# Patient Record
Sex: MALE | Race: WHITE | Employment: OTHER | URBAN - METROPOLITAN AREA
[De-identification: names, ages, dates, MRNs, and addresses within clinical notes are randomized per-mention and may not be internally consistent; named-entity substitution may affect disease eponyms.]

---

## 2017-02-22 ENCOUNTER — OFFICE VISIT (OUTPATIENT)
Dept: FAMILY MEDICINE CLINIC | Age: 77
End: 2017-02-22

## 2017-02-22 VITALS
BODY MASS INDEX: 26.84 KG/M2 | RESPIRATION RATE: 16 BRPM | WEIGHT: 171 LBS | DIASTOLIC BLOOD PRESSURE: 100 MMHG | HEIGHT: 67 IN | TEMPERATURE: 98.5 F | SYSTOLIC BLOOD PRESSURE: 162 MMHG | HEART RATE: 80 BPM | OXYGEN SATURATION: 95 %

## 2017-02-22 DIAGNOSIS — Z12.5 SCREENING FOR PROSTATE CANCER: ICD-10-CM

## 2017-02-22 DIAGNOSIS — C61 MALIGNANT NEOPLASM OF PROSTATE (HCC): ICD-10-CM

## 2017-02-22 DIAGNOSIS — G47.09 OTHER INSOMNIA: ICD-10-CM

## 2017-02-22 DIAGNOSIS — Z00.00 ROUTINE GENERAL MEDICAL EXAMINATION AT A HEALTH CARE FACILITY: Primary | ICD-10-CM

## 2017-02-22 DIAGNOSIS — R30.0 DYSURIA: ICD-10-CM

## 2017-02-22 DIAGNOSIS — N20.0 RENAL STONE: ICD-10-CM

## 2017-02-22 DIAGNOSIS — M25.50 ARTHRALGIA, UNSPECIFIED JOINT: ICD-10-CM

## 2017-02-22 DIAGNOSIS — Z13.6 SCREENING FOR ISCHEMIC HEART DISEASE: ICD-10-CM

## 2017-02-22 DIAGNOSIS — F43.23 ADJUSTMENT DISORDER WITH MIXED ANXIETY AND DEPRESSED MOOD: ICD-10-CM

## 2017-02-22 DIAGNOSIS — Z23 ENCOUNTER FOR IMMUNIZATION: ICD-10-CM

## 2017-02-22 DIAGNOSIS — I10 ESSENTIAL HYPERTENSION WITH GOAL BLOOD PRESSURE LESS THAN 140/90: ICD-10-CM

## 2017-02-22 DIAGNOSIS — F43.29 STRESS AND ADJUSTMENT REACTION: ICD-10-CM

## 2017-02-22 RX ORDER — ZOLPIDEM TARTRATE 10 MG/1
10 TABLET ORAL
Qty: 90 TAB | Refills: 1 | Status: SHIPPED | OUTPATIENT
Start: 2017-02-22 | End: 2017-05-15 | Stop reason: SDUPTHER

## 2017-02-22 RX ORDER — PAROXETINE HYDROCHLORIDE 20 MG/1
20 TABLET, FILM COATED ORAL DAILY
Qty: 90 TAB | Refills: 0 | Status: SHIPPED | OUTPATIENT
Start: 2017-02-22 | End: 2017-06-30 | Stop reason: SDUPTHER

## 2017-02-22 RX ORDER — OLMESARTAN MEDOXOMIL AND HYDROCHLOROTHIAZIDE 20/12.5 20; 12.5 MG/1; MG/1
1 TABLET ORAL DAILY
Qty: 90 TAB | Refills: 1 | Status: SHIPPED | OUTPATIENT
Start: 2017-02-22 | End: 2017-07-24 | Stop reason: SDUPTHER

## 2017-02-22 RX ORDER — HYDROCODONE BITARTRATE AND ACETAMINOPHEN 7.5; 325 MG/1; MG/1
1 TABLET ORAL
Qty: 120 TAB | Refills: 0 | Status: SHIPPED | OUTPATIENT
Start: 2017-02-22 | End: 2017-04-24 | Stop reason: SDUPTHER

## 2017-02-22 NOTE — ACP (ADVANCE CARE PLANNING)
Advance Care Planning (ACP) Provider Note - Comprehensive     Date of ACP Conversation: 02/22/17  Persons included in Conversation:  patient  Length of ACP Conversation in minutes:  18 minutes    Authorized Decision Maker (if patient is incapable of making informed decisions): This person is:  Healthcare Agent/Medical Power of  under Advance Directive miguel a Chao ACP for ALL Patients with Decision Making Capacity:   Importance of advance care planning, including choosing a healthcare agent to communicate patient's healthcare decisions if patient lost the ability to make decisions, such as after a sudden illness or accident  Understanding of the healthcare agent role was assessed and information provided  Exploration of values, goals, and preferences if recovery is not expected, even with continued medical treatment in the event of: Imminent death  Severe, permanent brain injury    Review of Existing Advance Directive:  n/a    For Serious or Chronic Illness:  Understanding of CPR, goals and expected outcomes, benefits and burdens discussed. Interventions Provided:  Recommended completion of Advance Directive form after review of ACP materials and conversation with prospective healthcare agent  No interventions desired.

## 2017-02-22 NOTE — PROGRESS NOTES
HISTORY OF PRESENT ILLNESS    Alma Hernandez is a 78y.o. year old male with: Anxiety/Depression/flank pain      HPI:    Above doing well with current Tx and needs refills for stable. Not taking benicar as Rx. PHQ-9 SCORE: 5   <--- 19 (11/9/16)  BILLY-7 SCORE: 8   <---12 (11/9/16)    Had episode flank pain prior which improved a lot but still issues with minimal urine output and some burning. Current Outpatient Prescriptions   Medication Sig Dispense Refill    PARoxetine (PAXIL) 10 mg tablet Take 1 Tab by mouth daily. 90 Tab 0    HYDROcodone-acetaminophen (NORCO) 7.5-325 mg per tablet Take 1 Tab by mouth every six (6) hours as needed for Pain. 120 Tab 0    zolpidem (AMBIEN) 10 mg tablet Take 1 Tab by mouth nightly as needed for Sleep. Max Daily Amount: 10 mg. 90 Tab 1    diazepam (VALIUM) 5 mg tablet TAKE 1/2 TO ONE TABLET BY MOUTH EVERY TWELVE HOURS AS NEEDED FOR ANXIETY (MAXIMUM 2 TABS PER DAY) 60 Tab 2    olmesartan-hydrochlorothiazide (BENICAR HCT) 20-12.5 mg per tablet Take 1 Tab by mouth daily. 90 Tab 1    latanoprost (XALATAN) 0.005 % ophthalmic solution   6     Patient Active Problem List   Diagnosis Code    Gross hematuria R31.0    Elevated PSA R97.20    Prediabetes R73.03    HTN (hypertension) I10    Insomnia G47.00    Joint pain M25.50     No family history on file. ROS:  See HPI. No fever SI, HI, rigoberto    Objective:  Visit Vitals    BP (!) 162/100 (BP 1 Location: Right arm, BP Patient Position: Sitting)    Pulse 80    Temp 98.5 °F (36.9 °C) (Oral)    Resp 16    Ht 5' 7\" (1.702 m)    Wt 171 lb (77.6 kg)    SpO2 95%    BMI 26.78 kg/m2     GEN:  Appears stated age in NAD. HEENT: Conjunctiva/lids normal.  External ears and nose without lesions/trauma. Hearing Intact. Tongue midline. NECK: Trachea midline. Supple. Full ROM  CARDIAC:  regular rate and rhythm. no Murmur, no peripheral edema. LUNGS: lungs clear to auscultation, no accessory muscle use. MS: no clubbing/cyanosis.   SKIN: Warm/dry without rash. PSYCH: Appropriate insight, Judgment. A&O x 3. Assessment/Plan:     ICD-10-CM ICD-9-CM    1. Routine general medical examination at a health care facility Z00.00 V70.0    2. Encounter for immunization Z23 V03.89 ADMIN PNEUMOCOCCAL VACCINE      PNEUMOCOCCAL POLYSACCHARIDE VACCINE, 23-VALENT, ADULT OR IMMUNOSUPPRESSED PT DOSE,   3. Renal stone N20.0 592.0 REFERRAL TO UROLOGY   4. Dysuria R30.0 788.1 REFERRAL TO UROLOGY   5. Screening for ischemic heart disease Z13.6 V81.0 LIPID PANEL      METABOLIC PANEL, COMPREHENSIVE   6. Screening for prostate cancer Z12.5 V76.44 PROSTATE SPECIFIC AG (PSA)   7. Malignant neoplasm of prostate (HCC)  C61 185 PROSTATE SPECIFIC AG (PSA)   8. Adjustment disorder with mixed anxiety and depressed mood F43.23 309.28    9. Arthralgia, unspecified joint M25.50 719.40    10. Other insomnia G47.09 780.52    11. Stress and adjustment reaction F43.29 309.89    12. Essential hypertension with goal blood pressure less than 140/90 I10 401.9        Patient verbalized understanding of plan. Has stopped BP med but will restart and refill as above and refer urology. RTC 3 months. See other note MWV.

## 2017-02-22 NOTE — PATIENT INSTRUCTIONS
Influenza (Flu) Vaccine: Care Instructions  Your Care Instructions  Influenza (flu) is an infection in the lungs and breathing passages. It is caused by the influenza virus. There are different strains, or types, of the flu virus every year. The flu comes on quickly. It can cause a cough, stuffy nose, fever, chills, tiredness, and aches and pains. These symptoms may last up to 10 days. The flu can make you feel very sick, but most of the time it doesn't lead to other problems. But it can cause serious problems in people who are older or who have a long-term illness, such as heart disease or diabetes. You can help prevent the flu by getting a flu vaccine every year, as soon as it is available. You cannot get the flu from the vaccine. The vaccine prevents most cases of the flu. But even when the vaccine doesn't prevent the flu, it can make symptoms less severe and reduce the chance of problems from the flu. Sometimes, young children and people who have an immune system problem may have a slight fever or muscle aches or pains 6 to 12 hours after getting the shot. These symptoms usually last 1 or 2 days. Follow-up care is a key part of your treatment and safety. Be sure to make and go to all appointments, and call your doctor if you are having problems. It's also a good idea to know your test results and keep a list of the medicines you take. Who should get the flu vaccine? Everyone age 7 months or older should get a flu vaccine each year. It lowers the chance of getting and spreading the flu. The vaccine is very important for people who are at high risk for getting other health problems from the flu. This includes:  · Anyone 48years of age or older. · People who live in a long-term care center, such as a nursing home. · All children 6 months through 25years of age. · Adults and children 6 months and older who have long-term heart or lung problems, such as asthma.   · Adults and children 6 months and older who needed medical care or were in a hospital during the past year because of diabetes, chronic kidney disease, or a weak immune system (including HIV or AIDS). · Women who will be pregnant during the flu season. · People who have any condition that can make it hard to breathe or swallow (such as a brain injury or muscle disorders). · People who can give the flu to others who are at high risk for problems from the flu. This includes all health care workers and close contacts of people age 72 or older. Who should not get the flu vaccine? The person who gives the vaccine may tell you not to get it if you:  · Have a severe allergy to eggs or any part of the vaccine. · Have had a severe reaction to a flu vaccine in the past.  · Have had Guillain-Barré syndrome (GBS). · Are sick with a fever. (Get the vaccine when symptoms are gone.)  How can you care for yourself at home? · If you or your child has a sore arm or a slight fever after the shot, take an over-the-counter pain medicine, such as acetaminophen (Tylenol) or ibuprofen (Advil, Motrin). Read and follow all instructions on the label. Do not give aspirin to anyone younger than 20. It has been linked to Reye syndrome, a serious illness. · Do not take two or more pain medicines at the same time unless the doctor told you to. Many pain medicines have acetaminophen, which is Tylenol. Too much acetaminophen (Tylenol) can be harmful. When should you call for help? Call 911 anytime you think you may need emergency care. For example, call if after getting the flu vaccine:  · You have symptoms of a severe reaction to the flu vaccine. Symptoms of a severe reaction may include:  ¨ Severe difficulty breathing. ¨ Sudden raised, red areas (hives) all over your body. ¨ Severe lightheadedness. Call your doctor now or seek immediate medical care if after getting the flu vaccine:  · You think you are having a reaction to the flu vaccine, such as a new fever.   Watch closely for changes in your health, and be sure to contact your doctor if you have any problems. Where can you learn more? Go to http://felix-shanique.info/. Enter K290 in the search box to learn more about \"Influenza (Flu) Vaccine: Care Instructions. \"  Current as of: August 1, 2016  Content Version: 11.1  © 4201-6625 [employer]. Care instructions adapted under license by [employer] (which disclaims liability or warranty for this information). If you have questions about a medical condition or this instruction, always ask your healthcare professional. [address] any warranty or liability for your use of this information.

## 2017-02-22 NOTE — PROGRESS NOTES
Patient is currently not taking the following medications and wants them removed from their list:    no    Learning Assessment (baseline): complete  Depression Screening: complete  Fall Risk:  complete    1. Have you been to the ER, urgent care clinic since your last visit? Hospitalized since your last visit? No    2. Have you seen or consulted any other health care providers outside of the [address] [place] [address] since your last visit? Include any pap smears or colon screening.  No      Patient is due for the following immunizations:    Influenza: completed  Pneumococcal:completed

## 2017-02-22 NOTE — PROGRESS NOTES
This is a Subsequent Medicare Annual Wellness Visit providing Personalized Prevention Plan Services (PPPS) (Performed 12 months after initial AWV and PPPS )    I have reviewed the patient's medical history in detail and updated the computerized patient record. History     Past Medical History:   Diagnosis Date    Asthma     Elevated PSA     Hypertension     Insomnia     Kidney stone     Malaria     Prediabetes     Skin cancer       Past Surgical History:   Procedure Laterality Date    HX COLONOSCOPY  2011    f/u 2021    HX HERNIA REPAIR  2000    [address]    HX SKIN BIOPSY  2013    [address], [place]     Current Outpatient Prescriptions   Medication Sig Dispense Refill    PARoxetine (PAXIL) 10 mg tablet Take 1 Tab by mouth daily. 90 Tab 0    HYDROcodone-acetaminophen (NORCO) 7.5-325 mg per tablet Take 1 Tab by mouth every six (6) hours as needed for Pain. 120 Tab 0    zolpidem (AMBIEN) 10 mg tablet Take 1 Tab by mouth nightly as needed for Sleep. Max Daily Amount: 10 mg. 90 Tab 1    diazepam (VALIUM) 5 mg tablet TAKE 1/2 TO ONE TABLET BY MOUTH EVERY TWELVE HOURS AS NEEDED FOR ANXIETY (MAXIMUM 2 TABS PER DAY) 60 Tab 2    olmesartan-hydrochlorothiazide (BENICAR HCT) 20-12.5 mg per tablet Take 1 Tab by mouth daily. 90 Tab 1    latanoprost (XALATAN) 0.005 % ophthalmic solution   6     Allergies   Allergen Reactions    Fluconazole Hives and Itching    Penicillin G Hives     No family history on file.   Social History   Substance Use Topics    Smoking status: Never Smoker    Smokeless tobacco: Never Used    Alcohol use No     Patient Active Problem List   Diagnosis Code    Gross hematuria R31.0    Elevated PSA R97.20    Prediabetes R73.03    HTN (hypertension) I10    Insomnia G47.00    Joint pain M25.50       Depression Risk Factor Screening:     PHQ 2 / 9, over the last two weeks 11/9/2016   Little interest or pleasure in doing things Nearly every day   Feeling down, depressed or hopeless More than half the days   Total Score PHQ 2 5   Trouble falling or staying asleep, or sleeping too much -   Feeling tired or having little energy -   Poor appetite or overeating -   Feeling bad about yourself - or that you are a failure or have let yourself or your family down -   Trouble concentrating on things such as school, work, reading or watching TV -   Moving or speaking so slowly that other people could have noticed; or the opposite being so fidgety that others notice -   Thoughts of being better off dead, or hurting yourself in some way -   PHQ 9 Score -     Alcohol Risk Factor Screening: On any occasion during the past 3 months, have you had more than 4 drinks containing alcohol? No    Do you average more than 14 drinks per week? No      Functional Ability and Level of Safety:     Hearing Loss   none    Activities of Daily Living   Self-care. Requires assistance with: no ADLs    Fall Risk     Fall Risk Assessment, last 12 mths 2/22/2017   Able to walk? Yes   Fall in past 12 months? No     Abuse Screen   None  Patient is not abused    Review of Systems   A comprehensive review of systems was negative except for that written in the HPI.     Physical Examination     Evaluation of Cognitive Function:  Mood/affect:  happy  Appearance: age appropriate and casually dressed  Family member/caregiver input: n/a    Visit Vitals    BP (!) 162/100 (BP 1 Location: Right arm, BP Patient Position: Sitting)    Pulse 80    Temp 98.5 °F (36.9 °C) (Oral)    Resp 16    Ht 5' 7\" (1.702 m)    Wt 171 lb (77.6 kg)    SpO2 95%    BMI 26.78 kg/m2     Patient Care Team:  Leeanne Chacon DO as PCP - [facility])    Advice/Referrals/Counseling   Education and counseling provided:  Are appropriate based on today's review and evaluation  End-of-Life planning (with patient's consent)  Prostate cancer screening tests (PSA, covered annually)  Cardiovascular screening blood test    Assessment/Plan       ICD-10-CM ICD-9-CM    1. Routine general medical examination at a health care facility Z00.00 V70.0    2. Encounter for immunization Z23 V03.89 ADMIN PNEUMOCOCCAL VACCINE      PNEUMOCOCCAL POLYSACCHARIDE VACCINE, 23-VALENT, ADULT OR IMMUNOSUPPRESSED PT DOSE,   3. Renal stone N20.0 592.0 REFERRAL TO UROLOGY   4. Dysuria R30.0 788.1 REFERRAL TO UROLOGY   5. Screening for ischemic heart disease Z13.6 V81.0 LIPID PANEL      METABOLIC PANEL, COMPREHENSIVE   6. Screening for prostate cancer Z12.5 V76.44 PROSTATE SPECIFIC AG (PSA)   7. Malignant neoplasm of prostate (HCC)  C61 185 PROSTATE SPECIFIC AG (PSA)   8. Adjustment disorder with mixed anxiety and depressed mood F43.23 309.28 PARoxetine (PAXIL) 20 mg tablet   9. Arthralgia, unspecified joint M25.50 719.40 HYDROcodone-acetaminophen (NORCO) 7.5-325 mg per tablet   10. Other insomnia G47.09 780.52 zolpidem (AMBIEN) 10 mg tablet   11. Stress and adjustment reaction F43.29 309.89    12. Essential hypertension with goal blood pressure less than 140/90 I10 401.9 olmesartan-hydroCHLOROthiazide (BENICAR HCT) 20-12.5 mg per tablet     5-10 year plan provided and discussed. Will notify results labs. Voiced understanding of plan. See other note E/M.

## 2017-02-22 NOTE — MR AVS SNAPSHOT
Visit Information Date & Time Provider Department Dept. Phone Encounter #  
 2/22/2017 11:20 AM Vanesa Lyles, [address] [id number] Follow-up Instructions Return in about 3 months (around 5/22/2017) for depression, anxiety, pain. Upcoming Health Maintenance Date Due DTaP/Tdap/Td series (1 - Tdap) 5/8/1963 ZOSTER VACCINE AGE 60> 5/8/2002 GLAUCOMA SCREENING Q2Y 5/8/2007 MEDICARE YEARLY EXAM 2/2/2017 Pneumococcal 65+ Low/Medium Risk (2 of 2 - PPSV23) 9/5/2017 COLONOSCOPY 5/11/2021 Allergies as of 2/22/2017  Review Complete On: 2/22/2017 By: Vanesa Lyles DO Severity Noted Reaction Type Reactions Fluconazole  05/31/2013    Hives, Itching Penicillin G  05/31/2013    Hives Current Immunizations  Never Reviewed Name Date Influenza High Dose Vaccine PF 11/9/2016, 9/24/2014 Influenza Vaccine 9/17/2013 Influenza Vaccine (Quad) PF 8/28/2015 Pneumococcal Conjugate (PCV-13)  Incomplete Pneumococcal Vaccine (Unspecified Type) 9/5/2012 Not reviewed this visit You Were Diagnosed With   
  
 Codes Comments Routine general medical examination at a health care facility    -  Primary ICD-10-CM: Z00.00 ICD-9-CM: V70.0 Renal stone     ICD-10-CM: N20.0 ICD-9-CM: 592.0 Dysuria     ICD-10-CM: R30.0 ICD-9-CM: 788.1 Screening for ischemic heart disease     ICD-10-CM: Z13.6 ICD-9-CM: V81.0 Screening for prostate cancer     ICD-10-CM: Z12.5 ICD-9-CM: V76.44 Malignant neoplasm of prostate [facility])     ICD-10-CM: B55 ICD-9-CM: 516 Adjustment disorder with mixed anxiety and depressed mood     ICD-10-CM: F43.23 
ICD-9-CM: 309.28 Arthralgia, unspecified joint     ICD-10-CM: M25.50 ICD-9-CM: 719.40 Other insomnia     ICD-10-CM: G47.09 
ICD-9-CM: 780.52 Stress and adjustment reaction     ICD-10-CM: F43.29 ICD-9-CM: 309.89   
 Essential hypertension with goal blood pressure less than 140/90     ICD-10-CM: I10 
ICD-9-CM: 401.9 Encounter for immunization     ICD-10-CM: H79 ICD-9-CM: V03.89 Vitals BP  
  
  
  
  
  
 (!) 162/100 (BP 1 Location: Right arm, BP Patient Position: Sitting) Vitals History BMI and BSA Data Body Mass Index Body Surface Area  
 26.78 kg/m 2 1.92 m 2 Preferred Pharmacy Pharmacy Name Phone [facility]/PHARMACY #[id number] Salima Barrera [address] [facility] [facility] [phone] Your Updated Medication List  
  
   
This list is accurate as of: 2/22/17 11:28 AM.  Always use your most recent med list.  
  
  
  
  
 HYDROcodone-acetaminophen 7.5-325 mg per tablet Commonly known as:  Harlan Bent Take 1 Tab by mouth every six (6) hours as needed for Pain.  
  
 latanoprost 0.005 % ophthalmic solution Commonly known as:  XALATAN  
  
 olmesartan-hydroCHLOROthiazide 20-12.5 mg per tablet Commonly known as:  BENICAR HCT Take 1 Tab by mouth daily. PARoxetine 20 mg tablet Commonly known as:  PAXIL Take 1 Tab by mouth daily. zolpidem 10 mg tablet Commonly known as:  AMBIEN Take 1 Tab by mouth nightly as needed for Sleep. Max Daily Amount: 10 mg.  
  
  
  
  
Prescriptions Printed Refills HYDROcodone-acetaminophen (NORCO) 7.5-325 mg per tablet 0 Sig: Take 1 Tab by mouth every six (6) hours as needed for Pain. Class: Print Route: Oral  
 zolpidem (AMBIEN) 10 mg tablet 1 Sig: Take 1 Tab by mouth nightly as needed for Sleep. Max Daily Amount: 10 mg.  
 Class: Print Route: Oral  
  
Prescriptions Sent to Pharmacy Refills PARoxetine (PAXIL) 20 mg tablet 0 Sig: Take 1 Tab by mouth daily. Class: Normal  
 Pharmacy: [facility]/pharmacy [id number], [address] [facility],[address] #: [phone] Route: Oral  
 olmesartan-hydroCHLOROthiazide (BENICAR HCT) 20-12.5 mg per tablet 1 Sig: Take 1 Tab by mouth daily.   
 Class: Normal  
 [facility] [address] [place] [address],[address] R [facility] #: [phone] Route: Oral  
  
We Performed the Following PNEUMOCOCCAL CONJ VACCINE 13 VALENT IM [id number] CPT(R)] UT IMMUNIZ ADMIN,1 SINGLE/COMB VAC/TOXOID [id number] CPT(R)] REFERRAL TO UROLOGY [EOD513 Custom] Follow-up Instructions Return in about 3 months (around 5/22/2017) for depression, anxiety, pain. To-Do List   
 02/22/2017 Lab:  LIPID PANEL   
  
 02/22/2017 Lab:  METABOLIC PANEL, COMPREHENSIVE   
  
 02/22/2017 Lab:  PROSTATE SPECIFIC AG (PSA) Referral Information Referral ID Referred By Referred To  
  
 [id number] [facility], [place] Rasheed Willis Justine   
   [address] [place] [address], [address] [place] [address],[place] [phone] Phone: [phone] Visits Status Start Date End Date 1 New Request 2/22/17 2/22/18 If your referral has a status of pending review or denied, additional information will be sent to support the outcome of this decision. Patient Instructions Influenza (Flu) Vaccine: Care Instructions Your Care Instructions Influenza (flu) is an infection in the lungs and breathing passages. It is caused by the influenza virus. There are different strains, or types, of the flu virus every year. The flu comes on quickly. It can cause a cough, stuffy nose, fever, chills, tiredness, and aches and pains. These symptoms may last up to 10 days. The flu can make you feel very sick, but most of the time it doesn't lead to other problems. But it can cause serious problems in people who are older or who have a long-term illness, such as heart disease or diabetes. You can help prevent the flu by getting a flu vaccine every year, as soon as it is available. You cannot get the flu from the vaccine. The vaccine prevents most cases of the flu. But even when the vaccine doesn't prevent the flu, it can make symptoms less severe and reduce the chance of problems from the flu. Sometimes, young children and people who have an immune system problem may have a slight fever or muscle aches or pains 6 to 12 hours after getting the shot. These symptoms usually last 1 or 2 days. Follow-up care is a key part of your treatment and safety. Be sure to make and go to all appointments, and call your doctor if you are having problems. It's also a good idea to know your test results and keep a list of the medicines you take. Who should get the flu vaccine? Everyone age 7 months or older should get a flu vaccine each year. It lowers the chance of getting and spreading the flu. The vaccine is very important for people who are at high risk for getting other health problems from the flu. This includes: · Anyone 48years of age or older. · People who live in a long-term care center, such as a nursing home. · All children 6 months through 25years of age. · Adults and children 6 months and older who have long-term heart or lung problems, such as asthma. · Adults and children 6 months and older who needed medical care or were in a hospital during the past year because of diabetes, chronic kidney disease, or a weak immune system (including HIV or AIDS). · Women who will be pregnant during the flu season. · People who have any condition that can make it hard to breathe or swallow (such as a brain injury or muscle disorders). · People who can give the flu to others who are at high risk for problems from the flu. This includes all health care workers and close contacts of people age 72 or older. Who should not get the flu vaccine? The person who gives the vaccine may tell you not to get it if you: 
· Have a severe allergy to eggs or any part of the vaccine. · Have had a severe reaction to a flu vaccine in the past. 
· Have had Guillain-Barré syndrome (GBS). · Are sick with a fever. (Get the vaccine when symptoms are gone.) How can you care for yourself at home? · If you or your child has a sore arm or a slight fever after the shot, take an over-the-counter pain medicine, such as acetaminophen (Tylenol) or ibuprofen (Advil, Motrin). Read and follow all instructions on the label. Do not give aspirin to anyone younger than 20. It has been linked to Reye syndrome, a serious illness. · Do not take two or more pain medicines at the same time unless the doctor told you to. Many pain medicines have acetaminophen, which is Tylenol. Too much acetaminophen (Tylenol) can be harmful. When should you call for help? Call 911 anytime you think you may need emergency care. For example, call if after getting the flu vaccine: 
· You have symptoms of a severe reaction to the flu vaccine. Symptoms of a severe reaction may include: ¨ Severe difficulty breathing. ¨ Sudden raised, red areas (hives) all over your body. ¨ Severe lightheadedness. Call your doctor now or seek immediate medical care if after getting the flu vaccine: 
· You think you are having a reaction to the flu vaccine, such as a new fever. Watch closely for changes in your health, and be sure to contact your doctor if you have any problems. Where can you learn more? Go to http://felix-shanique.info/. Enter C328 in the search box to learn more about \"Influenza (Flu) Vaccine: Care Instructions. \" Current as of: August 1, 2016 Content Version: 11.1 © 2869-4338 doggyloot. Care instructions adapted under license by [employer] (which disclaims liability or warranty for this information). If you have questions about a medical condition or this instruction, always ask your healthcare professional. [place] [address] any warranty or liability for your use of this information. Introducing [facility] & HEALTH SERVICES!    
 [facility] introduces [employer] patient portal. Now you can access parts of your medical record, email your doctor's office, and request medication refills online. 1. In your internet browser, go to [email or web address] 2. Click on the First Time User? Click Here link in the Sign In box. You will see the New Member Sign Up page. 3. Enter your [employer] Access Code exactly as it appears below. You will not need to use this code after youve completed the sign-up process. If you do not sign up before the expiration date, you must request a new code. · Ad[employer] Access Code: [email or web address] Expires: 5/23/2017 11:23 AM 
 
4. Enter the last four digits of your Social Security Number (xxxx) and Date of Birth (mm/dd/yyyy) as indicated and click Submit. You will be taken to the next sign-up page. 5. Create a Adzuna ID. This will be your Adzuna login ID and cannot be changed, so think of one that is secure and easy to remember. 6. Create a Adzuna password. You can change your password at any time. 7. Enter your Password Reset Question and Answer. This can be used at a later time if you forget your password. 8. Enter your e-mail address. You will receive e-mail notification when new information is available in [address] 9. Click Sign Up. You can now view and download portions of your medical record. 10. Click the Download Summary menu link to download a portable copy of your medical information. If you have questions, please visit the Frequently Asked Questions section of the Ad[employer] website. Remember, [employer] is NOT to be used for urgent needs. For medical emergencies, dial 911. Now available from your iPhone and Android! Please provide this summary of care documentation to your next provider. Your primary care clinician is listed as Donya Adkins. If you have any questions after today's visit, please call [phone].

## 2017-03-14 ENCOUNTER — OFFICE VISIT (OUTPATIENT)
Dept: UROLOGY | Age: 77
End: 2017-03-14

## 2017-03-14 ENCOUNTER — HOSPITAL ENCOUNTER (OUTPATIENT)
Dept: LAB | Age: 77
Discharge: HOME | End: 2017-03-14
Payer: MEDICARE

## 2017-03-14 VITALS
BODY MASS INDEX: 25.74 KG/M2 | HEIGHT: 67 IN | WEIGHT: 164 LBS | HEART RATE: 93 BPM | SYSTOLIC BLOOD PRESSURE: 132 MMHG | DIASTOLIC BLOOD PRESSURE: 84 MMHG | OXYGEN SATURATION: 94 %

## 2017-03-14 DIAGNOSIS — N20.0 KIDNEY STONES: Primary | ICD-10-CM

## 2017-03-14 DIAGNOSIS — N20.0 KIDNEY STONES: ICD-10-CM

## 2017-03-14 DIAGNOSIS — R97.20 ELEVATED PSA: ICD-10-CM

## 2017-03-14 DIAGNOSIS — N40.1 BENIGN NON-NODULAR PROSTATIC HYPERPLASIA WITH LOWER URINARY TRACT SYMPTOMS: ICD-10-CM

## 2017-03-14 LAB
BILIRUB UR QL STRIP: NORMAL
CREAT SERPL-MCNC: 1.11 MG/DL (ref 0.6–1.3)
GLUCOSE UR-MCNC: NEGATIVE MG/DL
KETONES P FAST UR STRIP-MCNC: NEGATIVE MG/DL
PH UR STRIP: 5 [PH] (ref 4.6–8)
PROT UR QL STRIP: NEGATIVE MG/DL
PSA SERPL-MCNC: 3.8 NG/ML (ref 0–4)
SP GR UR STRIP: 1.02 (ref 1–1.03)
UA UROBILINOGEN AMB POC: NORMAL (ref 0.2–1)
URINALYSIS CLARITY POC: CLEAR
URINALYSIS COLOR POC: YELLOW
URINE BLOOD POC: NEGATIVE
URINE LEUKOCYTES POC: NEGATIVE
URINE NITRITES POC: NEGATIVE

## 2017-03-14 PROCEDURE — 82565 ASSAY OF CREATININE: CPT | Performed by: UROLOGY

## 2017-03-14 PROCEDURE — 84153 ASSAY OF PSA TOTAL: CPT | Performed by: UROLOGY

## 2017-03-14 RX ORDER — DIAZEPAM 5 MG/1
TABLET ORAL
Refills: 2 | COMMUNITY
Start: 2017-03-08 | End: 2017-05-15 | Stop reason: SDUPTHER

## 2017-03-14 NOTE — MR AVS SNAPSHOT
Visit Information Date & Time Provider Department Dept. Phone Encounter #  
 3/14/2017  2:00 PM Lonnie Davidson, Jaky Inwood [facility] [phone] [id number] Your Appointments 4/24/2017 11:40 AM  
ROUTINE CARE with Araseli Conner  Beltran Garza ([facility]) Appt Note: 2 m fu depression/anxiety pain [address] [address] 2 Aaron [place] [address] [address] Upcoming Health Maintenance Date Due DTaP/Tdap/Td series (1 - Tdap) 5/8/1963 ZOSTER VACCINE AGE 60> 5/8/2002 GLAUCOMA SCREENING Q2Y 5/8/2007 MEDICARE YEARLY EXAM 2/23/2018 COLONOSCOPY 5/11/2021 Allergies as of 3/14/2017  Review Complete On: 3/14/2017 By: Lonnie Davidson MD  
  
 Severity Noted Reaction Type Reactions Fluconazole  05/31/2013    Hives, Itching Penicillin G  05/31/2013    Hives Current Immunizations  Never Reviewed Name Date Influenza High Dose Vaccine PF 11/9/2016, 9/24/2014 Influenza Vaccine 9/17/2013 Influenza Vaccine (Quad) PF 8/28/2015 Pneumococcal Conjugate (PCV-13) 2/22/2017 Pneumococcal Vaccine (Unspecified Type) 9/5/2012 Not reviewed this visit You Were Diagnosed With   
  
 Codes Comments Kidney stones    -  Primary ICD-10-CM: N20.0 ICD-9-CM: 592.0 Vitals BP Pulse Height(growth percentile) Weight(growth percentile) SpO2 BMI  
 132/84 (BP 1 Location: Right arm, BP Patient Position: Sitting) 93 5' 7\" (1.702 m) 164 lb (74.4 kg) 94% 25.69 kg/m2 Smoking Status Never Smoker Vitals History BMI and BSA Data Body Mass Index Body Surface Area  
 25.69 kg/m 2 1.88 m 2 Preferred Pharmacy Pharmacy Name Phone [id number] Sofia [place], [address] [facility],[address] [facility] [phone] Your Updated Medication List  
  
   
 This list is accurate as of: 3/14/17  3:20 PM.  Always use your most recent med list.  
  
  
  
  
 diazePAM 5 mg tablet Commonly known as:  VALIUM  
TAKE 1/2-1 TAB BY MOUTH EVERY 12 HOURS AS NEEDED FOR ANXIETY MAX 2 TABS PER DAY HYDROcodone-acetaminophen 7.5-325 mg per tablet Commonly known as:  Anupam Rosa Take 1 Tab by mouth every six (6) hours as needed for Pain.  
  
 latanoprost 0.005 % ophthalmic solution Commonly known as:  XALATAN  
  
 olmesartan-hydroCHLOROthiazide 20-12.5 mg per tablet Commonly known as:  BENICAR HCT Take 1 Tab by mouth daily. PARoxetine 20 mg tablet Commonly known as:  PAXIL Take 1 Tab by mouth daily. zolpidem 10 mg tablet Commonly known as:  AMBIEN Take 1 Tab by mouth nightly as needed for Sleep. Max Daily Amount: 10 mg. We Performed the Following AMB POC URINALYSIS DIP STICK AUTO W/O MICRO [55155 CPT(R)] To-Do List   
 03/14/2017 Imaging:  CT ABD PELV WO CONT Patient Instructions Kidney Stone: Care Instructions Your Care Instructions Kidney stones are formed when salts, minerals, and other substances normally found in the urine clump together. They can be as small as grains of sand or, rarely, as large as golf balls. While the stone is traveling through the ureter, which is the tube that carries urine from the kidney to the bladder, you will probably feel pain. The pain may be mild or very severe. You may also have some blood in your urine. As soon as the stone reaches the bladder, any intense pain should go away. If a stone is too large to pass on its own, you may need a medical procedure to help you pass the stone. The doctor has checked you carefully, but problems can develop later. If you notice any problems or new symptoms, get medical treatment right away. Follow-up care is a key part of your treatment and safety.  Be sure to make and go to all appointments, and call your doctor if you are having problems. It's also a good idea to know your test results and keep a list of the medicines you take. How can you care for yourself at home? · Drink plenty of fluids, enough so that your urine is light yellow or clear like water. If you have kidney, heart, or liver disease and have to limit fluids, talk with your doctor before you increase the amount of fluids you drink. · Take pain medicines exactly as directed. Call your doctor if you think you are having a problem with your medicine. ¨ If the doctor gave you a prescription medicine for pain, take it as prescribed. ¨ If you are not taking a prescription pain medicine, ask your doctor if you can take an over-the-counter medicine. Read and follow all instructions on the label. · Your doctor may ask you to strain your urine so that you can collect your kidney stone when it passes. You can use a kitchen strainer or a tea strainer to catch the stone. Store it in a plastic bag until you see your doctor again. Preventing future kidney stones Some changes in your diet may help prevent kidney stones. Depending on the cause of your stones, your doctor may recommend that you: · Drink plenty of fluids, enough so that your urine is light yellow or clear like water. If you have kidney, heart, or liver disease and have to limit fluids, talk with your doctor before you increase the amount of fluids you drink. · Limit coffee, tea, and alcohol. Also avoid grapefruit juice. · Do not take more than the recommended daily dose of vitamins C and D. 
· Avoid antacids such as Gaviscon, Maalox, Mylanta, or Tums. · Limit the amount of salt (sodium) in your diet. · Eat a balanced diet that is not too high in protein. · Limit foods that are high in a substance called oxalate, which can cause kidney stones. These foods include dark green vegetables, rhubarb, chocolate, wheat bran, nuts, cranberries, and beans. When should you call for help? Call your doctor now or seek immediate medical care if: 
· You cannot keep down fluids. · Your pain gets worse. · You have a fever or chills. · You have new or worse pain in your back just below your rib cage (the flank area). · You have new or more blood in your urine. Watch closely for changes in your health, and be sure to contact your doctor if: 
· You do not get better as expected. Where can you learn more? Go to http://felix-shanique.info/. Enter H343 in the search box to learn more about \"Kidney Stone: Care Instructions. \" Current as of: November 20, 2015 Content Version: 11.1 © 2685-1357 [employer]. Care instructions adapted under license by [employer] (which disclaims liability or warranty for this information). If you have questions about a medical condition or this instruction, always ask your healthcare professional. [address] any warranty or liability for your use of this information. Introducing hospitals & HEALTH SERVICES! Romayne Duster introduces [employer] patient portal. Now you can access parts of your medical record, email your doctor's office, and request medication refills online. 1. In your internet browser, go to [email or web address] 2. Click on the First Time User? Click Here link in the Sign In box. You will see the New Member Sign Up page. 3. Enter your [employer] Access Code exactly as it appears below. You will not need to use this code after youve completed the sign-up process. If you do not sign up before the expiration date, you must request a new code. · [employer] Access Code: [email or web address] Expires: 5/23/2017 12:23 PM 
 
4. Enter the last four digits of your Social Security Number (xxxx) and Date of Birth (mm/dd/yyyy) as indicated and click Submit. You will be taken to the next sign-up page. 5. Create a [employer] ID. This will be your [employer] login ID and cannot be changed, so think of one that is secure and easy to remember. 6. Create a [employer] password. You can change your password at any time. 7. Enter your Password Reset Question and Answer. This can be used at a later time if you forget your password. 8. Enter your e-mail address. You will receive e-mail notification when new information is available in [address] 9. Click Sign Up. You can now view and download portions of your medical record. 10. Click the Download Summary menu link to download a portable copy of your medical information. If you have questions, please visit the Frequently Asked Questions section of the [employer] website. Remember, [employer] is NOT to be used for urgent needs. For medical emergencies, dial 911. Now available from your iPhone and Android! Please provide this summary of care documentation to your next provider. Your primary care clinician is listed as Wale Villegas. If you have any questions after today's visit, please call [phone].

## 2017-03-14 NOTE — PROGRESS NOTES
RBV per Dr. Sumit Becerra blood drawn in office today for PSA for Elevated PSA and Creatinine for Kidney stones.

## 2017-03-14 NOTE — PATIENT INSTRUCTIONS
Kidney Stone: Care Instructions  Your Care Instructions    Kidney stones are formed when salts, minerals, and other substances normally found in the urine clump together. They can be as small as grains of sand or, rarely, as large as golf balls. While the stone is traveling through the ureter, which is the tube that carries urine from the kidney to the bladder, you will probably feel pain. The pain may be mild or very severe. You may also have some blood in your urine. As soon as the stone reaches the bladder, any intense pain should go away. If a stone is too large to pass on its own, you may need a medical procedure to help you pass the stone. The doctor has checked you carefully, but problems can develop later. If you notice any problems or new symptoms, get medical treatment right away. Follow-up care is a key part of your treatment and safety. Be sure to make and go to all appointments, and call your doctor if you are having problems. It's also a good idea to know your test results and keep a list of the medicines you take. How can you care for yourself at home? · Drink plenty of fluids, enough so that your urine is light yellow or clear like water. If you have kidney, heart, or liver disease and have to limit fluids, talk with your doctor before you increase the amount of fluids you drink. · Take pain medicines exactly as directed. Call your doctor if you think you are having a problem with your medicine. ¨ If the doctor gave you a prescription medicine for pain, take it as prescribed. ¨ If you are not taking a prescription pain medicine, ask your doctor if you can take an over-the-counter medicine. Read and follow all instructions on the label. · Your doctor may ask you to strain your urine so that you can collect your kidney stone when it passes. You can use a kitchen strainer or a tea strainer to catch the stone. Store it in a plastic bag until you see your doctor again.   Preventing future kidney stones  Some changes in your diet may help prevent kidney stones. Depending on the cause of your stones, your doctor may recommend that you:  · Drink plenty of fluids, enough so that your urine is light yellow or clear like water. If you have kidney, heart, or liver disease and have to limit fluids, talk with your doctor before you increase the amount of fluids you drink. · Limit coffee, tea, and alcohol. Also avoid grapefruit juice. · Do not take more than the recommended daily dose of vitamins C and D.  · Avoid antacids such as Gaviscon, Maalox, Mylanta, or Tums. · Limit the amount of salt (sodium) in your diet. · Eat a balanced diet that is not too high in protein. · Limit foods that are high in a substance called oxalate, which can cause kidney stones. These foods include dark green vegetables, rhubarb, chocolate, wheat bran, nuts, cranberries, and beans. When should you call for help? Call your doctor now or seek immediate medical care if:  · You cannot keep down fluids. · Your pain gets worse. · You have a fever or chills. · You have new or worse pain in your back just below your rib cage (the flank area). · You have new or more blood in your urine. Watch closely for changes in your health, and be sure to contact your doctor if:  · You do not get better as expected. Where can you learn more? Go to http://felix-shanique.info/. Enter G809 in the search box to learn more about \"Kidney Stone: Care Instructions. \"  Current as of: November 20, 2015  Content Version: 11.1  © 4682-4869 [employer]. Care instructions adapted under license by [employer] (which disclaims liability or warranty for this information). If you have questions about a medical condition or this instruction, always ask your healthcare professional. [address] any warranty or liability for your use of this information.

## 2017-03-14 NOTE — PROGRESS NOTES
Mr. Trupti Naylor has a reminder for a \"due or due soon\" health maintenance. I have asked that he contact his primary care provider for follow-up on this health maintenance.

## 2017-03-15 NOTE — PROGRESS NOTES
Karlee Sue 78 y.o. male     Mr. Rupesh Freedman seen today for evaluation of suspected kidney stone disease  Patient has been experiencing mild intermittent colicky left flank pain for the past 3 months and on 2 occasions last month passed small kidney stones spontaneously  Patient has history of kidney stones undergoing ureteroscopic stone extraction in the early 2000s  Patient complains of mild urinary urgency, frequency, and nocturia once per night-solid urinary stream no hesitancy or intermittency/  no dysuria  Dysuria or gross hematuria  Patient expresses concern for prospect of kidney stones retained within the bladder    Review of Systems:   CNS: No seizures syncope headaches dizziness or visual changes/history of depression  Respiratory: No wheezing no shortness of breath  Cardiovascular: Hypertension no chest pain no palpitations-  Intestinal: No dyspepsia diarrhea or constipation  Urinary: Irritative voiding symptoms  Skeletal: No bone or joint pain  Endocrine: No diabetes or thyroid disease  Other:    Allergies: Allergies   Allergen Reactions    Fluconazole Hives and Itching    Penicillin G Hives      Medications:    Current Outpatient Prescriptions   Medication Sig Dispense Refill    diazePAM (VALIUM) 5 mg tablet TAKE 1/2-1 TAB BY MOUTH EVERY 12 HOURS AS NEEDED FOR ANXIETY MAX 2 TABS PER DAY  2    PARoxetine (PAXIL) 20 mg tablet Take 1 Tab by mouth daily. 90 Tab 0    HYDROcodone-acetaminophen (NORCO) 7.5-325 mg per tablet Take 1 Tab by mouth every six (6) hours as needed for Pain. 120 Tab 0    zolpidem (AMBIEN) 10 mg tablet Take 1 Tab by mouth nightly as needed for Sleep. Max Daily Amount: 10 mg. 90 Tab 1    olmesartan-hydroCHLOROthiazide (BENICAR HCT) 20-12.5 mg per tablet Take 1 Tab by mouth daily.  90 Tab 1    latanoprost (XALATAN) 0.005 % ophthalmic solution   6       Past Medical History:   Diagnosis Date    Asthma     Elevated PSA     Hypertension     Insomnia     Kidney stone     Malaria  Prediabetes     Skin cancer       Past Surgical History:   Procedure Laterality Date    HX COLONOSCOPY  2011    f/u 2021    HX HERNIA REPAIR  2000    [id number] [employer]    HX SKIN BIOPSY  2013    [id number], [place]     History reviewed. No pertinent family history. Physical Examination: Well-nourished mature male in no apparent distress    Abdomen is nontender no palpable masses no organomegaly  Back-no percussion CVA tenderness on either side  No inguinal hernia or adenopathy  Penis is normal  Testes are normal in size shape and consistency bilaterally-no epididymal induration or tenderness on either side  Spermatic cords are palpably normal bilaterally  Scrotum is normal  Prostate by CARA is rounded, smooth, benign in consistency and nontender-no induration no nodularity  No rectal masses tenderness or induration      Urinalysis: Negative dipstick/nitrite negative    PVR today 30 cc                           prostate volume 56 cc    Impression: Symptomatic kidney stones                        Symptomatic BPH        Plan: PSA/ Cr  today            Plain CT scan imaging of abdomen and pelvis    Kidney stone precautions    rtc 1 week        Junie Najera MD  -electronically signed-    PLEASE NOTE:  This document has been produced using voice recognition software. Unrecognized errors in transcription may be present.

## 2017-04-24 ENCOUNTER — OFFICE VISIT (OUTPATIENT)
Dept: FAMILY MEDICINE CLINIC | Age: 77
End: 2017-04-24

## 2017-04-24 VITALS
DIASTOLIC BLOOD PRESSURE: 74 MMHG | WEIGHT: 171 LBS | BODY MASS INDEX: 26.84 KG/M2 | HEIGHT: 67 IN | OXYGEN SATURATION: 98 % | RESPIRATION RATE: 16 BRPM | HEART RATE: 84 BPM | TEMPERATURE: 98.4 F | SYSTOLIC BLOOD PRESSURE: 120 MMHG

## 2017-04-24 DIAGNOSIS — M25.50 ARTHRALGIA, UNSPECIFIED JOINT: ICD-10-CM

## 2017-04-24 DIAGNOSIS — F51.01 PRIMARY INSOMNIA: Primary | ICD-10-CM

## 2017-04-24 DIAGNOSIS — R73.03 PREDIABETES: ICD-10-CM

## 2017-04-24 DIAGNOSIS — F41.9 ANXIETY: ICD-10-CM

## 2017-04-24 RX ORDER — HYDROCODONE BITARTRATE AND ACETAMINOPHEN 7.5; 325 MG/1; MG/1
1 TABLET ORAL
Qty: 120 TAB | Refills: 0 | Status: SHIPPED | OUTPATIENT
Start: 2017-04-24 | End: 2017-04-24 | Stop reason: SDUPTHER

## 2017-04-24 RX ORDER — HYDROCODONE BITARTRATE AND ACETAMINOPHEN 7.5; 325 MG/1; MG/1
1 TABLET ORAL
Qty: 120 TAB | Refills: 0 | Status: SHIPPED | OUTPATIENT
Start: 2017-06-22 | End: 2017-07-24 | Stop reason: SDUPTHER

## 2017-04-24 RX ORDER — HYDROCODONE BITARTRATE AND ACETAMINOPHEN 7.5; 325 MG/1; MG/1
1 TABLET ORAL
Qty: 120 TAB | Refills: 0 | Status: SHIPPED | OUTPATIENT
Start: 2017-05-23 | End: 2017-04-24 | Stop reason: SDUPTHER

## 2017-04-24 NOTE — MR AVS SNAPSHOT
Visit Information Date & Time Provider Department Dept. Phone Encounter #  
 4/24/2017 11:40 AM Bonifacio Brush [id number] [phone] Follow-up Instructions Return in about 3 months (around 7/24/2017) for insomnia, pain. Upcoming Health Maintenance Date Due DTaP/Tdap/Td series (1 - Tdap) 5/8/1961 ZOSTER VACCINE AGE 60> 5/8/2002 GLAUCOMA SCREENING Q2Y 5/8/2007 MEDICARE YEARLY EXAM 2/23/2018 COLONOSCOPY 5/11/2021 Allergies as of 4/24/2017  Review Complete On: 4/24/2017 By: Jhonny Elam,  Severity Noted Reaction Type Reactions Fluconazole  05/31/2013    Hives, Itching Penicillin G  05/31/2013    Hives Current Immunizations  Never Reviewed Name Date Influenza High Dose Vaccine PF 11/9/2016, 9/24/2014 Influenza Vaccine 9/17/2013 Influenza Vaccine (Quad) PF 8/28/2015 Pneumococcal Conjugate (PCV-13) 2/22/2017 Pneumococcal Vaccine (Unspecified Type) 9/5/2012 Not reviewed this visit You Were Diagnosed With   
  
 Codes Comments Primary insomnia    -  Primary ICD-10-CM: F51.01 
ICD-9-CM: 307.42 Arthralgia, unspecified joint     ICD-10-CM: M25.50 ICD-9-CM: 719.40 Prediabetes     ICD-10-CM: R73.03 
ICD-9-CM: 790.29 Anxiety     ICD-10-CM: F41.9 ICD-9-CM: 300.00 Vitals BP Pulse Temp Resp Height(growth percentile) Weight(growth percentile) 120/74 (BP 1 Location: Right arm, BP Patient Position: Sitting) 84 98.4 °F (36.9 °C) (Oral) 16 5' 7\" (1.702 m) 171 lb (77.6 kg) SpO2 BMI Smoking Status 98% 26.78 kg/m2 Never Smoker Vitals History BMI and BSA Data Body Mass Index Body Surface Area  
 26.78 kg/m 2 1.92 m 2 Preferred Pharmacy Pharmacy Name Phone [id number] [address] [facility] [phone] Your Updated Medication List  
  
   
 This list is accurate as of: 4/24/17 12:05 PM.  Always use your most recent med list.  
  
  
  
  
 diazePAM 5 mg tablet Commonly known as:  VALIUM  
TAKE 1/2-1 TAB BY MOUTH EVERY 12 HOURS AS NEEDED FOR ANXIETY MAX 2 TABS PER DAY HYDROcodone-acetaminophen 7.5-325 mg per tablet Commonly known as:  Priyanka Mandril Take 1 Tab by mouth every six (6) hours as needed for Pain. Start taking on:  6/22/2017  
  
 latanoprost 0.005 % ophthalmic solution Commonly known as:  XALATAN  
  
 olmesartan-hydroCHLOROthiazide 20-12.5 mg per tablet Commonly known as:  BENICAR HCT Take 1 Tab by mouth daily. PARoxetine 20 mg tablet Commonly known as:  PAXIL Take 1 Tab by mouth daily. zolpidem 10 mg tablet Commonly known as:  AMBIEN Take 1 Tab by mouth nightly as needed for Sleep. Max Daily Amount: 10 mg.  
  
  
  
  
Prescriptions Printed Refills HYDROcodone-acetaminophen (NORCO) 7.5-325 mg per tablet 0 Starting on: 6/22/2017 Sig: Take 1 Tab by mouth every six (6) hours as needed for Pain. Class: Print Route: Oral  
  
Follow-up Instructions Return in about 3 months (around 7/24/2017) for insomnia, pain. Patient Instructions Joint Pain: Care Instructions Your Care Instructions Many people have small aches and pains from overuse or injury to muscles and joints. Joint injuries often happen during sports or recreation, work tasks, or projects around the home. An overuse injury can happen when you put too much stress on a joint or when you do an activity that stresses the joint over and over, such as using the computer or rowing a boat. You can take action at home to help your muscles and joints get better. You should feel better in 1 to 2 weeks, but it can take 3 months or more to heal completely. Follow-up care is a key part of your treatment and safety.  Be sure to make and go to all appointments, and call your doctor if you are having problems. It's also a good idea to know your test results and keep a list of the medicines you take. How can you care for yourself at home? · Do not put weight on the injured joint for at least a day or two. · For the first day or two after an injury, do not take hot showers or baths, and do not use hot packs. The heat could make swelling worse. · Put ice or a cold pack on the sore joint for 10 to 20 minutes at a time. Try to do this every 1 to 2 hours for the next 3 days (when you are awake) or until the swelling goes down. Put a thin cloth between the ice and your skin. · Wrap the injury in an elastic bandage. Do not wrap it too tightly because this can cause more swelling. · Prop up the sore joint on a pillow when you ice it or anytime you sit or lie down during the next 3 days. Try to keep it above the level of your heart. This will help reduce swelling. · Take an over-the-counter pain medicine, such as acetaminophen (Tylenol), ibuprofen (Advil, Motrin), or naproxen (Aleve). Read and follow all instructions on the label. · After 1 or 2 days of rest, begin moving the joint gently. While the joint is still healing, you can begin to exercise using activities that do not strain or hurt the painful joint. When should you call for help? Call your doctor now or seek immediate medical care if: 
· You have signs of infection, such as: 
¨ Increased pain, swelling, warmth, and redness. ¨ Red streaks leading from the joint. ¨ A fever. Watch closely for changes in your health, and be sure to contact your doctor if: 
· Your movement or symptoms are not getting better after 1 to 2 weeks of home treatment. Where can you learn more? Go to http://felix-shanique.info/. Enter P205 in the search box to learn more about \"Joint Pain: Care Instructions. \" Current as of: May 23, 2016 Content Version: 11.2 © 4736-8763 [employer].  Care instructions adapted under license by [facility] [address] (which disclaims liability or warranty for this information). If you have questions about a medical condition or this instruction, always ask your healthcare professional. [address] any warranty or liability for your use of this information. Insomnia: Care Instructions Your Care Instructions Insomnia is the inability to sleep well. It is a common problem for most people at some time. Insomnia may make it hard for you to get to sleep, stay asleep, or sleep as long as you need to. This can make you tired and grouchy during the day. It can also make you forgetful, less effective at work, and unhappy. Insomnia can be caused by conditions such as depression or anxiety. Pain can also affect your ability to sleep. When these problems are solved, the insomnia usually clears up. But sometimes bad sleep habits can cause insomnia. If insomnia is affecting your work or your enjoyment of life, you can take steps to improve your sleep. Follow-up care is a key part of your treatment and safety. Be sure to make and go to all appointments, and call your doctor if you are having problems. It's also a good idea to know your test results and keep a list of the medicines you take. How can you care for yourself at home? What to avoid · Do not have drinks with caffeine, such as coffee or black tea, for 8 hours before bed. · Do not smoke or use other types of tobacco near bedtime. Nicotine is a stimulant and can keep you awake. · Avoid drinking alcohol late in the evening, because it can cause you to wake in the middle of the night. · Do not eat a big meal close to bedtime. If you are hungry, eat a light snack. · Do not drink a lot of water close to bedtime, because the need to urinate may wake you up during the night. · Do not read or watch TV in bed. Use the bed only for sleeping and sexual activity. What to try · Go to bed at the same time every night, and wake up at the same time every morning. Do not take naps during the day. · Keep your bedroom quiet, dark, and cool. · Sleep on a comfortable pillow and mattress. · If watching the clock makes you anxious, turn it facing away from you so you cannot see the time. · If you worry when you lie down, start a worry book. Well before bedtime, write down your worries, and then set the book and your concerns aside. · Try meditation or other relaxation techniques before you go to bed. · If you cannot fall asleep, get up and go to another room until you feel sleepy. Do something relaxing. Repeat your bedtime routine before you go to bed again. · Make your house quiet and calm about an hour before bedtime. Turn down the lights, turn off the TV, log off the computer, and turn down the volume on music. This can help you relax after a busy day. When should you call for help? Watch closely for changes in your health, and be sure to contact your doctor if: 
· Your efforts to improve your sleep do not work. · Your insomnia gets worse. · You have been feeling down, depressed, or hopeless or have lost interest in things that you usually enjoy. Where can you learn more? Go to http://felix-shanique.info/. Enter P513 in the search box to learn more about \"Insomnia: Care Instructions. \" Current as of: July 26, 2016 Content Version: 11.2 © 8303-3699 [employer] Care instructions adapted under license by [employer] (which disclaims liability or warranty for this information). If you have questions about a medical condition or this instruction, always ask your healthcare professional. [place] [address] any warranty or liability for your use of this information. Anxiety Disorder: Care Instructions Your Care Instructions Anxiety is a normal reaction to stress.  Difficult situations can cause you to have symptoms such as sweaty palms and a nervous feeling. In an anxiety disorder, the symptoms are far more severe. Constant worry, muscle tension, trouble sleeping, nausea and diarrhea, and other symptoms can make normal daily activities difficult or impossible. These symptoms may occur for no reason, and they can affect your work, school, or social life. Medicines, counseling, and self-care can all help. Follow-up care is a key part of your treatment and safety. Be sure to make and go to all appointments, and call your doctor if you are having problems. It's also a good idea to know your test results and keep a list of the medicines you take. How can you care for yourself at home? · Take medicines exactly as directed. Call your doctor if you think you are having a problem with your medicine. · Go to your counseling sessions and follow-up appointments. · Recognize and accept your anxiety. Then, when you are in a situation that makes you anxious, say to yourself, \"This is not an emergency. I feel uncomfortable, but I am not in danger. I can keep going even if I feel anxious. \" · Be kind to your body: ¨ Relieve tension with exercise or a massage. ¨ Get enough rest. 
¨ Avoid alcohol, caffeine, nicotine, and illegal drugs. They can increase your anxiety level and cause sleep problems. ¨ Learn and do relaxation techniques. See below for more about these techniques. · Engage your mind. Get out and do something you enjoy. Go to a funny movie, or take a walk or hike. Plan your day. Having too much or too little to do can make you anxious. · Keep a record of your symptoms. Discuss your fears with a good friend or family member, or join a support group for people with similar problems. Talking to others sometimes relieves stress. · Get involved in social groups, or volunteer to help others. Being alone sometimes makes things seem worse than they are. · Get at least 30 minutes of exercise on most days of the week to relieve stress. Walking is a good choice. You also may want to do other activities, such as running, swimming, cycling, or playing tennis or team sports. Relaxation techniques Do relaxation exercises 10 to 20 minutes a day. You can play soothing, relaxing music while you do them, if you wish. · Tell others in your house that you are going to do your relaxation exercises. Ask them not to disturb you. · Find a comfortable place, away from all distractions and noise. · Lie down on your back, or sit with your back straight. · Focus on your breathing. Make it slow and steady. · Breathe in through your nose. Breathe out through either your nose or mouth. · Breathe deeply, filling up the area between your navel and your rib cage. Breathe so that your belly goes up and down. · Do not hold your breath. · Breathe like this for 5 to 10 minutes. Notice the feeling of calmness throughout your whole body. As you continue to breathe slowly and deeply, relax by doing the following for another 5 to 10 minutes: · Tighten and relax each muscle group in your body. You can begin at your toes and work your way up to your head. · Imagine your muscle groups relaxing and becoming heavy. · Empty your mind of all thoughts. · Let yourself relax more and more deeply. · Become aware of the state of calmness that surrounds you. · When your relaxation time is over, you can bring yourself back to alertness by moving your fingers and toes and then your hands and feet and then stretching and moving your entire body. Sometimes people fall asleep during relaxation, but they usually wake up shortly afterward. · Always give yourself time to return to full alertness before you drive a car or do anything that might cause an accident if you are not fully alert. Never play a relaxation tape while you drive a car. When should you call for help? Call 911 anytime you think you may need emergency care. For example, call if: 
· You feel you cannot stop from hurting yourself or someone else. Keep the numbers for these national suicide hotlines: 3-547-822-TALK (3-654.959.9233) and 3-261-UWTLQTL (5-660.557.1008). If you or someone you know talks about suicide or feeling hopeless, get help right away. Watch closely for changes in your health, and be sure to contact your doctor if: 
· You have anxiety or fear that affects your life. · You have symptoms of anxiety that are new or different from those you had before. Where can you learn more? Go to http://felix-shanique.info/. Enter P754 in the search box to learn more about \"Anxiety Disorder: Care Instructions. \" Current as of: July 26, 2016 Content Version: 11.2 © 8709-2166 [employer]. Care instructions adapted under license by [employer] (which disclaims liability or warranty for this information). If you have questions about a medical condition or this instruction, always ask your healthcare professional. [place] [address] any warranty or liability for your use of this information. Introducing [facility]! [facility] introduces iCrumz patient portal. Now you can access parts of your medical record, email your doctor's office, and request medication refills online. 1. In your internet browser, go to [email or web address] 2. Click on the First Time User? Click Here link in the Sign In box. You will see the New Member Sign Up page. 3. Enter your iCrumz Access Code exactly as it appears below. You will not need to use this code after youve completed the sign-up process. If you do not sign up before the expiration date, you must request a new code. · iCrumz Access Code: [email or web address] Expires: 5/23/2017 12:23 PM 
 
4.  Enter the last four digits of your Social Security Number (xxxx) and Date of Birth (mm/dd/yyyy) as indicated and click Submit. You will be taken to the next sign-up page. 5. Create a Edgeio ID. This will be your Edgeio login ID and cannot be changed, so think of one that is secure and easy to remember. 6. Create a Edgeio password. You can change your password at any time. 7. Enter your Password Reset Question and Answer. This can be used at a later time if you forget your password. 8. Enter your e-mail address. You will receive e-mail notification when new information is available in [address] 9. Click Sign Up. You can now view and download portions of your medical record. 10. Click the Download Summary menu link to download a portable copy of your medical information. If you have questions, please visit the Frequently Asked Questions section of the Edgeio website. Remember, Edgeio is NOT to be used for urgent needs. For medical emergencies, dial 911. Now available from your iPhone and Android! Please provide this summary of care documentation to your next provider. Your primary care clinician is listed as Sisi Garcia. If you have any questions after today's visit, please call [phone].

## 2017-04-24 NOTE — PROGRESS NOTES
HISTORY OF PRESENT ILLNESS    Lonny Chappell is a 78y.o. year old male with:Depression, Chronic Joint Pain      HPI:  Has been doing well with current Rx and uses valium and paxil with good relief but issues with providing care for ex-wife's parents still not resolved and going through legal sandoval. Uses norco for pain control which works well and uses about 3/day. PHQ-9 SCORE: 13   <--- 5 (2/22/17)  BILLY-7 SCORE: 9   <---8 (2/22/17)      Current Outpatient Prescriptions   Medication Sig Dispense Refill    diazePAM (VALIUM) 5 mg tablet TAKE 1/2-1 TAB BY MOUTH EVERY 12 HOURS AS NEEDED FOR ANXIETY MAX 2 TABS PER DAY  2    PARoxetine (PAXIL) 20 mg tablet Take 1 Tab by mouth daily. 90 Tab 0    HYDROcodone-acetaminophen (NORCO) 7.5-325 mg per tablet Take 1 Tab by mouth every six (6) hours as needed for Pain. 120 Tab 0    zolpidem (AMBIEN) 10 mg tablet Take 1 Tab by mouth nightly as needed for Sleep. Max Daily Amount: 10 mg. 90 Tab 1    olmesartan-hydroCHLOROthiazide (BENICAR HCT) 20-12.5 mg per tablet Take 1 Tab by mouth daily. 90 Tab 1    latanoprost (XALATAN) 0.005 % ophthalmic solution   6     Patient Active Problem List   Diagnosis Code    Gross hematuria R31.0    Elevated PSA R97.20    Prediabetes R73.03    HTN (hypertension) I10    Insomnia G47.00    Joint pain M25.50     No family history on file. ROS:  No numb, tingle, swell,bruise    Objective:  Visit Vitals    /74 (BP 1 Location: Right arm, BP Patient Position: Sitting)    Pulse 84    Temp 98.4 °F (36.9 °C) (Oral)    Resp 16    Ht 5' 7\" (1.702 m)    Wt 171 lb (77.6 kg)    SpO2 98%    BMI 26.78 kg/m2     GEN:  Appears stated age in NAD. HEENT: Conjunctiva/lids normal.  External ears and nose without lesions/trauma. Hearing Intact. Tongue midline. NECK: Trachea midline. Supple. Full ROM  CARDIAC:  regular rate and rhythm. no Murmur, no peripheral edema. LUNGS: lungs clear to auscultation, no accessory muscle use.   MS: no clubbing/cyanosis. SKIN: Warm/dry without rash. PSYCH: Appropriate insight, Judgment. A&O x 3. Assessment/Plan:     ICD-10-CM ICD-9-CM    1. Primary insomnia F51.01 307.42    2. Arthralgia, unspecified joint M25.50 719.40 HYDROcodone-acetaminophen (NORCO) 7.5-325 mg per tablet      DISCONTINUED: HYDROcodone-acetaminophen (NORCO) 7.5-325 mg per tablet      DISCONTINUED: HYDROcodone-acetaminophen (NORCO) 7.5-325 mg per tablet   3. Prediabetes R73.03 790.29    4. Anxiety F41.9 300.00      Patient verbalized understanding of plan. Will refill norco for stable and complete PA for ambien when needed. RTC 3 months. Discussed I will check urine for UDS periodically and monitor [facility]  for [facility] use and claims used norco last use couple weeks ago. Advised needs to bring controlled Rx bottles to each appt. Pt agrees with above terms.  reviewed and free of abherent finding(s): Yes.

## 2017-04-24 NOTE — PATIENT INSTRUCTIONS
Joint Pain: Care Instructions  Your Care Instructions  Many people have small aches and pains from overuse or injury to muscles and joints. Joint injuries often happen during sports or recreation, work tasks, or projects around the home. An overuse injury can happen when you put too much stress on a joint or when you do an activity that stresses the joint over and over, such as using the computer or rowing a boat. You can take action at home to help your muscles and joints get better. You should feel better in 1 to 2 weeks, but it can take 3 months or more to heal completely. Follow-up care is a key part of your treatment and safety. Be sure to make and go to all appointments, and call your doctor if you are having problems. It's also a good idea to know your test results and keep a list of the medicines you take. How can you care for yourself at home? · Do not put weight on the injured joint for at least a day or two. · For the first day or two after an injury, do not take hot showers or baths, and do not use hot packs. The heat could make swelling worse. · Put ice or a cold pack on the sore joint for 10 to 20 minutes at a time. Try to do this every 1 to 2 hours for the next 3 days (when you are awake) or until the swelling goes down. Put a thin cloth between the ice and your skin. · Wrap the injury in an elastic bandage. Do not wrap it too tightly because this can cause more swelling. · Prop up the sore joint on a pillow when you ice it or anytime you sit or lie down during the next 3 days. Try to keep it above the level of your heart. This will help reduce swelling. · Take an over-the-counter pain medicine, such as acetaminophen (Tylenol), ibuprofen (Advil, Motrin), or naproxen (Aleve). Read and follow all instructions on the label. · After 1 or 2 days of rest, begin moving the joint gently.  While the joint is still healing, you can begin to exercise using activities that do not strain or hurt the painful joint. When should you call for help? Call your doctor now or seek immediate medical care if:  · You have signs of infection, such as:  ¨ Increased pain, swelling, warmth, and redness. ¨ Red streaks leading from the joint. ¨ A fever. Watch closely for changes in your health, and be sure to contact your doctor if:  · Your movement or symptoms are not getting better after 1 to 2 weeks of home treatment. Where can you learn more? Go to http://felix-shanique.info/. Enter P205 in the search box to learn more about \"Joint Pain: Care Instructions. \"  Current as of: May 23, 2016  Content Version: 11.2  © 5187-9906 [employer]. Care instructions adapted under license by [employer] (which disclaims liability or warranty for this information). If you have questions about a medical condition or this instruction, always ask your healthcare professional. [place] [address] any warranty or liability for your use of this information. Insomnia: Care Instructions  Your Care Instructions  Insomnia is the inability to sleep well. It is a common problem for most people at some time. Insomnia may make it hard for you to get to sleep, stay asleep, or sleep as long as you need to. This can make you tired and grouchy during the day. It can also make you forgetful, less effective at work, and unhappy. Insomnia can be caused by conditions such as depression or anxiety. Pain can also affect your ability to sleep. When these problems are solved, the insomnia usually clears up. But sometimes bad sleep habits can cause insomnia. If insomnia is affecting your work or your enjoyment of life, you can take steps to improve your sleep. Follow-up care is a key part of your treatment and safety. Be sure to make and go to all appointments, and call your doctor if you are having problems.  It's also a good idea to know your test results and keep a list of the medicines you take.  How can you care for yourself at home? What to avoid  · Do not have drinks with caffeine, such as coffee or black tea, for 8 hours before bed. · Do not smoke or use other types of tobacco near bedtime. Nicotine is a stimulant and can keep you awake. · Avoid drinking alcohol late in the evening, because it can cause you to wake in the middle of the night. · Do not eat a big meal close to bedtime. If you are hungry, eat a light snack. · Do not drink a lot of water close to bedtime, because the need to urinate may wake you up during the night. · Do not read or watch TV in bed. Use the bed only for sleeping and sexual activity. What to try  · Go to bed at the same time every night, and wake up at the same time every morning. Do not take naps during the day. · Keep your bedroom quiet, dark, and cool. · Sleep on a comfortable pillow and mattress. · If watching the clock makes you anxious, turn it facing away from you so you cannot see the time. · If you worry when you lie down, start a worry book. Well before bedtime, write down your worries, and then set the book and your concerns aside. · Try meditation or other relaxation techniques before you go to bed. · If you cannot fall asleep, get up and go to another room until you feel sleepy. Do something relaxing. Repeat your bedtime routine before you go to bed again. · Make your house quiet and calm about an hour before bedtime. Turn down the lights, turn off the TV, log off the computer, and turn down the volume on music. This can help you relax after a busy day. When should you call for help? Watch closely for changes in your health, and be sure to contact your doctor if:  · Your efforts to improve your sleep do not work. · Your insomnia gets worse. · You have been feeling down, depressed, or hopeless or have lost interest in things that you usually enjoy. Where can you learn more? Go to http://douglas.info/.   Enter T995 in the search box to learn more about \"Insomnia: Care Instructions. \"  Current as of: July 26, 2016  Content Version: 11.2  © 5173-0378 [employer]. Care instructions adapted under license by [employer] (which disclaims liability or warranty for this information). If you have questions about a medical condition or this instruction, always ask your healthcare professional. [address] any warranty or liability for your use of this information. Anxiety Disorder: Care Instructions  Your Care Instructions  Anxiety is a normal reaction to stress. Difficult situations can cause you to have symptoms such as sweaty palms and a nervous feeling. In an anxiety disorder, the symptoms are far more severe. Constant worry, muscle tension, trouble sleeping, nausea and diarrhea, and other symptoms can make normal daily activities difficult or impossible. These symptoms may occur for no reason, and they can affect your work, school, or social life. Medicines, counseling, and self-care can all help. Follow-up care is a key part of your treatment and safety. Be sure to make and go to all appointments, and call your doctor if you are having problems. It's also a good idea to know your test results and keep a list of the medicines you take. How can you care for yourself at home? · Take medicines exactly as directed. Call your doctor if you think you are having a problem with your medicine. · Go to your counseling sessions and follow-up appointments. · Recognize and accept your anxiety. Then, when you are in a situation that makes you anxious, say to yourself, \"This is not an emergency. I feel uncomfortable, but I am not in danger. I can keep going even if I feel anxious. \"  · Be kind to your body:  ¨ Relieve tension with exercise or a massage. ¨ Get enough rest.  ¨ Avoid alcohol, caffeine, nicotine, and illegal drugs.  They can increase your anxiety level and cause sleep problems. ¨ Learn and do relaxation techniques. See below for more about these techniques. · Engage your mind. Get out and do something you enjoy. Go to a funny movie, or take a walk or hike. Plan your day. Having too much or too little to do can make you anxious. · Keep a record of your symptoms. Discuss your fears with a good friend or family member, or join a support group for people with similar problems. Talking to others sometimes relieves stress. · Get involved in social groups, or volunteer to help others. Being alone sometimes makes things seem worse than they are. · Get at least 30 minutes of exercise on most days of the week to relieve stress. Walking is a good choice. You also may want to do other activities, such as running, swimming, cycling, or playing tennis or team sports. Relaxation techniques  Do relaxation exercises 10 to 20 minutes a day. You can play soothing, relaxing music while you do them, if you wish. · Tell others in your house that you are going to do your relaxation exercises. Ask them not to disturb you. · Find a comfortable place, away from all distractions and noise. · Lie down on your back, or sit with your back straight. · Focus on your breathing. Make it slow and steady. · Breathe in through your nose. Breathe out through either your nose or mouth. · Breathe deeply, filling up the area between your navel and your rib cage. Breathe so that your belly goes up and down. · Do not hold your breath. · Breathe like this for 5 to 10 minutes. Notice the feeling of calmness throughout your whole body. As you continue to breathe slowly and deeply, relax by doing the following for another 5 to 10 minutes:  · Tighten and relax each muscle group in your body. You can begin at your toes and work your way up to your head. · Imagine your muscle groups relaxing and becoming heavy. · Empty your mind of all thoughts. · Let yourself relax more and more deeply.   · Become aware of the state of calmness that surrounds you. · When your relaxation time is over, you can bring yourself back to alertness by moving your fingers and toes and then your hands and feet and then stretching and moving your entire body. Sometimes people fall asleep during relaxation, but they usually wake up shortly afterward. · Always give yourself time to return to full alertness before you drive a car or do anything that might cause an accident if you are not fully alert. Never play a relaxation tape while you drive a car. When should you call for help? Call 911 anytime you think you may need emergency care. For example, call if:  · You feel you cannot stop from hurting yourself or someone else. Keep the numbers for these national suicide hotlines: 9-603-834-TALK (0-540.441.3369) and 3-931-VOYONIT (3-980.618.2953). If you or someone you know talks about suicide or feeling hopeless, get help right away. Watch closely for changes in your health, and be sure to contact your doctor if:  · You have anxiety or fear that affects your life. · You have symptoms of anxiety that are new or different from those you had before. Where can you learn more? Go to http://felix-shanique.info/. Enter P754 in the search box to learn more about \"Anxiety Disorder: Care Instructions. \"  Current as of: July 26, 2016  Content Version: 11.2  © 5700-1028 [employer]. Care instructions adapted under license by [employer] (which disclaims liability or warranty for this information). If you have questions about a medical condition or this instruction, always ask your healthcare professional. [address] any warranty or liability for your use of this information.

## 2017-04-24 NOTE — PROGRESS NOTES
Patient is currently not taking the following medications and wants them removed from their list:    no    Learning Assessment (baseline): complete  Depression Screening: complete  Fall Risk:  complete    1. Have you been to the ER, urgent care clinic since your last visit? Hospitalized since your last visit? No    2. Have you seen or consulted any other health care providers outside of the [address] [place] [address] since your last visit? Include any pap smears or colon screening.  No      Patient is due for the following immunizations:    Influenza: completed  Pneumococcal:completed

## 2017-05-15 ENCOUNTER — TELEPHONE (OUTPATIENT)
Dept: FAMILY MEDICINE CLINIC | Age: 77
End: 2017-05-15

## 2017-05-15 RX ORDER — DIAZEPAM 5 MG/1
TABLET ORAL
Qty: 60 TAB | Refills: 2 | Status: SHIPPED | OUTPATIENT
Start: 2017-05-15 | End: 2017-07-24 | Stop reason: SDUPTHER

## 2017-05-15 RX ORDER — ZOLPIDEM TARTRATE 10 MG/1
TABLET ORAL
Qty: 90 TAB | Refills: 0 | Status: SHIPPED | OUTPATIENT
Start: 2017-05-15 | End: 2017-07-24 | Stop reason: SDUPTHER

## 2017-06-30 DIAGNOSIS — F43.23 ADJUSTMENT DISORDER WITH MIXED ANXIETY AND DEPRESSED MOOD: ICD-10-CM

## 2017-06-30 RX ORDER — DIAZEPAM 5 MG/1
TABLET ORAL
Qty: 60 TAB | Refills: 2 | Status: CANCELLED | OUTPATIENT
Start: 2017-06-30

## 2017-07-03 RX ORDER — PAROXETINE HYDROCHLORIDE 20 MG/1
20 TABLET, FILM COATED ORAL DAILY
Qty: 90 TAB | Refills: 0 | Status: SHIPPED | OUTPATIENT
Start: 2017-07-03 | End: 2017-07-24 | Stop reason: SDUPTHER

## 2017-07-24 ENCOUNTER — OFFICE VISIT (OUTPATIENT)
Dept: FAMILY MEDICINE CLINIC | Age: 77
End: 2017-07-24

## 2017-07-24 VITALS
RESPIRATION RATE: 16 BRPM | HEART RATE: 69 BPM | OXYGEN SATURATION: 98 % | HEIGHT: 67 IN | SYSTOLIC BLOOD PRESSURE: 130 MMHG | TEMPERATURE: 98 F | WEIGHT: 168 LBS | BODY MASS INDEX: 26.37 KG/M2 | DIASTOLIC BLOOD PRESSURE: 82 MMHG

## 2017-07-24 DIAGNOSIS — I10 ESSENTIAL HYPERTENSION WITH GOAL BLOOD PRESSURE LESS THAN 140/90: ICD-10-CM

## 2017-07-24 DIAGNOSIS — M25.50 ARTHRALGIA, UNSPECIFIED JOINT: ICD-10-CM

## 2017-07-24 DIAGNOSIS — F43.23 ADJUSTMENT DISORDER WITH MIXED ANXIETY AND DEPRESSED MOOD: ICD-10-CM

## 2017-07-24 DIAGNOSIS — F51.01 PRIMARY INSOMNIA: Primary | ICD-10-CM

## 2017-07-24 RX ORDER — DIAZEPAM 5 MG/1
TABLET ORAL
Qty: 60 TAB | Refills: 2 | Status: SHIPPED | OUTPATIENT
Start: 2017-07-24 | End: 2017-11-22 | Stop reason: ALTCHOICE

## 2017-07-24 RX ORDER — ZOLPIDEM TARTRATE 10 MG/1
TABLET ORAL
Qty: 90 TAB | Refills: 0 | Status: SHIPPED | OUTPATIENT
Start: 2017-07-24 | End: 2017-11-22 | Stop reason: SDUPTHER

## 2017-07-24 RX ORDER — OLMESARTAN MEDOXOMIL AND HYDROCHLOROTHIAZIDE 20/12.5 20; 12.5 MG/1; MG/1
1 TABLET ORAL DAILY
Qty: 90 TAB | Refills: 1 | Status: SHIPPED | OUTPATIENT
Start: 2017-07-24 | End: 2018-07-26 | Stop reason: SDUPTHER

## 2017-07-24 RX ORDER — PAROXETINE HYDROCHLORIDE 20 MG/1
20 TABLET, FILM COATED ORAL DAILY
Qty: 90 TAB | Refills: 0 | Status: SHIPPED | OUTPATIENT
Start: 2017-07-24 | End: 2017-11-22 | Stop reason: SDUPTHER

## 2017-07-24 RX ORDER — HYDROCODONE BITARTRATE AND ACETAMINOPHEN 7.5; 325 MG/1; MG/1
1 TABLET ORAL
Qty: 120 TAB | Refills: 0 | Status: SHIPPED | OUTPATIENT
Start: 2017-09-22 | End: 2017-11-22 | Stop reason: ALTCHOICE

## 2017-07-24 RX ORDER — HYDROCODONE BITARTRATE AND ACETAMINOPHEN 7.5; 325 MG/1; MG/1
1 TABLET ORAL
Qty: 120 TAB | Refills: 0 | Status: SHIPPED | OUTPATIENT
Start: 2017-07-24 | End: 2017-07-24 | Stop reason: SDUPTHER

## 2017-07-24 RX ORDER — HYDROCODONE BITARTRATE AND ACETAMINOPHEN 7.5; 325 MG/1; MG/1
1 TABLET ORAL
Qty: 120 TAB | Refills: 0 | Status: SHIPPED | OUTPATIENT
Start: 2017-08-23 | End: 2017-07-24 | Stop reason: SDUPTHER

## 2017-07-24 NOTE — PATIENT INSTRUCTIONS
Adjustment Disorder: Care Instructions  Your Care Instructions  Adjustment disorder means that you have emotional or behavioral problems because of stress. But your response to the stress is far more severe than a normal response. It is severe enough to affect your work or social life and may cause depression and physical pains and problems. Events that may cause this response can include a divorce, money problems, or starting school or a new job. It might be anything that causes some stress. This disorder is most often a short-term problem. It happens within 3 months of the stressful event or change. If the response lasts longer than 6 months after the event ends, you may have a more serious disorder. Follow-up care is a key part of your treatment and safety. Be sure to make and go to all appointments, and call your doctor if you are having problems. It's also a good idea to know your test results and keep a list of the medicines you take. How can you care for yourself at home? · Go to all counseling sessions. Do not skip any because you are feeling better. · If your doctor prescribed medicines, take them exactly as prescribed. Call your doctor if you think you are having a problem with your medicine. You will get more details on the specific medicines your doctor prescribes. · Discuss the causes of your stress with a good friend or family member. Or you can join a support group for people with similar problems. Talking to others sometimes relieves stress. · Get at least 30 minutes of exercise on most days of the week. Walking is a good choice. You also may want to do other activities, such as running, swimming, cycling, or playing tennis or team sports. Relaxation techniques  Do relaxation exercises 10 to 20 minutes a day. You can play soothing, relaxing music while you do them, if you wish. · Tell others in your house that you are going to do your relaxation exercises.  Ask them not to disturb you.  · Find a comfortable, quiet place. · Lie down on your back, or sit with your back straight. · Focus on your breathing. Make it slow and steady. · Breathe in through your nose. Breathe out through either your nose or mouth. · Breathe deeply, filling up the area between your navel and your rib cage. Breathe so that your belly goes up and down. · Do not hold your breath. · Breathe like this for 5 to 10 minutes. Notice the feeling of calmness throughout your whole body. As you continue to breathe slowly and deeply, relax by doing these next steps for another 5 to 10 minutes:  · Tighten and relax each muscle group in your body. Start at your toes, and work your way up to your head. · Imagine your muscle groups relaxing and getting heavy. · Empty your mind of all thoughts. · Let yourself relax more and more deeply. · Be aware of the state of calmness that surrounds you. · When your relaxation time is over, you can bring yourself back to alertness by moving your fingers and toes. Then move your hands and feet. And then move your entire body. Sometimes people fall asleep during relaxation. But they most often wake up soon. · Always give yourself time to return to full alertness before you drive a car. Wait to do anything that might cause an accident if you are not fully alert. Never play a relaxation tape while you drive a car. When should you call for help? Call 911 anytime you think you may need emergency care. For example, call if:  · You feel you cannot stop from hurting yourself or someone else. Keep the numbers for these national suicide hotlines: 0-059-767-TALK (1-719.213.5876) and 4-392-PJSPTHZ (9-289.684.7098). If you or someone you know talks about suicide or feeling hopeless, get help right away. Watch closely for changes in your health, and be sure to contact your doctor if:  · You have new anxiety, or your anxiety gets worse.   · You have been feeling sad, depressed, or hopeless or have lost interest in things that you usually enjoy. · You do not get better as expected. Where can you learn more? Go to http://felix-shanique.info/. Enter 0688 [id number] [id number] in the search box to learn more about \"Adjustment Disorder: Care Instructions. \"  Current as of: July 26, 2016  Content Version: 11.3  © 3005-9407 [employer]. Care instructions adapted under license by [employer] (which disclaims liability or warranty for this information). If you have questions about a medical condition or this instruction, always ask your healthcare professional. [place] [address] any warranty or liability for your use of this information.

## 2017-07-24 NOTE — PROGRESS NOTES
1. Have you been to the ER, urgent care clinic since your last visit? Hospitalized since your last visit? No    2. Have you seen or consulted any other health care providers outside of the [address] [place] [address] since your last visit? Include any pap smears or colon screening.  No

## 2017-07-24 NOTE — PROGRESS NOTES
HISTORY OF PRESENT ILLNESS    Carola Spain is a 78y.o. year old male with: Depression, chronic pain    HPI:  Had stopped depression meds for about a week or so as he was feeling very sluggish so restarted after he had a lot of issues with mood and sadness. Has improved with restarting meds and is still related to issues with lawsuit after caring for ex-wifes family which is dragging on. Feels he is about as good as he can get until this legal issue ends. Pain is well controlled norco 4/day and helps function. PHQ-9 SCORE: 9    Current Outpatient Prescriptions   Medication Sig Dispense Refill    PARoxetine (PAXIL) 20 mg tablet Take 1 Tab by mouth daily. 90 Tab 0    diazePAM (VALIUM) 5 mg tablet TAKE 1/2-1 TAB BY MOUTH EVERY 12 HOURS AS NEEDED FOR ANXIETY MAX 2 TABS PER DAY 60 Tab 2    zolpidem (AMBIEN) 10 mg tablet TAKE 1 TAB BY MOUTH NIGHTLY AS NEEDED FOR SLEEP. MAX DAILY AMOUNT: 10MG 90 Tab 0    HYDROcodone-acetaminophen (NORCO) 7.5-325 mg per tablet Take 1 Tab by mouth every six (6) hours as needed for Pain. 120 Tab 0    olmesartan-hydroCHLOROthiazide (BENICAR HCT) 20-12.5 mg per tablet Take 1 Tab by mouth daily. 90 Tab 1    latanoprost (XALATAN) 0.005 % ophthalmic solution   6     Patient Active Problem List   Diagnosis Code    Gross hematuria R31.0    Elevated PSA R97.20    Prediabetes R73.03    HTN (hypertension) I10    Insomnia G47.00    Joint pain M25.50    Anxiety F41.9     History reviewed. No pertinent family history. ROS:  See HPI. NO SI, HI, weight loss    Objective:  Visit Vitals    /82 (BP 1 Location: Left arm, BP Patient Position: Sitting)    Pulse 69    Temp 98 °F (36.7 °C) (Oral)    Resp 16    Ht 5' 7\" (1.702 m)    Wt 168 lb (76.2 kg)    SpO2 98%    BMI 26.31 kg/m2     GEN:  Appears stated age in NAD. HEENT: Conjunctiva/lids normal.  External ears and nose without lesions/trauma. Hearing Intact. Tongue midline. NECK: Trachea midline. Supple.   Full ROM  CARDIAC: regular rate and rhythm. no Murmur, no peripheral edema. LUNGS: lungs clear to auscultation, no accessory muscle use. MS: no clubbing/cyanosis. SKIN: Warm/dry without rash. PSYCH: Appropriate insight, Judgment. A&O x 3. Assessment/Plan:     ICD-10-CM ICD-9-CM    1. Primary insomnia F51.01 307.42 zolpidem (AMBIEN) 10 mg tablet   2. Adjustment disorder with mixed anxiety and depressed mood F43.23 309.28 PARoxetine (PAXIL) 20 mg tablet      diazePAM (VALIUM) 5 mg tablet   3. Arthralgia, unspecified joint M25.50 719.40 HYDROcodone-acetaminophen (NORCO) 7.5-325 mg per tablet   4. Essential hypertension with goal blood pressure less than 140/90 I10 401.9 olmesartan-hydroCHLOROthiazide (BENICAR HCT) 20-12.5 mg per tablet       Patient verbalizes understanding of evaluation and plan. Will refill Rx as above for stable and will RTC 3 months. Discussed I will check urine for UDS periodically and monitor [facility]  for [facility] use and  claims used norco, valium last use last 3 days. Advised needs to bring controlled Rx bottles to each appt. Pt agrees with above terms.  reviewed and free of abherent finding(s): Yes.

## 2017-07-24 NOTE — MR AVS SNAPSHOT
Visit Information Date & Time Provider Department Dept. Phone Encounter #  
 7/24/2017  8:30 AM Susana Mccann, [address]  [id number] Follow-up Instructions Return in about 3 months (around 10/24/2017) for insomnia, pain. Upcoming Health Maintenance Date Due DTaP/Tdap/Td series (1 - Tdap) 5/8/1963 ZOSTER VACCINE AGE 60> 3/8/2002 GLAUCOMA SCREENING Q2Y 5/8/2007 INFLUENZA AGE 9 TO ADULT 8/1/2017 MEDICARE YEARLY EXAM 2/23/2018 COLONOSCOPY 5/11/2021 Allergies as of 7/24/2017  Review Complete On: 7/24/2017 By: Susana Mccann,  Severity Noted Reaction Type Reactions Fluconazole  05/31/2013    Hives, Itching Penicillin G  05/31/2013    Hives Current Immunizations  Never Reviewed Name Date Influenza High Dose Vaccine PF 11/9/2016, 9/24/2014 Influenza Vaccine 9/17/2013 Influenza Vaccine (Quad) PF 8/28/2015 Pneumococcal Conjugate (PCV-13) 2/22/2017 Pneumococcal Vaccine (Unspecified Type) 9/5/2012 Not reviewed this visit You Were Diagnosed With   
  
 Codes Comments Primary insomnia    -  Primary ICD-10-CM: F51.01 
ICD-9-CM: 307.42 Adjustment disorder with mixed anxiety and depressed mood     ICD-10-CM: F43.23 
ICD-9-CM: 309.28 Arthralgia, unspecified joint     ICD-10-CM: M25.50 ICD-9-CM: 719.40 Essential hypertension with goal blood pressure less than 140/90     ICD-10-CM: I10 
ICD-9-CM: 401.9 Vitals BP Pulse Temp Resp Height(growth percentile) Weight(growth percentile) 130/82 (BP 1 Location: Left arm, BP Patient Position: Sitting) 69 98 °F (36.7 °C) (Oral) 16 5' 7\" (1.702 m) 168 lb (76.2 kg) SpO2 BMI Smoking Status 98% 26.31 kg/m2 Never Smoker Vitals History BMI and BSA Data Body Mass Index Body Surface Area  
 26.31 kg/m 2 1.9 m 2 Preferred Pharmacy Pharmacy Name Phone [facility]/PHARMACY #[id number] Lidia Ovalle, [address] [facility] [facility] [phone] Your Updated Medication List  
  
   
This list is accurate as of: 7/24/17  8:53 AM.  Always use your most recent med list.  
  
  
  
  
 diazePAM 5 mg tablet Commonly known as:  VALIUM  
TAKE 1/2-1 TAB BY MOUTH EVERY 12 HOURS AS NEEDED FOR ANXIETY MAX 2 TABS PER DAY HYDROcodone-acetaminophen 7.5-325 mg per tablet Commonly known as:  Billye [place] Take 1 Tab by mouth every six (6) hours as needed for Pain. Start taking on:  9/22/2017  
  
 latanoprost 0.005 % ophthalmic solution Commonly known as:  XALATAN  
  
 olmesartan-hydroCHLOROthiazide 20-12.5 mg per tablet Commonly known as:  BENICAR HCT Take 1 Tab by mouth daily. PARoxetine 20 mg tablet Commonly known as:  PAXIL Take 1 Tab by mouth daily. zolpidem 10 mg tablet Commonly known as:  AMBIEN  
TAKE 1 TAB BY MOUTH NIGHTLY AS NEEDED FOR SLEEP. MAX DAILY AMOUNT: 10MG Prescriptions Printed Refills  
 diazePAM (VALIUM) 5 mg tablet 2 Sig: TAKE 1/2-1 TAB BY MOUTH EVERY 12 HOURS AS NEEDED FOR ANXIETY MAX 2 TABS PER DAY Class: Print  
 zolpidem (AMBIEN) 10 mg tablet 0 Sig: TAKE 1 TAB BY MOUTH NIGHTLY AS NEEDED FOR SLEEP. MAX DAILY AMOUNT: 10MG Class: Print HYDROcodone-acetaminophen (NORCO) 7.5-325 mg per tablet 0 Starting on: 9/22/2017 Sig: Take 1 Tab by mouth every six (6) hours as needed for Pain. Class: Print Route: Oral  
  
Prescriptions Sent to Pharmacy Refills PARoxetine (PAXIL) 20 mg tablet 0 Sig: Take 1 Tab by mouth daily. Class: Normal  
 Pharmacy: [facility]/pharmacy [address] [place] [address], [address] [place] [address],[address] [facility] [address] Ph #: [phone] Route: Oral  
 olmesartan-hydroCHLOROthiazide (BENICAR HCT) 20-12.5 mg per tablet 1 Sig: Take 1 Tab by mouth daily. Class: Normal  
 Pharmacy: [facility]/pharmacy [address] [place] [address], [address] [place] [address],[address] [place] [address] Ph #: [phone]  Route: Oral  
  
 Follow-up Instructions Return in about 3 months (around 10/24/2017) for insomnia, pain. Patient Instructions Adjustment Disorder: Care Instructions Your Care Instructions Adjustment disorder means that you have emotional or behavioral problems because of stress. But your response to the stress is far more severe than a normal response. It is severe enough to affect your work or social life and may cause depression and physical pains and problems. Events that may cause this response can include a divorce, money problems, or starting school or a new job. It might be anything that causes some stress. This disorder is most often a short-term problem. It happens within 3 months of the stressful event or change. If the response lasts longer than 6 months after the event ends, you may have a more serious disorder. Follow-up care is a key part of your treatment and safety. Be sure to make and go to all appointments, and call your doctor if you are having problems. It's also a good idea to know your test results and keep a list of the medicines you take. How can you care for yourself at home? · Go to all counseling sessions. Do not skip any because you are feeling better. · If your doctor prescribed medicines, take them exactly as prescribed. Call your doctor if you think you are having a problem with your medicine. You will get more details on the specific medicines your doctor prescribes. · Discuss the causes of your stress with a good friend or family member. Or you can join a support group for people with similar problems. Talking to others sometimes relieves stress. · Get at least 30 minutes of exercise on most days of the week. Walking is a good choice. You also may want to do other activities, such as running, swimming, cycling, or playing tennis or team sports. Relaxation techniques Do relaxation exercises 10 to 20 minutes a day.  You can play soothing, relaxing music while you do them, if you wish. · Tell others in your house that you are going to do your relaxation exercises. Ask them not to disturb you. · Find a comfortable, quiet place. · Lie down on your back, or sit with your back straight. · Focus on your breathing. Make it slow and steady. · Breathe in through your nose. Breathe out through either your nose or mouth. · Breathe deeply, filling up the area between your navel and your rib cage. Breathe so that your belly goes up and down. · Do not hold your breath. · Breathe like this for 5 to 10 minutes. Notice the feeling of calmness throughout your whole body. As you continue to breathe slowly and deeply, relax by doing these next steps for another 5 to 10 minutes: · Tighten and relax each muscle group in your body. Start at your toes, and work your way up to your head. · Imagine your muscle groups relaxing and getting heavy. · Empty your mind of all thoughts. · Let yourself relax more and more deeply. · Be aware of the state of calmness that surrounds you. · When your relaxation time is over, you can bring yourself back to alertness by moving your fingers and toes. Then move your hands and feet. And then move your entire body. Sometimes people fall asleep during relaxation. But they most often wake up soon. · Always give yourself time to return to full alertness before you drive a car. Wait to do anything that might cause an accident if you are not fully alert. Never play a relaxation tape while you drive a car. When should you call for help? Call 911 anytime you think you may need emergency care. For example, call if: 
· You feel you cannot stop from hurting yourself or someone else. Keep the numbers for these national suicide hotlines: 7-381-311-TALK (5-413.998.5258) and 6-074-AWYWBGL (4-377.347.5384). If you or someone you know talks about suicide or feeling hopeless, get help right away. Watch closely for changes in your health, and be sure to contact your doctor if: 
· You have new anxiety, or your anxiety gets worse. · You have been feeling sad, depressed, or hopeless or have lost interest in things that you usually enjoy. · You do not get better as expected. Where can you learn more? Go to http://felix-shanique.info/. Enter 0688 [id number] in the search box to learn more about \"Adjustment Disorder: Care Instructions. \" Current as of: July 26, 2016 Content Version: 11.3 © 0351-1578 [employer]. Care instructions adapted under license by "[employer] (which disclaims liability or warranty for this information). If you have questions about a medical condition or this instruction, always ask your healthcare professional. [address] any warranty or liability for your use of this information. Introducing [facility]! Cathy Casey introduces [employer] patient portal. Now you can access parts of your medical record, email your doctor's office, and request medication refills online. 1. In your internet browser, go to [email or web address]/[employer] 2. Click on the First Time User? Click Here link in the Sign In box. You will see the New Member Sign Up page. 3. Enter your [employer] Access Code exactly as it appears below. You will not need to use this code after youve completed the sign-up process. If you do not sign up before the expiration date, you must request a new code. · [employer] Access Code: [email or web address] Expires: 10/22/2017  8:53 AM 
 
4. Enter the last four digits of your Social Security Number (xxxx) and Date of Birth (mm/dd/yyyy) as indicated and click Submit. You will be taken to the next sign-up page. 5. Create a [employer] ID. This will be your [employer] login ID and cannot be changed, so think of one that is secure and easy to remember. 6. Create a [employer] password. You can change your password at any time. 7. Enter your Password Reset Question and Answer. This can be used at a later time if you forget your password. 8. Enter your e-mail address. You will receive e-mail notification when new information is available in [address] 9. Click Sign Up. You can now view and download portions of your medical record. 10. Click the Download Summary menu link to download a portable copy of your medical information. If you have questions, please visit the Frequently Asked Questions section of the [employer] website. Remember, [employer] is NOT to be used for urgent needs. For medical emergencies, dial 911. Now available from your iPhone and Android! Please provide this summary of care documentation to your next provider. Your primary care clinician is listed as Bibi Allen. If you have any questions after today's visit, please call [phone].

## 2017-08-24 ENCOUNTER — TELEPHONE (OUTPATIENT)
Dept: FAMILY MEDICINE CLINIC | Age: 77
End: 2017-08-24

## 2017-11-22 ENCOUNTER — OFFICE VISIT (OUTPATIENT)
Dept: FAMILY MEDICINE CLINIC | Age: 77
End: 2017-11-22

## 2017-11-22 VITALS
SYSTOLIC BLOOD PRESSURE: 134 MMHG | RESPIRATION RATE: 18 BRPM | WEIGHT: 173 LBS | TEMPERATURE: 98.3 F | HEIGHT: 67 IN | HEART RATE: 78 BPM | BODY MASS INDEX: 27.15 KG/M2 | DIASTOLIC BLOOD PRESSURE: 83 MMHG | OXYGEN SATURATION: 96 %

## 2017-11-22 DIAGNOSIS — I10 ESSENTIAL HYPERTENSION: ICD-10-CM

## 2017-11-22 DIAGNOSIS — F51.01 PRIMARY INSOMNIA: ICD-10-CM

## 2017-11-22 DIAGNOSIS — F43.23 ADJUSTMENT DISORDER WITH MIXED ANXIETY AND DEPRESSED MOOD: Primary | ICD-10-CM

## 2017-11-22 DIAGNOSIS — Z23 ENCOUNTER FOR IMMUNIZATION: ICD-10-CM

## 2017-11-22 RX ORDER — ZOLPIDEM TARTRATE 10 MG/1
TABLET ORAL
Qty: 90 TAB | Refills: 0 | Status: SHIPPED | OUTPATIENT
Start: 2017-11-22 | End: 2018-02-22 | Stop reason: SDUPTHER

## 2017-11-22 RX ORDER — PAROXETINE HYDROCHLORIDE 20 MG/1
20 TABLET, FILM COATED ORAL DAILY
Qty: 90 TAB | Refills: 1 | Status: SHIPPED | OUTPATIENT
Start: 2017-11-22 | End: 2018-07-18 | Stop reason: SDUPTHER

## 2017-11-22 NOTE — PROGRESS NOTES
HISTORY OF PRESENT ILLNESS  Darren Gregory is a 78 y.o. male. HPI  Patient is here today for evaluation and treatment of; Hypertension, Insomnia. Hypertension: Pt is on benicar /HCTZ for BP; He takes med daily; tolerates med well. Insomnia: pt has chronic insomnia; he has been on [place] as well; this has been helpful; He has tory advised that he will no longer be prescribed ambien along with norco; states he uses norco for gout. Advised that gout can be treated differently. He does continue on paxil for mood; This has been helpful;  He needs a refill on med      PMH,  Meds, Allergies, Family History, Social history reviewed\      Review of Systems   Constitutional: Negative for chills and fever. Cardiovascular: Negative for chest pain and palpitations. Physical Exam   Constitutional: He appears well-developed and well-nourished. No distress. Cardiovascular: Normal rate and regular rhythm. Exam reveals no gallop and no friction rub. No murmur heard. Pulmonary/Chest: Breath sounds normal. No respiratory distress. He has no wheezes. He has no rales. Musculoskeletal: He exhibits no edema. Nursing note and vitals reviewed. Visit Vitals    /83 (BP 1 Location: Left arm, BP Patient Position: Sitting)    Pulse 78    Temp 98.3 °F (36.8 °C) (Oral)    Resp 18    Ht 5' 7\" (1.702 m)    Wt 173 lb (78.5 kg)    SpO2 96%    BMI 27.1 kg/m2         ASSESSMENT and PLAN    ICD-10-CM ICD-9-CM    1. Adjustment disorder with mixed anxiety and depressed mood F43.23 309.28 PARoxetine (PAXIL) 20 mg tablet   2. Primary insomnia F51.01 307.42 zolpidem (AMBIEN) 10 mg tablet   3. Essential hypertension I10 401.9    4.  Encounter for immunization Z23 V03.89 INFLUENZA VIRUS VACCINE, HIGH DOSE SEASONAL, PRESERVATIVE FREE      CANCELED: INFLUENZA VIRUS VAC QUAD,SPLIT,PRESV FREE SYRINGE IM       As above,   above all stable unless otherwise noted  DC norco  Continue paxil;   prudent use of ambien advised  Consider allopurinol/colchicine ( which pt states he has been on before) for gout  Continue current meds as ordered  Follow-up Disposition:  Return in about 3 months (around 2/22/2018) for medicare well. An After Visit Summary was printed and given to the patient. This has been fully explained to the patient, who indicates understanding.

## 2017-11-22 NOTE — PROGRESS NOTES
1. Have you been to the ER, urgent care clinic since your last visit? Hospitalized since your last visit? No    2. Have you seen or consulted any other health care providers outside of the [address] [place] [address] since your last visit? Include any pap smears or colon screening.  No

## 2017-11-22 NOTE — PATIENT INSTRUCTIONS

## 2018-01-12 DIAGNOSIS — I10 ESSENTIAL HYPERTENSION WITH GOAL BLOOD PRESSURE LESS THAN 140/90: ICD-10-CM

## 2018-01-15 RX ORDER — OLMESARTAN MEDOXOMIL AND HYDROCHLOROTHIAZIDE 20/12.5 20; 12.5 MG/1; MG/1
TABLET ORAL
Qty: 90 TAB | Refills: 1 | OUTPATIENT
Start: 2018-01-15

## 2018-02-22 ENCOUNTER — OFFICE VISIT (OUTPATIENT)
Dept: FAMILY MEDICINE CLINIC | Age: 78
End: 2018-02-22

## 2018-02-22 VITALS
OXYGEN SATURATION: 96 % | TEMPERATURE: 98.2 F | DIASTOLIC BLOOD PRESSURE: 90 MMHG | WEIGHT: 174 LBS | SYSTOLIC BLOOD PRESSURE: 148 MMHG | RESPIRATION RATE: 12 BRPM | HEIGHT: 67 IN | BODY MASS INDEX: 27.31 KG/M2 | HEART RATE: 93 BPM

## 2018-02-22 DIAGNOSIS — Z23 ENCOUNTER FOR IMMUNIZATION: ICD-10-CM

## 2018-02-22 DIAGNOSIS — F51.01 PRIMARY INSOMNIA: ICD-10-CM

## 2018-02-22 DIAGNOSIS — Z13.6 SCREENING FOR CARDIOVASCULAR CONDITION: ICD-10-CM

## 2018-02-22 DIAGNOSIS — Z00.00 MEDICARE ANNUAL WELLNESS VISIT, SUBSEQUENT: Primary | ICD-10-CM

## 2018-02-22 DIAGNOSIS — R73.9 ELEVATED BLOOD SUGAR: ICD-10-CM

## 2018-02-22 RX ORDER — ZOLPIDEM TARTRATE 10 MG/1
TABLET ORAL
Qty: 90 TAB | Refills: 0 | Status: SHIPPED | OUTPATIENT
Start: 2018-02-22 | End: 2018-08-30 | Stop reason: SDUPTHER

## 2018-02-22 NOTE — PATIENT INSTRUCTIONS
Medicare Wellness Visit, Male    The best way to live healthy is to have a healthy lifestyle by eating a well-balanced diet, exercising regularly, limiting alcohol and stopping smoking. Regular physical exams and screening tests are another way to keep healthy. Preventive exams provided by your health care provider can find health problems before they become diseases or illnesses. Preventive services including immunizations, screening tests, monitoring and exams can help you take care of your own health. All people over age 72 should have a pneumovax  and and a prevnar shot to prevent pneumonia. These are once in a lifetime unless you and your provider decide differently. All people over 65 should have a yearly flu shot and a tetanus vaccine every 10 years. Screening for diabetes mellitus with a blood sugar test should be done every year. Glaucoma is a disease of the eye due to increased ocular pressure that can lead to blindness and it should be done every year by an eye professional.    Cardiovascular screening tests that check for elevated lipids (fatty part of blood) which can lead to heart disease and strokes should be done every 5 years. Colorectal screening that evaluates for blood or polyps in your colon should be done yearly as a stool test or every five years as a flexible sigmoidoscope or every 10 years as a colonoscopy up to age 76. Men up to age 76 may need a screening blood test for prostate cancer at certain intervals, depending on their personal and family history. This decision is between the patient and his provider. If you have been a smoker or had family history of abdominal aortic aneurysms, you and your provider may decide to schedule an ultrasound test of your aorta. Hepatitis C screening is also recommended for anyone born between [birth year] through [address]. A shingles vaccine is also recommended once in a lifetime after age 61.     Your Medicare Wellness Exam is recommended annually. Here is a list of your current Health Maintenance items with a due date:  Health Maintenance Due   Topic Date Due    DTaP/Tdap/Td  (1 - Tdap) 05/08/1963    Shingles Vaccine  03/08/2002    Glaucoma Screening   05/08/2007          Well Visit, Over 72: Care Instructions  Your Care Instructions    Physical exams can help you stay healthy. Your doctor has checked your overall health and may have suggested ways to take good care of yourself. He or she also may have recommended tests. At home, you can help prevent illness with healthy eating, regular exercise, and other steps. Follow-up care is a key part of your treatment and safety. Be sure to make and go to all appointments, and call your doctor if you are having problems. It's also a good idea to know your test results and keep a list of the medicines you take. How can you care for yourself at home? · Reach and stay at a healthy weight. This will lower your risk for many problems, such as obesity, diabetes, heart disease, and high blood pressure. · Get at least 30 minutes of exercise on most days of the week. Walking is a good choice. You also may want to do other activities, such as running, swimming, cycling, or playing tennis or team sports. · Do not smoke. Smoking can make health problems worse. If you need help quitting, talk to your doctor about stop-smoking programs and medicines. These can increase your chances of quitting for good. · Protect your skin from too much sun. When you're outdoors from 10 a.m. to 4 p.m., stay in the shade or cover up with clothing and a hat with a wide brim. Wear sunglasses that block UV rays. Even when it's cloudy, put broad-spectrum sunscreen (SPF 30 or higher) on any exposed skin. · See a dentist one or two times a year for checkups and to have your teeth cleaned. · Wear a seat belt in the car. · Limit alcohol to 2 drinks a day for men and 1 drink a day for women.  Too much alcohol can cause health problems. Follow your doctor's advice about when to have certain tests. These tests can spot problems early. For men and women  · Cholesterol. Your doctor will tell you how often to have this done based on your overall health and other things that can increase your risk for heart attack and stroke. · Blood pressure. Have your blood pressure checked during a routine doctor visit. Your doctor will tell you how often to check your blood pressure based on your age, your blood pressure results, and other factors. · Diabetes. Ask your doctor whether you should have tests for diabetes. · Vision. Experts recommend that you have yearly exams for glaucoma and other age-related eye problems. · Hearing. Tell your doctor if you notice any change in your hearing. You can have tests to find out how well you hear. · Colon cancer tests. Keep having colon cancer tests as your doctor recommends. You can have one of several types of tests. · Heart attack and stroke risk. At least every 4 to 6 years, you should have your risk for heart attack and stroke assessed. Your doctor uses factors such as your age, blood pressure, cholesterol, and whether you smoke or have diabetes to show what your risk for a heart attack or stroke is over the next 10 years. · Osteoporosis. Talk to your doctor about whether you should have a bone density test to find out whether you have thinning bones. Also ask your doctor about whether you should take calcium and vitamin D supplements. For women  · Pap test and pelvic exam. You may no longer need a Pap test. Talk with your doctor about whether to stop or continue to have Pap tests. · Breast exam and mammogram. Ask how often you should have a mammogram, which is an X-ray of your breasts. A mammogram can spot breast cancer before it can be felt and when it is easiest to treat. · Thyroid disease.  Talk to your doctor about whether to have your thyroid checked as part of a regular physical exam. Women have an increased chance of a thyroid problem. For men  · Prostate exam. Talk to your doctor about whether you should have a blood test (called a PSA test) for prostate cancer. Experts disagree on whether men should have this test. Some experts recommend that you discuss the benefits and risks of the test with your doctor. · Abdominal aortic aneurysm. Ask your doctor whether you should have a test to check for an aneurysm. You may need a test if you ever smoked or if your parent, brother, sister, or child has had an aneurysm. When should you call for help? Watch closely for changes in your health, and be sure to contact your doctor if you have any problems or symptoms that concern you. Where can you learn more? Go to http://felix-shanique.info/. Enter I677 in the search box to learn more about \"Well Visit, Over 65: Care Instructions. \"  Current as of: May 12, 2017  Content Version: 11.4  © 1064-8372 [employer], [employer]. Care instructions adapted under license by [employer] (which disclaims liability or warranty for this information). If you have questions about a medical condition or this instruction, always ask your healthcare professional. [place] [address] any warranty or liability for your use of this information.

## 2018-02-22 NOTE — PROGRESS NOTES
1. Have you been to the ER, urgent care clinic since your last visit? Hospitalized since your last visit? No    2. Have you seen or consulted any other health care providers outside of the [address] [place] [address] since your last visit? Include any pap smears or colon screening.  Yes Where: Marylu Rucker MD - dermatology

## 2018-02-22 NOTE — MR AVS SNAPSHOT
[address] [place] [address] 
 
 
 [address] [address] [address] [address] 
[phone] Patient: Alfonso Sanchez MRN: [id number] OBL:[birth year] Visit Information Date & Time Provider Department Dept. Phone Encounter #  
 2/22/2018  8:40 AM Sudhir Casey, [address] [place] [address] [phone] [id number] Upcoming Health Maintenance Date Due  
 GLAUCOMA SCREENING Q2Y 7/19/2018* ZOSTER VACCINE AGE 60> 7/26/2018* MEDICARE YEARLY EXAM 2/23/2019 COLONOSCOPY 5/11/2021 DTaP/Tdap/Td series (2 - Td) 2/22/2028 *Topic was postponed. The date shown is not the original due date. Allergies as of 2/22/2018  Review Complete On: 2/22/2018 By: Coralee Liming, LPN Severity Noted Reaction Type Reactions Fluconazole  05/31/2013    Hives, Itching Penicillin G  05/31/2013    Hives Current Immunizations  Never Reviewed Name Date Influenza High Dose Vaccine PF 11/22/2017, 11/9/2016, 9/24/2014 Influenza Vaccine 9/17/2013 Influenza Vaccine (Quad) PF 8/28/2015 Pneumococcal Conjugate (PCV-13) 2/22/2017 Pneumococcal Vaccine (Unspecified Type) 9/5/2012 Tdap  Incomplete Not reviewed this visit You Were Diagnosed With   
  
 Codes Comments Medicare annual wellness visit, subsequent    -  Primary ICD-10-CM: Z00.00 ICD-9-CM: V70.0 Primary insomnia     ICD-10-CM: F51.01 
ICD-9-CM: 307.42 Screening for cardiovascular condition     ICD-10-CM: Z13.6 ICD-9-CM: V81.2 Elevated blood sugar     ICD-10-CM: R73.9 ICD-9-CM: 790.29 Encounter for immunization     ICD-10-CM: O00 ICD-9-CM: V03.89 Vitals BP Pulse Temp Resp Height(growth percentile) Weight(growth percentile) 148/90 (BP 1 Location: Right arm, BP Patient Position: Sitting) 93 98.2 °F (36.8 °C) (Oral) 12 5' 7\" (1.702 m) 174 lb (78.9 kg) SpO2 BMI Smoking Status 96% 27.25 kg/m2 Never Smoker BMI and BSA Data Body Mass Index Body Surface Area  
 27.25 kg/m 2 1.93 m 2 Preferred Pharmacy Pharmacy Name Phone [id number] Armin David, [address] [facility] [phone] Your Updated Medication List  
  
   
This list is accurate as of 2/22/18  9:24 AM.  Always use your most recent med list.  
  
  
  
  
 latanoprost 0.005 % ophthalmic solution Commonly known as:  XALATAN  
  
 olmesartan-hydroCHLOROthiazide 20-12.5 mg per tablet Commonly known as:  BENICAR HCT Take 1 Tab by mouth daily. PARoxetine 20 mg tablet Commonly known as:  PAXIL Take 1 Tab by mouth daily. zolpidem 10 mg tablet Commonly known as:  AMBIEN  
TAKE 1 TAB BY MOUTH NIGHTLY AS NEEDED FOR SLEEP. MAX DAILY AMOUNT: 10MG Prescriptions Printed Refills  
 zolpidem (AMBIEN) 10 mg tablet 0 Sig: TAKE 1 TAB BY MOUTH NIGHTLY AS NEEDED FOR SLEEP. MAX DAILY AMOUNT: 10MG Class: Print We Performed the Following TETANUS, DIPHTHERIA TOXOIDS AND ACELLULAR PERTUSSIS VACCINE (TDAP), IN INDIVIDS. >=7, IM [id number] CPT(R)] To-Do List   
 03/01/2018 Lab:  HEMOGLOBIN A1C WITH EAG   
  
 03/01/2018 Lab:  LIPID PANEL   
  
 03/01/2018 Lab:  METABOLIC PANEL, COMPREHENSIVE Patient Instructions Medicare Wellness Visit, Male The best way to live healthy is to have a healthy lifestyle by eating a well-balanced diet, exercising regularly, limiting alcohol and stopping smoking. Regular physical exams and screening tests are another way to keep healthy. Preventive exams provided by your health care provider can find health problems before they become diseases or illnesses. Preventive services including immunizations, screening tests, monitoring and exams can help you take care of your own health. All people over age 72 should have a pneumovax  and and a prevnar shot to prevent pneumonia.  These are once in a lifetime unless you and your provider decide differently. All people over 65 should have a yearly flu shot and a tetanus vaccine every 10 years. Screening for diabetes mellitus with a blood sugar test should be done every year. Glaucoma is a disease of the eye due to increased ocular pressure that can lead to blindness and it should be done every year by an eye professional. 
 
Cardiovascular screening tests that check for elevated lipids (fatty part of blood) which can lead to heart disease and strokes should be done every 5 years. Colorectal screening that evaluates for blood or polyps in your colon should be done yearly as a stool test or every five years as a flexible sigmoidoscope or every 10 years as a colonoscopy up to age 76. Men up to age 76 may need a screening blood test for prostate cancer at certain intervals, depending on their personal and family history. This decision is between the patient and his provider. If you have been a smoker or had family history of abdominal aortic aneurysms, you and your provider may decide to schedule an ultrasound test of your aorta. Hepatitis C screening is also recommended for anyone born between [birth year] through [address]. A shingles vaccine is also recommended once in a lifetime after age 61. Your Medicare Wellness Exam is recommended annually. Here is a list of your current Health Maintenance items with a due date: 
Health Maintenance Due Topic Date Due  
 DTaP/Tdap/Td  (1 - Tdap) 05/08/1963  Shingles Vaccine  03/08/2002  Glaucoma Screening   05/08/2007 Well Visit, Over 72: Care Instructions Your Care Instructions Physical exams can help you stay healthy. Your doctor has checked your overall health and may have suggested ways to take good care of yourself. He or she also may have recommended tests. At home, you can help prevent illness with healthy eating, regular exercise, and other steps. Follow-up care is a key part of your treatment and safety. Be sure to make and go to all appointments, and call your doctor if you are having problems. It's also a good idea to know your test results and keep a list of the medicines you take. How can you care for yourself at home? · Reach and stay at a healthy weight. This will lower your risk for many problems, such as obesity, diabetes, heart disease, and high blood pressure. · Get at least 30 minutes of exercise on most days of the week. Walking is a good choice. You also may want to do other activities, such as running, swimming, cycling, or playing tennis or team sports. · Do not smoke. Smoking can make health problems worse. If you need help quitting, talk to your doctor about stop-smoking programs and medicines. These can increase your chances of quitting for good. · Protect your skin from too much sun. When you're outdoors from 10 a.m. to 4 p.m., stay in the shade or cover up with clothing and a hat with a wide brim. Wear sunglasses that block UV rays. Even when it's cloudy, put broad-spectrum sunscreen (SPF 30 or higher) on any exposed skin. · See a dentist one or two times a year for checkups and to have your teeth cleaned. · Wear a seat belt in the car. · Limit alcohol to 2 drinks a day for men and 1 drink a day for women. Too much alcohol can cause health problems. Follow your doctor's advice about when to have certain tests. These tests can spot problems early. For men and women · Cholesterol. Your doctor will tell you how often to have this done based on your overall health and other things that can increase your risk for heart attack and stroke. · Blood pressure. Have your blood pressure checked during a routine doctor visit. Your doctor will tell you how often to check your blood pressure based on your age, your blood pressure results, and other factors. · Diabetes. Ask your doctor whether you should have tests for diabetes. · Vision. Experts recommend that you have yearly exams for glaucoma and other age-related eye problems. · Hearing. Tell your doctor if you notice any change in your hearing. You can have tests to find out how well you hear. · Colon cancer tests. Keep having colon cancer tests as your doctor recommends. You can have one of several types of tests. · Heart attack and stroke risk. At least every 4 to 6 years, you should have your risk for heart attack and stroke assessed. Your doctor uses factors such as your age, blood pressure, cholesterol, and whether you smoke or have diabetes to show what your risk for a heart attack or stroke is over the next 10 years. · Osteoporosis. Talk to your doctor about whether you should have a bone density test to find out whether you have thinning bones. Also ask your doctor about whether you should take calcium and vitamin D supplements. For women · Pap test and pelvic exam. You may no longer need a Pap test. Talk with your doctor about whether to stop or continue to have Pap tests. · Breast exam and mammogram. Ask how often you should have a mammogram, which is an X-ray of your breasts. A mammogram can spot breast cancer before it can be felt and when it is easiest to treat. · Thyroid disease. Talk to your doctor about whether to have your thyroid checked as part of a regular physical exam. Women have an increased chance of a thyroid problem. For men · Prostate exam. Talk to your doctor about whether you should have a blood test (called a PSA test) for prostate cancer. Experts disagree on whether men should have this test. Some experts recommend that you discuss the benefits and risks of the test with your doctor. · Abdominal aortic aneurysm. Ask your doctor whether you should have a test to check for an aneurysm. You may need a test if you ever smoked or if your parent, brother, sister, or child has had an aneurysm. When should you call for help? Watch closely for changes in your health, and be sure to contact your doctor if you have any problems or symptoms that concern you. Where can you learn more? Go to http://felix-shanique.info/. Enter E133 in the search box to learn more about \"Well Visit, Over 65: Care Instructions. \" Current as of: May 12, 2017 Content Version: 11.4 © 7450-2128 [employer]. Care instructions adapted under license by [employer] (which disclaims liability or warranty for this information). If you have questions about a medical condition or this instruction, always ask your healthcare professional. [address] [address] any warranty or liability for your use of this information. Introducing [facility]! [employer] introduces [employer] patient portal. Now you can access parts of your medical record, email your doctor's office, and request medication refills online. 1. In your internet browser, go to [email or web address] 2. Click on the First Time User? Click Here link in the Sign In box. You will see the New Member Sign Up page. 3. Enter your Mov[employer] Access Code exactly as it appears below. You will not need to use this code after youve completed the sign-up process. If you do not sign up before the expiration date, you must request a new code. · Mov[employer] Access Code: [email or web address] Expires: 5/23/2018  9:23 AM 
 
4. Enter the last four digits of your Social Security Number (xxxx) and Date of Birth (mm/dd/yyyy) as indicated and click Submit. You will be taken to the next sign-up page. 5. Create a Movli ID. This will be your Movli login ID and cannot be changed, so think of one that is secure and easy to remember. 6. Create a Movli password. You can change your password at any time. 7. Enter your Password Reset Question and Answer. This can be used at a later time if you forget your password. 8. Enter your e-mail address. You will receive e-mail notification when new information is available in [address] 9. Click Sign Up. You can now view and download portions of your medical record. 10. Click the Download Summary menu link to download a portable copy of your medical information. If you have questions, please visit the Frequently Asked Questions section of the [employer] website. Remember, [employer] is NOT to be used for urgent needs. For medical emergencies, dial 911. Now available from your iPhone and Android! Please provide this summary of care documentation to your next provider. If you have any questions after today's visit, please call [phone].

## 2018-02-22 NOTE — PROGRESS NOTES
This is a Subsequent Medicare Annual Wellness Exam (AWV) (Performed 12 months after IPPE or effective date of Medicare Part B enrollment)    I have reviewed the patient's medical history in detail and updated the computerized patient record. He has been well; needs a refill on [place]; needs labs; Has had an elevated blood sugar in the past.    History     Past Medical History:   Diagnosis Date    Anxiety and depression     Asthma     Elevated PSA     Gout     Hypertension     Insomnia     Kidney stone     Malaria     Prediabetes     Skin cancer       Past Surgical History:   Procedure Laterality Date    HX COLONOSCOPY  2011    f/u 2021    HX HERNIA REPAIR  2000    [id number] [facility]    HX SKIN BIOPSY  2013    [id number] [facility] [place]     Current Outpatient Prescriptions   Medication Sig Dispense Refill    zolpidem (AMBIEN) 10 mg tablet TAKE 1 TAB BY MOUTH NIGHTLY AS NEEDED FOR SLEEP. MAX DAILY AMOUNT: 10MG 90 Tab 0    PARoxetine (PAXIL) 20 mg tablet Take 1 Tab by mouth daily. 90 Tab 1    olmesartan-hydroCHLOROthiazide (BENICAR HCT) 20-12.5 mg per tablet Take 1 Tab by mouth daily. 90 Tab 1    latanoprost (XALATAN) 0.005 % ophthalmic solution   6     Allergies   Allergen Reactions    Fluconazole Hives and Itching    Penicillin G Hives     No family history on file.   Social History   Substance Use Topics    Smoking status: Never Smoker    Smokeless tobacco: Never Used    Alcohol use No     Patient Active Problem List   Diagnosis Code    Gross hematuria R31.0    Elevated PSA R97.20    Prediabetes R73.03    HTN (hypertension) I10    Insomnia G47.00    Joint pain M25.50    Anxiety F41.9    Anxiety and depression F41.8    Asthma J45.909    Hypertension I10    Kidney stone N20.0    Malaria B54    Skin cancer C44.90    Gout M10.9       Depression Risk Factor Screening:     PHQ over the last two weeks 11/9/2016   Little interest or pleasure in doing things Nearly every day   Feeling down, depressed or hopeless More than half the days   Total Score PHQ 2 5   Trouble falling or staying asleep, or sleeping too much -   Feeling tired or having little energy -   Poor appetite or overeating -   Feeling bad about yourself - or that you are a failure or have let yourself or your family down -   Trouble concentrating on things such as school, work, reading or watching TV -   Moving or speaking so slowly that other people could have noticed; or the opposite being so fidgety that others notice -   Thoughts of being better off dead, or hurting yourself in some way -   PHQ 9 Score -     Alcohol Risk Factor Screening: You do not drink alcohol or very rarely. Functional Ability and Level of Safety:   Hearing Loss  Hearing is good. Activities of Daily Living  The home contains: no safety equipment. Patient does total self care    Fall Risk  Fall Risk Assessment, last 12 mths 4/24/2017   Able to walk? Yes   Fall in past 12 months? No       Abuse Screen  Patient is not abused    Cognitive Screening   Evaluation of Cognitive Function:  Has your family/caregiver stated any concerns about your memory: no  Normal    Patient Care Team   Patient Care Team:  Briseida Cardoso MD as Physician (Urology)    Assessment/Plan   Education and counseling provided:  Are appropriate based on today's review and evaluation  End-of-Life planning (with patient's consent)  Cardiovascular screening blood test  Diabetes screening test    Diagnoses and all orders for this visit:    1. Medicare annual wellness visit, subsequent    2. Primary insomnia  -     zolpidem (AMBIEN) 10 mg tablet; TAKE 1 TAB BY MOUTH NIGHTLY AS NEEDED FOR SLEEP. MAX DAILY AMOUNT: 10MG    3. Screening for cardiovascular condition  -     LIPID PANEL; Future  -     METABOLIC PANEL, COMPREHENSIVE; Future    4. Elevated blood sugar  -     HEMOGLOBIN A1C WITH EAG; Future    5.  Encounter for immunization  -     Tetanus, diphtheria toxoids and acellular pertussis (TDAP) vaccine, in individuals >=7 years, IM        There are no preventive care reminders to display for this patient. As above, pt well, stable  Follow-up Disposition:  Return in about 4 months (around 6/22/2018) for prediabetes/htn. An After Visit Summary was printed and given to the patient. This has been fully explained to the patient, who indicates understanding.

## 2018-04-23 ENCOUNTER — OFFICE VISIT (OUTPATIENT)
Dept: FAMILY MEDICINE CLINIC | Age: 78
End: 2018-04-23

## 2018-04-23 VITALS
HEART RATE: 82 BPM | RESPIRATION RATE: 16 BRPM | BODY MASS INDEX: 27.21 KG/M2 | DIASTOLIC BLOOD PRESSURE: 90 MMHG | WEIGHT: 173.4 LBS | TEMPERATURE: 97.9 F | HEIGHT: 67 IN | OXYGEN SATURATION: 97 % | SYSTOLIC BLOOD PRESSURE: 140 MMHG

## 2018-04-23 DIAGNOSIS — I10 ESSENTIAL HYPERTENSION: ICD-10-CM

## 2018-04-23 DIAGNOSIS — M10.9 GOUT, UNSPECIFIED CAUSE, UNSPECIFIED CHRONICITY, UNSPECIFIED SITE: ICD-10-CM

## 2018-04-23 DIAGNOSIS — F41.9 ANXIETY: ICD-10-CM

## 2018-04-23 DIAGNOSIS — R05.9 COUGH: Primary | ICD-10-CM

## 2018-04-23 DIAGNOSIS — R73.03 PREDIABETES: ICD-10-CM

## 2018-04-23 DIAGNOSIS — R73.09 ELEVATED HEMOGLOBIN A1C: ICD-10-CM

## 2018-04-23 DIAGNOSIS — F33.0 MILD EPISODE OF RECURRENT MAJOR DEPRESSIVE DISORDER (HCC): ICD-10-CM

## 2018-04-23 DIAGNOSIS — J31.0 CHRONIC RHINITIS: ICD-10-CM

## 2018-04-23 RX ORDER — FLUTICASONE PROPIONATE 50 MCG
2 SPRAY, SUSPENSION (ML) NASAL DAILY
Qty: 1 BOTTLE | Refills: 0 | Status: SHIPPED | OUTPATIENT
Start: 2018-04-23 | End: 2020-04-28 | Stop reason: SDUPTHER

## 2018-04-23 RX ORDER — CETIRIZINE HCL 10 MG
10 TABLET ORAL DAILY
Qty: 30 TAB | Refills: 0 | Status: SHIPPED | OUTPATIENT
Start: 2018-04-23 | End: 2018-06-16 | Stop reason: SDUPTHER

## 2018-04-23 NOTE — PATIENT INSTRUCTIONS

## 2018-04-23 NOTE — MR AVS SNAPSHOT
[address] 
[phone] Patient: Jamie Hernandez MRN: [id number] RFC:[birth year] Visit Information Date & Time Provider Department Dept. Phone Encounter #  
 4/23/2018  9:30 AM Ru Soriano, [address] [place] [address] [id number] Follow-up Instructions Return in about 2 weeks (around 5/7/2018), or if symptoms worsen or fail to improve. Upcoming Health Maintenance Date Due  
 GLAUCOMA SCREENING Q2Y 7/19/2018* ZOSTER VACCINE AGE 60> 7/26/2018* MEDICARE YEARLY EXAM 2/23/2019 COLONOSCOPY 5/11/2021 DTaP/Tdap/Td series (2 - Td) 2/22/2028 *Topic was postponed. The date shown is not the original due date. Allergies as of 4/23/2018  Review Complete On: 4/23/2018 By: Ru Soriano MD  
  
 Severity Noted Reaction Type Reactions Fluconazole  05/31/2013    Hives, Itching Penicillin G  05/31/2013    Hives Current Immunizations  Never Reviewed Name Date Influenza High Dose Vaccine PF 11/22/2017, 11/9/2016, 9/24/2014 Influenza Vaccine 9/17/2013 Influenza Vaccine (Quad) PF 8/28/2015 Pneumococcal Conjugate (PCV-13) 2/22/2017 Pneumococcal Vaccine (Unspecified Type) 9/5/2012 Tdap 2/22/2018 Not reviewed this visit You Were Diagnosed With   
  
 Codes Comments Cough    -  Primary ICD-10-CM: D42 ICD-9-CM: 786.2 Prediabetes     ICD-10-CM: R73.03 
ICD-9-CM: 790.29 Essential hypertension     ICD-10-CM: I10 
ICD-9-CM: 401.9 Gout, unspecified cause, unspecified chronicity, unspecified site     ICD-10-CM: M10.9 ICD-9-CM: 274.9 Elevated hemoglobin A1c     ICD-10-CM: R73.09 
ICD-9-CM: 790.29 Chronic rhinitis     ICD-10-CM: J31.0 ICD-9-CM: 472.0 Vitals BP Pulse Temp Resp Height(growth percentile) Weight(growth percentile)  140/90 (BP 1 Location: Left arm, BP Patient Position: Sitting) 82 97.9 °F (36.6 °C) (Oral) 16 5' 7\" (1.702 m) 173 lb 6.4 oz (78.7 kg) SpO2 BMI Smoking Status 97% 27.16 kg/m2 Never Smoker Vitals History BMI and BSA Data Body Mass Index Body Surface Area  
 27.16 kg/m 2 1.93 m 2 Preferred Pharmacy Pharmacy Name Phone [facility]/PHARMACY #[id number] Raphael Cervantes, [address] [facility] [facility] [phone] Your Updated Medication List  
  
   
This list is accurate as of 18 10:02 AM.  Always use your most recent med list.  
  
  
  
  
 cetirizine 10 mg tablet Commonly known as:  ZYRTEC Take 1 Tab by mouth daily. fluticasone 50 mcg/actuation nasal spray Commonly known as:  [place] 2 Sprays by Both Nostrils route daily. latanoprost 0.005 % ophthalmic solution Commonly known as:  XALATAN  
  
 olmesartan-hydroCHLOROthiazide 20-12.5 mg per tablet Commonly known as:  BENICAR HCT Take 1 Tab by mouth daily. PARoxetine 20 mg tablet Commonly known as:  PAXIL Take 1 Tab by mouth daily. zolpidem 10 mg tablet Commonly known as:  AMBIEN  
TAKE 1 TAB BY MOUTH NIGHTLY AS NEEDED FOR SLEEP. MAX DAILY AMOUNT: 10MG Prescriptions Sent to Pharmacy Refills  
 cetirizine (ZYRTEC) 10 mg tablet 0 Sig: Take 1 Tab by mouth daily. Class: Normal  
 Pharmacy: [facility]/pharmacy [address] [place] [address],[address] [facility] [address] Ph #: [phone] Route: Oral  
 fluticasone (FLONASE) 50 mcg/actuation nasal spray 0 Si Sprays by Both Nostrils route daily. Class: Normal  
 Pharmacy: [facility]/pharmacy [address] [place] [address],[address] [facility] [address] Ph #: [phone] Route: Both Nostrils Follow-up Instructions Return in about 2 weeks (around 2018), or if symptoms worsen or fail to improve. To-Do List   
 2018 Lab:  HEMOGLOBIN A1C W/O EAG   
  
 2018 Lab:  LIPID PANEL   
  
 2018 Lab:  METABOLIC PANEL, COMPREHENSIVE   
  
 2018 Lab:  URIC ACID   
  
 2018 Imaging:  XR CHEST PA LAT Patient Instructions DASH Diet: Care Instructions Your Care Instructions The DASH diet is an eating plan that can help lower your blood pressure. DASH stands for Dietary Approaches to Stop Hypertension. Hypertension is high blood pressure. The DASH diet focuses on eating foods that are high in calcium, potassium, and magnesium. These nutrients can lower blood pressure. The foods that are highest in these nutrients are fruits, vegetables, low-fat dairy products, nuts, seeds, and legumes. But taking calcium, potassium, and magnesium supplements instead of eating foods that are high in those nutrients does not have the same effect. The DASH diet also includes whole grains, fish, and poultry. The DASH diet is one of several lifestyle changes your doctor may recommend to lower your high blood pressure. Your doctor may also want you to decrease the amount of sodium in your diet. Lowering sodium while following the DASH diet can lower blood pressure even further than just the DASH diet alone. Follow-up care is a key part of your treatment and safety. Be sure to make and go to all appointments, and call your doctor if you are having problems. It's also a good idea to know your test results and keep a list of the medicines you take. How can you care for yourself at home? Following the DASH diet · Eat 4 to 5 servings of fruit each day. A serving is 1 medium-sized piece of fruit, ½ cup chopped or canned fruit, 1/4 cup dried fruit, or 4 ounces (½ cup) of fruit juice. Choose fruit more often than fruit juice. · Eat 4 to 5 servings of vegetables each day. A serving is 1 cup of lettuce or raw leafy vegetables, ½ cup of chopped or cooked vegetables, or 4 ounces (½ cup) of vegetable juice. Choose vegetables more often than vegetable juice. · Get 2 to 3 servings of low-fat and fat-free dairy each day. A serving is 8 ounces of milk, 1 cup of yogurt, or 1 ½ ounces of cheese. · Eat 6 to 8 servings of grains each day. A serving is 1 slice of bread, 1 ounce of dry cereal, or ½ cup of cooked rice, pasta, or cooked cereal. Try to choose whole-grain products as much as possible. · Limit lean meat, poultry, and fish to 2 servings each day. A serving is 3 ounces, about the size of a deck of cards. · Eat 4 to 5 servings of nuts, seeds, and legumes (cooked dried beans, lentils, and split peas) each week. A serving is 1/3 cup of nuts, 2 tablespoons of seeds, or ½ cup of cooked beans or peas. · Limit fats and oils to 2 to 3 servings each day. A serving is 1 teaspoon of vegetable oil or 2 tablespoons of salad dressing. · Limit sweets and added sugars to 5 servings or less a week. A serving is 1 tablespoon jelly or jam, ½ cup sorbet, or 1 cup of lemonade. · Eat less than 2,300 milligrams (mg) of sodium a day. If you limit your sodium to 1,500 mg a day, you can lower your blood pressure even more. Tips for success · Start small. Do not try to make dramatic changes to your diet all at once. You might feel that you are missing out on your favorite foods and then be more likely to not follow the plan. Make small changes, and stick with them. Once those changes become habit, add a few more changes. · Try some of the following: ¨ Make it a goal to eat a fruit or vegetable at every meal and at snacks. This will make it easy to get the recommended amount of fruits and vegetables each day. ¨ Try yogurt topped with fruit and nuts for a snack or healthy dessert. ¨ Add lettuce, tomato, cucumber, and onion to sandwiches. ¨ Combine a ready-made pizza crust with low-fat mozzarella cheese and lots of vegetable toppings. Try using tomatoes, squash, spinach, broccoli, carrots, cauliflower, and onions. ¨ Have a variety of cut-up vegetables with a low-fat dip as an appetizer instead of chips and dip. ¨ Sprinkle sunflower seeds or chopped almonds over salads.  Or try adding chopped walnuts or almonds to cooked vegetables. ¨ Try some vegetarian meals using beans and peas. Add garbanzo or kidney beans to salads. Make burritos and tacos with mashed irving beans or black beans. Where can you learn more? Go to http://felix-shanique.info/. Enter E692 in the search box to learn more about \"DASH Diet: Care Instructions. \" Current as of: September 21, 2016 Content Version: 11.4 © 0244-8308 [employer]ive [employer] unlimited. Care instructions adapted under license by [employer] (which disclaims liability or warranty for this information). If you have questions about a medical condition or this instruction, always ask your healthcare professional. [address] any warranty or liability for your use of this information. Introducing [facility] & HEALTH SERVICES! Dear Elpidio Sue: Thank you for requesting a CrowdZone account. Our records indicate that you already have an active CrowdZone account. You can access your account anytime at [email or web address]. [employer] Planet [email or web address]/Graveyard Pizza Did you know that you can access your hospital and ER discharge instructions at any time in CrowdZone? You can also review all of your test results from your hospital stay or ER visit. Additional Information If you have questions, please visit the Frequently Asked Questions section of the CrowdZone website at [email or web address]/[email or web address]/. Remember, CrowdZone is NOT to be used for urgent needs. For medical emergencies, dial 911. Now available from your iPhone and Android! Please provide this summary of care documentation to your next provider. If you have any questions after today's visit, please call [phone].

## 2018-04-23 NOTE — PROGRESS NOTES
Yas Nagy, 78 y.o.,  male    SUBJECTIVE  Establish care    HTN- long standin h/o, taking medication    H/o prediabetes    Cough- dry, post nasal drip and  Nasal congestion, ongoing for past month. Says feels 'sick', no fever, sob or wheezing. Reports h/o childhood asthma. Thinks he has these symptoms with changes in season but has not tried anything. Gout- reports flares about 2 x a year, shellfish usually triggers. Taking vicodin for this prn. Depression/anxiety- says since death of child several years ago. He has been responding fairly well to paxil, has bad moments when he thinks about him, denies SI/HI. Previous . Remote h/o kidney stones- following dr Kimberli Mcwilliams. ROS:  See HPI, all others negative        Patient Active Problem List   Diagnosis Code    Gross hematuria R31.0    Elevated PSA R97.20    Prediabetes R73.03    Insomnia G47.00    Joint pain M25.50    Anxiety F41.9    Anxiety and depression F41.9, F32.9    Asthma J45.909    Hypertension I10    Kidney stone N20.0    Malaria B54    Skin cancer C44.90    Gout M10.9    Essential hypertension I10       Current Outpatient Prescriptions   Medication Sig Dispense Refill    cetirizine (ZYRTEC) 10 mg tablet Take 1 Tab by mouth daily. 30 Tab 0    fluticasone (FLONASE) 50 mcg/actuation nasal spray 2 Sprays by Both Nostrils route daily. 1 Bottle 0    zolpidem (AMBIEN) 10 mg tablet TAKE 1 TAB BY MOUTH NIGHTLY AS NEEDED FOR SLEEP. MAX DAILY AMOUNT: 10MG 90 Tab 0    PARoxetine (PAXIL) 20 mg tablet Take 1 Tab by mouth daily. 90 Tab 1    olmesartan-hydroCHLOROthiazide (BENICAR HCT) 20-12.5 mg per tablet Take 1 Tab by mouth daily.  90 Tab 1    latanoprost (XALATAN) 0.005 % ophthalmic solution   6       Allergies   Allergen Reactions    Fluconazole Hives and Itching    Penicillin G Hives       Past Medical History:   Diagnosis Date    Anxiety and depression     Asthma     Elevated PSA     Gout     Hypertension     Insomnia  Kidney stone     Malaria     Prediabetes     Skin cancer        Social History     Social History    Marital status:      Spouse name: N/A    Number of children: N/A    Years of education: N/A     Occupational History    Not on file. Social History Main Topics    Smoking status: Never Smoker    Smokeless tobacco: Never Used    Alcohol use No    Drug use: No    Sexual activity: Not on file     Other Topics Concern    Not on file     Social History Narrative       History reviewed. No pertinent family history. OBJECTIVE    Physical Exam:     Visit Vitals    /90 (BP 1 Location: Left arm, BP Patient Position: Sitting)    Pulse 82    Temp 97.9 °F (36.6 °C) (Oral)    Resp 16    Ht 5' 7\" (1.702 m)    Wt 173 lb 6.4 oz (78.7 kg)    SpO2 97%    BMI 27.16 kg/m2       General: alert, well-appearing, in no apparent distress or pain  Head: atraumatic. Non-tender maxillary and frontal sinuses  Eyes: Lids with no discharge, no matting, conjunctivae clear and non injected, full EOMs, PERLLA  Ears: pinna non-tender, external auditory canal patent, TM intact  Mouth/throat:tonsils non enlarged, pharynx non erythematous and no lesion, nasal mucosa boggy  Neck: supple, no adenopathy palpated  CVS: normal rate, regular rhythm, distinct S1 and S2  Lungs:clear to ausculation bilaterally, no crackles, wheezing or rhonchi noted  Abdomen: normoactive bowel sounds, soft, non-tender  Extremities: no edema, no cyanosis,  Skin: warm, no lesions, rashes noted  Psych:  mood and affect normal        ASSESSMENT/PLAN  Diagnoses and all orders for this visit:    1. Cough  -     XR CHEST PA LAT; Future    2. Prediabetes  -     HEMOGLOBIN A1C W/O EAG; Future    3. Essential hypertension  -     METABOLIC PANEL, COMPREHENSIVE; Future  -     LIPID PANEL; Future    4. Gout, unspecified cause, unspecified chronicity, unspecified site  -     URIC ACID; Future    5.  Elevated hemoglobin A1c  -     HEMOGLOBIN A1C W/O EAG; Future    6. Chronic rhinitis  Start:  -     cetirizine (ZYRTEC) 10 mg tablet; Take 1 Tab by mouth daily. -     fluticasone (FLONASE) 50 mcg/actuation nasal spray; 2 Sprays by Both Nostrils route daily. Follow-up Disposition:  Return in about 2 weeks (around 5/7/2018), or if symptoms worsen or fail to improve. Patient understands plan of care. Patient has provided input and agrees with goals.

## 2018-04-23 NOTE — PROGRESS NOTES
Chief Complaint   Patient presents with   [address]     previously seen at [facility], Dr. Blanco Smoker Hypertension    Other     prediabetes    Tingling     bilat hand and feet, a couple weeks with hands, many years with feet    Other     chest pressure, x 1 month

## 2018-04-24 ENCOUNTER — HOSPITAL ENCOUNTER (OUTPATIENT)
Dept: GENERAL RADIOLOGY | Age: 78
Discharge: HOME | End: 2018-04-24
Payer: MEDICARE

## 2018-04-24 ENCOUNTER — HOSPITAL ENCOUNTER (OUTPATIENT)
Dept: LAB | Age: 78
Discharge: HOME | End: 2018-04-24
Payer: MEDICARE

## 2018-04-24 DIAGNOSIS — R73.03 PREDIABETES: ICD-10-CM

## 2018-04-24 DIAGNOSIS — R05.9 COUGH: ICD-10-CM

## 2018-04-24 DIAGNOSIS — I10 ESSENTIAL HYPERTENSION: ICD-10-CM

## 2018-04-24 DIAGNOSIS — M10.9 GOUT, UNSPECIFIED CAUSE, UNSPECIFIED CHRONICITY, UNSPECIFIED SITE: ICD-10-CM

## 2018-04-24 DIAGNOSIS — R73.09 ELEVATED HEMOGLOBIN A1C: ICD-10-CM

## 2018-04-24 LAB
ALBUMIN SERPL-MCNC: 4.1 G/DL (ref 3.4–5)
ALBUMIN/GLOB SERPL: 1 {RATIO} (ref 0.8–1.7)
ALP SERPL-CCNC: 61 U/L (ref 45–117)
ALT SERPL-CCNC: 22 U/L (ref 16–61)
ANION GAP SERPL CALC-SCNC: 8 MMOL/L (ref 3–18)
AST SERPL-CCNC: 11 U/L (ref 15–37)
BILIRUB SERPL-MCNC: 0.5 MG/DL (ref 0.2–1)
BUN SERPL-MCNC: 19 MG/DL (ref 7–18)
BUN/CREAT SERPL: 19 (ref 12–20)
CALCIUM SERPL-MCNC: 9.1 MG/DL (ref 8.5–10.1)
CHLORIDE SERPL-SCNC: 105 MMOL/L (ref 100–108)
CHOLEST SERPL-MCNC: 212 MG/DL
CO2 SERPL-SCNC: 26 MMOL/L (ref 21–32)
CREAT SERPL-MCNC: 0.99 MG/DL (ref 0.6–1.3)
GLOBULIN SER CALC-MCNC: 4 G/DL (ref 2–4)
GLUCOSE SERPL-MCNC: 109 MG/DL (ref 74–99)
HBA1C MFR BLD: 6.1 % (ref 4.2–5.6)
HDLC SERPL-MCNC: 36 MG/DL (ref 40–60)
HDLC SERPL: 5.9 {RATIO} (ref 0–5)
LDLC SERPL CALC-MCNC: 139.4 MG/DL (ref 0–100)
LIPID PROFILE,FLP: ABNORMAL
POTASSIUM SERPL-SCNC: 4 MMOL/L (ref 3.5–5.5)
PROT SERPL-MCNC: 8.1 G/DL (ref 6.4–8.2)
SODIUM SERPL-SCNC: 139 MMOL/L (ref 136–145)
TRIGL SERPL-MCNC: 183 MG/DL (ref ?–150)
URATE SERPL-MCNC: 6.5 MG/DL (ref 2.6–7.2)
VLDLC SERPL CALC-MCNC: 36.6 MG/DL

## 2018-04-24 PROCEDURE — 83036 HEMOGLOBIN GLYCOSYLATED A1C: CPT | Performed by: FAMILY MEDICINE

## 2018-04-24 PROCEDURE — 36415 COLL VENOUS BLD VENIPUNCTURE: CPT | Performed by: FAMILY MEDICINE

## 2018-04-24 PROCEDURE — 84550 ASSAY OF BLOOD/URIC ACID: CPT | Performed by: FAMILY MEDICINE

## 2018-04-24 PROCEDURE — 80061 LIPID PANEL: CPT | Performed by: FAMILY MEDICINE

## 2018-04-24 PROCEDURE — 80053 COMPREHEN METABOLIC PANEL: CPT | Performed by: FAMILY MEDICINE

## 2018-04-24 PROCEDURE — 71046 X-RAY EXAM CHEST 2 VIEWS: CPT

## 2018-05-02 ENCOUNTER — TELEPHONE (OUTPATIENT)
Dept: FAMILY MEDICINE CLINIC | Age: 78
End: 2018-05-02

## 2018-05-02 NOTE — TELEPHONE ENCOUNTER
Patient was seen on 4/24/18 at [facility] and was sent to have an xray. He has not heard results from the test. Granddaughter not on consent so she was not asked if patient is still seeing our office or if he had transferred to a new office. Please advise patient of results. Per granddaughter he is still not feeling well.  [phone]

## 2018-05-03 NOTE — TELEPHONE ENCOUNTER
Patient identified with 2 identifiers (name and ). Patient aware of normal chest x ray. Patient was offered earlier appointment. Patient states he will keep appt on 2018.

## 2018-05-07 ENCOUNTER — OFFICE VISIT (OUTPATIENT)
Dept: FAMILY MEDICINE CLINIC | Age: 78
End: 2018-05-07

## 2018-05-07 VITALS
HEART RATE: 85 BPM | RESPIRATION RATE: 15 BRPM | WEIGHT: 177.8 LBS | SYSTOLIC BLOOD PRESSURE: 140 MMHG | TEMPERATURE: 98.3 F | OXYGEN SATURATION: 94 % | BODY MASS INDEX: 27.91 KG/M2 | HEIGHT: 67 IN | DIASTOLIC BLOOD PRESSURE: 86 MMHG

## 2018-05-07 DIAGNOSIS — J30.9 ALLERGIC RHINITIS, UNSPECIFIED SEASONALITY, UNSPECIFIED TRIGGER: ICD-10-CM

## 2018-05-07 DIAGNOSIS — F33.0 MILD EPISODE OF RECURRENT MAJOR DEPRESSIVE DISORDER (HCC): ICD-10-CM

## 2018-05-07 DIAGNOSIS — E78.00 PURE HYPERCHOLESTEROLEMIA: ICD-10-CM

## 2018-05-07 DIAGNOSIS — G47.9 SLEEPING DIFFICULTY: ICD-10-CM

## 2018-05-07 DIAGNOSIS — I10 ESSENTIAL HYPERTENSION: Primary | ICD-10-CM

## 2018-05-07 DIAGNOSIS — R73.03 PREDIABETES: ICD-10-CM

## 2018-05-07 RX ORDER — PRAVASTATIN SODIUM 20 MG/1
20 TABLET ORAL
Qty: 90 TAB | Refills: 0 | Status: SHIPPED | OUTPATIENT
Start: 2018-05-07 | End: 2018-08-03 | Stop reason: SDUPTHER

## 2018-05-07 RX ORDER — AZITHROMYCIN 250 MG/1
TABLET, FILM COATED ORAL
Qty: 6 TAB | Refills: 0 | Status: SHIPPED | OUTPATIENT
Start: 2018-05-07 | End: 2018-09-28 | Stop reason: ALTCHOICE

## 2018-05-07 RX ORDER — HYDROCODONE BITARTRATE AND ACETAMINOPHEN 5; 300 MG/1; MG/1
TABLET ORAL
COMMUNITY
End: 2021-01-01 | Stop reason: DRUGHIGH

## 2018-05-07 NOTE — MR AVS SNAPSHOT
[facility] [address] 
[phone] Patient: Alexis Le MRN: [id number] VMO:9/0/[birth year] Visit Information Date & Time Provider Department Dept. Phone Encounter #  
 5/7/2018 11:30 AM Merissa Mayer, [address] [place] [address] [id number] Follow-up Instructions Return in about 3 months (around 8/7/2018), or if symptoms worsen or fail to improve. Upcoming Health Maintenance Date Due  
 GLAUCOMA SCREENING Q2Y 7/19/2018* ZOSTER VACCINE AGE 60> 7/26/2018* Influenza Age 5 to Adult 8/1/2018 MEDICARE YEARLY EXAM 2/23/2019 COLONOSCOPY 5/11/2021 DTaP/Tdap/Td series (2 - Td) 2/22/2028 *Topic was postponed. The date shown is not the original due date. Allergies as of 5/7/2018  Review Complete On: 5/7/2018 By: Merissa Mayer MD  
  
 Severity Noted Reaction Type Reactions Fluconazole  05/31/2013    Hives, Itching Penicillin G  05/31/2013    Hives Current Immunizations  Never Reviewed Name Date Influenza High Dose Vaccine PF 11/22/2017, 11/9/2016, 9/24/2014 Influenza Vaccine 9/17/2013 Influenza Vaccine (Quad) PF 8/28/2015 Pneumococcal Conjugate (PCV-13) 2/22/2017 Pneumococcal Vaccine (Unspecified Type) 9/5/2012 Tdap 2/22/2018 Not reviewed this visit You Were Diagnosed With   
  
 Codes Comments Essential hypertension    -  Primary ICD-10-CM: I10 
ICD-9-CM: 401.9 Prediabetes     ICD-10-CM: R73.03 
ICD-9-CM: 790.29 Pure hypercholesterolemia     ICD-10-CM: E78.00 ICD-9-CM: 272.0 Mild episode of recurrent major depressive disorder (HCC)     ICD-10-CM: F33.0 ICD-9-CM: 296.31 Allergic rhinitis, unspecified seasonality, unspecified trigger     ICD-10-CM: J30.9 ICD-9-CM: 477.9 Vitals BP Pulse Temp Resp Height(growth percentile) Weight(growth percentile) 140/86 (BP 1 Location: Left arm, BP Patient Position: Sitting) 85 98.3 °F (36.8 °C) (Oral) 15 5' 7\" (1.702 m) 177 lb 12.8 oz (80.6 kg) SpO2 BMI Smoking Status 94% 27.85 kg/m2 Never Smoker Vitals History BMI and BSA Data Body Mass Index Body Surface Area  
 27.85 kg/m 2 1.95 m 2 Preferred Pharmacy Pharmacy Name Phone [facility]/PHARMACY #[id number] [facility] [address] [facility],4[address] [facility] [phone] Your Updated Medication List  
  
   
This list is accurate as of 5/7/18 12:40 PM.  Always use your most recent med list.  
  
  
  
  
 azithromycin 250 mg tablet Commonly known as:  Kim Ream Take two tablets today then one tablet daily  
  
 cetirizine 10 mg tablet Commonly known as:  ZYRTEC Take 1 Tab by mouth daily. fluticasone 50 mcg/actuation nasal spray Commonly known as:  [place] 2 Sprays by Both Nostrils route daily. latanoprost 0.005 % ophthalmic solution Commonly known as:  XALATAN  
  
 olmesartan-hydroCHLOROthiazide 20-12.5 mg per tablet Commonly known as:  BENICAR HCT Take 1 Tab by mouth daily. PARoxetine 20 mg tablet Commonly known as:  PAXIL Take 1 Tab by mouth daily. pravastatin 20 mg tablet Commonly known as:  PRAVACHOL Take 1 Tab by mouth nightly. VICODIN 5-300 mg tablet Generic drug:  HYDROcodone-acetaminophen Take  by mouth.  
  
 zolpidem 10 mg tablet Commonly known as:  AMBIEN  
TAKE 1 TAB BY MOUTH NIGHTLY AS NEEDED FOR SLEEP. MAX DAILY AMOUNT: 10MG Prescriptions Sent to Pharmacy Refills  
 azithromycin (ZITHROMAX) 250 mg tablet 0 Sig: Take two tablets today then one tablet daily Class: Normal  
 Pharmacy: [facility]/pharmacy #[id number] [place] [address] Ph #: [phone]  
 pravastatin (PRAVACHOL) 20 mg tablet 0 Sig: Take 1 Tab by mouth nightly. Class: Normal  
 Pharmacy: [facility]/pharmacy [address] [facility], [address] [facility],[address] [facility] Ph #: [phone] Route: Oral  
  
Follow-up Instructions Return in about 3 months (around 8/7/2018), or if symptoms worsen or fail to improve. Patient Instructions Heart-Healthy Diet: Care Instructions Your Care Instructions A heart-healthy diet has lots of vegetables, fruits, nuts, beans, and whole grains, and is low in salt. It limits foods that are high in saturated fat, such as meats, cheeses, and fried foods. It may be hard to change your diet, but even small changes can lower your risk of heart attack and heart disease. Follow-up care is a key part of your treatment and safety. Be sure to make and go to all appointments, and call your doctor if you are having problems. It's also a good idea to know your test results and keep a list of the medicines you take. How can you care for yourself at home? Watch your portions · Learn what a serving is. A \"serving\" and a \"portion\" are not always the same thing. Make sure that you are not eating larger portions than are recommended. For example, a serving of pasta is ½ cup. A serving size of meat is 2 to 3 ounces. A 3-ounce serving is about the size of a deck of cards. Measure serving sizes until you are good at [place]" them. Keep in mind that restaurants often serve portions that are 2 or 3 times the size of one serving. · To keep your energy level up and keep you from feeling hungry, eat often but in smaller portions. · Eat only the number of calories you need to stay at a healthy weight. If you need to lose weight, eat fewer calories than your body burns (through exercise and other physical activity). Eat more fruits and vegetables · Eat a variety of fruit and vegetables every day. Dark green, deep orange, red, or yellow fruits and vegetables are especially good for you. Examples include spinach, carrots, peaches, and berries. · Keep carrots, celery, and other veggies handy for snacks.  Buy fruit that is in season and store it where you can see it so that you will be tempted to eat it. · Cook dishes that have a lot of veggies in them, such as stir-fries and soups. Limit saturated and trans fat · Read food labels, and try to avoid saturated and trans fats. They increase your risk of heart disease. Trans fat is found in many processed foods such as cookies and crackers. · Use olive or canola oil when you cook. Try cholesterol-lowering spreads, such as Benecol or Take Control. · Bake, broil, grill, or steam foods instead of frying them. · Choose lean meats instead of high-fat meats such as hot dogs and sausages. Cut off all visible fat when you prepare meat. · Eat fish, skinless poultry, and meat alternatives such as soy products instead of high-fat meats. Soy products, such as tofu, may be especially good for your heart. · Choose low-fat or fat-free milk and dairy products. Eat fish · Eat at least two servings of fish a week. Certain fish, such as salmon and tuna, contain omega-3 fatty acids, which may help reduce your risk of heart attack. Eat foods high in fiber · Eat a variety of grain products every day. Include whole-grain foods that have lots of fiber and nutrients. Examples of whole-grain foods include oats, whole wheat bread, and brown rice. · Buy whole-grain breads and cereals, instead of white bread or pastries. Limit salt and sodium · Limit how much salt and sodium you eat to help lower your blood pressure. · Taste food before you salt it. Add only a little salt when you think you need it. With time, your taste buds will adjust to less salt. · Eat fewer snack items, fast foods, and other high-salt, processed foods. Check food labels for the amount of sodium in packaged foods. · Choose low-sodium versions of canned goods (such as soups, vegetables, and beans). Limit sugar · Limit drinks and foods with added sugar. These include candy, desserts, and soda pop. Limit alcohol · Limit alcohol to no more than 2 drinks a day for men and 1 drink a day for women. Too much alcohol can cause health problems. When should you call for help? Watch closely for changes in your health, and be sure to contact your doctor if: 
? · You would like help planning heart-healthy meals. Where can you learn more? Go to http://felix-shanique.info/. Enter V137 in the search box to learn more about \"Heart-Healthy Diet: Care Instructions. \" Current as of: September 21, 2016 Content Version: 11.4 © 3486-1199 [employer]. Care instructions adapted under license by [facility] (which disclaims liability or warranty for this information). If you have questions about a medical condition or this instruction, always ask your healthcare professional. [address] any warranty or liability for your use of this information. Introducing [facility] & HEALTH SERVICES! Dear Tony Powers: Thank you for requesting a GeoMetWatch account. Our records indicate that you already have an active GeoMetWatch account. You can access your account anytime at [email or web address]/Exclusive Networks Did you know that you can access your hospital and ER discharge instructions at any time in GeoMetWatch? You can also review all of your test results from your hospital stay or ER visit. Additional Information If you have questions, please visit the Frequently Asked Questions section of the GeoMetWatch website at [email or web address] Remember, GeoMetWatch is NOT to be used for urgent needs. For medical emergencies, dial 911. Now available from your iPhone and Android! Please provide this summary of care documentation to your next provider. Your primary care clinician is listed as Cheko Henderson. If you have any questions after today's visit, please call [phone].

## 2018-05-07 NOTE — PATIENT INSTRUCTIONS

## 2018-05-07 NOTE — PROGRESS NOTES
Miguel Soriano, 78 y.o.,  male    SUBJECTIVE  Ff-up, 2nd visit    HTN- long standin h/o, taking medication without problems. Reviewed labs, noted  Prediabetes and elevated cholesterol    Cough- dry, post nasal drip and  Nasal congestion, ongoing 6 weeks. Has h/o allergic rhinitis and childhood asthma. Given and zyrtec and reports improvement of cough, but not completely resolved. CXR neg    Depression/anxiety- says since death of child several years ago. He has been responding fairly well to paxil, has bad moments when he thinks about him, denies SI/HI. Gout- reports about 2 x a year flares, usually shellfish triggers    ROS:  See HPI, all others negative        Patient Active Problem List   Diagnosis Code    Gross hematuria R31.0    Elevated PSA R97.20    Prediabetes R73.03    Insomnia G47.00    Joint pain M25.50    Anxiety F41.9    Anxiety and depression F41.9, F32.9    Asthma J45.909    Hypertension I10    Kidney stone N20.0    Malaria B54    Skin cancer C44.90    Gout M10.9    Essential hypertension I10    Pure hypercholesterolemia E78.00       Current Outpatient Prescriptions   Medication Sig Dispense Refill    HYDROcodone-acetaminophen (VICODIN) 5-300 mg tablet Take  by mouth.  azithromycin (ZITHROMAX) 250 mg tablet Take two tablets today then one tablet daily 6 Tab 0    pravastatin (PRAVACHOL) 20 mg tablet Take 1 Tab by mouth nightly. 90 Tab 0    cetirizine (ZYRTEC) 10 mg tablet Take 1 Tab by mouth daily. 30 Tab 0    fluticasone (FLONASE) 50 mcg/actuation nasal spray 2 Sprays by Both Nostrils route daily. 1 Bottle 0    zolpidem (AMBIEN) 10 mg tablet TAKE 1 TAB BY MOUTH NIGHTLY AS NEEDED FOR SLEEP. MAX DAILY AMOUNT: 10MG 90 Tab 0    PARoxetine (PAXIL) 20 mg tablet Take 1 Tab by mouth daily. 90 Tab 1    olmesartan-hydroCHLOROthiazide (BENICAR HCT) 20-12.5 mg per tablet Take 1 Tab by mouth daily.  90 Tab 1    latanoprost (XALATAN) 0.005 % ophthalmic solution   6       Allergies Allergen Reactions    Fluconazole Hives and Itching    Penicillin G Hives       Past Medical History:   Diagnosis Date    Anxiety and depression     Asthma     Elevated PSA     Gout     Hypertension     Insomnia     Kidney stone     Malaria     Prediabetes     Skin cancer        Social History     Social History    Marital status:      Spouse name: N/A    Number of children: N/A    Years of education: N/A     Occupational History    Not on file. Social History Main Topics    Smoking status: Never Smoker    Smokeless tobacco: Never Used    Alcohol use No    Drug use: No    Sexual activity: Not on file     Other Topics Concern    Not on file     Social History Narrative       History reviewed. No pertinent family history. OBJECTIVE    Physical Exam:     Visit Vitals    /86 (BP 1 Location: Left arm, BP Patient Position: Sitting)    Pulse 85    Temp 98.3 °F (36.8 °C) (Oral)    Resp 15    Ht 5' 7\" (1.702 m)    Wt 177 lb 12.8 oz (80.6 kg)    SpO2 94%    BMI 27.85 kg/m2       General: alert, well-appearing, in no apparent distress or pain  Head: atraumatic.  Non-tender maxillary and frontal sinuses  Eyes: Lids with no discharge, no matting, conjunctivae clear and non injected, full EOMs, PERLLA  Ears: pinna non-tender, external auditory canal patent, TM intact  Mouth/throat:tonsils non enlarged, pharynx non erythematous and no lesion, nasal mucosa  normal  Neck: supple, no adenopathy palpated  CVS: normal rate, regular rhythm, distinct S1 and S2  Lungs:clear to ausculation bilaterally, no crackles, wheezing or rhonchi noted  Psych:  mood and affect normal    Results for orders placed or performed during the hospital encounter of 41/73/26   METABOLIC PANEL, COMPREHENSIVE   Result Value Ref Range    Sodium 139 136 - 145 mmol/L    Potassium 4.0 3.5 - 5.5 mmol/L    Chloride 105 100 - 108 mmol/L    CO2 26 21 - 32 mmol/L    Anion gap 8 3.0 - 18 mmol/L    Glucose 109 (H) 74 - 99 mg/dL    BUN 19 (H) 7.0 - 18 MG/DL    Creatinine 0.99 0.6 - 1.3 MG/DL    BUN/Creatinine ratio 19 12 - 20      GFR est AA >60 >60 ml/min/1.73m2    GFR est non-AA >60 >60 ml/min/1.73m2    Calcium 9.1 8.5 - 10.1 MG/DL    Bilirubin, total 0.5 0.2 - 1.0 MG/DL    ALT (SGPT) 22 16 - 61 U/L    AST (SGOT) 11 (L) 15 - 37 U/L    Alk. phosphatase 61 45 - 117 U/L    Protein, total 8.1 6.4 - 8.2 g/dL    Albumin 4.1 3.4 - 5.0 g/dL    Globulin 4.0 2.0 - 4.0 g/dL    A-G Ratio 1.0 0.8 - 1.7     LIPID PANEL   Result Value Ref Range    LIPID PROFILE          Cholesterol, total 212 (H) <200 MG/DL    Triglyceride 183 (H) <150 MG/DL    HDL Cholesterol 36 (L) 40 - 60 MG/DL    LDL, calculated 139.4 (H) 0 - 100 MG/DL    VLDL, calculated 36.6 MG/DL    CHOL/HDL Ratio 5.9 (H) 0 - 5.0     HEMOGLOBIN A1C W/O EAG   Result Value Ref Range    Hemoglobin A1c 6.1 (H) 4.2 - 5.6 %   URIC ACID   Result Value Ref Range    Uric acid 6.5 2.6 - 7.2 MG/DL         ASSESSMENT/PLAN  Diagnoses and all orders for this visit:    1. Essential hypertension  Fair control, cont benicar hct  Dash diet, monitoring    2. Prediabetes  Persistent, tlcs    3. Pure hypercholesterolemia  Calc cv risk 29 % vs 18 %   Advised to start pravachol 20 mg qhs  Will monitor    4. Mild episode of recurrent major depressive disorder (HCC)  Controlled, cont paxil    5. Allergic rhinitis, unspecified seasonality, unspecified trigger  Improved, cont zyrtec and flonase  Will give zpack to cover for atypicals    6. Sleeping difficulty  On ambien  Advised trial trazodone, pt is open to this, he is to call when he rans out ambien to transition    Other orders  -     azithromycin (ZITHROMAX) 250 mg tablet; Take two tablets today then one tablet daily  -     pravastatin (PRAVACHOL) 20 mg tablet; Take 1 Tab by mouth nightly. Follow-up Disposition:  Return in about 3 months (around 8/7/2018), or if symptoms worsen or fail to improve. Patient understands plan of care.  Patient has provided input and agrees with goals.

## 2018-05-07 NOTE — PROGRESS NOTES
Chief Complaint   Patient presents with    Anxiety    Asthma    Hypertension    Gout    Cough     improved       1. Have you been to the ER, urgent care clinic since your last visit? Hospitalized since your last visit? No    2. Have you seen or consulted any other health care providers outside of the [address] [place] [address] since your last visit? Include any pap smears or colon screening.  Addi Styles, 5/4/18

## 2018-07-18 DIAGNOSIS — F43.23 ADJUSTMENT DISORDER WITH MIXED ANXIETY AND DEPRESSED MOOD: ICD-10-CM

## 2018-07-18 RX ORDER — PAROXETINE HYDROCHLORIDE 20 MG/1
TABLET, FILM COATED ORAL
Qty: 90 TAB | Refills: 1 | Status: SHIPPED | OUTPATIENT
Start: 2018-07-18 | End: 2019-01-09 | Stop reason: SDUPTHER

## 2018-07-26 DIAGNOSIS — I10 ESSENTIAL HYPERTENSION WITH GOAL BLOOD PRESSURE LESS THAN 140/90: ICD-10-CM

## 2018-07-26 RX ORDER — OLMESARTAN MEDOXOMIL AND HYDROCHLOROTHIAZIDE 20/12.5 20; 12.5 MG/1; MG/1
1 TABLET ORAL DAILY
Qty: 90 TAB | Refills: 1 | Status: SHIPPED | OUTPATIENT
Start: 2018-07-26 | End: 2019-01-18 | Stop reason: SDUPTHER

## 2018-07-26 NOTE — TELEPHONE ENCOUNTER
This patient contacted office for the following prescriptions to be filled:    Medication requested : Benicar  PCP: [address] [place] [address] or Print: [facility]  Mail order or Local pharmacy [facility]: 5/7/18

## 2018-08-03 RX ORDER — PRAVASTATIN SODIUM 20 MG/1
TABLET ORAL
Qty: 90 TAB | Refills: 0 | Status: SHIPPED | OUTPATIENT
Start: 2018-08-03 | End: 2018-11-07 | Stop reason: SDUPTHER

## 2018-08-30 DIAGNOSIS — F51.01 PRIMARY INSOMNIA: ICD-10-CM

## 2018-09-05 NOTE — TELEPHONE ENCOUNTER
This pharmacy faxed over request for the following prescriptions to be filled:    Medication requested :   Requested Prescriptions     Pending Prescriptions Disp Refills    zolpidem (AMBIEN) 10 mg tablet 90 Tab 0     Sig: TAKE 1 TAB BY MOUTH NIGHTLY AS NEEDED FOR SLEEP. MAX DAILY AMOUNT: 10MG       PCP: [address] [place] [address] or Print: [facility]  Mail order or Local pharmacy [address] [facility]     Scheduled appointment if not seen by current providers in office:  LOV 5/7/2018 No f/u up Scheduled at this time. Due 8/7/2018 wife is the one who called in and she states that she will talk to her  to call and set up an appt. But he is completely out and has been for a few days. RX was first sent by [facility]. Needs today. Advised that Dr Judit Barber was out of the office today and she would like to know if someone else can fill it.

## 2018-09-05 NOTE — TELEPHONE ENCOUNTER
Last OV 2018  Last none scheduled. Patient identified with 2 identifiers (name and ). Spoke with patient and he is aware that another provider is unable to refill medication. Patient was advised as well that he need a follow up appt with Dr. Ander Winchester. Patient is scheduled to see Dr. Ander Winchester 2018.

## 2018-09-06 RX ORDER — ZOLPIDEM TARTRATE 10 MG/1
TABLET ORAL
Qty: 90 TAB | Refills: 0 | Status: SHIPPED | OUTPATIENT
Start: 2018-09-06 | End: 2018-12-05 | Stop reason: SDUPTHER

## 2018-09-07 ENCOUNTER — DOCUMENTATION ONLY (OUTPATIENT)
Dept: FAMILY MEDICINE CLINIC | Age: 78
End: 2018-09-07

## 2018-09-07 NOTE — PROGRESS NOTES
Patient identified with 2 identifiers (name and ).   Spoke with patient regarding approval for Ambien from  Start date 2018 - 2019  [facility] aware of approval.

## 2018-09-28 ENCOUNTER — OFFICE VISIT (OUTPATIENT)
Dept: FAMILY MEDICINE CLINIC | Age: 78
End: 2018-09-28

## 2018-09-28 VITALS
DIASTOLIC BLOOD PRESSURE: 92 MMHG | HEART RATE: 71 BPM | SYSTOLIC BLOOD PRESSURE: 140 MMHG | BODY MASS INDEX: 27.94 KG/M2 | WEIGHT: 178 LBS | OXYGEN SATURATION: 96 % | HEIGHT: 67 IN | RESPIRATION RATE: 16 BRPM | TEMPERATURE: 98.1 F

## 2018-09-28 DIAGNOSIS — R73.03 PREDIABETES: Primary | ICD-10-CM

## 2018-09-28 DIAGNOSIS — F51.01 PRIMARY INSOMNIA: ICD-10-CM

## 2018-09-28 DIAGNOSIS — Z23 ENCOUNTER FOR IMMUNIZATION: ICD-10-CM

## 2018-09-28 RX ORDER — TRAZODONE HYDROCHLORIDE 50 MG/1
50 TABLET ORAL
Qty: 90 TAB | Refills: 0 | Status: SHIPPED | OUTPATIENT
Start: 2018-09-28 | End: 2018-10-26

## 2018-09-28 NOTE — PATIENT INSTRUCTIONS

## 2018-09-28 NOTE — PROGRESS NOTES
1. Have you been to the ER, urgent care clinic since your last visit? Hospitalized since your last visit? No    2. Have you seen or consulted any other health care providers outside of the [address] [place] [address] since your last visit? Include any pap smears or colon screening. No    Chief Complaint   Patient presents with    Hypertension    Other     Pre-DM    Cholesterol Problem    Allergic Rhinitis    Depression    Immunization/Injection     Physician order obtained. Patient completed adult immunization consent form. Allergies, contraindications and recommendations reviewed with patient. High dose seasonal influenza vaccine administered IM LT deltoid. Patient tolerated well. Patient remained in office for 15 minutes after injection and no adverse reactions were noted.     Lot#[id number]  Exp:05/31/2019  DFY#[id number]

## 2018-09-28 NOTE — MR AVS SNAPSHOT
[address] 
[phone] Patient: Bruce Brown MRN: [id number] EDGARDO:[birth year] Visit Information Date & Time Provider Department Dept. Phone Encounter #  
 9/28/2018 10:30 AM Carmelina Junior, [address] [place] [address] [id number] Follow-up Instructions Return in about 4 weeks (around 10/26/2018), or if symptoms worsen or fail to improve. Upcoming Health Maintenance Date Due Shingrix Vaccine Age 50> (1 of 2) 5/8/1992 GLAUCOMA SCREENING Q2Y 5/8/2007 Influenza Age 5 to Adult 8/1/2018 MEDICARE YEARLY EXAM 2/23/2019 COLONOSCOPY 5/11/2021 DTaP/Tdap/Td series (2 - Td) 2/22/2028 Allergies as of 9/28/2018  Review Complete On: 9/28/2018 By: Carmelina Junior MD  
  
 Severity Noted Reaction Type Reactions Fluconazole  05/31/2013    Hives, Itching Penicillin G  05/31/2013    Hives Current Immunizations  Reviewed on 9/28/2018 Name Date Influenza High Dose Vaccine PF 11/22/2017, 11/9/2016, 9/24/2014 Influenza Vaccine 9/17/2013 Influenza Vaccine (Quad) PF 8/28/2015 Influenza Vaccine (Tri) Adjuvanted 9/28/2018 Pneumococcal Conjugate (PCV-13) 2/22/2017 Pneumococcal Vaccine (Unspecified Type) 9/5/2012 Tdap 2/22/2018 Reviewed by John Francois LPN on 5/75/6551 at 94:96 AM  
You Were Diagnosed With   
  
 Codes Comments Prediabetes    -  Primary ICD-10-CM: R73.03 
ICD-9-CM: 790.29 Encounter for immunization     ICD-10-CM: L48 ICD-9-CM: V03.89 Primary insomnia     ICD-10-CM: F51.01 
ICD-9-CM: 307.42 Vitals BP Pulse Temp Resp Height(growth percentile) Weight(growth percentile) (!) 140/92 (BP 1 Location: Right arm, BP Patient Position: Sitting) 71 98.1 °F (36.7 °C) (Oral) 16 5' 7\" (1.702 m) 178 lb (80.7 kg) SpO2 BMI Smoking Status 96% 27.88 kg/m2 Never Smoker Vitals History BMI and BSA Data Body Mass Index Body Surface Area  
 27.88 kg/m 2 1.95 m 2 Preferred Pharmacy Pharmacy Name Phone [facility]/PHARMACY #[id number] Maurizio Ponce, [address] [facility] [facility] [phone] Your Updated Medication List  
  
   
This list is accurate as of 9/28/18 10:57 AM.  Always use your most recent med list.  
  
  
  
  
 cetirizine 10 mg tablet Commonly known as:  ZYRTEC  
TAKE 1 TAB BY MOUTH DAILY. fluticasone 50 mcg/actuation nasal spray Commonly known as:  Cathlyn Frantz 2 Sprays by Both Nostrils route daily. latanoprost 0.005 % ophthalmic solution Commonly known as:  XALATAN  
  
 olmesartan-hydroCHLOROthiazide 20-12.5 mg per tablet Commonly known as:  BENICAR HCT Take 1 Tab by mouth daily. PARoxetine 20 mg tablet Commonly known as:  PAXIL TAKE 1 TABLET BY MOUTH DAILY. pravastatin 20 mg tablet Commonly known as:  PRAVACHOL  
TAKE 1 TABLET BY MOUTH EVERYDAY AT BEDTIME  
  
 traZODone 50 mg tablet Commonly known as:  Filomena Cast Take 1 Tab by mouth nightly. VICODIN 5-300 mg tablet Generic drug:  HYDROcodone-acetaminophen Take  by mouth.  
  
 zolpidem 10 mg tablet Commonly known as:  AMBIEN  
TAKE 1 TAB BY MOUTH NIGHTLY AS NEEDED FOR SLEEP. MAX DAILY AMOUNT: 10MG Prescriptions Sent to Pharmacy Refills  
 traZODone (DESYREL) 50 mg tablet 0 Sig: Take 1 Tab by mouth nightly. Class: Normal  
 Pharmacy: [facility]/pharmacy [address] [place] [address], [facility] #: [phone] Route: Oral  
  
We Performed the Following ADMIN INFLUENZA VIRUS VAC [ [facility]] INFLUENZA VACCINE INACTIVATED (IIV), SUBUNIT, ADJUVANTED, IM [id number] CPT(R)] Follow-up Instructions Return in about 4 weeks (around 10/26/2018), or if symptoms worsen or fail to improve. Patient Instructions DASH Diet: Care Instructions Your Care Instructions The DASH diet is an eating plan that can help lower your blood pressure. DASH stands for Dietary Approaches to Stop Hypertension. Hypertension is high blood pressure. The DASH diet focuses on eating foods that are high in calcium, potassium, and magnesium. These nutrients can lower blood pressure. The foods that are highest in these nutrients are fruits, vegetables, low-fat dairy products, nuts, seeds, and legumes. But taking calcium, potassium, and magnesium supplements instead of eating foods that are high in those nutrients does not have the same effect. The DASH diet also includes whole grains, fish, and poultry. The DASH diet is one of several lifestyle changes your doctor may recommend to lower your high blood pressure. Your doctor may also want you to decrease the amount of sodium in your diet. Lowering sodium while following the DASH diet can lower blood pressure even further than just the DASH diet alone. Follow-up care is a key part of your treatment and safety. Be sure to make and go to all appointments, and call your doctor if you are having problems. It's also a good idea to know your test results and keep a list of the medicines you take. How can you care for yourself at home? Following the DASH diet · Eat 4 to 5 servings of fruit each day. A serving is 1 medium-sized piece of fruit, ½ cup chopped or canned fruit, 1/4 cup dried fruit, or 4 ounces (½ cup) of fruit juice. Choose fruit more often than fruit juice. · Eat 4 to 5 servings of vegetables each day. A serving is 1 cup of lettuce or raw leafy vegetables, ½ cup of chopped or cooked vegetables, or 4 ounces (½ cup) of vegetable juice. Choose vegetables more often than vegetable juice. · Get 2 to 3 servings of low-fat and fat-free dairy each day. A serving is 8 ounces of milk, 1 cup of yogurt, or 1 ½ ounces of cheese. · Eat 6 to 8 servings of grains each day.  A serving is 1 slice of bread, 1 ounce of dry cereal, or ½ cup of cooked rice, pasta, or cooked cereal. Try to choose whole-grain products as much as possible. · Limit lean meat, poultry, and fish to 2 servings each day. A serving is 3 ounces, about the size of a deck of cards. · Eat 4 to 5 servings of nuts, seeds, and legumes (cooked dried beans, lentils, and split peas) each week. A serving is 1/3 cup of nuts, 2 tablespoons of seeds, or ½ cup of cooked beans or peas. · Limit fats and oils to 2 to 3 servings each day. A serving is 1 teaspoon of vegetable oil or 2 tablespoons of salad dressing. · Limit sweets and added sugars to 5 servings or less a week. A serving is 1 tablespoon jelly or jam, ½ cup sorbet, or 1 cup of lemonade. · Eat less than 2,300 milligrams (mg) of sodium a day. If you limit your sodium to 1,500 mg a day, you can lower your blood pressure even more. Tips for success · Start small. Do not try to make dramatic changes to your diet all at once. You might feel that you are missing out on your favorite foods and then be more likely to not follow the plan. Make small changes, and stick with them. Once those changes become habit, add a few more changes. · Try some of the following: ¨ Make it a goal to eat a fruit or vegetable at every meal and at snacks. This will make it easy to get the recommended amount of fruits and vegetables each day. ¨ Try yogurt topped with fruit and nuts for a snack or healthy dessert. ¨ Add lettuce, tomato, cucumber, and onion to sandwiches. ¨ Combine a ready-made pizza crust with low-fat mozzarella cheese and lots of vegetable toppings. Try using tomatoes, squash, spinach, broccoli, carrots, cauliflower, and onions. ¨ Have a variety of cut-up vegetables with a low-fat dip as an appetizer instead of chips and dip. ¨ Sprinkle sunflower seeds or chopped almonds over salads. Or try adding chopped walnuts or almonds to cooked vegetables. ¨ Try some vegetarian meals using beans and peas. Add garbanzo or kidney beans to salads. Make burritos and tacos with mashed irving beans or black beans. Where can you learn more? Go to http://felix-shanique.info/. Enter T938 in the search box to learn more about \"DASH Diet: Care Instructions. \" Current as of: December 6, 2017 Content Version: 11.7 © 8588-6731 [employer]. Care instructions adapted under license by [employer] (which disclaims liability or warranty for this information). If you have questions about a medical condition or this instruction, always ask your healthcare professional. [address] any warranty or liability for your use of this information. Introducing [facility]! Dear Jae Deshpande: Thank you for requesting a InfoNow account. Our records indicate that you already have an active InfoNow account. You can access your account anytime at [email or web address] Did you know that you can access your hospital and ER discharge instructions at any time in InfoNow? You can also review all of your test results from your hospital stay or ER visit. Additional Information If you have questions, please visit the Frequently Asked Questions section of the InfoNow website at [email or web address] Remember, InfoNow is NOT to be used for urgent needs. For medical emergencies, dial 911. Now available from your iPhone and Android! Please provide this summary of care documentation to your next provider. Your primary care clinician is listed as April Valerio. If you have any questions after today's visit, please call [phone].

## 2018-09-28 NOTE — PROGRESS NOTES
Bridgette Mclaughlin, 70 y.o.,  male    SUBJECTIVE  Ff-up    HTN- taking medication without problems. Prediabetes and HL- on pravachol    Depression/anxiety- says since death of child several years ago. He has been responding fairly well to paxil, has bad moments when he thinks about him, denies SI/HI. Insomnia- on ambien for years without reported side effects. Gout- reports about 2 x a year flares, usually shellfish triggers    Following pain management for low back arthritis- chronic back pain, taking prn vicodin      ROS:  See HPI, all others negative        Patient Active Problem List   Diagnosis Code    Gross hematuria R31.0    Elevated PSA R97.20    Prediabetes R73.03    Insomnia G47.00    Joint pain M25.50    Anxiety F41.9    Anxiety and depression F41.9, F32.9    Hypertension I10    Kidney stone N20.0    Malaria B54    Skin cancer C44.90    Gout M10.9    Essential hypertension I10    Pure hypercholesterolemia E78.00    Sleeping difficulty G47.9       Current Outpatient Prescriptions   Medication Sig Dispense Refill    traZODone (DESYREL) 50 mg tablet Take 1 Tab by mouth nightly. 90 Tab 0    zolpidem (AMBIEN) 10 mg tablet TAKE 1 TAB BY MOUTH NIGHTLY AS NEEDED FOR SLEEP. MAX DAILY AMOUNT: 10MG 90 Tab 0    pravastatin (PRAVACHOL) 20 mg tablet TAKE 1 TABLET BY MOUTH EVERYDAY AT BEDTIME 90 Tab 0    olmesartan-hydroCHLOROthiazide (BENICAR HCT) 20-12.5 mg per tablet Take 1 Tab by mouth daily. 90 Tab 1    PARoxetine (PAXIL) 20 mg tablet TAKE 1 TABLET BY MOUTH DAILY. 90 Tab 1    cetirizine (ZYRTEC) 10 mg tablet TAKE 1 TAB BY MOUTH DAILY. 90 Tab 3    HYDROcodone-acetaminophen (VICODIN) 5-300 mg tablet Take  by mouth.  fluticasone (FLONASE) 50 mcg/actuation nasal spray 2 Sprays by Both Nostrils route daily.  1 Bottle 0    latanoprost (XALATAN) 0.005 % ophthalmic solution   6       Allergies   Allergen Reactions    Fluconazole Hives and Itching    Penicillin G Hives       Past Medical History:   Diagnosis Date    Anxiety and depression     Asthma     Elevated PSA     Gout     Hypertension     Insomnia     Kidney stone     Malaria     Prediabetes     Skin cancer        Social History     Social History    Marital status:      Spouse name: N/A    Number of children: N/A    Years of education: N/A     Occupational History    Not on file. Social History Main Topics    Smoking status: Never Smoker    Smokeless tobacco: Never Used    Alcohol use No    Drug use: No    Sexual activity: Not on file     Other Topics Concern    Not on file     Social History Narrative       History reviewed. No pertinent family history. OBJECTIVE    Physical Exam:     Visit Vitals    BP (!) 140/92 (BP 1 Location: Right arm, BP Patient Position: Sitting)    Pulse 71    Temp 98.1 °F (36.7 °C) (Oral)    Resp 16    Ht 5' 7\" (1.702 m)    Wt 178 lb (80.7 kg)    SpO2 96%    BMI 27.88 kg/m2       General: alert, well-appearing, in no apparent distress or pain  Head: atraumatic. Non-tender maxillary and frontal sinuses  CVS: normal rate, regular rhythm, distinct S1 and S2  Lungs:clear to ausculation bilaterally, no crackles, wheezing or rhonchi noted  Psych:  mood and affect normal    Results for orders placed or performed during the hospital encounter of 31/41/48   METABOLIC PANEL, COMPREHENSIVE   Result Value Ref Range    Sodium 139 136 - 145 mmol/L    Potassium 4.0 3.5 - 5.5 mmol/L    Chloride 105 100 - 108 mmol/L    CO2 26 21 - 32 mmol/L    Anion gap 8 3.0 - 18 mmol/L    Glucose 109 (H) 74 - 99 mg/dL    BUN 19 (H) 7.0 - 18 MG/DL    Creatinine 0.99 0.6 - 1.3 MG/DL    BUN/Creatinine ratio 19 12 - 20      GFR est AA >60 >60 ml/min/1.73m2    GFR est non-AA >60 >60 ml/min/1.73m2    Calcium 9.1 8.5 - 10.1 MG/DL    Bilirubin, total 0.5 0.2 - 1.0 MG/DL    ALT (SGPT) 22 16 - 61 U/L    AST (SGOT) 11 (L) 15 - 37 U/L    Alk.  phosphatase 61 45 - 117 U/L    Protein, total 8.1 6.4 - 8.2 g/dL Albumin 4.1 3.4 - 5.0 g/dL    Globulin 4.0 2.0 - 4.0 g/dL    A-G Ratio 1.0 0.8 - 1.7     LIPID PANEL   Result Value Ref Range    LIPID PROFILE          Cholesterol, total 212 (H) <200 MG/DL    Triglyceride 183 (H) <150 MG/DL    HDL Cholesterol 36 (L) 40 - 60 MG/DL    LDL, calculated 139.4 (H) 0 - 100 MG/DL    VLDL, calculated 36.6 MG/DL    CHOL/HDL Ratio 5.9 (H) 0 - 5.0     HEMOGLOBIN A1C W/O EAG   Result Value Ref Range    Hemoglobin A1c 6.1 (H) 4.2 - 5.6 %   URIC ACID   Result Value Ref Range    Uric acid 6.5 2.6 - 7.2 MG/DL         ASSESSMENT/PLAN  Diagnoses and all orders for this visit:    1. Essential hypertension  Fair control, cont benicar hct  Dash diet, monitoring    2. Prediabetes  Persistent, tlcs    3. Pure hypercholesterolemia  Calc cv risk 29 % vs 18 %   On  pravachol 20 mg qhs  Will monitor  Plan to check lipid/cmp/a1c on next visit    4. Mild episode of recurrent major depressive disorder (HCC)  Controlled, cont paxil    5. Allergic rhinitis, unspecified seasonality, unspecified trigger  Improved, cont zyrtec and flonase    6. Sleeping difficulty  On ambien  Advised trial trazodone, pt is open to this    7. Encounter for vaccine  High dose flu vaccine given    Follow-up Disposition:  Return in about 4 weeks (around 10/26/2018), or if symptoms worsen or fail to improve. Patient understands plan of care. Patient has provided input and agrees with goals.

## 2018-10-26 ENCOUNTER — OFFICE VISIT (OUTPATIENT)
Dept: FAMILY MEDICINE CLINIC | Age: 78
End: 2018-10-26

## 2018-10-26 VITALS
BODY MASS INDEX: 28.53 KG/M2 | OXYGEN SATURATION: 93 % | HEART RATE: 71 BPM | DIASTOLIC BLOOD PRESSURE: 80 MMHG | TEMPERATURE: 98.1 F | RESPIRATION RATE: 14 BRPM | WEIGHT: 181.8 LBS | SYSTOLIC BLOOD PRESSURE: 132 MMHG | HEIGHT: 67 IN

## 2018-10-26 DIAGNOSIS — M10.9 GOUT, UNSPECIFIED CAUSE, UNSPECIFIED CHRONICITY, UNSPECIFIED SITE: ICD-10-CM

## 2018-10-26 DIAGNOSIS — F33.0 MILD EPISODE OF RECURRENT MAJOR DEPRESSIVE DISORDER (HCC): Primary | ICD-10-CM

## 2018-10-26 DIAGNOSIS — I10 ESSENTIAL HYPERTENSION: ICD-10-CM

## 2018-10-26 DIAGNOSIS — R73.03 PREDIABETES: ICD-10-CM

## 2018-10-26 DIAGNOSIS — F51.01 PRIMARY INSOMNIA: ICD-10-CM

## 2018-10-26 NOTE — PROGRESS NOTES
1. Have you been to the ER, urgent care clinic since your last visit? Hospitalized since your last visit? No    2. Have you seen or consulted any other health care providers outside of the [address] [place] [address] since your last visit? Include any pap smears or colon screening.  Dermatology Dr. Len Coe 9/2018    Chief Complaint   Patient presents with    Hypertension    Elevated Blood Pressure    Cholesterol Problem    Insomnia

## 2018-10-26 NOTE — PATIENT INSTRUCTIONS

## 2018-10-26 NOTE — PROGRESS NOTES
Beka Curry, 70 y.o.,  male    SUBJECTIVE  Ff-up    Insomnia- did not take trial trazodone as he was told of interaction with paxil. He would rather stay on  [place] he has taken for years without reported side effects. She has chronic pain, Following pain management for low back arthritis- chronic back pain, taking prn vicodin. He is aware of potential yolanda effects    HTN- taking medication without problems. Prediabetes and HL- on pravachol    Depression/anxiety- says since death of child several years ago. He has been responding fairly well to paxil, has bad moments when he thinks about him, denies SI/HI. Gout- reports about 2 x a year flares, usually shellfish triggers      ROS:  See HPI, all others negative        Patient Active Problem List   Diagnosis Code    Gross hematuria R31.0    Elevated PSA R97.20    Prediabetes R73.03    Insomnia G47.00    Joint pain M25.50    Anxiety F41.9    Anxiety and depression F41.9, F32.9    Hypertension I10    Kidney stone N20.0    Malaria B54    Skin cancer C44.90    Gout M10.9    Essential hypertension I10    Pure hypercholesterolemia E78.00    Sleeping difficulty G47.9    Mild episode of recurrent major depressive disorder (HCC) F33.0       Current Outpatient Medications   Medication Sig Dispense Refill    zolpidem (AMBIEN) 10 mg tablet TAKE 1 TAB BY MOUTH NIGHTLY AS NEEDED FOR SLEEP. MAX DAILY AMOUNT: 10MG 90 Tab 0    pravastatin (PRAVACHOL) 20 mg tablet TAKE 1 TABLET BY MOUTH EVERYDAY AT BEDTIME 90 Tab 0    olmesartan-hydroCHLOROthiazide (BENICAR HCT) 20-12.5 mg per tablet Take 1 Tab by mouth daily. 90 Tab 1    PARoxetine (PAXIL) 20 mg tablet TAKE 1 TABLET BY MOUTH DAILY. 90 Tab 1    HYDROcodone-acetaminophen (VICODIN) 5-300 mg tablet Take  by mouth.  fluticasone (FLONASE) 50 mcg/actuation nasal spray 2 Sprays by Both Nostrils route daily. 1 Bottle 0    cetirizine (ZYRTEC) 10 mg tablet TAKE 1 TAB BY MOUTH DAILY.  90 Tab 3    latanoprost (XALATAN) 0.005 % ophthalmic solution   6       Allergies   Allergen Reactions    Fluconazole Hives and Itching    Penicillin G Hives       Past Medical History:   Diagnosis Date    Anxiety and depression     Asthma     Elevated PSA     Gout     Hypertension     Insomnia     Kidney stone     Malaria     Prediabetes     Skin cancer        Social History     Socioeconomic History    Marital status:      Spouse name: Not on file    Number of children: Not on file    Years of education: Not on file    Highest education level: Not on file   Social Needs    Financial resource strain: Not on file    Food insecurity - worry: Not on file    Food insecurity - inability: Not on file   [employer] needs - medical: Not on file   [employer] needs - non-medical: Not on file   Occupational History    Not on file   Tobacco Use    Smoking status: Never Smoker    Smokeless tobacco: Never Used   Substance and Sexual Activity    Alcohol use: No    Drug use: No    Sexual activity: Not on file   Other Topics Concern    Not on file   Social History Narrative    Not on file       History reviewed. No pertinent family history. OBJECTIVE    Physical Exam:     Visit Vitals  /80 (BP 1 Location: Left arm, BP Patient Position: Sitting)   Pulse 71   Temp 98.1 °F (36.7 °C) (Oral)   Resp 14   Ht 5' 7\" (1.702 m)   Wt 181 lb 12.8 oz (82.5 kg)   SpO2 93%   BMI 28.47 kg/m²       General: alert, well-appearing, in no apparent distress or pain  Head: atraumatic.  Non-tender maxillary and frontal sinuses  CVS: normal rate, regular rhythm, distinct S1 and S2  Lungs:clear to ausculation bilaterally, no crackles, wheezing or rhonchi noted  Psych:  mood and affect normal    Results for orders placed or performed during the hospital encounter of 98/53/73   METABOLIC PANEL, COMPREHENSIVE   Result Value Ref Range    Sodium 139 136 - 145 mmol/L    Potassium 4.0 3.5 - 5.5 mmol/L    Chloride 105 100 - 108 mmol/L CO2 26 21 - 32 mmol/L    Anion gap 8 3.0 - 18 mmol/L    Glucose 109 (H) 74 - 99 mg/dL    BUN 19 (H) 7.0 - 18 MG/DL    Creatinine 0.99 0.6 - 1.3 MG/DL    BUN/Creatinine ratio 19 12 - 20      GFR est AA >60 >60 ml/min/1.73m2    GFR est non-AA >60 >60 ml/min/1.73m2    Calcium 9.1 8.5 - 10.1 MG/DL    Bilirubin, total 0.5 0.2 - 1.0 MG/DL    ALT (SGPT) 22 16 - 61 U/L    AST (SGOT) 11 (L) 15 - 37 U/L    Alk. phosphatase 61 45 - 117 U/L    Protein, total 8.1 6.4 - 8.2 g/dL    Albumin 4.1 3.4 - 5.0 g/dL    Globulin 4.0 2.0 - 4.0 g/dL    A-G Ratio 1.0 0.8 - 1.7     LIPID PANEL   Result Value Ref Range    LIPID PROFILE          Cholesterol, total 212 (H) <200 MG/DL    Triglyceride 183 (H) <150 MG/DL    HDL Cholesterol 36 (L) 40 - 60 MG/DL    LDL, calculated 139.4 (H) 0 - 100 MG/DL    VLDL, calculated 36.6 MG/DL    CHOL/HDL Ratio 5.9 (H) 0 - 5.0     HEMOGLOBIN A1C W/O EAG   Result Value Ref Range    Hemoglobin A1c 6.1 (H) 4.2 - 5.6 %   URIC ACID   Result Value Ref Range    Uric acid 6.5 2.6 - 7.2 MG/DL         ASSESSMENT/PLAN  Diagnoses and all orders for this visit:    1. Essential hypertension  Fair control, cont benicar hct  Dash diet, monitoring  CMP prior to next visit    2. Prediabetes  Persistent, tlcs    3. Pure hypercholesterolemia  Calc cv risk 29 % vs 18 %   On  pravachol 20 mg qhs  Will monitor    4. Mild episode of recurrent major depressive disorder (HCC)  Controlled, cont paxil    5. Allergic rhinitis, unspecified seasonality, unspecified trigger  Improved, cont zyrtec and flonase    6. Sleeping difficulty  Agree to continuing prn ambien  Aware of SE potential with vicodin  q3 month checks   consistent    Ff-up in 3 months or sooner prn    Patient understands plan of care. Patient has provided input and agrees with goals.

## 2018-12-05 DIAGNOSIS — F51.01 PRIMARY INSOMNIA: ICD-10-CM

## 2018-12-06 RX ORDER — ZOLPIDEM TARTRATE 10 MG/1
TABLET ORAL
Qty: 90 TAB | Refills: 0 | Status: SHIPPED | OUTPATIENT
Start: 2018-12-06 | End: 2019-04-23

## 2018-12-07 NOTE — TELEPHONE ENCOUNTER
Spoke to Luis Suarez @ [facility] pharmacy and called in [address]. Contacted patient and verified identity using name and date of birth (2- identifiers). Patient informed medication called in to pharmacy. Patient voiced understanding.

## 2019-01-07 ENCOUNTER — HOSPITAL ENCOUNTER (OUTPATIENT)
Dept: LAB | Age: 79
Discharge: HOME | End: 2019-01-07
Payer: MEDICARE

## 2019-01-07 DIAGNOSIS — I10 ESSENTIAL HYPERTENSION: ICD-10-CM

## 2019-01-07 DIAGNOSIS — R73.03 PREDIABETES: ICD-10-CM

## 2019-01-07 LAB
ALBUMIN SERPL-MCNC: 3.7 G/DL (ref 3.4–5)
ALBUMIN/GLOB SERPL: 1.1 {RATIO} (ref 0.8–1.7)
ALP SERPL-CCNC: 55 U/L (ref 45–117)
ALT SERPL-CCNC: 22 U/L (ref 16–61)
ANION GAP SERPL CALC-SCNC: 5 MMOL/L (ref 3–18)
AST SERPL-CCNC: 16 U/L (ref 15–37)
BILIRUB SERPL-MCNC: 0.2 MG/DL (ref 0.2–1)
BUN SERPL-MCNC: 17 MG/DL (ref 7–18)
BUN/CREAT SERPL: 16 (ref 12–20)
CALCIUM SERPL-MCNC: 8.5 MG/DL (ref 8.5–10.1)
CHLORIDE SERPL-SCNC: 106 MMOL/L (ref 100–108)
CO2 SERPL-SCNC: 31 MMOL/L (ref 21–32)
CREAT SERPL-MCNC: 1.09 MG/DL (ref 0.6–1.3)
GLOBULIN SER CALC-MCNC: 3.5 G/DL (ref 2–4)
GLUCOSE SERPL-MCNC: 94 MG/DL (ref 74–99)
POTASSIUM SERPL-SCNC: 3.8 MMOL/L (ref 3.5–5.5)
PROT SERPL-MCNC: 7.2 G/DL (ref 6.4–8.2)
SODIUM SERPL-SCNC: 142 MMOL/L (ref 136–145)

## 2019-01-07 PROCEDURE — 80053 COMPREHEN METABOLIC PANEL: CPT

## 2019-01-07 PROCEDURE — 36415 COLL VENOUS BLD VENIPUNCTURE: CPT

## 2019-01-09 DIAGNOSIS — F43.23 ADJUSTMENT DISORDER WITH MIXED ANXIETY AND DEPRESSED MOOD: ICD-10-CM

## 2019-01-10 RX ORDER — PAROXETINE HYDROCHLORIDE 20 MG/1
TABLET, FILM COATED ORAL
Qty: 90 TAB | Refills: 1 | Status: SHIPPED | OUTPATIENT
Start: 2019-01-10 | End: 2019-07-05 | Stop reason: SDUPTHER

## 2019-01-18 ENCOUNTER — HOSPITAL ENCOUNTER (OUTPATIENT)
Age: 79
Discharge: HOME | End: 2019-01-18
Attending: PHYSICIAN ASSISTANT
Payer: MEDICARE

## 2019-01-18 DIAGNOSIS — I10 ESSENTIAL HYPERTENSION WITH GOAL BLOOD PRESSURE LESS THAN 140/90: ICD-10-CM

## 2019-01-18 DIAGNOSIS — M75.51 BURSITIS OF RIGHT SHOULDER: ICD-10-CM

## 2019-01-18 DIAGNOSIS — M51.36 LUMBAR DEGENERATIVE DISC DISEASE: ICD-10-CM

## 2019-01-18 PROCEDURE — 73221 MRI JOINT UPR EXTREM W/O DYE: CPT

## 2019-01-18 PROCEDURE — 72148 MRI LUMBAR SPINE W/O DYE: CPT

## 2019-01-18 RX ORDER — OLMESARTAN MEDOXOMIL AND HYDROCHLOROTHIAZIDE 20/12.5 20; 12.5 MG/1; MG/1
TABLET ORAL
Qty: 90 TAB | Refills: 1 | Status: SHIPPED | OUTPATIENT
Start: 2019-01-18 | End: 2019-07-08 | Stop reason: SDUPTHER

## 2019-01-25 ENCOUNTER — OFFICE VISIT (OUTPATIENT)
Dept: FAMILY MEDICINE CLINIC | Age: 79
End: 2019-01-25

## 2019-01-25 VITALS
BODY MASS INDEX: 26.9 KG/M2 | SYSTOLIC BLOOD PRESSURE: 132 MMHG | HEART RATE: 72 BPM | TEMPERATURE: 97.7 F | WEIGHT: 171.4 LBS | RESPIRATION RATE: 14 BRPM | DIASTOLIC BLOOD PRESSURE: 82 MMHG | OXYGEN SATURATION: 95 % | HEIGHT: 67 IN

## 2019-01-25 DIAGNOSIS — R73.03 PREDIABETES: Primary | ICD-10-CM

## 2019-01-25 DIAGNOSIS — G47.9 SLEEPING DIFFICULTY: ICD-10-CM

## 2019-01-25 DIAGNOSIS — I10 ESSENTIAL HYPERTENSION: ICD-10-CM

## 2019-01-25 DIAGNOSIS — E78.00 PURE HYPERCHOLESTEROLEMIA: ICD-10-CM

## 2019-01-25 DIAGNOSIS — F33.0 MILD EPISODE OF RECURRENT MAJOR DEPRESSIVE DISORDER (HCC): ICD-10-CM

## 2019-01-25 NOTE — PATIENT INSTRUCTIONS
DASH Diet: Care Instructions  Your Care Instructions    The DASH diet is an eating plan that can help lower your blood pressure. DASH stands for Dietary Approaches to Stop Hypertension. Hypertension is high blood pressure. The DASH diet focuses on eating foods that are high in calcium, potassium, and magnesium. These nutrients can lower blood pressure. The foods that are highest in these nutrients are fruits, vegetables, low-fat dairy products, nuts, seeds, and legumes. But taking calcium, potassium, and magnesium supplements instead of eating foods that are high in those nutrients does not have the same effect. The DASH diet also includes whole grains, fish, and poultry. The DASH diet is one of several lifestyle changes your doctor may recommend to lower your high blood pressure. Your doctor may also want you to decrease the amount of sodium in your diet. Lowering sodium while following the DASH diet can lower blood pressure even further than just the DASH diet alone. Follow-up care is a key part of your treatment and safety. Be sure to make and go to all appointments, and call your doctor if you are having problems. It's also a good idea to know your test results and keep a list of the medicines you take. How can you care for yourself at home? Following the DASH diet  · Eat 4 to 5 servings of fruit each day. A serving is 1 medium-sized piece of fruit, ½ cup chopped or canned fruit, 1/4 cup dried fruit, or 4 ounces (½ cup) of fruit juice. Choose fruit more often than fruit juice. · Eat 4 to 5 servings of vegetables each day. A serving is 1 cup of lettuce or raw leafy vegetables, ½ cup of chopped or cooked vegetables, or 4 ounces (½ cup) of vegetable juice. Choose vegetables more often than vegetable juice. · Get 2 to 3 servings of low-fat and fat-free dairy each day. A serving is 8 ounces of milk, 1 cup of yogurt, or 1 ½ ounces of cheese. · Eat 6 to 8 servings of grains each day.  A serving is 1 slice of bread, 1 ounce of dry cereal, or ½ cup of cooked rice, pasta, or cooked cereal. Try to choose whole-grain products as much as possible. · Limit lean meat, poultry, and fish to 2 servings each day. A serving is 3 ounces, about the size of a deck of cards. · Eat 4 to 5 servings of nuts, seeds, and legumes (cooked dried beans, lentils, and split peas) each week. A serving is 1/3 cup of nuts, 2 tablespoons of seeds, or ½ cup of cooked beans or peas. · Limit fats and oils to 2 to 3 servings each day. A serving is 1 teaspoon of vegetable oil or 2 tablespoons of salad dressing. · Limit sweets and added sugars to 5 servings or less a week. A serving is 1 tablespoon jelly or jam, ½ cup sorbet, or 1 cup of lemonade. · Eat less than 2,300 milligrams (mg) of sodium a day. If you limit your sodium to 1,500 mg a day, you can lower your blood pressure even more. Tips for success  · Start small. Do not try to make dramatic changes to your diet all at once. You might feel that you are missing out on your favorite foods and then be more likely to not follow the plan. Make small changes, and stick with them. Once those changes become habit, add a few more changes. · Try some of the following:  ? Make it a goal to eat a fruit or vegetable at every meal and at snacks. This will make it easy to get the recommended amount of fruits and vegetables each day. ? Try yogurt topped with fruit and nuts for a snack or healthy dessert. ? Add lettuce, tomato, cucumber, and onion to sandwiches. ? Combine a ready-made pizza crust with low-fat mozzarella cheese and lots of vegetable toppings. Try using tomatoes, squash, spinach, broccoli, carrots, cauliflower, and onions. ? Have a variety of cut-up vegetables with a low-fat dip as an appetizer instead of chips and dip. ? Sprinkle sunflower seeds or chopped almonds over salads. Or try adding chopped walnuts or almonds to cooked vegetables.   ? Try some vegetarian meals using beans and peas. Add garbanzo or kidney beans to salads. Make burritos and tacos with mashed irving beans or black beans. Where can you learn more? Go to http://felix-shanique.info/. Enter C352 in the search box to learn more about \"DASH Diet: Care Instructions. \"  Current as of: July 22, 2018  Content Version: 11.9  © 9868-8039 [employer]. Care instructions adapted under license by [employer] (which disclaims liability or warranty for this information). If you have questions about a medical condition or this instruction, always ask your healthcare professional. [address] any warranty or liability for your use of this information.

## 2019-01-25 NOTE — PROGRESS NOTES
Jamie Hernandez, 70 y.o.,  male    SUBJECTIVE  Ff-up    Insomnia-taking ambien, reports no side effects. She has chronic pain, Following pain management for low back arthritis- chronic back pain, taking prn vicodin. He is aware of potential yolanda effects    HTN- taking medication without problems. Prediabetes and HL- on pravachol. Depression/anxiety- says since death of child several years ago. He has been responding fairly well to paxil, has bad moments when he thinks about him, denies SI/HI. Gout- reports about 2 x a year flares, usually shellfish triggers      ROS:  See HPI, all others negative        Patient Active Problem List   Diagnosis Code    Gross hematuria R31.0    Elevated PSA R97.20    Prediabetes R73.03    Insomnia G47.00    Joint pain M25.50    Anxiety F41.9    Anxiety and depression F41.9, F32.9    Hypertension I10    Kidney stone N20.0    Malaria B54    Skin cancer C44.90    Gout M10.9    Essential hypertension I10    Pure hypercholesterolemia E78.00    Sleeping difficulty G47.9    Mild episode of recurrent major depressive disorder (HCC) F33.0       Current Outpatient Medications   Medication Sig Dispense Refill    olmesartan-hydroCHLOROthiazide (BENICAR HCT) 20-12.5 mg per tablet TAKE 1 TABLET BY MOUTH EVERY DAY 90 Tab 1    PARoxetine (PAXIL) 20 mg tablet TAKE 1 TABLET BY MOUTH DAILY. 90 Tab 1    zolpidem (AMBIEN) 10 mg tablet TAKE 1 TAB BY MOUTH NIGHTLY AS NEEDED FOR SLEEP. MAX DAILY AMOUNT: 10MG 90 Tab 0    pravastatin (PRAVACHOL) 20 mg tablet TAKE 1 TABLET BY MOUTH EVERYDAY AT BEDTIME 90 Tab 2    cetirizine (ZYRTEC) 10 mg tablet TAKE 1 TAB BY MOUTH DAILY. 90 Tab 3    HYDROcodone-acetaminophen (VICODIN) 5-300 mg tablet Take  by mouth.  fluticasone (FLONASE) 50 mcg/actuation nasal spray 2 Sprays by Both Nostrils route daily.  1 Bottle 0    latanoprost (XALATAN) 0.005 % ophthalmic solution   6       Allergies   Allergen Reactions    Fluconazole Hives and Itching    Penicillin G Hives       Past Medical History:   Diagnosis Date    Anxiety and depression     Asthma     Elevated PSA     Gout     Hypertension     Insomnia     Kidney stone     Malaria     Prediabetes     Skin cancer        Social History     Socioeconomic History    Marital status:      Spouse name: Not on file    Number of children: Not on file    Years of education: Not on file    Highest education level: Not on file   Social Needs    Financial resource strain: Not on file    Food insecurity - worry: Not on file    Food insecurity - inability: Not on file   [employer] needs - medical: Not on file   [employer] needs - non-medical: Not on file   Occupational History    Not on file   Tobacco Use    Smoking status: Never Smoker    Smokeless tobacco: Never Used   Substance and Sexual Activity    Alcohol use: No    Drug use: No    Sexual activity: Not on file   Other Topics Concern    Not on file   Social History Narrative    Not on file       History reviewed. No pertinent family history. OBJECTIVE    Physical Exam:     Visit Vitals  /82 (BP 1 Location: Left arm, BP Patient Position: Sitting)   Pulse 72   Temp 97.7 °F (36.5 °C) (Oral)   Resp 14   Ht 5' 7\" (1.702 m)   Wt 171 lb 6.4 oz (77.7 kg)   SpO2 95%   BMI 26.85 kg/m²       General: alert, well-appearing, in no apparent distress or pain  Head: atraumatic.  Non-tender maxillary and frontal sinuses  CVS: normal rate, regular rhythm, distinct S1 and S2  Lungs:clear to ausculation bilaterally, no crackles, wheezing or rhonchi noted  Psych:  mood and affect normal    Results for orders placed or performed during the hospital encounter of 35/47/66   METABOLIC PANEL, COMPREHENSIVE   Result Value Ref Range    Sodium 142 136 - 145 mmol/L    Potassium 3.8 3.5 - 5.5 mmol/L    Chloride 106 100 - 108 mmol/L    CO2 31 21 - 32 mmol/L    Anion gap 5 3.0 - 18 mmol/L    Glucose 94 74 - 99 mg/dL    BUN 17 7.0 - 18 MG/DL    Creatinine 1.09 0.6 - 1.3 MG/DL    BUN/Creatinine ratio 16 12 - 20      GFR est AA >60 >60 ml/min/1.73m2    GFR est non-AA >60 >60 ml/min/1.73m2    Calcium 8.5 8.5 - 10.1 MG/DL    Bilirubin, total 0.2 0.2 - 1.0 MG/DL    ALT (SGPT) 22 16 - 61 U/L    AST (SGOT) 16 15 - 37 U/L    Alk. phosphatase 55 45 - 117 U/L    Protein, total 7.2 6.4 - 8.2 g/dL    Albumin 3.7 3.4 - 5.0 g/dL    Globulin 3.5 2.0 - 4.0 g/dL    A-G Ratio 1.1 0.8 - 1.7           ASSESSMENT/PLAN  Diagnoses and all orders for this visit:    1. Essential hypertension  Controlled,  cont benicar hct  Dash diet, monitoring  CMP/lipid panel/a1c in 3 months prior to next visit    2. Prediabetes  Persistent, tlcs    3. Pure hypercholesterolemia  Calc cv risk 29 % vs 18 %   On  pravachol 20 mg qhs  Will monitor    4. Mild episode of recurrent major depressive disorder (HCC)  Controlled, cont paxil    5. Allergic rhinitis, unspecified seasonality, unspecified trigger  controlled, cont zyrtec and flonase    6. Sleeping difficulty  Agree to continuing prn ambien  Aware of SE potential with vicodin  q3 month checks   consistent    Ff-up in 3 months or sooner prn    Patient understands plan of care. Patient has provided input and agrees with goals.

## 2019-01-25 NOTE — PROGRESS NOTES
1. Have you been to the ER, urgent care clinic since your last visit? Hospitalized since your last visit? No    2. Have you seen or consulted any other health care providers outside of the [address] [place] [address] since your last visit? Include any pap smears or colon screening.  Orthopedics Dr. Claudeen Hung 1/22/19, Dentist abscess tooth removed 11/2018    Chief Complaint   Patient presents with    Hypertension    Insomnia    Gout    Depression    Other     Pre-DM    Shoulder Pain     RT shoulder pain, sees Dr. Claudeen Hung

## 2019-01-27 ENCOUNTER — APPOINTMENT (OUTPATIENT)
Dept: GENERAL RADIOLOGY | Age: 79
End: 2019-01-27
Attending: EMERGENCY MEDICINE
Payer: MEDICARE

## 2019-01-27 ENCOUNTER — HOSPITAL ENCOUNTER (EMERGENCY)
Age: 79
Discharge: HOME | End: 2019-01-28
Attending: EMERGENCY MEDICINE
Payer: MEDICARE

## 2019-01-27 ENCOUNTER — APPOINTMENT (OUTPATIENT)
Dept: CT IMAGING | Age: 79
End: 2019-01-27
Attending: EMERGENCY MEDICINE
Payer: MEDICARE

## 2019-01-27 DIAGNOSIS — F19.10 SUBSTANCE ABUSE (HCC): ICD-10-CM

## 2019-01-27 DIAGNOSIS — F10.920 ALCOHOLIC INTOXICATION WITHOUT COMPLICATION (HCC): Primary | ICD-10-CM

## 2019-01-27 LAB
ALBUMIN SERPL-MCNC: 3.8 G/DL (ref 3.4–5)
ALBUMIN/GLOB SERPL: 1 {RATIO} (ref 0.8–1.7)
ALP SERPL-CCNC: 44 U/L (ref 45–117)
ALT SERPL-CCNC: 16 U/L (ref 16–61)
AMPHET UR QL SCN: NEGATIVE
ANION GAP SERPL CALC-SCNC: 9 MMOL/L (ref 3–18)
APTT PPP: 35.8 SEC (ref 23–36.4)
AST SERPL-CCNC: 12 U/L (ref 15–37)
ATRIAL RATE: 84 BPM
BARBITURATES UR QL SCN: NEGATIVE
BASOPHILS # BLD: 0 K/UL (ref 0–0.1)
BASOPHILS NFR BLD: 1 % (ref 0–2)
BENZODIAZ UR QL: POSITIVE
BILIRUB SERPL-MCNC: 0.2 MG/DL (ref 0.2–1)
BUN SERPL-MCNC: 17 MG/DL (ref 7–18)
BUN/CREAT SERPL: 17 (ref 12–20)
CALCIUM SERPL-MCNC: 8.9 MG/DL (ref 8.5–10.1)
CALCULATED P AXIS, ECG09: 51 DEGREES
CALCULATED R AXIS, ECG10: -33 DEGREES
CALCULATED T AXIS, ECG11: 53 DEGREES
CANNABINOIDS UR QL SCN: NEGATIVE
CHLORIDE SERPL-SCNC: 101 MMOL/L (ref 100–108)
CK MB CFR SERPL CALC: 1.4 % (ref 0–4)
CK MB SERPL-MCNC: 1.8 NG/ML (ref 5–25)
CK SERPL-CCNC: 130 U/L (ref 39–308)
CO2 SERPL-SCNC: 30 MMOL/L (ref 21–32)
COCAINE UR QL SCN: NEGATIVE
CREAT SERPL-MCNC: 1.03 MG/DL (ref 0.6–1.3)
DIAGNOSIS, 93000: NORMAL
DIFFERENTIAL METHOD BLD: ABNORMAL
EOSINOPHIL # BLD: 0.9 K/UL (ref 0–0.4)
EOSINOPHIL NFR BLD: 12 % (ref 0–5)
ERYTHROCYTE [DISTWIDTH] IN BLOOD BY AUTOMATED COUNT: 12.6 % (ref 11.6–14.5)
ETHANOL SERPL-MCNC: 147 MG/DL (ref 0–3)
GLOBULIN SER CALC-MCNC: 3.7 G/DL (ref 2–4)
GLUCOSE BLD STRIP.AUTO-MCNC: 109 MG/DL (ref 70–110)
GLUCOSE SERPL-MCNC: 111 MG/DL (ref 74–99)
HCT VFR BLD AUTO: 37 % (ref 36–48)
HDSCOM,HDSCOM: ABNORMAL
HGB BLD-MCNC: 12.4 G/DL (ref 13–16)
INR PPP: 1 (ref 0.8–1.2)
LYMPHOCYTES # BLD: 1.9 K/UL (ref 0.9–3.6)
LYMPHOCYTES NFR BLD: 25 % (ref 21–52)
MCH RBC QN AUTO: 33.8 PG (ref 24–34)
MCHC RBC AUTO-ENTMCNC: 33.5 G/DL (ref 31–37)
MCV RBC AUTO: 100.8 FL (ref 74–97)
METHADONE UR QL: NEGATIVE
MONOCYTES # BLD: 0.5 K/UL (ref 0.05–1.2)
MONOCYTES NFR BLD: 7 % (ref 3–10)
NEUTS SEG # BLD: 4.2 K/UL (ref 1.8–8)
NEUTS SEG NFR BLD: 55 % (ref 40–73)
OPIATES UR QL: POSITIVE
P-R INTERVAL, ECG05: 188 MS
PCP UR QL: NEGATIVE
PLATELET # BLD AUTO: 214 K/UL (ref 135–420)
PMV BLD AUTO: 9.1 FL (ref 9.2–11.8)
POTASSIUM SERPL-SCNC: 3.6 MMOL/L (ref 3.5–5.5)
PROT SERPL-MCNC: 7.5 G/DL (ref 6.4–8.2)
PROTHROMBIN TIME: 13.1 SEC (ref 11.5–15.2)
Q-T INTERVAL, ECG07: 400 MS
QRS DURATION, ECG06: 116 MS
QTC CALCULATION (BEZET), ECG08: 472 MS
RBC # BLD AUTO: 3.67 M/UL (ref 4.7–5.5)
SODIUM SERPL-SCNC: 140 MMOL/L (ref 136–145)
TROPONIN I SERPL-MCNC: <0.02 NG/ML (ref 0–0.06)
VENTRICULAR RATE, ECG03: 84 BPM
WBC # BLD AUTO: 7.5 K/UL (ref 4.6–13.2)

## 2019-01-27 PROCEDURE — 93005 ELECTROCARDIOGRAM TRACING: CPT

## 2019-01-27 PROCEDURE — 85610 PROTHROMBIN TIME: CPT

## 2019-01-27 PROCEDURE — 82962 GLUCOSE BLOOD TEST: CPT

## 2019-01-27 PROCEDURE — 85025 COMPLETE CBC W/AUTO DIFF WBC: CPT

## 2019-01-27 PROCEDURE — 80053 COMPREHEN METABOLIC PANEL: CPT

## 2019-01-27 PROCEDURE — 82550 ASSAY OF CK (CPK): CPT

## 2019-01-27 PROCEDURE — 99285 EMERGENCY DEPT VISIT HI MDM: CPT

## 2019-01-27 PROCEDURE — 71045 X-RAY EXAM CHEST 1 VIEW: CPT

## 2019-01-27 PROCEDURE — 85730 THROMBOPLASTIN TIME PARTIAL: CPT

## 2019-01-27 PROCEDURE — 70450 CT HEAD/BRAIN W/O DYE: CPT

## 2019-01-27 PROCEDURE — 80307 DRUG TEST PRSMV CHEM ANLYZR: CPT

## 2019-01-28 VITALS
RESPIRATION RATE: 13 BRPM | HEART RATE: 80 BPM | OXYGEN SATURATION: 97 % | DIASTOLIC BLOOD PRESSURE: 66 MMHG | SYSTOLIC BLOOD PRESSURE: 104 MMHG

## 2019-01-28 LAB — ETHANOL SERPL-MCNC: 49 MG/DL (ref 0–3)

## 2019-01-28 PROCEDURE — 80307 DRUG TEST PRSMV CHEM ANLYZR: CPT

## 2019-01-28 NOTE — ED NOTES
In CT on EMS cart   When asked, pt is able to answer year with effort at recall; states 2018 then 2019. Affect calm/conversant, respirations regular/non labored/skin warm and dry. Performing NIH score. Pt answers questions appropriately this time/converses without difficulty; as stated some mentation is slow. Denies difference in sensation from one side to the other; does report when touching his knees bilaterally his shins are being touched and vice versa. No neurological deficits noted at this time.

## 2019-01-28 NOTE — DISCHARGE INSTRUCTIONS
Patient Education        Alcohol, Drug, or Poison Ingestion: Care Instructions  Your Care Instructions    A person can become very sick, or die, from swallowing or using alcohol, drugs, or poisons. Alcohol poisoning occurs when a person drinks a large amount of alcohol. Alcohol can stop nerve signals that control breathing. It can also stop the gag reflex that prevents choking. Alcohol poisoning is serious. It can lead to brain damage or death if it's not treated right away. Drugs can be used by accident or on purpose. They can be swallowed, inhaled, injected, or absorbed through the skin. Drugs include over-the-counter medicine (such as aspirin or acetaminophen) and prescription medicine. They also include vitamins and supplements. And they include illegal drugs such as cocaine and heroin. And poisons are all around us. They include household , cosmetics, houseplants, and garden chemicals. The doctor has checked you carefully, but problems can develop later. If you notice any problems or new symptoms, get medical treatment right away. Follow-up care is a key part of your treatment and safety. Be sure to make and go to all appointments, and call your doctor if you are having problems. It's also a good idea to know your test results and keep a list of the medicines you take. How can you care for yourself at home? Alcohol problems  · Talk to your doctor or counselor about programs that can help you stop using alcohol. · Plan ways to avoid being tempted to drink. ? Get rid of all alcohol in your home. ? Avoid places where you tend to drink. ? Stay away from places or events that offer alcohol. ? Stay away from people who drink a lot. Drug problems  · Talk to your doctor about programs that can help you stop using drugs. · Get rid of any drugs you might be tempted to misuse. · Learn how to say no when other people use drugs. · Don't spend time with people who use drugs.   Poison prevention  · Keep products in the containers they came in. Keep them with the original labels. · Be careful when you use cleaning products, paints, solvents, and pesticides. Read labels before use. Use a fan to move strong odors and fumes out of your home. · Do not mix cleaning products. Try to use nontoxic . These include vinegar, lemon juice, and baking soda. When should you call for help? Poison control centers, hospitals, or your doctor can give immediate advice in the case of a poisoning. The [employer] number is 6-375-447-954-214-2564. Have the poison container with you so you can give complete information to the poison control center, such as what the poison or substance is, how much was taken and when. Do not try to make the person vomit.   Call 911 anytime you think you may need emergency care. For example, call if you or someone else:    · Has used or currently uses alcohol or drugs and is very confused or can't stay awake.     · Has passed out (lost consciousness).     · Has severe trouble breathing.     · Is having a seizure.    Call your doctor now or seek immediate medical care if you or someone else:    · Has new symptoms, or is not acting normally.    Watch closely for changes in your health, and be sure to contact your doctor if:    · You do not get better as expected.     · You need help with drug or alcohol problems.     · You have problems with depression or other mental health issues. Where can you learn more? Go to http://felix-shanique.info/. Enter R459 in the search box to learn more about \"Alcohol, Drug, or Poison Ingestion: Care Instructions. \"  Current as of: September 23, 2018  Content Version: 11.9  © 5631-0487 [employer]. Care instructions adapted under license by [employer] (which disclaims liability or warranty for this information).  If you have questions about a medical condition or this instruction, always ask your healthcare professional. [place] [address] any warranty or liability for your use of this information. Patient Education        Learning About Drug Misuse  What is drug misuse? Drug misuse means using drugs in a way that harms you or causes you to harm others. Your doctor may call it substance use disorder or drug abuse. It's possible to misuse almost any type of drug, including illegal drugs, prescription drugs, and over-the-counter drugs. An overdose happens when a person takes more than the normal or recommended amount of a drug. An overdose can result in harmful symptoms or death. Could you have a problem? If there's a chance you may be misusing drugs, it's important to find out. So ask yourself a few questions. · Do you spend a lot of time thinking about your medicine or other drug--getting it and using it? Has that taken the place of other things you used to enjoy? · Have you had problems with your family or friends, or at work, because of your use? · Do you use drugs even when you've told yourself you won't? Do you think you might have a problem? If you do, then you've just taken an important first step. Many people have overcome this problem. And most of them started by reaching out to others, like caring friends or family, their doctor, or a support group. How is drug misuse treated? If you think you may have a problem with drugs, talk to your doctor. You and your doctor can decide whether you have a problem and what type of treatment might help you. You may need to stay in a hospital at first, so that you can be treated for withdrawal symptoms. One of the goals of treatment is helping you get used to life without the drug. Counseling can help you prepare for people or situations that might tempt you to start using again. You can practice these skills through one-on-one counseling, family therapy, or group therapy.   Therapy may be part of inpatient treatment, where you stay in a treatment center. Or it may be part of outpatient treatment, where you can fit your therapy around your job or other responsibilities. Another goal of treatment is getting ongoing support for your drug-free life. Many people find support by going to meetings like Narcotics Anonymous or [employer]. This type of support can help you feel less alone and more motivated to stay drug-free. You might talk to your doctor or do an online search for local treatment programs. Or you might tell a friend or loved one that you need help. Follow-up care is a key part of your treatment and safety. Be sure to make and go to all appointments, and call your doctor if you are having problems. It's also a good idea to know your test results and keep a list of the medicines you take. Where can you learn more? Go to http://felix-shanique.info/. Enter 168-242-3288 in the search box to learn more about \"Learning About Drug Misuse. \"  Current as of: May 7, 2018  Content Version: 11.9  © 5663-8013 [employer]. Care instructions adapted under license by [employer] (which disclaims liability or warranty for this information). If you have questions about a medical condition or this instruction, always ask your healthcare professional. [address] any warranty or liability for your use of this information.

## 2019-01-28 NOTE — ED PROVIDER NOTES
EMERGENCY DEPARTMENT HISTORY AND PHYSICAL EXAM 
 
8:29 PM 
 
 
Date: 2019 Patient Name: Anne Marie Gold History of Presenting Illness Chief Complaint Patient presents with  Dizziness  Altered mental status  Fall History Provided By: Patient Chief Complaint: AMS Duration:  Hours Timing:  Acute and Improving Location: neuro Quality: not reported Severity: N/A Modifying Factors: none Associated Symptoms: Dizziness, falls, and Etoh on board w/ pain medications (vicodin) Additional History (Context): Anne Marie Gold is a 70 y.o. male with h/o HTN, skin CA, DM, an chronic pain who presents with family at bed side due to sudden onset confusion PTA that has since resolved while presenting. Reports he has been dizzy throughout the day and has fallen twice because of this. No head injury or LOC. Pt admits to some Etoh (vodka/double shot) w/ usual Vicodin medications today. Initial arrival to the ER per the RN didn't answer year correctly; thought it was  but now he is A&O x3. Per his wife the pt has h/o drinking in the past but he quit; recently one of his children has  and he had went to the [facility] to visit about 2 weeks ago and since has began drinking again, also feeling depressed. Denies CP, SOB, fever, chills, abdominal pain, n/V, back pain, neck pain, urinary sx, weakness, numbness, or any other associated sx. No other complaints or concerns. PCP: Eben Pina MD 
 
Current Outpatient Medications Medication Sig Dispense Refill  olmesartan-hydroCHLOROthiazide (BENICAR HCT) 20-12.5 mg per tablet TAKE 1 TABLET BY MOUTH EVERY DAY 90 Tab 1  
 PARoxetine (PAXIL) 20 mg tablet TAKE 1 TABLET BY MOUTH DAILY. 90 Tab 1  
 zolpidem (AMBIEN) 10 mg tablet TAKE 1 TAB BY MOUTH NIGHTLY AS NEEDED FOR SLEEP. MAX DAILY AMOUNT: 10MG 90 Tab 0  pravastatin (PRAVACHOL) 20 mg tablet TAKE 1 TABLET BY MOUTH EVERYDAY AT BEDTIME 90 Tab 2  
  cetirizine (ZYRTEC) 10 mg tablet TAKE 1 TAB BY MOUTH DAILY. 90 Tab 3  
 HYDROcodone-acetaminophen (VICODIN) 5-300 mg tablet Take  by mouth.  fluticasone (FLONASE) 50 mcg/actuation nasal spray 2 Sprays by Both Nostrils route daily. 1 Bottle 0  
 latanoprost (XALATAN) 0.005 % ophthalmic solution   6 Past History Past Medical History: 
Past Medical History:  
Diagnosis Date  Anxiety and depression  Asthma  Elevated PSA  Gout  Hypertension  Insomnia  Kidney stone  Malaria  Prediabetes  Skin cancer Past Surgical History: 
Past Surgical History:  
Procedure Laterality Date  HX COLONOSCOPY  2011  
 f/u 2021 [address] [facility] [id number]  [address]  
 HX SKIN BIOPSY  2013 [id number], [place] Family History: No family history on file. Social History: 
Social History Tobacco Use  Smoking status: Never Smoker  Smokeless tobacco: Never Used Substance Use Topics  Alcohol use: No  
 Drug use: No  
 
 
Allergies: Allergies Allergen Reactions  Fluconazole Hives and Itching  Penicillin G Hives Review of Systems Review of Systems Constitutional: Negative for chills, fatigue and fever. HENT: Negative. Negative for sore throat. Eyes: Negative. Respiratory: Negative for cough and shortness of breath. Cardiovascular: Negative for chest pain and palpitations. Gastrointestinal: Negative for abdominal pain, nausea and vomiting. Genitourinary: Negative for dysuria. Musculoskeletal: Negative. Skin: Negative. Neurological: Positive for dizziness. Negative for weakness, light-headedness and headaches. Psychiatric/Behavioral: Positive for confusion (resolved). Negative for hallucinations, self-injury and suicidal ideas. +depressed. All other systems reviewed and are negative. Physical Exam  
 
Visit Vitals /66 Pulse 80 Resp 13 SpO2 97% Physical Exam  
 Constitutional: He is oriented to person, place, and time. He appears well-developed and well-nourished. No distress. HENT:  
Head: Normocephalic. Mouth/Throat: Oropharynx is clear and moist.  
Eyes: Conjunctivae and EOM are normal. Pupils are equal, round, and reactive to light. Neck: Normal range of motion. Neck supple. Cardiovascular: Normal rate, regular rhythm, normal heart sounds and intact distal pulses. No murmur heard. Pulmonary/Chest: Effort normal and breath sounds normal. No respiratory distress. He has no wheezes. He has no rales. He exhibits no tenderness. Abdominal: Soft. Bowel sounds are normal. He exhibits no distension. There is no tenderness. There is no rebound. Musculoskeletal: Normal range of motion. He exhibits no edema or tenderness. Neurological: He is alert and oriented to person, place, and time. No cranial nerve deficit. He exhibits normal muscle tone. Coordination normal.  
Slurred speech (questionable Etoh use) Skin: Skin is warm and dry. No rash noted. Psychiatric: He has a normal mood and affect. His behavior is normal. Judgment and thought content normal.  
Nursing note and vitals reviewed. Diagnostic Study Results Labs - Recent Results (from the past 12 hour(s)) CBC WITH AUTOMATED DIFF Collection Time: 01/27/19  8:17 PM  
Result Value Ref Range WBC 7.5 4.6 - 13.2 K/uL  
 RBC 3.67 (L) 4.70 - 5.50 M/uL  
 HGB 12.4 (L) 13.0 - 16.0 g/dL HCT 37.0 36.0 - 48.0 % .8 (H) 74.0 - 97.0 FL  
 MCH 33.8 24.0 - 34.0 PG  
 MCHC 33.5 31.0 - 37.0 g/dL  
 RDW 12.6 11.6 - 14.5 % PLATELET 970 790 - 688 K/uL MPV 9.1 (L) 9.2 - 11.8 FL  
 NEUTROPHILS 55 40 - 73 % LYMPHOCYTES 25 21 - 52 % MONOCYTES 7 3 - 10 % EOSINOPHILS 12 (H) 0 - 5 % BASOPHILS 1 0 - 2 %  
 ABS. NEUTROPHILS 4.2 1.8 - 8.0 K/UL  
 ABS. LYMPHOCYTES 1.9 0.9 - 3.6 K/UL  
 ABS. MONOCYTES 0.5 0.05 - 1.2 K/UL  
 ABS. EOSINOPHILS 0.9 (H) 0.0 - 0.4 K/UL ABS. BASOPHILS 0.0 0.0 - 0.1 K/UL  
 DF AUTOMATED PROTHROMBIN TIME + INR Collection Time: 01/27/19  8:17 PM  
Result Value Ref Range Prothrombin time 13.1 11.5 - 15.2 sec INR 1.0 0.8 - 1.2 METABOLIC PANEL, COMPREHENSIVE Collection Time: 01/27/19  8:17 PM  
Result Value Ref Range Sodium 140 136 - 145 mmol/L Potassium 3.6 3.5 - 5.5 mmol/L Chloride 101 100 - 108 mmol/L  
 CO2 30 21 - 32 mmol/L Anion gap 9 3.0 - 18 mmol/L Glucose 111 (H) 74 - 99 mg/dL BUN 17 7.0 - 18 MG/DL Creatinine 1.03 0.6 - 1.3 MG/DL  
 BUN/Creatinine ratio 17 12 - 20 GFR est AA >60 >60 ml/min/1.73m2 GFR est non-AA >60 >60 ml/min/1.73m2 Calcium 8.9 8.5 - 10.1 MG/DL Bilirubin, total 0.2 0.2 - 1.0 MG/DL  
 ALT (SGPT) 16 16 - 61 U/L  
 AST (SGOT) 12 (L) 15 - 37 U/L Alk. phosphatase 44 (L) 45 - 117 U/L Protein, total 7.5 6.4 - 8.2 g/dL Albumin 3.8 3.4 - 5.0 g/dL Globulin 3.7 2.0 - 4.0 g/dL A-G Ratio 1.0 0.8 - 1.7 PTT Collection Time: 01/27/19  8:17 PM  
Result Value Ref Range aPTT 35.8 23.0 - 36.4 SEC CARDIAC PANEL,(CK, CKMB & TROPONIN) Collection Time: 01/27/19  8:17 PM  
Result Value Ref Range  39 - 308 U/L  
 CK - MB 1.8 <3.6 ng/ml CK-MB Index 1.4 0.0 - 4.0 % Troponin-I, QT <0.02 0.00 - 0.06 NG/ML  
ETHYL ALCOHOL Collection Time: 01/27/19  8:17 PM  
Result Value Ref Range ALCOHOL(ETHYL),SERUM 147 (H) 0 - 3 MG/DL  
GLUCOSE, POC Collection Time: 01/27/19  8:19 PM  
Result Value Ref Range Glucose (POC) 109 70 - 110 mg/dL EKG, 12 LEAD, INITIAL Collection Time: 01/27/19  8:20 PM  
Result Value Ref Range Ventricular Rate 84 BPM  
 Atrial Rate 84 BPM  
 P-R Interval 188 ms QRS Duration 116 ms  
 Q-T Interval 400 ms QTC Calculation (Bezet) 472 ms Calculated P Axis 51 degrees Calculated R Axis -33 degrees Calculated T Axis 53 degrees Diagnosis Normal sinus rhythm Left axis deviation Prolonged QT Abnormal ECG 
 No previous ECGs available Confirmed by Marylin Velazquez ([id number]) on 1/27/2019 9:15:30 PM 
  
DRUG SCREEN, URINE Collection Time: 01/27/19  9:16 PM  
Result Value Ref Range BENZODIAZEPINES POSITIVE (A) NEG    
 BARBITURATES NEGATIVE  NEG    
 THC (TH-CANNABINOL) NEGATIVE  NEG    
 OPIATES POSITIVE (A) NEG    
 PCP(PHENCYCLIDINE) NEGATIVE  NEG    
 COCAINE NEGATIVE  NEG    
 AMPHETAMINES NEGATIVE  NEG METHADONE NEGATIVE  NEG HDSCOM (NOTE) ETHYL ALCOHOL Collection Time: 01/28/19  1:25 AM  
Result Value Ref Range ALCOHOL(ETHYL),SERUM 49 (H) 0 - 3 MG/DL Radiologic Studies -  
XR CHEST PORT Final Result Impression: Hypoinflation with bibasilar streaky opacities, favor atelectasis. CT HEAD WO CONT Final Result IMPRESSION:  
1. No acute hemorrhage identified. 2. Small amount of periventricular and presumed deep hemispheric small vessel  
disease change as above. Findings discussed with Dr. Venkatesh Green at 2023 hours. Medical Decision Making It should be noted that I, No att. providers found will be the provider of record for this patient. I reviewed the vital signs, available nursing notes, past medical history, past surgical history, family history and social history. Vital Signs-Reviewed the patient's vital signs. Pulse Oximetry Analysis - 96% 2L NC 
 
EKG: Interpreted by the EP. Time Interpreted: 20:22 Rate: 84 Rhythm: Normal Sinus Rhythm Interpretation:No STEMI Records Reviewed: Nursing Notes and Old Medical Records (Time of Review: 8:29 PM) ED Course: Progress Notes, Reevaluation, and Consults: 
8:32 PM Consult:  Discussed care with Dr. Geronimo Tesfaye (Teleneuro) Standard discussion; including history of patients chief complaint, available diagnostic results, and treatment course. Recommends work up for possible syncope and questionable admission following work up. 2:45 AM I have reevaluated the pt. He is A&Ox 3 w/ normal gait. I have explained I believe the pt sx are due to Etoh intoxication; both the pt and his son agrees. His states he will help make sure his father doesn't drink and will make sure he f/u for further work with his PCP in the next 24-48 hrs. Provider Notes (Medical Decision Making):  
Ddx: TIA, CVA, Etoh toxication, hypoglycemia, MI, Alzheimer's, Subdural hematoma Diagnosis Clinical Impression: 1. Alcoholic intoxication without complication ([facility] [address] 2. Substance abuse ([facility] [address] Disposition: home Follow-up Information Follow up With Specialties Details Why Contact Info Edel Henriquez MD Family Practice Call in 2 days Follow up [facility] [address] [place] [address] 
[phone] [id number] [facility] EMERGENCY DEPT Emergency Medicine  If symptoms worsen, As needed [address] [id number] [phone] Medication List  
  
ASK your doctor about these medications   
cetirizine 10 mg tablet Commonly known as:  ZYRTEC 
TAKE 1 TAB BY MOUTH DAILY. fluticasone 50 mcg/actuation nasal spray Commonly known as:  Marcine Infield 2 Sprays by Both Nostrils route daily. latanoprost 0.005 % ophthalmic solution Commonly known as:  XALATAN 
  
olmesartan-hydroCHLOROthiazide 20-12.5 mg per tablet Commonly known as:  BENICAR HCT 
TAKE 1 TABLET BY MOUTH EVERY DAY PARoxetine 20 mg tablet Commonly known as:  PAXIL TAKE 1 TABLET BY MOUTH DAILY. pravastatin 20 mg tablet Commonly known as:  PRAVACHOL 
TAKE 1 TABLET BY MOUTH EVERYDAY AT BEDTIME 
  
VICODIN 5-300 mg tablet Generic drug:  HYDROcodone-acetaminophen 
  
zolpidem 10 mg tablet Commonly known as:  AMBIEN 
TAKE 1 TAB BY MOUTH NIGHTLY AS NEEDED FOR SLEEP. MAX DAILY AMOUNT: 10MG 
  
  
 
_______________________________ Scribe Attestation Mariela Blackwell acting as a scribe for and in the presence of Narendra Pedersen MD     
 January 27, 2019 at 8:29 PM 
    
Provider Attestation:     
I personally performed the services described in the documentation, reviewed the documentation, as recorded by the scribe in my presence, and it accurately and completely records my words and actions. January 27, 2019 at 8:29 PM - Meka Drummond MD   
 
 
_______________________________

## 2019-01-28 NOTE — ED TRIAGE NOTES
Per EMS pt had a fall this AM, and this afternoon per family. + small drink of vodka.  + Confused to year; answers other orientation questions appropriately.

## 2019-02-27 DIAGNOSIS — F51.01 PRIMARY INSOMNIA: ICD-10-CM

## 2019-02-28 RX ORDER — ZOLPIDEM TARTRATE 10 MG/1
TABLET ORAL
Qty: 90 TAB | Refills: 0 | OUTPATIENT
Start: 2019-02-28

## 2019-02-28 NOTE — TELEPHONE ENCOUNTER
Reviewed recent ED note, with the history of falls/etoh intoxication and being on vicodin, I recommend against Khushbu Frazier as this would further put him at risk for falls and confusion.  pls notify pt

## 2019-03-01 NOTE — TELEPHONE ENCOUNTER
Contacted patient and verified identity using name and date of birth (2- identifiers)  Spoke with patient and he verbalized understanding of it not recommended of him to be taking [place]. Patient did indicate that this did not make him happy but he understood.

## 2019-04-23 ENCOUNTER — OFFICE VISIT (OUTPATIENT)
Dept: FAMILY MEDICINE CLINIC | Age: 79
End: 2019-04-23

## 2019-04-23 VITALS
HEIGHT: 67 IN | RESPIRATION RATE: 16 BRPM | BODY MASS INDEX: 27.94 KG/M2 | WEIGHT: 178 LBS | OXYGEN SATURATION: 97 % | TEMPERATURE: 98.4 F | SYSTOLIC BLOOD PRESSURE: 136 MMHG | DIASTOLIC BLOOD PRESSURE: 88 MMHG | HEART RATE: 80 BPM

## 2019-04-23 DIAGNOSIS — R73.03 PREDIABETES: ICD-10-CM

## 2019-04-23 DIAGNOSIS — R97.20 ELEVATED PSA: ICD-10-CM

## 2019-04-23 DIAGNOSIS — R35.0 URINARY FREQUENCY: ICD-10-CM

## 2019-04-23 DIAGNOSIS — F33.0 MILD EPISODE OF RECURRENT MAJOR DEPRESSIVE DISORDER (HCC): ICD-10-CM

## 2019-04-23 DIAGNOSIS — F51.01 PRIMARY INSOMNIA: ICD-10-CM

## 2019-04-23 DIAGNOSIS — I10 ESSENTIAL HYPERTENSION: ICD-10-CM

## 2019-04-23 DIAGNOSIS — E78.00 PURE HYPERCHOLESTEROLEMIA: ICD-10-CM

## 2019-04-23 DIAGNOSIS — Z12.5 SCREENING FOR PROSTATE CANCER: ICD-10-CM

## 2019-04-23 DIAGNOSIS — Z00.00 MEDICARE ANNUAL WELLNESS VISIT, SUBSEQUENT: Primary | ICD-10-CM

## 2019-04-23 LAB
BILIRUB UR QL STRIP: NEGATIVE
GLUCOSE UR-MCNC: NEGATIVE MG/DL
KETONES P FAST UR STRIP-MCNC: NORMAL MG/DL
PH UR STRIP: 5 [PH] (ref 4.6–8)
PROT UR QL STRIP: NEGATIVE
SP GR UR STRIP: 1.02 (ref 1–1.03)
UA UROBILINOGEN AMB POC: NORMAL (ref 0.2–1)
URINALYSIS CLARITY POC: CLEAR
URINALYSIS COLOR POC: YELLOW
URINE BLOOD POC: NORMAL
URINE LEUKOCYTES POC: NEGATIVE
URINE NITRITES POC: NEGATIVE

## 2019-04-23 RX ORDER — PRAVASTATIN SODIUM 20 MG/1
TABLET ORAL
Qty: 90 TAB | Refills: 2 | Status: SHIPPED | OUTPATIENT
Start: 2019-04-23 | End: 2019-12-27 | Stop reason: SDUPTHER

## 2019-04-23 NOTE — PATIENT INSTRUCTIONS
Medicare Wellness Visit, Male The best way to live healthy is to have a lifestyle where you eat a well-balanced diet, exercise regularly, limit alcohol use, and quit all forms of tobacco/nicotine, if applicable. Regular preventive services are another way to keep healthy. Preventive services (vaccines, screening tests, monitoring & exams) can help personalize your care plan, which helps you manage your own care. Screening tests can find health problems at the earliest stages, when they are easiest to treat. [facility] follows the current, evidence-based guidelines published by the [facility] Froilan Timbo ([facility]) when recommending preventive services for our patients. Because we follow these guidelines, sometimes recommendations change over time as research supports it. (For example, a prostate screening blood test is no longer routinely recommended for men with no symptoms.) Of course, you and your doctor may decide to screen more often for some diseases, based on your risk and co-morbidities (chronic disease you are already diagnosed with). Preventive services for you include: - Medicare offers their members a free annual wellness visit, which is time for you and your primary care provider to discuss and plan for your preventive service needs. Take advantage of this benefit every year! 
-All adults over age 72 should receive the recommended pneumonia vaccines. Current USPSTF guidelines recommend a series of two vaccines for the best pneumonia protection.  
-All adults should have a flu vaccine yearly and an ECG.  All adults age 61 and older should receive a shingles vaccine once in their lifetime.   
-All adults age 38-68 who are overweight should have a diabetes screening test once every three years.  
-Other screening tests & preventive services for persons with diabetes include: an eye exam to screen for diabetic retinopathy, a kidney function test, a foot exam, and stricter control over your cholesterol.  
-Cardiovascular screening for adults with routine risk involves an electrocardiogram (ECG) at intervals determined by the provider.  
-Colorectal cancer screening should be done for adults age 54-65 with no increased risk factors for colorectal cancer. There are a number of acceptable methods of screening for this type of cancer. Each test has its own benefits and drawbacks. Discuss with your provider what is most appropriate for you during your annual wellness visit. The different tests include: colonoscopy (considered the best screening method), a fecal occult blood test, a fecal DNA test, and sigmoidoscopy. 
-All adults born between [facility] should be screened once for Hepatitis C. 
-An Abdominal Aortic Aneurysm (AAA) Screening is recommended for men age 73-68 who has ever smoked in their lifetime. Here is a list of your current Health Maintenance items (your personalized list of preventive services) with a due date: 
Health Maintenance Due Topic Date Due  Shingles Vaccine (1 of 2) 05/08/1992  Glaucoma Screening   05/08/2007 [place] Annual Well Visit  02/23/2019

## 2019-04-23 NOTE — PROGRESS NOTES
This is the Subsequent Medicare Annual Wellness Exam, performed 12 months or more after the Initial AWV or the last Subsequent AWV I have reviewed the patient's medical history in detail and updated the computerized patient record. History Past Medical History:  
Diagnosis Date  Anxiety and depression  Asthma  Elevated PSA  Gout  Hypertension  Insomnia  Kidney stone  Malaria  Prediabetes  Skin cancer Past Surgical History:  
Procedure Laterality Date  HX COLONOSCOPY  2011  
 f/u 2021 [address] [facility] [id number]  
 HX SKIN BIOPSY  2013 [id number], [place] Current Outpatient Medications Medication Sig Dispense Refill  pravastatin (PRAVACHOL) 20 mg tablet TAKE 1 TABLET BY MOUTH EVERYDAY AT BEDTIME 90 Tab 2  
 olmesartan-hydroCHLOROthiazide (BENICAR HCT) 20-12.5 mg per tablet TAKE 1 TABLET BY MOUTH EVERY DAY 90 Tab 1  
 PARoxetine (PAXIL) 20 mg tablet TAKE 1 TABLET BY MOUTH DAILY. 90 Tab 1  cetirizine (ZYRTEC) 10 mg tablet TAKE 1 TAB BY MOUTH DAILY. 90 Tab 3  
 HYDROcodone-acetaminophen (VICODIN) 5-300 mg tablet Take  by mouth.  fluticasone (FLONASE) 50 mcg/actuation nasal spray 2 Sprays by Both Nostrils route daily. 1 Bottle 0  
 latanoprost (XALATAN) 0.005 % ophthalmic solution   6 Allergies Allergen Reactions  Fluconazole Hives and Itching  Penicillin G Hives History reviewed. No pertinent family history. Social History Tobacco Use  Smoking status: Never Smoker  Smokeless tobacco: Never Used Substance Use Topics  Alcohol use: No  
 
Patient Active Problem List  
Diagnosis Code  Gross hematuria R31.0  Elevated PSA R97.20  Prediabetes R73.03  
 Insomnia G47.00  Joint pain M25.50  Anxiety F41.9  Anxiety and depression F41.9, F32.9  Hypertension I10  
 Kidney stone N20.0  Malaria B54  
 Skin cancer C44.90  Gout M10.9  Essential hypertension I10  
  Pure hypercholesterolemia E78.00  Sleeping difficulty G47.9  Mild episode of recurrent major depressive disorder ([facility] [address] F33.0 Depression Risk Factor Screening:  
 
3 most recent PHQ Screens 11/9/2016 Little interest or pleasure in doing things Nearly every day Feeling down, depressed, irritable, or hopeless More than half the days Total Score PHQ 2 5 Trouble falling or staying asleep, or sleeping too much - Feeling tired or having little energy - Poor appetite, weight loss, or overeating - Feeling bad about yourself - or that you are a failure or have let yourself or your family down - Trouble concentrating on things such as school, work, reading, or watching TV - Moving or speaking so slowly that other people could have noticed; or the opposite being so fidgety that others notice - Thoughts of being better off dead, or hurting yourself in some way -  
PHQ 9 Score - Alcohol Risk Factor Screening: You do not drink alcohol or very rarely. Functional Ability and Level of Safety:  
Hearing Loss Hearing is good. Activities of Daily Living The home contains: no safety equipment. Patient does total self care Fall Risk Fall Risk Assessment, last 12 mths 4/23/2019 Able to walk? Yes Fall in past 12 months? Yes Fall with injury? No  
Number of falls in past 12 months 1 Fall Risk Score 1 Abuse Screen Patient is not abused Cognitive Screening Evaluation of Cognitive Function: 
Has your family/caregiver stated any concerns about your memory: no 
 
 
Patient Care Team  
Patient Care Team: 
Marcia Rollins MD as PCP - General (Family Practice) Omari Lynch MD as Physician (Urology) Coral Martínez DO (Optometry) Assessment/Plan Education and counseling provided: 
Are appropriate based on today's review and evaluation End-of-Life planning (with patient's consent)-discussed, per pt has exisiting, advised to send us records Pneumococcal Vaccine- 13/23 completed Influenza Vaccine- annually Prostate cancer screening tests (PSA, covered annually)- previous h/o elevated PSA, will recheck Colorectal cancer screening tests- 2011 update 2021 Cardiovascular screening blood test- due, reminded/reprinted labs Screening for glaucoma- per pt utd dr. Rosalina Dixon Diagnoses and all orders for this visit: 
 
1. Medicare annual wellness visit, subsequent Bridgette Mclaughlin, 70 y.o.,  male SUBJECTIVE Ff-up Insomnia-taking ambien prn. He had an episode of etoh intoxication/fall took vicodin and [place] then as well. Says etoh intake is not regular for him, visited son's grave and had felt more depressed than usual that day, no suicidal ideation. He has not drank or took [place] since then. He is interested in seeing psychiatrist at this point. He continues to take paxil. She has chronic pain, Following pain management for low back arthritis- chronic back pain, taking prn vicodin. He is aware of potential yolanda effects HTN- taking medication without problems. Prediabetes and HL- on pravachol. Gout- reports about 2 x a year flares, usually shellfish triggers C/o urinary frequency and urgency for several monthys. No dysuria. Has had h/o elevated PSA in the past, was seeing dr. Quang Amin. No  
ROS: 
See HPI, all others negative Patient Active Problem List  
Diagnosis Code  Gross hematuria R31.0  Elevated PSA R97.20  Prediabetes R73.03  
 Insomnia G47.00  Joint pain M25.50  Anxiety F41.9  Anxiety and depression F41.9, F32.9  Hypertension I10  
 Kidney stone N20.0  Malaria B54  
 Skin cancer C44.90  Gout M10.9  Essential hypertension I10  Pure hypercholesterolemia E78.00  Sleeping difficulty G47.9  Mild episode of recurrent major depressive disorder ([facility] [address] F33.0 Current Outpatient Medications Medication Sig Dispense Refill  pravastatin (PRAVACHOL) 20 mg tablet TAKE 1 TABLET BY MOUTH EVERYDAY AT BEDTIME 90 Tab 2  
 olmesartan-hydroCHLOROthiazide (BENICAR HCT) 20-12.5 mg per tablet TAKE 1 TABLET BY MOUTH EVERY DAY 90 Tab 1  
 PARoxetine (PAXIL) 20 mg tablet TAKE 1 TABLET BY MOUTH DAILY. 90 Tab 1  cetirizine (ZYRTEC) 10 mg tablet TAKE 1 TAB BY MOUTH DAILY. 90 Tab 3  
 HYDROcodone-acetaminophen (VICODIN) 5-300 mg tablet Take  by mouth.  fluticasone (FLONASE) 50 mcg/actuation nasal spray 2 Sprays by Both Nostrils route daily. 1 Bottle 0  
 latanoprost (XALATAN) 0.005 % ophthalmic solution   6 Allergies Allergen Reactions  Fluconazole Hives and Itching  Penicillin G Hives Past Medical History:  
Diagnosis Date  Anxiety and depression  Asthma  Elevated PSA  Gout  Hypertension  Insomnia  Kidney stone  Malaria  Prediabetes  Skin cancer Social History Socioeconomic History  Marital status:  Spouse name: Not on file  Number of children: Not on file  Years of education: Not on file  Highest education level: Not on file Occupational History  Not on file Social Needs  Financial resource strain: Not on file  Food insecurity:  
  Worry: Not on file Inability: Not on file  Transportation needs:  
  Medical: Not on file Non-medical: Not on file Tobacco Use  Smoking status: Never Smoker  Smokeless tobacco: Never Used Substance and Sexual Activity  Alcohol use: No  
 Drug use: No  
 Sexual activity: Not on file Lifestyle  Physical activity:  
  Days per week: Not on file Minutes per session: Not on file  Stress: Not on file Relationships  Social connections:  
  Talks on phone: Not on file Gets together: Not on file Attends Yazidi service: Not on file Active member of club or organization: Not on file Attends meetings of clubs or organizations: Not on file Relationship status: Not on file  Intimate partner violence:  
  Fear of current or ex partner: Not on file Emotionally abused: Not on file Physically abused: Not on file Forced sexual activity: Not on file Other Topics Concern  Not on file Social History Narrative  Not on file History reviewed. No pertinent family history. OBJECTIVE Physical Exam:  
 
Visit Vitals /88 (BP 1 Location: Left arm, BP Patient Position: Sitting) Pulse 80 Temp 98.4 °F (36.9 °C) (Oral) Resp 16 Ht 5' 7\" (1.702 m) Wt 178 lb (80.7 kg) SpO2 97% BMI 27.88 kg/m² General: alert, well-appearing, in no apparent distress or pain Head: atraumatic. Non-tender maxillary and frontal sinuses CVS: normal rate, regular rhythm, distinct S1 and S2 Lungs:clear to ausculation bilaterally, no crackles, wheezing or rhonchi noted Abdomen: soft, NT, ND Extremities: no edema Psych:  mood and affect normal 
 
 
ASSESSMENT/PLAN Diagnoses and all orders for this visit: 1. Essential hypertension Controlled,  cont benicar hct Dash diet, monitoring CMP/lipid panel/a1c soon, reminded reprinted/ 2. Prediabetes Persistent, tlcs 3. Pure hypercholesterolemia Calc cv risk 29 % vs 18 % On  pravachol 20 mg qhs Will monitor 4. Mild episode of recurrent major depressive disorder ([facility] [address] Fair, 
cont paxil Refer to psychiatry 5. Allergic rhinitis, unspecified seasonality, unspecified trigger 
controlled, cont zyrtec and flonase 6. Primary insomnia I am hesitant to prescribe ambien with history of falling/etoh intoxication/AMS related to ambien/vicodin being used together, which he was advised against multiple times previously. Another discussion regarding se profile and safety issues on these medication Referral to psychiatry 7. Urinary frequency Neg UA 
? BPH, referral to dr. Martina Garcia 8. Elevated PSA Recheck PSA 9. Screening for prostate cancer PSA Ff-up in 6 months or sooner prn Patient understands plan of care. Patient has provided input and agrees with goals.

## 2019-05-14 ENCOUNTER — OFFICE VISIT (OUTPATIENT)
Dept: ORTHOPEDIC SURGERY | Age: 79
End: 2019-05-14

## 2019-05-14 VITALS
HEART RATE: 94 BPM | BODY MASS INDEX: 27.94 KG/M2 | HEIGHT: 67 IN | DIASTOLIC BLOOD PRESSURE: 87 MMHG | SYSTOLIC BLOOD PRESSURE: 147 MMHG | WEIGHT: 178 LBS | OXYGEN SATURATION: 99 %

## 2019-05-14 DIAGNOSIS — M65.312 TRIGGER THUMB, LEFT THUMB: Primary | ICD-10-CM

## 2019-05-14 DIAGNOSIS — M99.04 SACRAL REGION SOMATIC DYSFUNCTION: ICD-10-CM

## 2019-05-14 DIAGNOSIS — S39.012A LUMBAR STRAIN, INITIAL ENCOUNTER: ICD-10-CM

## 2019-05-14 DIAGNOSIS — M75.101 NON-TRAUMATIC ROTATOR CUFF TEAR, RIGHT: ICD-10-CM

## 2019-05-14 DIAGNOSIS — M99.03 LUMBAR REGION SOMATIC DYSFUNCTION: ICD-10-CM

## 2019-05-14 DIAGNOSIS — M99.05 PELVIC SOMATIC DYSFUNCTION: ICD-10-CM

## 2019-05-14 RX ORDER — DICLOFENAC SODIUM 10 MG/G
GEL TOPICAL
COMMUNITY
Start: 2019-05-14 | End: 2019-07-02 | Stop reason: SDUPTHER

## 2019-05-14 NOTE — PROGRESS NOTES
AVS reviewed: YES  HEP: AT demonstrated  Resources Provided: YES splint for finger.  Pt declined video & photo after AT realized forgot to include  Patient questions/concerns answered: NO. Pt denied questions/concerns  Patient verbalized understanding of treatment plan: YES

## 2019-05-14 NOTE — PROGRESS NOTES
HISTORY OF PRESENT ILLNESS    Sofía Cantor is a 70y.o. year old male comes in today as new patient for: right hip, right shoulder, back pain    Patients symptoms have been present for several months. Pain level 7/10. Patient has tried:  rest.  It is described as pain right shoulder with certain motions and has been Dx supraspinatus tear right and great benefit injection 3 months ago. Pain left hand with popping left thumb in morning but no lock. + pain left hand as well. Also pain right hip and low back for a few months off and on. Norco from pain management helps. IMAGING: MRI Right Shoulder 1/19/19  IMPRESSION[de-identified]  1. Full-thickness rotator cuff tear involving distal supraspinatus. No muscle  atrophy. 2. Infraspinatus tendinosis. 3. Longitudinal split tear distal subscapularis. 4. Longitudinal split tear of the biceps tendon with medial subluxation of the  component into the subscapularis tear. 5. Regional arthritic findings, notably at the acromioclavicular joint. Past Surgical History:   Procedure Laterality Date    HX COLONOSCOPY  2011    f/u 2021    [facility]    [facility]     Social History     Socioeconomic History    Marital status:      Spouse name: Not on file    Number of children: Not on file    Years of education: Not on file    Highest education level: Not on file   Tobacco Use    Smoking status: Never Smoker    Smokeless tobacco: Never Used   Substance and Sexual Activity    Alcohol use: No    Drug use: No      Current Outpatient Medications   Medication Sig Dispense Refill    pravastatin (PRAVACHOL) 20 mg tablet TAKE 1 TABLET BY MOUTH EVERYDAY AT BEDTIME 90 Tab 2    olmesartan-hydroCHLOROthiazide (BENICAR HCT) 20-12.5 mg per tablet TAKE 1 TABLET BY MOUTH EVERY DAY 90 Tab 1    PARoxetine (PAXIL) 20 mg tablet TAKE 1 TABLET BY MOUTH DAILY. 90 Tab 1    cetirizine (ZYRTEC) 10 mg tablet TAKE 1 TAB BY MOUTH DAILY. 90 Tab 3    HYDROcodone-acetaminophen (VICODIN) 5-300 mg tablet Take  by mouth.  fluticasone (FLONASE) 50 mcg/actuation nasal spray 2 Sprays by Both Nostrils route daily. 1 Bottle 0    latanoprost (XALATAN) 0.005 % ophthalmic solution   6     Past Medical History:   Diagnosis Date    Anxiety and depression     Asthma     Elevated PSA     Gout     Hypertension     Insomnia     Kidney stone     Malaria     Prediabetes     Skin cancer      History reviewed. No pertinent family history. ROS:  + numb right hand, foot. No incont, fever. All other systems reviewed and negative aside from that written in the HPI. Objective:  /87   Pulse 94   Ht 5' 7\" (1.702 m)   Wt 178 lb (80.7 kg)   SpO2 99%   BMI 27.88 kg/m²   GEN:  Appears stated age in NAD. HEAD:  Normocephalic, Atraumatic. NEURO:  Sensation intact light touch B/L upper extremities. right hand dominant. DTRs normal biceps, triceps, patellar and Achilles   M/S:  Shoulder ROM Decreased  right. Spurling's negative bilaterally  right Shoulder:  Empty can negative External rotation negative. Internal rotation negative. [place] negative. SLAP negative. Load and Shift +1 Anterior, 1 Posterior. Strength +5/5 bilaterally upper extremities. Crossover test negative. Negative atrophy bilaterally. Negative TTP at [facility] joint. Apprehension test negative. Wiggins-Jese Test negative. Yergason's test negative. Speed's test negative. Examined seated and supine. Standing flexion test positive left  Stork positive  left  ASIS low left  Iliac crests equal bilaterally Pubes equal bilaterally Medial malleolus low left  Sacral base posterior left  KODI low left  Sphinx test Positive left. TTA at L3, 4, 5 on right worse f;exion Rib(s) no TTP and equal  EXT no Clubbing/cyanosis. no edema. SKIN: Warm, dry w/o rash. HEENT: Conjunctiva/lids WNL. External canals/nares WNL. Tongue midline. PERRL, EOMI. Hearing intact. NECK: Trachea midline. Supple, Full ROM. No thyromegaly. CARDIAC: No edema. LUNGS: Normal effort. ABD: Soft, no masses. No HSM. PSYCH: A+O x3. Appropriate judgment and insight. Assessment/Plan:     ICD-10-CM ICD-9-CM    1. Trigger thumb, left thumb M65.312 727.03    2. Non-traumatic rotator cuff tear, right M75.101 727.61    3. Lumbar strain, initial encounter S39.012A 847.2    4. Pelvic somatic dysfunction M99.05 739.5 [place] OSTEOPATHIC MANIP,3-4 BODY REGN   5. Sacral region somatic dysfunction M99.04 739.4 [place] OSTEOPATHIC MANIP,3-4 BODY REGN   6. Lumbar region somatic dysfunction M99.03 739.3 [place] OSTEOPATHIC MANIP,3-4 BODY REGN       Patient (or guardian if minor) verbalizes understanding of evaluation and plan. Verbal consent obtained. Lumbar, Pelvic and Sacral SD treated with ME. Correction of previous malalignments verified after Tx. Pt tolerated well. Notes improvement of Sx and pain is now rated 0/10. HEP/stretches daily. Discussed stretching/strengthening/posture. Will splint thumb and continue current for right RC tear as great benefit injection prior. Time with Pt 49 minutes, >50% of which was counseling pt regarding Dx and Tx options and coordination of care.

## 2019-07-02 ENCOUNTER — OFFICE VISIT (OUTPATIENT)
Dept: ORTHOPEDIC SURGERY | Age: 79
End: 2019-07-02

## 2019-07-02 VITALS
TEMPERATURE: 97.8 F | WEIGHT: 178.4 LBS | SYSTOLIC BLOOD PRESSURE: 157 MMHG | RESPIRATION RATE: 15 BRPM | HEIGHT: 67 IN | HEART RATE: 86 BPM | DIASTOLIC BLOOD PRESSURE: 103 MMHG | BODY MASS INDEX: 28 KG/M2

## 2019-07-02 DIAGNOSIS — M65.312 TRIGGER THUMB, LEFT THUMB: Primary | ICD-10-CM

## 2019-07-02 RX ORDER — DICLOFENAC SODIUM 10 MG/G
2 GEL TOPICAL
Qty: 100 G | Refills: 2 | Status: SHIPPED | OUTPATIENT
Start: 2019-07-02 | End: 2019-08-13 | Stop reason: SDUPTHER

## 2019-07-02 RX ORDER — ZOLPIDEM TARTRATE 10 MG/1
10 TABLET ORAL
COMMUNITY

## 2019-07-02 NOTE — PROGRESS NOTES
HISTORY OF PRESENT ILLNESS    Wood Pearson is a 70y.o. year old male comes in today to be evaluated and treated for: left thumb    Since last appt has noticed pain left thumb and will lock in morning when waking. Pain level 8/10. Using voltaren gel with benefit. Has been to [facility] for this but appt took so long had to leave that day and needs to reschedule. Past Surgical History:   Procedure Laterality Date    HX COLONOSCOPY  2011    f/u 2021    HX HERNIA REPAIR  2000    [address]    HX SKIN BIOPSY  2013    [address], [place]     Social History     Socioeconomic History    Marital status:      Spouse name: Not on file    Number of children: Not on file    Years of education: Not on file    Highest education level: Not on file   Tobacco Use    Smoking status: Never Smoker    Smokeless tobacco: Never Used   Substance and Sexual Activity    Alcohol use: No    Drug use: No     Current Outpatient Medications   Medication Sig Dispense Refill    zolpidem (AMBIEN) 10 mg tablet Take  by mouth nightly as needed for Sleep.  diclofenac (VOLTAREN) 1 % gel Apply 2 g to affected area every six (6) hours as needed for Pain (left thumb). 100 g 2    olmesartan-hydroCHLOROthiazide (BENICAR HCT) 20-12.5 mg per tablet TAKE 1 TABLET BY MOUTH EVERY DAY 90 Tab 1    PARoxetine (PAXIL) 20 mg tablet TAKE 1 TABLET BY MOUTH DAILY. 90 Tab 1    HYDROcodone-acetaminophen (VICODIN) 5-300 mg tablet Take  by mouth.  pravastatin (PRAVACHOL) 20 mg tablet TAKE 1 TABLET BY MOUTH EVERYDAY AT BEDTIME 90 Tab 2    cetirizine (ZYRTEC) 10 mg tablet TAKE 1 TAB BY MOUTH DAILY. 90 Tab 3    fluticasone (FLONASE) 50 mcg/actuation nasal spray 2 Sprays by Both Nostrils route daily.  1 Bottle 0    latanoprost (XALATAN) 0.005 % ophthalmic solution   6       Past Medical History:   Diagnosis Date    Anxiety and depression     Asthma     Elevated PSA     Gout     Hypertension     Insomnia     Kidney stone     Malaria     Prediabetes     Skin cancer      History reviewed. No pertinent family history. ROS:  No sell, bruise, numb    Objective:  BP (!) 157/103   Pulse 86   Temp 97.8 °F (36.6 °C)   Resp 15   Ht 5' 7\" (1.702 m)   Wt 178 lb 6.4 oz (80.9 kg)   BMI 27.94 kg/m²   GEN:  Appears stated age in NAD. HEAD:  Normocephalic, Atraumatic. NEURO:  Sensation intact light touch upper and lower extremities. Biceps & Triceps reflexes +2/4 bilaterally. right hand dominant. M/S:  left elbow/wrist: Positive TTP volar left thumb MCP, worse with flexion. Negative elodia tenderness. Phalen's negative. Tinel's negative. Strength +5/5 bilateral .  Piano key sign Negative bilateral .  Carpal bone motion normal.  Finklestein's negative  TFCC Load Test negative. BILATERAL neither epicondyle(s) without TTP not changed with wrist extension. negative muscular atrophy. EXT:  no clubbing/cyanosis. no edema. Assessment/Plan:     ICD-10-CM ICD-9-CM    1. Trigger thumb, left thumb M65.312 727.03 diclofenac (VOLTAREN) 1 % gel       Patient (or guardian if minor) verbalizes understanding of evaluation and plan. Will split and use voltaren gel and return 6 weeks. Will consider inject then if indicated.

## 2019-07-02 NOTE — PROGRESS NOTES
Pt reports having locking & having to manipulate thumb in the morning.    Reports being on Vicodin 2x/day  Reports having worn immobilizer at night time, but then stretched out and not as effective

## 2019-07-02 NOTE — PROGRESS NOTES
AVS reviewed: YES  HEP: N/A  Resources Provided: YES splints, tape & splinting instruction  Patient questions/concerns answered: NO. Pt denied questions/concerns  Patient verbalized understanding of treatment plan: YES

## 2019-07-08 DIAGNOSIS — I10 ESSENTIAL HYPERTENSION WITH GOAL BLOOD PRESSURE LESS THAN 140/90: ICD-10-CM

## 2019-07-08 RX ORDER — OLMESARTAN MEDOXOMIL AND HYDROCHLOROTHIAZIDE 20/12.5 20; 12.5 MG/1; MG/1
TABLET ORAL
Qty: 90 TAB | Refills: 1 | Status: SHIPPED | OUTPATIENT
Start: 2019-07-08 | End: 2019-12-27 | Stop reason: SDUPTHER

## 2019-08-13 ENCOUNTER — OFFICE VISIT (OUTPATIENT)
Dept: ORTHOPEDIC SURGERY | Age: 79
End: 2019-08-13

## 2019-08-13 VITALS
SYSTOLIC BLOOD PRESSURE: 161 MMHG | RESPIRATION RATE: 14 BRPM | DIASTOLIC BLOOD PRESSURE: 93 MMHG | TEMPERATURE: 97.7 F | HEART RATE: 44 BPM | BODY MASS INDEX: 28.09 KG/M2 | WEIGHT: 179 LBS | HEIGHT: 67 IN

## 2019-08-13 DIAGNOSIS — M65.312 TRIGGER THUMB, LEFT THUMB: Primary | ICD-10-CM

## 2019-08-13 DIAGNOSIS — M24.849 THUMB LOCKING: ICD-10-CM

## 2019-08-13 RX ORDER — BETAMETHASONE SODIUM PHOSPHATE AND BETAMETHASONE ACETATE 3; 3 MG/ML; MG/ML
3 INJECTION, SUSPENSION INTRA-ARTICULAR; INTRALESIONAL; INTRAMUSCULAR; SOFT TISSUE ONCE
Qty: 1 ML | Refills: 0
Start: 2019-08-13 | End: 2019-08-13

## 2019-08-13 RX ORDER — DICLOFENAC SODIUM 10 MG/G
2 GEL TOPICAL
Qty: 100 G | Refills: 5 | Status: SHIPPED | OUTPATIENT
Start: 2019-08-13 | End: 2021-01-01

## 2019-08-13 NOTE — LETTER
NAME: Tristen Carey : 1940 MRN: [id number] CONSENT FOR TREATMENT/PROCEDURE I authorize Ray Alarcon DO at [facility] to perform the medical treatment and/or procedure(s) described below:  
 
Description of Medical Treatment and/or Procedure(s): (Specify number of treatments or procedures if repeated treatment is recommended) 
 
_____________inject steroid into left trigger thumb_________________________ I understand my provider may be assisted with significant procedural tasks by other qualified medical practitioners, who may perform important parts of the treatment/procedure(s) or assist with the administration of analgesia (pain-relieving medications) as necessary for my treatment/procedure(s). These practitioners will only perform tasks within the scope of their licensure and practice, as determined under [place] law and any other applicable regulation(s). Name and Credentials of Practitioners Who May Assist with My Treatment/Procedure(s):  
 
______________________________________________________________________ I understand that a medical company representative may be present during my treatment/procedure(s) to observe and provide verbal, technical advice to my provider. I consent to the participation of this representative in my treatment/procedure(s). My provider has explained that unforeseen conditions may be identified during the performance of my treatment/procedure(s) that necessitate an extension of the original treatment/procedure(s) or the performance of procedures other than those identified above. I consent to the performance of additional treatment/procedure(s) by my provider and the qualified medical practitioners assisting my provider as deemed necessary by my provider for treatment of my medical condition(s).   
 
I understand that certain complications may arise during the use of analgesia during my treatment/procedure(s) that may include, but are not limited to, decreased/altered awareness, respiratory problems, drug reactions, paralysis, brain damage, or possibly, death. I understand that the analgesia required for the performance of my treatment/procedure(s) involves additional risks beyond those of the treatment/procedure(s) to be performed, and authorize the use of analgesia by my provider as deemed necessary for the control of pain during my treatment/procedure(s). I understand that my provider may need to change the type of analgesia or medications used, possibly without explanation to me, and I consent to any such changes as deemed necessary by my provider for treatment of my medical condition(s). I understand that there are risks of infection and other unexpected complications associated with any medical or surgical treatment/procedure including the treatment/procedure(s) listed above or other treatment/procedure(s) necessitated by my medical condition, and that such complications may occur in the absence of any negligence on the part of my healthcare providers. My provider has explained to my satisfaction my medical condition and the specific treatment/procedure(s) recommended and identified above. I have been given an opportunity to ask and have answered to my satisfaction questions about: (CONTINUED PAGE 2) NAME: Mickey Stone : 1940 MRN: [id number]  The nature and extent of the treatment/procedure(s) to be performed;  The benefit of treatment, and the risks associated with not having the recommended treatment/procedure(s);  The risks and possible complications associated with having the treatment, including those which, even though unlikely to occur, may involve serious consequences;  
 Analgesia and alternative forms of analgesia;  Alternative procedures and methods of treatment, and the risk associated with each;  
  The expected consequences of the treatment/procedure(s) on my future health. I understand that no assurance can be given that the treatment/procedure(s) performed will be a success, and no guarantee or warranty of success for the treatment/procedure(s) has been given to me by my provider. I consent to the disposal by the examining Pathologist of any tissue removed during my treatment/procedure(s) in accordance with the receiving hospitals or laboratorys policy and any associated regulations specific to such disposal.  
 
I DO_____  DO NOT__x__ consent to other health care personnel observing my treatment/procedure(s) for the purpose of medical education or other specified purposes as may be explained by my provider. I DO_______ DO NOT__x__ consent to photography or videotaping of all or any part of my treatment/procedure(s) for medical and/or educational purposes. I understand that my identity will not be revealed in any photographs, videos, or accompanying explanations should these images be used by my healthcare providers. I certify that I have read and fully understand the above Consent for Treatment/Procedure and that all blanks were completed before I signed this consent.  
 
__________[place]_________                      _______________ Print Name of Patient or Legal Representative        Relationship to Patient (if not self) X_______________________________            __________________________ Signature of Patient (or legal representative)  Witness to signature    
 
                       __8/13/2019___/_________AM/PM 
                                                                   Date/Time (If patient is a minor or is unable to sign, complete the following) Patient is a minor, ________ years of age, or is unable to sign due to:______________________ I have explained the nature, purpose and anticipated benefits as well as any possible alternative methods of treatment, the known risks that are involved, and the possibility of complications of the proposed procedure(s) to the patient or patients legal representative. I have provided the patient or legal representative with an opportunity to ask and have answered to their satisfaction any questions about the proposed treatment/procedure(s) and alternative methods of treatment.   
 
Provider Signature:___________________  Date:__8/13/2019__Time:_____________AM/PM

## 2019-08-13 NOTE — PROCEDURES
PROCEDURE NOTE:  Time out: 838am  * Patient was identified by name and date of birth   * Agreement on procedure being performed was verified  * Risks and Benefits explained to the patient  * Procedure site verified and marked as necessary  * Patient was positioned for comfort  * Consent was signed and verified. Risks/benefits including but not limited to bleeding, infection, and scarring discussed and Pt wishes to proceed with procedure. The area was prepped with alcohol. 1/2cc of 6mg/cc celestoneand 1/2cc lidocaine were injected into nodule volar side of left 1st MCP joint. Sterile gauze used to clean the area. Blood loss minimal.  Noticed improvement in pain Sx within 5 minutes (now rated 0/10). Tolerated procedure well. Discussed possible signs/Sx of infxn, and advised to seek care if concerned.

## 2019-08-13 NOTE — PROGRESS NOTES
HISTORY OF PRESENT ILLNESS    Edna Magdaleno is a 70y.o. year old male comes in today to be evaluated and treated for: left trigger thumb    Since last appt has noticed mild improvement with splint but doesn't wear all the time and hoping for injection. Pain level 5/10. Using voltaren gel with mild benefit. Past Surgical History:   Procedure Laterality Date    HX COLONOSCOPY  2011    f/u 2021    HX HERNIA REPAIR  2000    [address]    HX SKIN BIOPSY  2013    [address], [place]     Social History     Socioeconomic History    Marital status:      Spouse name: Not on file    Number of children: Not on file    Years of education: Not on file    Highest education level: Not on file   Tobacco Use    Smoking status: Never Smoker    Smokeless tobacco: Never Used   Substance and Sexual Activity    Alcohol use: No    Drug use: No     Current Outpatient Medications   Medication Sig Dispense Refill    olmesartan-hydroCHLOROthiazide (BENICAR HCT) 20-12.5 mg per tablet TAKE 1 TABLET BY MOUTH EVERY DAY 90 Tab 1    PARoxetine (PAXIL) 20 mg tablet TAKE 1 TABLET BY MOUTH EVERY DAY 90 Tab 0    zolpidem (AMBIEN) 10 mg tablet Take  by mouth nightly as needed for Sleep.  diclofenac (VOLTAREN) 1 % gel Apply 2 g to affected area every six (6) hours as needed for Pain (left thumb). 100 g 2    pravastatin (PRAVACHOL) 20 mg tablet TAKE 1 TABLET BY MOUTH EVERYDAY AT BEDTIME 90 Tab 2    cetirizine (ZYRTEC) 10 mg tablet TAKE 1 TAB BY MOUTH DAILY. 90 Tab 3    HYDROcodone-acetaminophen (VICODIN) 5-300 mg tablet Take  by mouth.  fluticasone (FLONASE) 50 mcg/actuation nasal spray 2 Sprays by Both Nostrils route daily. 1 Bottle 0    latanoprost (XALATAN) 0.005 % ophthalmic solution   6     Past Medical History:   Diagnosis Date    Anxiety and depression     Asthma     Elevated PSA     Gout     Hypertension     Insomnia     Kidney stone     Malaria     Prediabetes     Skin cancer      History reviewed.  No pertinent family history. ROS:  No swell, bruise, numb    Objective:  BP (!) 161/93   Pulse (!) 44   Temp 97.7 °F (36.5 °C)   Resp 14   Ht 5' 7\" (1.702 m)   Wt 179 lb (81.2 kg)   BMI 28.04 kg/m²   GEN:  Appears stated age in NAD. HEAD:  Normocephalic, Atraumatic. NEURO:  Sensation intact light touch upper and lower extremities. Biceps & Triceps reflexes +2/4 bilaterally. right hand dominant. M/S:  left elbow/wrist: Positive TTP volar left thumb MCP, worse with flexion. Negative elodia tenderness. Phalen's negative. Tinel's negative. Strength +5/5 bilateral .  Piano key sign Negative bilateral .  Carpal bone motion normal.  Finklestein's negative  TFCC Load Test negative. BILATERAL neither epicondyle(s) without TTP not changed with wrist extension. negative muscular atrophy. EXT:  no clubbing/cyanosis. no edema. Assessment/Plan:     ICD-10-CM ICD-9-CM    1. Trigger thumb, left thumb M65.312 727.03 diclofenac (VOLTAREN) 1 % gel      VT INJECT TENDON SHEATH/LIGAMENT   2. Thumb locking M24.849 [id number]        Patient (or guardian if minor) verbalizes understanding of evaluation and plan. Will inject today and continue splint at least 4 weeks at night and return 6 weeks.

## 2019-09-05 ENCOUNTER — OFFICE VISIT (OUTPATIENT)
Dept: FAMILY MEDICINE CLINIC | Age: 79
End: 2019-09-05

## 2019-09-05 VITALS
SYSTOLIC BLOOD PRESSURE: 124 MMHG | RESPIRATION RATE: 16 BRPM | TEMPERATURE: 97.8 F | BODY MASS INDEX: 28.35 KG/M2 | HEIGHT: 67 IN | WEIGHT: 180.6 LBS | OXYGEN SATURATION: 93 % | DIASTOLIC BLOOD PRESSURE: 70 MMHG | HEART RATE: 79 BPM

## 2019-09-05 DIAGNOSIS — M25.552 BILATERAL HIP PAIN: ICD-10-CM

## 2019-09-05 DIAGNOSIS — F43.23 ADJUSTMENT DISORDER WITH MIXED ANXIETY AND DEPRESSED MOOD: ICD-10-CM

## 2019-09-05 DIAGNOSIS — G44.319 ACUTE POST-TRAUMATIC HEADACHE, NOT INTRACTABLE: ICD-10-CM

## 2019-09-05 DIAGNOSIS — R41.0 CONFUSION: Primary | ICD-10-CM

## 2019-09-05 DIAGNOSIS — M25.551 BILATERAL HIP PAIN: ICD-10-CM
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
## 2019-09-05 RX ORDER — PAROXETINE HYDROCHLORIDE 20 MG/1
TABLET, FILM COATED ORAL
Qty: 90 TAB | Refills: 0 | Status: CANCELLED | OUTPATIENT
Start: 2019-09-05

## 2019-09-05 NOTE — PROGRESS NOTES
Chief Complaint   Patient presents with    Fall     fall five days ago on Sunday    Blurred Vision     blurred vision since Sunday    Fatigue     x 5 days    Hip Pain     bilat hip pain since falling         1. Have you been to the ER, urgent care clinic since your last visit? Hospitalized since your last visit? No    2. Have you seen or consulted any other health care providers outside of the [address] [place] [address] since your last visit? Include any pap smears or colon screening.  No

## 2019-09-05 NOTE — PATIENT INSTRUCTIONS
Concussion: Care Instructions  Your Care Instructions    A concussion is a kind of injury to the brain. It happens when the head receives a hard blow. The impact can jar or shake the brain against the skull. This interrupts the brain's normal activities. Although you may have cuts or bruises on your head or face, you may have no other visible signs of a brain injury. In most cases, damage to the brain from a concussion can't be seen in tests such as a CT or MRI scan. For a few weeks, you may have low energy, dizziness, trouble sleeping, a headache, ringing in your ears, or nausea. You may also feel anxious, grumpy, or depressed. You may have problems with memory and concentration. These symptoms are common after a concussion. They should slowly improve over time. Sometimes this takes weeks or even months. Someone who lives with you should know how to care for you. Please share this and all information with a caregiver who will be available to help if needed. Follow-up care is a key part of your treatment and safety. Be sure to make and go to all appointments, and call your doctor if you are having problems. It's also a good idea to know your test results and keep a list of the medicines you take. How can you care for yourself at home? Pain control  · Put ice or a cold pack on the part of your head that hurts for 10 to 20 minutes at a time. Put a thin cloth between the ice and your skin. · Be safe with medicines. Read and follow all instructions on the label. ? If the doctor gave you a prescription medicine for pain, take it as prescribed. ? If you are not taking a prescription pain medicine, ask your doctor if you can take an over-the-counter medicine. Recovery  · Follow your doctor's instructions. He or she will tell you if you need someone to watch you closely for the next 24 hours or longer. · Rest is the best way to recover from a concussion.  You need to rest your body and your brain:  ? Get plenty of sleep at night. And take rest breaks during the day. ? Avoid activities that take a lot of physical or mental work. This includes housework, exercise, schoolwork, video games, text messaging, and using the computer. ? You may need to change your school or work schedule while you recover. ? Return to your normal activities slowly. Do not try to do too much at once. · Do not drink alcohol or use illegal drugs. Alcohol and illegal drugs can slow your recovery. And they can increase your risk of a second brain injury. · Avoid activities that could lead to another concussion. Follow your doctor's instructions for a gradual return to activity and sports. · Ask your doctor when it's okay for you to drive a car, ride a bike, or operate machinery. How should you return to activity? Your return to activity can begin after 1 to 2 days of physical and mental rest. After resting, you can gradually increase your activity as long as it does not cause new symptoms or worsen your symptoms. Doctors and concussion specialists suggest steps to follow for returning to sports after a concussion. Use these steps as a guide. You should slowly progress through the following levels of activity:  1. Limited activity. You can take part in daily activities as long as the activity doesn't increase your symptoms or cause new symptoms. 2. Light aerobic activity. This can include walking, swimming, or other exercise at less than 70% of maximum heart rate. No resistance training is included in this step. 3. Sport-specific exercise. This includes running drills or skating drills (depending on the sport), but no head impact. 4. Noncontact training drills. This includes more complex training drills such as passing. The athlete may also begin light resistance training. 5. Full-contact practice. The athlete can participate in normal training. 6. Return to normal game play.  This is the final step and allows the athlete to join in normal game play. Watch and keep track of your progress. It should take at least 6 days for you to go from light activity to normal game play. Make sure that you can stay at each new level of activity for at least 24 hours without symptoms, or as long as your doctor says, before doing more. If one or more symptoms come back, return to a lower level of activity for at least 24 hours. Don't move on until all symptoms are gone. When should you call for help? Call 911 anytime you think you may need emergency care. For example, call if:    · You have a seizure.     · You passed out (lost consciousness).     · You are confused or can't stay awake.    Call your doctor now or seek immediate medical care if:    · You have new or worse vomiting.     · You feel less alert.     · You have new weakness or numbness in any part of your body.    Watch closely for changes in your health, and be sure to contact your doctor if:    · You do not get better as expected.     · You have new symptoms, such as headaches, trouble concentrating, or changes in mood. Where can you learn more? Go to http://felix-shanique.info/. Enter T369 in the search box to learn more about \"Concussion: Care Instructions. \"  Current as of: March 28, 2019  Content Version: 12.1  © 6578-1375 [employer]. Care instructions adapted under license by [employer] (which disclaims liability or warranty for this information). If you have questions about a medical condition or this instruction, always ask your healthcare professional. [place] [address] any warranty or liability for your use of this information.

## 2019-09-05 NOTE — PROGRESS NOTES
Luis Eduardo Gonzales, 70 y.o.,  male    SUBJECTIVE  Fall x 4 days ago    Pt reports accidentally falling back while power washing their home and hitting top/posterior part of head on cement floor. He says  fell on top of him. He denies LOC, bruising. Has noticed mental fogginess, 'feeling out of it' since episode. Reports bilateral hip pain, more so than his usual chronic lumbar arthritis type of pain. He denies N/V, morning headaches, focal neuro deficit. Anxiety/depression- says has transitioned to seeing psychiatrist NP denise MCKEON. Says he is doing well, continues to take paxil and ambien for sleep    He has h/o HTN/HL. 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 100 g 5    olmesartan-hydroCHLOROthiazide (BENICAR HCT) 20-12.5 mg per tablet TAKE 1 TABLET BY MOUTH EVERY DAY 90 Tab 1    PARoxetine (PAXIL) 20 mg tablet TAKE 1 TABLET BY MOUTH EVERY DAY 90 Tab 0    zolpidem (AMBIEN) 10 mg tablet Take  by mouth nightly as needed for Sleep.  pravastatin (PRAVACHOL) 20 mg tablet TAKE 1 TABLET BY MOUTH EVERYDAY AT BEDTIME 90 Tab 2    HYDROcodone-acetaminophen (VICODIN) 5-300 mg tablet Take  by mouth.  fluticasone (FLONASE) 50 mcg/actuation nasal spray 2 Sprays by Both Nostrils route daily.  1 Bottle 0    cetirizine (ZYRTEC) 10 mg tablet TAKE 1 TAB BY MOUTH DAILY. 90 Tab 3    latanoprost (XALATAN) 0.005 % ophthalmic solution   6       Allergies   Allergen Reactions    Fluconazole Hives and Itching    Penicillin G Hives       Past Medical History:   Diagnosis Date    Anxiety and depression     Asthma     Elevated PSA     Gout     Hypertension     Insomnia     Kidney stone     Malaria     Prediabetes     Skin cancer        Social History     Socioeconomic History    Marital status:      Spouse name: Not on file    Number of children: Not on file    Years of education: Not on file    Highest education level: Not on file   Occupational History    Not on file   Social Needs    Financial resource strain: Not on file    Food insecurity:     Worry: Not on file     Inability: Not on file    Transportation needs:     Medical: Not on file     Non-medical: Not on file   Tobacco Use    Smoking status: Never Smoker    Smokeless tobacco: Never Used   Substance and Sexual Activity    Alcohol use: No    Drug use: No    Sexual activity: Not on file   Lifestyle    Physical activity:     Days per week: Not on file     Minutes per session: Not on file    Stress: Not on file   Relationships    Social connections:     Talks on phone: Not on file     Gets together: Not on file     Attends Christianity service: Not on file     Active member of club or organization: Not on file     Attends meetings of clubs or organizations: Not on file     Relationship status: Not on file    Intimate partner violence:     Fear of current or ex partner: Not on file     Emotionally abused: Not on file     Physically abused: Not on file     Forced sexual activity: Not on file   Other Topics Concern    Not on file   Social History Narrative    Not on file       No family history on file.       OBJECTIVE    Physical Exam:     Visit Vitals  /70 (BP 1 Location: Left arm, BP Patient Position: Sitting)   Pulse 79   Temp 97.8 °F (36.6 °C) (Oral)   Resp 16   Ht 5' 7\" (1.702 m)   Wt 180 lb 9.6 oz (81.9 kg)   SpO2 93%   BMI 28.29 kg/m²       General: alert, well-appearing,  in no apparent distress or pain  Head: atraumatic. Non-tender maxillary and frontal sinuses  Eyes: Lids with no discharge, no matting, conjunctivae clear and non injected, full EOMs, PERLLA  Ears: pinna non-tender, external auditory canal patent, TM intact  Mouth/throat:tonsils non enlarged, pharynx non erythematous and no lesion, nasal mucosa normal  Neck: supple, no adenopathy palpated  CVS: normal rate, regular rhythm, distinct S1 and S2  Lungs:clear to ausculation bilaterally, no crackles, wheezing or rhonchi noted  Abdomen: normoactive bowel sounds, soft, non-tender  Neurological: Alert, oriented to person, place and time. able to spell WORLD and days of the week backwards  CN II:visual fields intact  CN III/IV/VI:  Full EOM  CN V: facial sensation normal bilaterally  CN VII: symmetric facial muscle  CN VIII: hearing intact bilaterally  CN IX/X: gag normal  CN XI: sternocleidomastoid strength normal/symmetric  CN XII: tongue midline  Motor exam: 5/5 in bilateral upper and lower extremities with normal tone and bulk and no drift. Extremities: no edema, no cyanosis, MSK grossly normal  Skin: warm, no lesions, rashes noted  Psych:  mood and affect normal        ASSESSMENT/PLAN  Diagnoses and all orders for this visit:    1. Confusion  Concern for post concussive syndrome  Benign neuro exam  R/o intracranial pathology  H/o etoh abuse  -     ETHYL ALCOHOL; Future  -     METABOLIC PANEL, COMPREHENSIVE; Future  -     CBC WITH AUTOMATED DIFF; Future  -     CT HEAD WO CONT; Future    2. Adjustment disorder with mixed anxiety and depressed mood  Stable per pt   Following CPA    3. Acute post-traumatic headache, not intractable  -     ETHYL ALCOHOL; Future  -     METABOLIC PANEL, COMPREHENSIVE; Future  -     CBC WITH AUTOMATED DIFF; Future  -     CT HEAD WO CONT; Future    4.  Fall, initial encounter  Sounds like a mechanical fall    5. Bilateral hip pain  -     XR HIP LT W OR WO PELV 2-3 VWS; Future  -     XR HIP RT W OR WO PELV 2-3 VWS; Future    Follow-up and Dispositions    · Return in about 2 weeks (around 9/19/2019), or if symptoms worsen or fail to improve, for routine chronic illness care. Patient understands plan of care. Patient has provided input and agrees with goals.

## 2019-09-10 ENCOUNTER — HOSPITAL ENCOUNTER (OUTPATIENT)
Dept: LAB | Age: 79
Discharge: HOME | End: 2019-09-10
Payer: MEDICARE

## 2019-09-10 LAB
ALBUMIN SERPL-MCNC: 3.7 G/DL (ref 3.4–5)
ALBUMIN/GLOB SERPL: 1.1 {RATIO} (ref 0.8–1.7)
ALP SERPL-CCNC: 53 U/L (ref 45–117)
ALT SERPL-CCNC: 20 U/L (ref 16–61)
ANION GAP SERPL CALC-SCNC: 4 MMOL/L (ref 3–18)
AST SERPL-CCNC: 10 U/L (ref 10–38)
BILIRUB SERPL-MCNC: 0.2 MG/DL (ref 0.2–1)
BUN SERPL-MCNC: 21 MG/DL (ref 7–18)
BUN/CREAT SERPL: 18 (ref 12–20)
CALCIUM SERPL-MCNC: 9.3 MG/DL (ref 8.5–10.1)
CHLORIDE SERPL-SCNC: 104 MMOL/L (ref 100–111)
CO2 SERPL-SCNC: 32 MMOL/L (ref 21–32)
CREAT SERPL-MCNC: 1.18 MG/DL (ref 0.6–1.3)
ERYTHROCYTE [SEDIMENTATION RATE] IN BLOOD: 21 MM/HR (ref 0–20)
GLOBULIN SER CALC-MCNC: 3.3 G/DL (ref 2–4)
GLUCOSE SERPL-MCNC: 111 MG/DL (ref 74–99)
POTASSIUM SERPL-SCNC: 4.1 MMOL/L (ref 3.5–5.5)
PROT SERPL-MCNC: 7 G/DL (ref 6.4–8.2)
PSA SERPL-MCNC: 4.9 NG/ML (ref 0–4)
SODIUM SERPL-SCNC: 140 MMOL/L (ref 136–145)

## 2019-09-10 PROCEDURE — 85652 RBC SED RATE AUTOMATED: CPT

## 2019-09-10 PROCEDURE — 36415 COLL VENOUS BLD VENIPUNCTURE: CPT

## 2019-09-10 PROCEDURE — 80053 COMPREHEN METABOLIC PANEL: CPT

## 2019-09-10 PROCEDURE — 84153 ASSAY OF PSA TOTAL: CPT

## 2019-09-11 ENCOUNTER — HOSPITAL ENCOUNTER (OUTPATIENT)
Dept: CT IMAGING | Age: 79
Discharge: HOME | End: 2019-09-11
Attending: FAMILY MEDICINE
Payer: MEDICARE

## 2019-09-11 ENCOUNTER — HOSPITAL ENCOUNTER (OUTPATIENT)
Dept: CT IMAGING | Age: 79
Discharge: HOME | End: 2019-09-11
Attending: ORTHOPAEDIC SURGERY
Payer: MEDICARE

## 2019-09-11 DIAGNOSIS — G44.319 ACUTE POST-TRAUMATIC HEADACHE, NOT INTRACTABLE: ICD-10-CM

## 2019-09-11 DIAGNOSIS — R41.0 CONFUSION: ICD-10-CM

## 2019-09-11 DIAGNOSIS — R19.03 ABDOMINAL SWELLING, RIGHT LOWER QUADRANT: ICD-10-CM

## 2019-09-11 DIAGNOSIS — M89.9 LESION OF PELVIC BONE: ICD-10-CM

## 2019-09-11 PROCEDURE — 72192 CT PELVIS W/O DYE: CPT

## 2019-09-11 PROCEDURE — 70450 CT HEAD/BRAIN W/O DYE: CPT

## 2019-09-25 ENCOUNTER — OFFICE VISIT (OUTPATIENT)
Dept: FAMILY MEDICINE CLINIC | Age: 79
End: 2019-09-25

## 2019-09-25 VITALS
TEMPERATURE: 99 F | SYSTOLIC BLOOD PRESSURE: 120 MMHG | RESPIRATION RATE: 16 BRPM | WEIGHT: 178 LBS | DIASTOLIC BLOOD PRESSURE: 68 MMHG | OXYGEN SATURATION: 98 % | HEART RATE: 70 BPM | BODY MASS INDEX: 27.94 KG/M2 | HEIGHT: 67 IN

## 2019-09-25 DIAGNOSIS — I10 ESSENTIAL HYPERTENSION: Primary | ICD-10-CM

## 2019-09-25 DIAGNOSIS — R73.03 PREDIABETES: ICD-10-CM

## 2019-09-25 DIAGNOSIS — J30.9 ALLERGIC RHINITIS, UNSPECIFIED SEASONALITY, UNSPECIFIED TRIGGER: ICD-10-CM

## 2019-09-25 DIAGNOSIS — R97.20 ELEVATED PSA: ICD-10-CM

## 2019-09-25 DIAGNOSIS — Z23 ENCOUNTER FOR IMMUNIZATION: ICD-10-CM

## 2019-09-25 DIAGNOSIS — I67.89 CEREBRAL MICROVASCULAR DISEASE: ICD-10-CM

## 2019-09-25 DIAGNOSIS — G47.9 SLEEPING DIFFICULTY: ICD-10-CM

## 2019-09-25 DIAGNOSIS — F33.9 RECURRENT DEPRESSION (HCC): ICD-10-CM

## 2019-09-25 DIAGNOSIS — E78.00 PURE HYPERCHOLESTEROLEMIA: ICD-10-CM

## 2019-09-25 DIAGNOSIS — M10.9 GOUT, UNSPECIFIED CAUSE, UNSPECIFIED CHRONICITY, UNSPECIFIED SITE: ICD-10-CM

## 2019-09-25 NOTE — PROGRESS NOTES
Fluad 0.5 ml given IM in left deltoid. Lot # [id number], exp date 06/30/2020. Patient tolerated injection well. No adverse reaction noted.

## 2019-09-25 NOTE — PROGRESS NOTES
1. Have you been to the ER, urgent care clinic since your last visit? Hospitalized since your last visit? No    2. Have you seen or consulted any other health care providers outside of the [address] [place] [address] since your last visit? Include any pap smears or colon screening.  Yes Reason for visit: hip pain dr. Hai Li

## 2019-09-25 NOTE — PATIENT INSTRUCTIONS
DASH Diet: Care Instructions  Your Care Instructions    The DASH diet is an eating plan that can help lower your blood pressure. DASH stands for Dietary Approaches to Stop Hypertension. Hypertension is high blood pressure. The DASH diet focuses on eating foods that are high in calcium, potassium, and magnesium. These nutrients can lower blood pressure. The foods that are highest in these nutrients are fruits, vegetables, low-fat dairy products, nuts, seeds, and legumes. But taking calcium, potassium, and magnesium supplements instead of eating foods that are high in those nutrients does not have the same effect. The DASH diet also includes whole grains, fish, and poultry. The DASH diet is one of several lifestyle changes your doctor may recommend to lower your high blood pressure. Your doctor may also want you to decrease the amount of sodium in your diet. Lowering sodium while following the DASH diet can lower blood pressure even further than just the DASH diet alone. Follow-up care is a key part of your treatment and safety. Be sure to make and go to all appointments, and call your doctor if you are having problems. It's also a good idea to know your test results and keep a list of the medicines you take. How can you care for yourself at home? Following the DASH diet  · Eat 4 to 5 servings of fruit each day. A serving is 1 medium-sized piece of fruit, ½ cup chopped or canned fruit, 1/4 cup dried fruit, or 4 ounces (½ cup) of fruit juice. Choose fruit more often than fruit juice. · Eat 4 to 5 servings of vegetables each day. A serving is 1 cup of lettuce or raw leafy vegetables, ½ cup of chopped or cooked vegetables, or 4 ounces (½ cup) of vegetable juice. Choose vegetables more often than vegetable juice. · Get 2 to 3 servings of low-fat and fat-free dairy each day. A serving is 8 ounces of milk, 1 cup of yogurt, or 1 ½ ounces of cheese. · Eat 6 to 8 servings of grains each day.  A serving is 1 slice of bread, 1 ounce of dry cereal, or ½ cup of cooked rice, pasta, or cooked cereal. Try to choose whole-grain products as much as possible. · Limit lean meat, poultry, and fish to 2 servings each day. A serving is 3 ounces, about the size of a deck of cards. · Eat 4 to 5 servings of nuts, seeds, and legumes (cooked dried beans, lentils, and split peas) each week. A serving is 1/3 cup of nuts, 2 tablespoons of seeds, or ½ cup of cooked beans or peas. · Limit fats and oils to 2 to 3 servings each day. A serving is 1 teaspoon of vegetable oil or 2 tablespoons of salad dressing. · Limit sweets and added sugars to 5 servings or less a week. A serving is 1 tablespoon jelly or jam, ½ cup sorbet, or 1 cup of lemonade. · Eat less than 2,300 milligrams (mg) of sodium a day. If you limit your sodium to 1,500 mg a day, you can lower your blood pressure even more. Tips for success  · Start small. Do not try to make dramatic changes to your diet all at once. You might feel that you are missing out on your favorite foods and then be more likely to not follow the plan. Make small changes, and stick with them. Once those changes become habit, add a few more changes. · Try some of the following:  ? Make it a goal to eat a fruit or vegetable at every meal and at snacks. This will make it easy to get the recommended amount of fruits and vegetables each day. ? Try yogurt topped with fruit and nuts for a snack or healthy dessert. ? Add lettuce, tomato, cucumber, and onion to sandwiches. ? Combine a ready-made pizza crust with low-fat mozzarella cheese and lots of vegetable toppings. Try using tomatoes, squash, spinach, broccoli, carrots, cauliflower, and onions. ? Have a variety of cut-up vegetables with a low-fat dip as an appetizer instead of chips and dip. ? Sprinkle sunflower seeds or chopped almonds over salads. Or try adding chopped walnuts or almonds to cooked vegetables.   ? Try some vegetarian meals using beans and peas. Add garbanzo or kidney beans to salads. Make burritos and tacos with mashed irving beans or black beans. Where can you learn more? Go to http://felix-shanique.info/. Enter F569 in the search box to learn more about \"DASH Diet: Care Instructions. \"  Current as of: April 9, 2019  Content Version: 12.2  © 3411-2280 [employer], [employer]. Care instructions adapted under license by [employer] (which disclaims liability or warranty for this information). If you have questions about a medical condition or this instruction, always ask your healthcare professional. [address] any warranty or liability for your use of this information.

## 2019-09-25 NOTE — PROGRESS NOTES
Minesh Pfeiffer, 70 y.o.,  male    SUBJECTIVE  Ff-up    Post traumatic headache/fogginess has resolved. He feels back to his usual self, CT scan showing no acute intracranial pathology, there is note of chronic ischemic changes. He reports previous intolerance to ASA. Dr. Henna Prieto has evaluated him for hip pain, found to have enlarged prostate on CT and PSA noted to be 4.9. He does have symptoms of urinary frequency, dribbling and weak stream for years now. Insomnia/depression- reports to be doing well, continues to follow psychiatrist. On paxil/ambien    HTN- taking medication without problems. Prediabetes and HL- on pravachol. 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  pravastatin (PRAVACHOL) 20 mg tablet TAKE 1 TABLET BY MOUTH EVERYDAY AT BEDTIME 90 Tab 2    HYDROcodone-acetaminophen (VICODIN) 5-300 mg tablet Take  by mouth.       latanoprost (XALATAN) 0.005 % ophthalmic solution   6    diclofenac (VOLTAREN) 1 % gel Apply 2 g to affected area every six (6) hours as needed for Pain (left thumb). 100 g 5    cetirizine (ZYRTEC) 10 mg tablet TAKE 1 TAB BY MOUTH DAILY. 90 Tab 3    fluticasone (FLONASE) 50 mcg/actuation nasal spray 2 Sprays by Both Nostrils route daily. 1 Bottle 0       Allergies   Allergen Reactions    Fluconazole Hives and Itching    Penicillin G Hives       Past Medical History:   Diagnosis Date    Anxiety and depression     Asthma     Elevated PSA     Gout     Hypertension     Insomnia     Kidney stone     Malaria     Prediabetes     Skin cancer        Social History     Socioeconomic History    Marital status:      Spouse name: Not on file    Number of children: Not on file    Years of education: Not on file    Highest education level: Not on file   Occupational History    Not on file   Social Needs    Financial resource strain: Not on file    Food insecurity:     Worry: Not on file     Inability: Not on file    Transportation needs:     Medical: Not on file     Non-medical: Not on file   Tobacco Use    Smoking status: Never Smoker    Smokeless tobacco: Never Used   Substance and Sexual Activity    Alcohol use: No    Drug use: No    Sexual activity: Not on file   Lifestyle    Physical activity:     Days per week: Not on file     Minutes per session: Not on file    Stress: Not on file   Relationships    Social connections:     Talks on phone: Not on file     Gets together: Not on file     Attends Pentecostal service: Not on file     Active member of club or organization: Not on file     Attends meetings of clubs or organizations: Not on file     Relationship status: Not on file    Intimate partner violence:     Fear of current or ex partner: Not on file     Emotionally abused: Not on file     Physically abused: Not on file     Forced sexual activity: Not on file   Other Topics Concern    Not on file   Social History Narrative    Not on file       History reviewed. No pertinent family history.       OBJECTIVE    Physical Exam: Visit Vitals  /68 (BP 1 Location: Left arm, BP Patient Position: Sitting)   Pulse 70   Temp 99 °F (37.2 °C) (Oral)   Resp 16   Ht 5' 7\" (1.702 m)   Wt 178 lb (80.7 kg)   SpO2 98%   BMI 27.88 kg/m²       General: alert, well-appearing, in no apparent distress or pain  Head: atraumatic. Non-tender maxillary and frontal sinuses  CVS: normal rate, regular rhythm, distinct S1 and S2  Lungs:clear to ausculation bilaterally, no crackles, wheezing or rhonchi noted  Abdomen: soft, NT, ND  Extremities: no edema  Psych:  mood and affect normal      ASSESSMENT/PLAN  Diagnoses and all orders for this visit:    1. Essential hypertension  Controlled,  cont benicar hct  Dash diet, monitoring  CMP/lipid panel/a1c/uric acid soon    2. Prediabetes  Persistent, tlcs    3. Pure hypercholesterolemia  Calc cv risk 29 % vs 18 %   On  pravachol 20 mg qhs  Will monitor    4. Recurrent depression ([address]  Fair control,  On paxil, ambien  Following psychiatry    5. Allergic rhinitis, unspecified seasonality, unspecified trigger  controlled, cont zyrtec and flonase    6. Sleeping difficulty  On ambien, following psychiatry    7. Elevated PSA  Refer to Urology    8. Encounter for vaccine  HD flu vaccine given today    9. Gout, unspecified cause, unspecified chronicity, unspecified site  Fair control, cont prn colchicine  chec uric acid elvel    10. Cerebral microvascular disease  Optimize med mgt  Offered ASA addition, he declines  Cont statin, BP control  Ff-up in 3 months or sooner prn, plan on MWV then    Patient understands plan of care. Patient has provided input and agrees with goals.

## 2019-12-27 ENCOUNTER — OFFICE VISIT (OUTPATIENT)
Dept: FAMILY MEDICINE CLINIC | Age: 79
End: 2019-12-27

## 2019-12-27 VITALS
DIASTOLIC BLOOD PRESSURE: 78 MMHG | BODY MASS INDEX: 28.41 KG/M2 | TEMPERATURE: 98.5 F | HEIGHT: 67 IN | SYSTOLIC BLOOD PRESSURE: 136 MMHG | OXYGEN SATURATION: 98 % | RESPIRATION RATE: 16 BRPM | HEART RATE: 84 BPM | WEIGHT: 181 LBS

## 2019-12-27 DIAGNOSIS — E78.00 PURE HYPERCHOLESTEROLEMIA: ICD-10-CM

## 2019-12-27 DIAGNOSIS — R73.03 PREDIABETES: ICD-10-CM

## 2019-12-27 DIAGNOSIS — I67.89 CEREBRAL MICROVASCULAR DISEASE: ICD-10-CM

## 2019-12-27 DIAGNOSIS — J30.9 ALLERGIC RHINITIS, UNSPECIFIED SEASONALITY, UNSPECIFIED TRIGGER: ICD-10-CM

## 2019-12-27 DIAGNOSIS — I10 ESSENTIAL HYPERTENSION: Primary | ICD-10-CM

## 2019-12-27 DIAGNOSIS — I10 ESSENTIAL HYPERTENSION WITH GOAL BLOOD PRESSURE LESS THAN 140/90: ICD-10-CM

## 2019-12-27 DIAGNOSIS — M10.9 GOUT, UNSPECIFIED CAUSE, UNSPECIFIED CHRONICITY, UNSPECIFIED SITE: ICD-10-CM

## 2019-12-27 DIAGNOSIS — R97.20 ELEVATED PSA: ICD-10-CM

## 2019-12-27 DIAGNOSIS — G47.9 SLEEPING DIFFICULTY: ICD-10-CM

## 2019-12-27 DIAGNOSIS — F33.9 RECURRENT DEPRESSION (HCC): ICD-10-CM

## 2019-12-27 RX ORDER — PRAVASTATIN SODIUM 20 MG/1
TABLET ORAL
Qty: 90 TAB | Refills: 2 | Status: SHIPPED | OUTPATIENT
Start: 2019-12-27 | End: 2020-04-28 | Stop reason: SDUPTHER

## 2019-12-27 RX ORDER — PAROXETINE HYDROCHLORIDE 20 MG/1
TABLET, FILM COATED ORAL
Qty: 90 TAB | Refills: 0 | Status: CANCELLED | OUTPATIENT
Start: 2019-12-27

## 2019-12-27 RX ORDER — OLMESARTAN MEDOXOMIL AND HYDROCHLOROTHIAZIDE 20/12.5 20; 12.5 MG/1; MG/1
TABLET ORAL
Qty: 90 TAB | Refills: 1 | Status: SHIPPED | OUTPATIENT
Start: 2019-12-27 | End: 2020-01-03

## 2019-12-27 NOTE — PATIENT INSTRUCTIONS
DASH Diet: Care Instructions Your Care Instructions The DASH diet is an eating plan that can help lower your blood pressure. DASH stands for Dietary Approaches to Stop Hypertension. Hypertension is high blood pressure. The DASH diet focuses on eating foods that are high in calcium, potassium, and magnesium. These nutrients can lower blood pressure. The foods that are highest in these nutrients are fruits, vegetables, low-fat dairy products, nuts, seeds, and legumes. But taking calcium, potassium, and magnesium supplements instead of eating foods that are high in those nutrients does not have the same effect. The DASH diet also includes whole grains, fish, and poultry. The DASH diet is one of several lifestyle changes your doctor may recommend to lower your high blood pressure. Your doctor may also want you to decrease the amount of sodium in your diet. Lowering sodium while following the DASH diet can lower blood pressure even further than just the DASH diet alone. Follow-up care is a key part of your treatment and safety. Be sure to make and go to all appointments, and call your doctor if you are having problems. It's also a good idea to know your test results and keep a list of the medicines you take. How can you care for yourself at home? Following the DASH diet · Eat 4 to 5 servings of fruit each day. A serving is 1 medium-sized piece of fruit, ½ cup chopped or canned fruit, 1/4 cup dried fruit, or 4 ounces (½ cup) of fruit juice. Choose fruit more often than fruit juice. · Eat 4 to 5 servings of vegetables each day. A serving is 1 cup of lettuce or raw leafy vegetables, ½ cup of chopped or cooked vegetables, or 4 ounces (½ cup) of vegetable juice. Choose vegetables more often than vegetable juice. · Get 2 to 3 servings of low-fat and fat-free dairy each day. A serving is 8 ounces of milk, 1 cup of yogurt, or 1 ½ ounces of cheese. · Eat 6 to 8 servings of grains each day. A serving is 1 slice of bread, 1 ounce of dry cereal, or ½ cup of cooked rice, pasta, or cooked cereal. Try to choose whole-grain products as much as possible. · Limit lean meat, poultry, and fish to 2 servings each day. A serving is 3 ounces, about the size of a deck of cards. · Eat 4 to 5 servings of nuts, seeds, and legumes (cooked dried beans, lentils, and split peas) each week. A serving is 1/3 cup of nuts, 2 tablespoons of seeds, or ½ cup of cooked beans or peas. · Limit fats and oils to 2 to 3 servings each day. A serving is 1 teaspoon of vegetable oil or 2 tablespoons of salad dressing. · Limit sweets and added sugars to 5 servings or less a week. A serving is 1 tablespoon jelly or jam, ½ cup sorbet, or 1 cup of lemonade. · Eat less than 2,300 milligrams (mg) of sodium a day. If you limit your sodium to 1,500 mg a day, you can lower your blood pressure even more. Tips for success · Start small. Do not try to make dramatic changes to your diet all at once. You might feel that you are missing out on your favorite foods and then be more likely to not follow the plan. Make small changes, and stick with them. Once those changes become habit, add a few more changes. · Try some of the following: ? Make it a goal to eat a fruit or vegetable at every meal and at snacks. This will make it easy to get the recommended amount of fruits and vegetables each day. ? Try yogurt topped with fruit and nuts for a snack or healthy dessert. ? Add lettuce, tomato, cucumber, and onion to sandwiches. ? Combine a ready-made pizza crust with low-fat mozzarella cheese and lots of vegetable toppings. Try using tomatoes, squash, spinach, broccoli, carrots, cauliflower, and onions. ? Have a variety of cut-up vegetables with a low-fat dip as an appetizer instead of chips and dip. ? Sprinkle sunflower seeds or chopped almonds over salads.  Or try adding chopped walnuts or almonds to cooked vegetables. ? Try some vegetarian meals using beans and peas. Add garbanzo or kidney beans to salads. Make burritos and tacos with mashed irving beans or black beans. Where can you learn more? Go to http://felix-shanique.info/. Enter X515 in the search box to learn more about \"DASH Diet: Care Instructions. \" Current as of: April 9, 2019 Content Version: 12.2 © 3099-8255 [employer]. Care instructions adapted under license by [employer] (which disclaims liability or warranty for this information). If you have questions about a medical condition or this instruction, always ask your healthcare professional. [address] any warranty or liability for your use of this information.

## 2019-12-27 NOTE — PROGRESS NOTES
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 Have you been to the ER, urgent care clinic since your last visit? Hospitalized since your last visit? No 
 
2. Have you seen or consulted any other health care providers outside of the [address] [place] [address] since your last visit? Include any pap smears or colon screening. Dr. Theresa Saenz- Monthly

## 2019-12-27 NOTE — PROGRESS NOTES
Jj Blake, 70 y.o.,  male SUBJECTIVE Ff-up Dr. Keo Myers has evaluated him for hip pain, found to have enlarged prostate on CT and PSA noted to be 4.9 (9/2019). He does have symptoms of urinary frequency, dribbling and weak stream for years now. He was referred to Dr. Ines Lindsay on last visit and did not pursue consult, I advised he be evaluated for this and that dr. Ines Lindsay has retired. Will refer to urology of [facility]. Insomnia/depression- reports some increase in depression with caring for a friend who is ill. He continues to follow psychiatrist. On paxil/ambien HTN- taking medication without problems. Prediabetes and HL- on pravachol. He has forgotten to get labs drawn prior to this visit. Reordered/reminded Gout- reports about 2 x a year flares, usually shellfish triggers ROS: 
See HPI, all others negative Patient Active Problem List  
Diagnosis Code  Gross hematuria R31.0  Elevated PSA R97.20  Prediabetes R73.03  
 Insomnia G47.00  Joint pain M25.50  Anxiety F41.9  Anxiety and depression F41.9, F32.9  Hypertension I10  
 Kidney stone N20.0  Malaria B54  
 Skin cancer C44.90  Gout M10.9  Essential hypertension I10  Pure hypercholesterolemia E78.00  Sleeping difficulty G47.9  Mild episode of recurrent major depressive disorder ([facility] [address] F33.0  Cerebral microvascular disease I67.9 Current Outpatient Medications Medication Sig Dispense Refill  pravastatin (PRAVACHOL) 20 mg tablet TAKE 1 TABLET BY MOUTH EVERYDAY AT BEDTIME 90 Tab 2  
 olmesartan-hydroCHLOROthiazide (BENICAR HCT) 20-12.5 mg per tablet TAKE 1 TABLET BY MOUTH EVERY DAY 90 Tab 1  
 diclofenac (VOLTAREN) 1 % gel Apply 2 g to affected area every six (6) hours as needed for Pain (left thumb). 100 g 5  PARoxetine (PAXIL) 20 mg tablet TAKE 1 TABLET BY MOUTH EVERY DAY 90 Tab 0  
 zolpidem (AMBIEN) 10 mg tablet Take  by mouth nightly as needed for Sleep.  cetirizine (ZYRTEC) 10 mg tablet TAKE 1 TAB BY MOUTH DAILY. 90 Tab 3  
 HYDROcodone-acetaminophen (VICODIN) 5-300 mg tablet Take  by mouth.  fluticasone (FLONASE) 50 mcg/actuation nasal spray 2 Sprays by Both Nostrils route daily. 1 Bottle 0  
 latanoprost (XALATAN) 0.005 % ophthalmic solution   6 Allergies Allergen Reactions  Fluconazole Hives and Itching  Penicillin G Hives Past Medical History:  
Diagnosis Date  Anxiety and depression  Asthma  Elevated PSA  Gout  Hypertension  Insomnia  Kidney stone  Malaria  Prediabetes  Skin cancer Social History Socioeconomic History  Marital status:  Spouse name: Not on file  Number of children: Not on file  Years of education: Not on file  Highest education level: Not on file Occupational History  Not on file Social Needs  Financial resource strain: Not on file  Food insecurity:  
  Worry: Not on file Inability: Not on file  Transportation needs:  
  Medical: Not on file Non-medical: Not on file Tobacco Use  Smoking status: Never Smoker  Smokeless tobacco: Never Used Substance and Sexual Activity  Alcohol use: No  
 Drug use: No  
 Sexual activity: Not on file Lifestyle  Physical activity:  
  Days per week: Not on file Minutes per session: Not on file  Stress: Not on file Relationships  Social connections:  
  Talks on phone: Not on file Gets together: Not on file Attends Yazidi service: Not on file Active member of club or organization: Not on file Attends meetings of clubs or organizations: Not on file Relationship status: Not on file  Intimate partner violence:  
  Fear of current or ex partner: Not on file Emotionally abused: Not on file Physically abused: Not on file Forced sexual activity: Not on file Other Topics Concern  Not on file Social History Narrative  Not on file No family history on file. OBJECTIVE Physical Exam:  
 
Visit Vitals /78 (BP 1 Location: Left arm, BP Patient Position: Sitting) Pulse 84 Temp 98.5 °F (36.9 °C) (Oral) Resp 16 Ht 5' 7\" (1.702 m) Wt 181 lb (82.1 kg) SpO2 98% BMI 28.35 kg/m² General: alert, well-appearing, in no apparent distress or pain Head: atraumatic. Non-tender maxillary and frontal sinuses CVS: normal rate, regular rhythm, distinct S1 and S2 Lungs:clear to ausculation bilaterally, no crackles, wheezing or rhonchi noted Abdomen: soft, NT, ND Extremities: no edema Psych:  mood and affect normal 
 
 
ASSESSMENT/PLAN Diagnoses and all orders for this visit: 
 
 Essential hypertension Controlled,  cont benicar hct Dash diet, monitoring CMP/lipid panel/a1c/uric acid soon Prediabetes Persistent, tlcs Pure hypercholesterolemia Calc cv risk 29 % vs 18 % On  pravachol 20 mg qhs Will monitor Recurrent depression ([facility] [address] Fair control, On [place] Following psychiatry Allergic rhinitis, unspecified seasonality, unspecified trigger 
controlled, cont zyrtec and flonase Sleeping difficulty On ambien, following psychiatry Elevated PSA Refer to [facility] [place] Gout, unspecified cause, unspecified chronicity, unspecified site Fair control, cont prn colchicine 
chec uric acid elvel Cerebral microvascular disease Optimize med [employer] Offered ASA addition, he declines Cont statin, BP control Ff-up in 4 months or sooner prn, plan on MWV then Patient understands plan of care. Patient has provided input and agrees with goals.

## 2020-01-03 DIAGNOSIS — I10 ESSENTIAL HYPERTENSION WITH GOAL BLOOD PRESSURE LESS THAN 140/90: ICD-10-CM

## 2020-01-03 RX ORDER — OLMESARTAN MEDOXOMIL AND HYDROCHLOROTHIAZIDE 20/12.5 20; 12.5 MG/1; MG/1
TABLET ORAL
Qty: 90 TAB | Refills: 1 | Status: SHIPPED | OUTPATIENT
Start: 2020-01-03 | End: 2020-04-28 | Stop reason: SDUPTHER

## 2020-04-28 ENCOUNTER — VIRTUAL VISIT (OUTPATIENT)
Dept: FAMILY MEDICINE CLINIC | Age: 80
End: 2020-04-28

## 2020-04-28 DIAGNOSIS — Z71.89 ADVANCE DIRECTIVE DISCUSSED WITH PATIENT: ICD-10-CM

## 2020-04-28 DIAGNOSIS — I10 ESSENTIAL HYPERTENSION WITH GOAL BLOOD PRESSURE LESS THAN 140/90: ICD-10-CM

## 2020-04-28 DIAGNOSIS — R73.03 PREDIABETES: Primary | ICD-10-CM

## 2020-04-28 DIAGNOSIS — E78.00 PURE HYPERCHOLESTEROLEMIA: ICD-10-CM

## 2020-04-28 DIAGNOSIS — J31.0 CHRONIC RHINITIS: ICD-10-CM

## 2020-04-28 DIAGNOSIS — Z00.00 MEDICARE ANNUAL WELLNESS VISIT, SUBSEQUENT: ICD-10-CM

## 2020-04-28 DIAGNOSIS — G47.9 SLEEPING DIFFICULTY: ICD-10-CM

## 2020-04-28 DIAGNOSIS — I67.89 CEREBRAL MICROVASCULAR DISEASE: ICD-10-CM

## 2020-04-28 DIAGNOSIS — M10.9 GOUT, UNSPECIFIED CAUSE, UNSPECIFIED CHRONICITY, UNSPECIFIED SITE: ICD-10-CM

## 2020-04-28 RX ORDER — PRAVASTATIN SODIUM 20 MG/1
TABLET ORAL
Qty: 90 TAB | Refills: 2 | Status: SHIPPED | OUTPATIENT
Start: 2020-04-28 | End: 2020-06-23 | Stop reason: ALTCHOICE

## 2020-04-28 RX ORDER — OLMESARTAN MEDOXOMIL AND HYDROCHLOROTHIAZIDE 20/12.5 20; 12.5 MG/1; MG/1
1 TABLET ORAL DAILY
Qty: 90 TAB | Refills: 1 | Status: SHIPPED | OUTPATIENT
Start: 2020-04-28 | End: 2020-12-18 | Stop reason: SDUPTHER

## 2020-04-28 RX ORDER — FLUTICASONE PROPIONATE 50 MCG
2 SPRAY, SUSPENSION (ML) NASAL DAILY
Qty: 3 BOTTLE | Refills: 1 | Status: SHIPPED | OUTPATIENT
Start: 2020-04-28

## 2020-04-28 NOTE — PROGRESS NOTES
Ozzie Zuñiga is a 70 y.o. male who was seen by synchronous (real-time) audio-video technology on 4/28/2020. Consent: Ozzie Zuñiga, who was seen by synchronous (real-time) audio-video technology, and/or his healthcare decision maker, is aware that this patient-initiated, Telehealth encounter on 4/28/2020 is a billable service, with coverage as determined by his insurance carrier. He is aware that he may receive a bill and has provided verbal consent to proceed: Yes. Subjective:  
Ozzie Zuñiga is a 70 y.o. male who was seen for No chief complaint on file. Ff-up 
 no new concerns 
 
 HTN- taking medication without problems.  
  
Prediabetes and HL- on pravachol.  
  
Gout- no recent attacks, infrequent, usually shellfish triggers 
  Insomnia/depression- reports to be doing well. He continues to follow psychiatrist. On paxil/ambien 
  
Elevated PSA- following urology, started on flomax Prior to Admission medications Medication Sig Start Date End Date Taking? Authorizing Provider  
olmesartan-hydroCHLOROthiazide (BENICAR HCT) 20-12.5 mg per tablet Take 1 Tab by mouth daily. 4/28/20  Yes Danny Jimenez MD  
fluticasone propionate (FLONASE) 50 mcg/actuation nasal spray 2 Sprays by Both Nostrils route daily. 4/28/20  Yes Danny Jimenez MD  
pravastatin (PRAVACHOL) 20 mg tablet TAKE 1 TABLET BY MOUTH EVERYDAY AT BEDTIME 4/28/20  Yes Terrence Dasilva MD  
tamsulosin (FLOMAX) 0.4 mg capsule Take 1 Cap by mouth daily (after dinner). 3/4/20  Yes Rosana Servin MD  
diclofenac (VOLTAREN) 1 % gel Apply 2 g to affected area every six (6) hours as needed for Pain (left thumb). 8/13/19  Yes Marnie ZARAGOZA,   
PARoxetine (PAXIL) 20 mg tablet TAKE 1 TABLET BY MOUTH EVERY DAY 7/5/19  Yes Gilda Martinez PA  
zolpidem (AMBIEN) 10 mg tablet Take  by mouth nightly as needed for Sleep. Yes Provider, Historical  
HYDROcodone-acetaminophen (VICODIN) 5-300 mg tablet Take  by mouth.    Yes Provider, Historical  
 
Allergies Allergen Reactions  Fluconazole Hives and Itching  Penicillin G Hives Patient Active Problem List  
 Diagnosis Date Noted  Cerebral microvascular disease 09/25/2019  Mild episode of recurrent major depressive disorder ([facility] [address] 10/26/2018  Pure hypercholesterolemia 05/07/2018  Sleeping difficulty 05/07/2018  Essential hypertension 04/23/2018  Anxiety and depression  Hypertension  Kidney stone  Malaria   
 Skin cancer  Gout  Anxiety 04/24/2017  Elevated PSA 11/18/2013  Prediabetes 11/18/2013  Insomnia 11/18/2013  Joint pain 11/18/2013  Gross hematuria 05/31/2013 Current Outpatient Medications Medication Sig Dispense Refill  olmesartan-hydroCHLOROthiazide (BENICAR HCT) 20-12.5 mg per tablet Take 1 Tab by mouth daily. 90 Tab 1  
 fluticasone propionate (FLONASE) 50 mcg/actuation nasal spray 2 Sprays by Both Nostrils route daily. 3 Bottle 1  
 pravastatin (PRAVACHOL) 20 mg tablet TAKE 1 TABLET BY MOUTH EVERYDAY AT BEDTIME 90 Tab 2  
 tamsulosin (FLOMAX) 0.4 mg capsule Take 1 Cap by mouth daily (after dinner). 90 Cap 3  
 diclofenac (VOLTAREN) 1 % gel Apply 2 g to affected area every six (6) hours as needed for Pain (left thumb). 100 g 5  PARoxetine (PAXIL) 20 mg tablet TAKE 1 TABLET BY MOUTH EVERY DAY 90 Tab 0  
 zolpidem (AMBIEN) 10 mg tablet Take  by mouth nightly as needed for Sleep.  HYDROcodone-acetaminophen (VICODIN) 5-300 mg tablet Take  by mouth. Allergies Allergen Reactions  Fluconazole Hives and Itching  Penicillin G Hives Past Medical History:  
Diagnosis Date  Anxiety and depression  Asthma  Elevated PSA  Gout  Hypertension  Insomnia  Kidney stone  Malaria  Prediabetes  Skin cancer Past Surgical History:  
Procedure Laterality Date  HX COLONOSCOPY  2011  
 f/u 2021 [address] [facility] [address]  
 HX SKIN BIOPSY  2013 [id number], [place] No family history on file. Social History Tobacco Use  Smoking status: Never Smoker  Smokeless tobacco: Never Used Substance Use Topics  Alcohol use: No  
 
 
ROS Objective: There were no vitals taken for this visit. General: alert, cooperative, no distress Mental  status: normal mood, behavior, speech, dress, motor activity, and thought processes, able to follow commands HENT: NCAT Neck: no visualized mass Resp: no respiratory distress Neuro: no gross deficits Skin: no discoloration or lesions of concern on visible areas Psychiatric: normal affect, consistent with stated mood, no evidence of hallucinations Additional exam findings: We discussed the expected course, resolution and complications of the diagnosis(es) in detail. Medication risks, benefits, costs, interactions, and alternatives were discussed as indicated. I advised him to contact the office if his condition worsens, changes or fails to improve as anticipated. He expressed understanding with the diagnosis(es) and plan. Arianne Garcia is a 70 y.o. male who was evaluated by a video visit encounter for concerns as above. Patient identification was verified prior to start of the visit. A caregiver was present when appropriate. Due to this being a TeleHealth encounter (During [facility]-48 public health emergency), evaluation of the following organ systems was limited: Vitals/Constitutional/EENT/Resp/CV/GI//MS/Neuro/Skin/Heme-Lymph-Imm. Pursuant to the emergency declaration under the [facility] waiver authority and the [employer] and [employer] Act, this Virtual  Visit was conducted, with patient's (and/or legal guardian's) consent, to reduce the patient's risk of exposure to COVID-19 and provide necessary medical care.  
  
Services were provided through a video synchronous discussion virtually to substitute for in-person clinic visit. Patient and provider were located at their individual homes. 
 
  
ASSESSMENT/PLAN Diagnoses and all orders for this visit: 
  
 Essential hypertension Controlled,  cont benicar hct Dash diet, monitoring CMP/lipid panel/a1c/uric acid to be postponed until prior to next visit in 4 months due to covid 
  
 Prediabetes Persistent, tlcs 
  
 Pure hypercholesterolemia Calc cv risk 29 % vs 18 % On  pravachol 20 mg qhs Will monitor 
  
 Recurrent depression ([facility] [address] Fair control, On Deannie Railing Following psychiatry 
  
Chronic rhinitis 
controlled, cont zyrtec and flonase 
  
 Sleeping difficulty On ambien, following psychiatry 
  
Elevated PSA Following urology, monitoring On flomax Gout, unspecified cause, unspecified chronicity, unspecified site Fair control, cont prn colchicine 
chec uric acid elvel 
  
 Cerebral microvascular disease [employer] Offered ASA addition, he declines Cont statin, BP control Ff-up in 4 months or sooner prnic Kelsey MD 
 
 
 
This is the Subsequent Medicare Annual Wellness Exam, performed 12 months or more after the Initial AWV or the last Subsequent AWV Consent: Bettie Powell, who was seen by synchronous (real-time) audio-video technology, and/or his healthcare decision maker, is aware that this patient-initiated, Telehealth encounter on 4/28/2020 is a billable service. While AWVs are fully covered by Medicare, any services rendered on this date that are not included in an AWV are subject to additional billing, with coverage as determined by his insurance carrier. He is aware that he may receive a bill for any such additional services and has provided verbal consent to proceed: Yes. I have reviewed the patient's medical history in detail and updated the computerized patient record. History Patient Active Problem List  
Diagnosis Code  Gross hematuria R31.0  Elevated PSA R97.20  Prediabetes R73.03  
 Insomnia G47.00  Joint pain M25.50  Anxiety F41.9  Anxiety and depression F41.9, F32.9  Hypertension I10  
 Kidney stone N20.0  Malaria B54  
 Skin cancer C44.90  Gout M10.9  Essential hypertension I10  Pure hypercholesterolemia E78.00  Sleeping difficulty G47.9  Mild episode of recurrent major depressive disorder ([address] F33.0  Cerebral microvascular disease I67.9 Past Medical History:  
Diagnosis Date  Anxiety and depression  Asthma  Elevated PSA  Gout  Hypertension  Insomnia  Kidney stone  Malaria  Prediabetes  Skin cancer Past Surgical History:  
Procedure Laterality Date  HX COLONOSCOPY  2011  
 f/u 2021 [address] [facility] [address]  
 HX SKIN BIOPSY  2013 [address], [place] Current Outpatient Medications Medication Sig Dispense Refill  olmesartan-hydroCHLOROthiazide (BENICAR HCT) 20-12.5 mg per tablet Take 1 Tab by mouth daily. 90 Tab 1  
 fluticasone propionate (FLONASE) 50 mcg/actuation nasal spray 2 Sprays by Both Nostrils route daily. 3 Bottle 1  
 pravastatin (PRAVACHOL) 20 mg tablet TAKE 1 TABLET BY MOUTH EVERYDAY AT BEDTIME 90 Tab 2  
 tamsulosin (FLOMAX) 0.4 mg capsule Take 1 Cap by mouth daily (after dinner). 90 Cap 3  
 diclofenac (VOLTAREN) 1 % gel Apply 2 g to affected area every six (6) hours as needed for Pain (left thumb). 100 g 5  PARoxetine (PAXIL) 20 mg tablet TAKE 1 TABLET BY MOUTH EVERY DAY 90 Tab 0  
 zolpidem (AMBIEN) 10 mg tablet Take  by mouth nightly as needed for Sleep.  HYDROcodone-acetaminophen (VICODIN) 5-300 mg tablet Take  by mouth. Allergies Allergen Reactions  Fluconazole Hives and Itching  Penicillin G Hives No family history on file. Social History Tobacco Use  Smoking status: Never Smoker  Smokeless tobacco: Never Used Substance Use Topics  Alcohol use:  No  
 
 
 Depression Risk Factor Screening:  
 
3 most recent PHQ Screens 11/9/2016 Little interest or pleasure in doing things Nearly every day Feeling down, depressed, irritable, or hopeless More than half the days Total Score PHQ 2 5 Trouble falling or staying asleep, or sleeping too much - Feeling tired or having little energy - Poor appetite, weight loss, or overeating - Feeling bad about yourself - or that you are a failure or have let yourself or your family down - Trouble concentrating on things such as school, work, reading, or watching TV - Moving or speaking so slowly that other people could have noticed; or the opposite being so fidgety that others notice - Thoughts of being better off dead, or hurting yourself in some way -  
PHQ 9 Score - Alcohol Risk Factor Screening (MALE > 65): Do you average more 1 drink per night or more than 7 drinks a week: No 
 
In the past three months have you have had more than 4 drinks containing alcohol on one occasion: No 
 
 
Functional Ability and Level of Safety:  
Hearing: Hearing is good. Activities of Daily Living: The home contains: no safety equipment. Patient does total self care Ambulation: with no difficulty Fall Risk: 
Fall Risk Assessment, last 12 mths 12/27/2019 Able to walk? Yes Fall in past 12 months? Yes Fall with injury? No  
Number of falls in past 12 months 1 Fall Risk Score 1 Abuse Screen: 
Patient is not abused Cognitive Screening Has your family/caregiver stated any concerns about your memory: no 
 
 
Patient Care Team  
Patient Care Team: 
Keo Bradley MD as PCP - [facility]) [facility] Jerrod Dyer MD as Physician (Urology) Maddie Martínez DO (Optometry) Assessment/Plan Education and counseling provided: 
Are appropriate based on today's review and evaluation End-of-Life planning (with patient's consent)- discussed, ACD form to be mailed to pt Pneumococcal Vaccine- 13,23 completed Influenza Vaccine-annually Prostate cancer screening tests (PSA, covered annually)- 3/2020 following urology Colorectal cancer screening tests- 5/2011 Cardiovascular screening blood test- due, standing order available, to be done prior Screening for glaucoma- due, dr. Katty Blair, post poned due to covid Health Maintenance Due Topic Date Due  Shingrix Vaccine Age 50> (1 of 2) 05/08/1992  GLAUCOMA SCREENING Q2Y  05/08/2007  Lipid Screen  04/24/2019  Medicare Yearly Exam  04/23/2020 Belkis Bowman is a 70 y.o. male who was evaluated by a video visit encounter for concerns as above. Patient identification was verified prior to start of the visit. A caregiver was present when appropriate. Due to this being a TeleHealth encounter (During [facility] public health emergency), evaluation of the following organ systems was limited: Vitals/Constitutional/EENT/Resp/CV/GI//MS/Neuro/Skin/Heme-Lymph-Imm. Pursuant to the emergency declaration under the [facility] waiver authority and the [place] and [employer] Act, this Virtual  Visit was conducted, with patient's (and/or legal guardian's) consent, to reduce the patient's risk of exposure to COVID-19 and provide necessary medical care. Services were provided through a video synchronous discussion virtually to substitute for in-person clinic visit. Patient and provider were located at their individual homes.  
 
Lisa Ramos MD

## 2020-04-30 NOTE — PATIENT INSTRUCTIONS
DASH Diet: Care Instructions Your Care Instructions The DASH diet is an eating plan that can help lower your blood pressure. DASH stands for Dietary Approaches to Stop Hypertension. Hypertension is high blood pressure. The DASH diet focuses on eating foods that are high in calcium, potassium, and magnesium. These nutrients can lower blood pressure. The foods that are highest in these nutrients are fruits, vegetables, low-fat dairy products, nuts, seeds, and legumes. But taking calcium, potassium, and magnesium supplements instead of eating foods that are high in those nutrients does not have the same effect. The DASH diet also includes whole grains, fish, and poultry. The DASH diet is one of several lifestyle changes your doctor may recommend to lower your high blood pressure. Your doctor may also want you to decrease the amount of sodium in your diet. Lowering sodium while following the DASH diet can lower blood pressure even further than just the DASH diet alone. Follow-up care is a key part of your treatment and safety. Be sure to make and go to all appointments, and call your doctor if you are having problems. It's also a good idea to know your test results and keep a list of the medicines you take. How can you care for yourself at home? Following the DASH diet · Eat 4 to 5 servings of fruit each day. A serving is 1 medium-sized piece of fruit, ½ cup chopped or canned fruit, 1/4 cup dried fruit, or 4 ounces (½ cup) of fruit juice. Choose fruit more often than fruit juice. · Eat 4 to 5 servings of vegetables each day. A serving is 1 cup of lettuce or raw leafy vegetables, ½ cup of chopped or cooked vegetables, or 4 ounces (½ cup) of vegetable juice. Choose vegetables more often than vegetable juice. · Get 2 to 3 servings of low-fat and fat-free dairy each day. A serving is 8 ounces of milk, 1 cup of yogurt, or 1 ½ ounces of cheese. · Eat 6 to 8 servings of grains each day. A serving is 1 slice of bread, 1 ounce of dry cereal, or ½ cup of cooked rice, pasta, or cooked cereal. Try to choose whole-grain products as much as possible. · Limit lean meat, poultry, and fish to 2 servings each day. A serving is 3 ounces, about the size of a deck of cards. · Eat 4 to 5 servings of nuts, seeds, and legumes (cooked dried beans, lentils, and split peas) each week. A serving is 1/3 cup of nuts, 2 tablespoons of seeds, or ½ cup of cooked beans or peas. · Limit fats and oils to 2 to 3 servings each day. A serving is 1 teaspoon of vegetable oil or 2 tablespoons of salad dressing. · Limit sweets and added sugars to 5 servings or less a week. A serving is 1 tablespoon jelly or jam, ½ cup sorbet, or 1 cup of lemonade. · Eat less than 2,300 milligrams (mg) of sodium a day. If you limit your sodium to 1,500 mg a day, you can lower your blood pressure even more. Tips for success · Start small. Do not try to make dramatic changes to your diet all at once. You might feel that you are missing out on your favorite foods and then be more likely to not follow the plan. Make small changes, and stick with them. Once those changes become habit, add a few more changes. · Try some of the following: ? Make it a goal to eat a fruit or vegetable at every meal and at snacks. This will make it easy to get the recommended amount of fruits and vegetables each day. ? Try yogurt topped with fruit and nuts for a snack or healthy dessert. ? Add lettuce, tomato, cucumber, and onion to sandwiches. ? Combine a ready-made pizza crust with low-fat mozzarella cheese and lots of vegetable toppings. Try using tomatoes, squash, spinach, broccoli, carrots, cauliflower, and onions. ? Have a variety of cut-up vegetables with a low-fat dip as an appetizer instead of chips and dip. ? Sprinkle sunflower seeds or chopped almonds over salads.  Or try adding chopped walnuts or almonds to cooked vegetables. ? Try some vegetarian meals using beans and peas. Add garbanzo or kidney beans to salads. Make burritos and tacos with mashed irving beans or black beans. Where can you learn more? Go to http://felix-shanique.info/ Enter A090 in the search box to learn more about \"DASH Diet: Care Instructions. \" Current as of: December 15, 2019Content Version: 12.4 © 7617-7539 [employer]. Care instructions adapted under license by [employer] (which disclaims liability or warranty for this information). If you have questions about a medical condition or this instruction, always ask your healthcare professional. [address] any warranty or liability for your use of this information. Medicare Wellness Visit, Male The best way to live healthy is to have a lifestyle where you eat a well-balanced diet, exercise regularly, limit alcohol use, and quit all forms of tobacco/nicotine, if applicable. Regular preventive services are another way to keep healthy. Preventive services (vaccines, screening tests, monitoring & exams) can help personalize your care plan, which helps you manage your own care. Screening tests can find health problems at the earliest stages, when they are easiest to treat. [facility] follows the current, evidence-based guidelines published by the [place] [employer] (USPSTF) when recommending preventive services for our patients. Because we follow these guidelines, sometimes recommendations change over time as research supports it. (For example, a prostate screening blood test is no longer routinely recommended for men with no symptoms). Of course, you and your doctor may decide to screen more often for some diseases, based on your risk and co-morbidities (chronic disease you are already diagnosed with). Preventive services for you include: - Medicare offers their members a free annual wellness visit, which is time for you and your primary care provider to discuss and plan for your preventive service needs. Take advantage of this benefit every year! 
-All adults over age 72 should receive the recommended pneumonia vaccines. Current USPSTF guidelines recommend a series of two vaccines for the best pneumonia protection.  
-All adults should have a flu vaccine yearly and tetanus vaccine every 10 years. 
-All adults age 48 and older should receive the shingles vaccines (series of two vaccines). -All adults age 38-68 who are overweight should have a diabetes screening test once every three years.  
-Other screening tests & preventive services for persons with diabetes include: an eye exam to screen for diabetic retinopathy, a kidney function test, a foot exam, and stricter control over your cholesterol.  
-Cardiovascular screening for adults with routine risk involves an electrocardiogram (ECG) at intervals determined by the provider.  
-Colorectal cancer screening should be done for adults age 54-65 with no increased risk factors for colorectal cancer. There are a number of acceptable methods of screening for this type of cancer. Each test has its own benefits and drawbacks. Discuss with your provider what is most appropriate for you during your annual wellness visit. The different tests include: colonoscopy (considered the best screening method), a fecal occult blood test, a fecal DNA test, and sigmoidoscopy. 
-All adults born between [facility] should be screened once for Hepatitis C. 
-An Abdominal Aortic Aneurysm (AAA) Screening is recommended for men age 73-68 who has ever smoked in their lifetime. Here is a list of your current Health Maintenance items (your personalized list of preventive services) with a due date: 
Health Maintenance Due Topic Date Due  Shingles Vaccine (1 of 2) 05/08/1992  Glaucoma Screening   05/08/2007  Cholesterol Test   04/24/2019 [address] [place] [address] Annual Well Visit  04/23/2020

## 2020-04-30 NOTE — ACP (ADVANCE CARE PLANNING)
Advance Care Planning Advance Care Planning (ACP) Physician/NP/PA (Provider) Hiram Ferguson Date of ACP Conversation: 4/28/2020 Conversation Conducted with:   Patient with Decision Making Capacity Health Care Decision Maker: 
 
Current Designated Health Care Decision Maker:   Primary Decision Maker: Henrik Rosario Spouse - [phone] Secondary Decision Maker: Sherry Sierra - Daughter - [phone] (If there is a valid [address] named in the [facility] Makers\" box in the ACP activity, but it is not visible above, be sure to open that field and then select the health care decision maker relationship (ie \"primary\") in the blank space to the right of the name.) Note: Assess and validate information in current ACP documents, as indicated. Note: If the relationship of these Decision-Makers to the patient does NOT follow your state's Next of Kin hierarchy, recommend that patient complete ACP document that meets state-specific requirements to allow them to act on the patient's behalf when appropriate. Care Preferences: Hospitalization: \"If your health worsens and it becomes clear that your chance of recovery is unlikely, what would your preference be regarding hospitalization? \" If the patient would want hospitalization, answer \"yes\". If the patient would prefer comfort-focused treatment without hospitalization, answer \"no\". yes Ventilation: \"If you were in your present state of health and suddenly became very ill and were unable to breathe on your own, what would your preference be about the use of a ventilator (breathing machine) if it was available to you? \" If patient would desire the use of a ventilator (breathing machine), answer \"yes\", if not answer \"no\": 
yes \"If your health worsens and it becomes clear that your chance of recovery is unlikely, what would your preference be about the use of a ventilator (breathing machine) if it was available to you? \"  
no Resuscitation: \"CPR works best to restart the heart when there is a sudden event, like a heart attack, in someone who is otherwise healthy. Unfortunately, CPR does not typically restart the heart for people who have serious health conditions or who are very sick. \" \"In the event your heart stopped as a result of an underlying serious health condition, would you want attempts to be made to restart your heart (answer \"yes\" for attempt to resuscitate) or would you prefer a natural death (answer \"no\" for do not attempt to resuscitate)? \"  
Anais De La O MD, made a recommendation, and the patient agrees to a natural death without the attempt of CPR. NOTE: If the patient has a valid advance directive AND provides care preference(s) that are inconsistent with that prior directive, advise the patient to consider either: creating a new advance directive that complies with state-specific requirements; or, if that is not possible, orally revoking that prior directive in accordance with state-specific requirements, which must be documented in the EHR. Conversation Outcomes / Follow-Up Plan:  
Recommended completion of Advance Directive Length of Voluntary ACP Conversation in minutes:  16 minutes Jamaica Torres MD

## 2020-06-19 ENCOUNTER — HOSPITAL ENCOUNTER (OUTPATIENT)
Dept: LAB | Age: 80
Discharge: HOME | End: 2020-06-19
Payer: MEDICARE

## 2020-06-19 DIAGNOSIS — I10 ESSENTIAL HYPERTENSION: ICD-10-CM

## 2020-06-19 DIAGNOSIS — M10.9 GOUT, UNSPECIFIED CAUSE, UNSPECIFIED CHRONICITY, UNSPECIFIED SITE: ICD-10-CM

## 2020-06-19 DIAGNOSIS — R73.03 PREDIABETES: ICD-10-CM

## 2020-06-19 DIAGNOSIS — E78.00 PURE HYPERCHOLESTEROLEMIA: ICD-10-CM

## 2020-06-19 LAB
ALBUMIN SERPL-MCNC: 4.2 G/DL (ref 3.4–5)
ALBUMIN/GLOB SERPL: 1.1 {RATIO} (ref 0.8–1.7)
ALP SERPL-CCNC: 58 U/L (ref 45–117)
ALT SERPL-CCNC: 27 U/L (ref 16–61)
ANION GAP SERPL CALC-SCNC: 6 MMOL/L (ref 3–18)
AST SERPL-CCNC: 16 U/L (ref 10–38)
BILIRUB SERPL-MCNC: 0.4 MG/DL (ref 0.2–1)
BUN SERPL-MCNC: 22 MG/DL (ref 7–18)
BUN/CREAT SERPL: 19 (ref 12–20)
CALCIUM SERPL-MCNC: 9.2 MG/DL (ref 8.5–10.1)
CHLORIDE SERPL-SCNC: 106 MMOL/L (ref 100–111)
CHOLEST SERPL-MCNC: 215 MG/DL
CO2 SERPL-SCNC: 30 MMOL/L (ref 21–32)
CREAT SERPL-MCNC: 1.16 MG/DL (ref 0.6–1.3)
GLOBULIN SER CALC-MCNC: 3.7 G/DL (ref 2–4)
GLUCOSE SERPL-MCNC: 94 MG/DL (ref 74–99)
HBA1C MFR BLD: 6.1 % (ref 4.2–5.6)
HDLC SERPL-MCNC: 43 MG/DL (ref 40–60)
HDLC SERPL: 5 {RATIO} (ref 0–5)
LDLC SERPL CALC-MCNC: 121.8 MG/DL (ref 0–100)
LIPID PROFILE,FLP: ABNORMAL
POTASSIUM SERPL-SCNC: 4.4 MMOL/L (ref 3.5–5.5)
PROT SERPL-MCNC: 7.9 G/DL (ref 6.4–8.2)
SODIUM SERPL-SCNC: 142 MMOL/L (ref 136–145)
TRIGL SERPL-MCNC: 251 MG/DL (ref ?–150)
URATE SERPL-MCNC: 7 MG/DL (ref 2.6–7.2)
VLDLC SERPL CALC-MCNC: 50.2 MG/DL

## 2020-06-19 PROCEDURE — 84550 ASSAY OF BLOOD/URIC ACID: CPT

## 2020-06-19 PROCEDURE — 80053 COMPREHEN METABOLIC PANEL: CPT

## 2020-06-19 PROCEDURE — 83036 HEMOGLOBIN GLYCOSYLATED A1C: CPT

## 2020-06-19 PROCEDURE — 36415 COLL VENOUS BLD VENIPUNCTURE: CPT

## 2020-06-19 PROCEDURE — 80061 LIPID PANEL: CPT

## 2020-06-23 DIAGNOSIS — E78.00 PURE HYPERCHOLESTEROLEMIA: Primary | ICD-10-CM

## 2020-06-23 RX ORDER — ATORVASTATIN CALCIUM 20 MG/1
20 TABLET, FILM COATED ORAL DAILY
Qty: 90 TAB | Refills: 0 | Status: SHIPPED | OUTPATIENT
Start: 2020-06-23 | End: 2020-07-02 | Stop reason: SINTOL

## 2020-06-23 NOTE — PROGRESS NOTES
Prediabetes is persistent, cont to work on healthy/low carb diet  Cholesterol has improved, but hoping it could be better, trial switching pravachol to lipitor and see if he can tolerate,if not we will go back to pravachol  Lipid panel/cmp prior to next visit in august. pls sched visit then.

## 2020-06-25 NOTE — PROGRESS NOTES
Patient identified with 2 identifiers (name and ). Spoke with patient spouse she is aware      Prediabetes is persistent, cont to work on healthy/low carb diet   Cholesterol has improved, but hoping it could be better, trial switching pravachol to lipitor and see if he can tolerate,if not we will go back to pravachol. Patients spouse states that he tried lipitor when he was seeing Kandi Chung and was unable to tolerate due to muscle aches. Patient does not want to switch he would like to stay on pravochol. Patient aware to repeat labs in august before his appt.

## 2020-07-01 NOTE — PROGRESS NOTES
Patient identified with 2 identifiers (name and ). Patient agrees to increase pravachol to 40 mg.  Please send new script to   [facility], meantime patient will take 2 tabs of the 20 mg

## 2020-07-02 RX ORDER — PRAVASTATIN SODIUM 40 MG/1
40 TABLET ORAL
Qty: 90 TAB | Refills: 0 | Status: SHIPPED | OUTPATIENT
Start: 2020-07-02 | End: 2021-01-04 | Stop reason: SDUPTHER

## 2020-12-18 DIAGNOSIS — I10 ESSENTIAL HYPERTENSION WITH GOAL BLOOD PRESSURE LESS THAN 140/90: ICD-10-CM

## 2020-12-18 RX ORDER — OLMESARTAN MEDOXOMIL AND HYDROCHLOROTHIAZIDE 20/12.5 20; 12.5 MG/1; MG/1
1 TABLET ORAL DAILY
Qty: 90 TAB | Refills: 1 | Status: SHIPPED | OUTPATIENT
Start: 2020-12-18 | End: 2021-01-01

## 2021-01-01 ENCOUNTER — HOME CARE VISIT (OUTPATIENT)
Dept: SCHEDULING | Facility: HOME HEALTH | Age: 81
End: 2021-01-01
Payer: MEDICARE

## 2021-01-01 ENCOUNTER — HOME CARE VISIT (OUTPATIENT)
Dept: HOME HEALTH SERVICES | Facility: HOME HEALTH | Age: 81
End: 2021-01-01
Payer: MEDICARE

## 2021-01-01 ENCOUNTER — HOSPITAL ENCOUNTER (OUTPATIENT)
Dept: GENERAL RADIOLOGY | Age: 81
Discharge: HOME | End: 2021-07-08
Payer: MEDICARE

## 2021-01-01 ENCOUNTER — APPOINTMENT (OUTPATIENT)
Dept: ULTRASOUND IMAGING | Age: 81
DRG: 177 | End: 2021-01-01
Attending: EMERGENCY MEDICINE
Payer: MEDICARE

## 2021-01-01 ENCOUNTER — HOSPITAL ENCOUNTER (OUTPATIENT)
Dept: PREADMISSION TESTING | Age: 81
Discharge: HOME | End: 2021-07-29
Payer: MEDICARE

## 2021-01-01 ENCOUNTER — OFFICE VISIT (OUTPATIENT)
Dept: CARDIOTHORACIC SURGERY | Age: 81
End: 2021-01-01
Payer: MEDICARE

## 2021-01-01 ENCOUNTER — TELEPHONE (OUTPATIENT)
Dept: CARDIOLOGY CLINIC | Age: 81
End: 2021-01-01

## 2021-01-01 ENCOUNTER — TELEPHONE (OUTPATIENT)
Dept: CARDIOTHORACIC SURGERY | Age: 81
End: 2021-01-01

## 2021-01-01 ENCOUNTER — HOSPITAL ENCOUNTER (OUTPATIENT)
Dept: VASCULAR SURGERY | Age: 81
Discharge: HOME | End: 2021-07-29
Attending: PHYSICIAN ASSISTANT
Payer: MEDICARE

## 2021-01-01 ENCOUNTER — HOSPITAL ENCOUNTER (OUTPATIENT)
Dept: LAB | Age: 81
Discharge: HOME | End: 2021-07-08
Payer: MEDICARE

## 2021-01-01 ENCOUNTER — APPOINTMENT (OUTPATIENT)
Dept: GENERAL RADIOLOGY | Age: 81
DRG: 177 | End: 2021-01-01
Attending: EMERGENCY MEDICINE
Payer: MEDICARE

## 2021-01-01 ENCOUNTER — PATIENT OUTREACH (OUTPATIENT)
Dept: CASE MANAGEMENT | Age: 81
End: 2021-01-01

## 2021-01-01 ENCOUNTER — OFFICE VISIT (OUTPATIENT)
Dept: CARDIOLOGY CLINIC | Age: 81
End: 2021-01-01
Payer: MEDICARE

## 2021-01-01 ENCOUNTER — ANESTHESIA (OUTPATIENT)
Dept: CARDIOTHORACIC SURGERY | Age: 81
DRG: 236 | End: 2021-01-01
Payer: MEDICARE

## 2021-01-01 ENCOUNTER — HOSPITAL ENCOUNTER (OUTPATIENT)
Age: 81
Setting detail: OUTPATIENT SURGERY
Discharge: HOME | End: 2021-07-12
Attending: INTERNAL MEDICINE | Admitting: INTERNAL MEDICINE
Payer: MEDICARE

## 2021-01-01 ENCOUNTER — APPOINTMENT (OUTPATIENT)
Dept: CT IMAGING | Age: 81
End: 2021-01-01
Attending: EMERGENCY MEDICINE
Payer: MEDICARE

## 2021-01-01 ENCOUNTER — APPOINTMENT (OUTPATIENT)
Dept: CT IMAGING | Age: 81
DRG: 177 | End: 2021-01-01
Attending: EMERGENCY MEDICINE
Payer: MEDICARE

## 2021-01-01 ENCOUNTER — APPOINTMENT (OUTPATIENT)
Dept: GENERAL RADIOLOGY | Age: 81
DRG: 236 | End: 2021-01-01
Attending: PHYSICIAN ASSISTANT
Payer: MEDICARE

## 2021-01-01 ENCOUNTER — ANESTHESIA EVENT (OUTPATIENT)
Dept: CARDIOTHORACIC SURGERY | Age: 81
DRG: 236 | End: 2021-01-01
Payer: MEDICARE

## 2021-01-01 ENCOUNTER — HOSPITAL ENCOUNTER (OUTPATIENT)
Dept: GENERAL RADIOLOGY | Age: 81
Discharge: HOME | DRG: 177 | End: 2021-12-30
Payer: MEDICARE

## 2021-01-01 ENCOUNTER — HOSPITAL ENCOUNTER (EMERGENCY)
Age: 81
Discharge: HOME | End: 2021-12-26
Attending: EMERGENCY MEDICINE
Payer: MEDICARE

## 2021-01-01 ENCOUNTER — HOSPITAL ENCOUNTER (INPATIENT)
Age: 81
LOS: 7 days | Discharge: HOME HEALTH | DRG: 236 | End: 2021-08-12
Attending: SPECIALIST | Admitting: SPECIALIST
Payer: MEDICARE

## 2021-01-01 ENCOUNTER — HOME HEALTH ADMISSION (OUTPATIENT)
Dept: HOME HEALTH SERVICES | Facility: HOME HEALTH | Age: 81
End: 2021-01-01
Payer: MEDICARE

## 2021-01-01 ENCOUNTER — HOSPITAL ENCOUNTER (INPATIENT)
Age: 81
LOS: 7 days | DRG: 177 | End: 2022-01-07
Attending: EMERGENCY MEDICINE | Admitting: INTERNAL MEDICINE
Payer: MEDICARE

## 2021-01-01 ENCOUNTER — APPOINTMENT (OUTPATIENT)
Dept: NON INVASIVE DIAGNOSTICS | Age: 81
DRG: 236 | End: 2021-01-01
Attending: PHYSICIAN ASSISTANT
Payer: MEDICARE

## 2021-01-01 VITALS
HEART RATE: 95 BPM | BODY MASS INDEX: 25.62 KG/M2 | DIASTOLIC BLOOD PRESSURE: 82 MMHG | WEIGHT: 171 LBS | SYSTOLIC BLOOD PRESSURE: 126 MMHG | OXYGEN SATURATION: 95 % | TEMPERATURE: 97.3 F | RESPIRATION RATE: 20 BRPM

## 2021-01-01 VITALS
OXYGEN SATURATION: 91 % | RESPIRATION RATE: 19 BRPM | TEMPERATURE: 98 F | DIASTOLIC BLOOD PRESSURE: 82 MMHG | BODY MASS INDEX: 27.91 KG/M2 | WEIGHT: 188.4 LBS | HEIGHT: 69 IN | HEART RATE: 94 BPM | SYSTOLIC BLOOD PRESSURE: 145 MMHG

## 2021-01-01 VITALS
HEIGHT: 69 IN | HEART RATE: 79 BPM | DIASTOLIC BLOOD PRESSURE: 82 MMHG | BODY MASS INDEX: 25.62 KG/M2 | OXYGEN SATURATION: 95 % | WEIGHT: 173 LBS | SYSTOLIC BLOOD PRESSURE: 134 MMHG

## 2021-01-01 VITALS
RESPIRATION RATE: 16 BRPM | DIASTOLIC BLOOD PRESSURE: 80 MMHG | TEMPERATURE: 97.7 F | OXYGEN SATURATION: 96 % | HEART RATE: 50 BPM | SYSTOLIC BLOOD PRESSURE: 120 MMHG

## 2021-01-01 VITALS
OXYGEN SATURATION: 93 % | SYSTOLIC BLOOD PRESSURE: 141 MMHG | RESPIRATION RATE: 16 BRPM | TEMPERATURE: 97.6 F | HEART RATE: 70 BPM | DIASTOLIC BLOOD PRESSURE: 79 MMHG

## 2021-01-01 VITALS
OXYGEN SATURATION: 98 % | RESPIRATION RATE: 16 BRPM | SYSTOLIC BLOOD PRESSURE: 144 MMHG | TEMPERATURE: 97.7 F | DIASTOLIC BLOOD PRESSURE: 87 MMHG | HEART RATE: 69 BPM

## 2021-01-01 VITALS
HEART RATE: 96 BPM | RESPIRATION RATE: 16 BRPM | DIASTOLIC BLOOD PRESSURE: 80 MMHG | SYSTOLIC BLOOD PRESSURE: 148 MMHG | TEMPERATURE: 97.8 F | OXYGEN SATURATION: 99 %

## 2021-01-01 VITALS
OXYGEN SATURATION: 95 % | HEART RATE: 75 BPM | TEMPERATURE: 98 F | SYSTOLIC BLOOD PRESSURE: 138 MMHG | DIASTOLIC BLOOD PRESSURE: 82 MMHG

## 2021-01-01 VITALS
OXYGEN SATURATION: 95 % | TEMPERATURE: 98.2 F | DIASTOLIC BLOOD PRESSURE: 80 MMHG | HEART RATE: 103 BPM | SYSTOLIC BLOOD PRESSURE: 150 MMHG | RESPIRATION RATE: 20 BRPM

## 2021-01-01 VITALS
DIASTOLIC BLOOD PRESSURE: 78 MMHG | OXYGEN SATURATION: 98 % | HEART RATE: 64 BPM | SYSTOLIC BLOOD PRESSURE: 130 MMHG | RESPIRATION RATE: 16 BRPM | TEMPERATURE: 97.7 F

## 2021-01-01 VITALS
DIASTOLIC BLOOD PRESSURE: 84 MMHG | WEIGHT: 173 LBS | HEART RATE: 122 BPM | SYSTOLIC BLOOD PRESSURE: 118 MMHG | TEMPERATURE: 97.7 F | BODY MASS INDEX: 25.62 KG/M2 | RESPIRATION RATE: 16 BRPM | OXYGEN SATURATION: 96 % | HEIGHT: 69 IN

## 2021-01-01 VITALS
TEMPERATURE: 97.6 F | HEART RATE: 83 BPM | SYSTOLIC BLOOD PRESSURE: 137 MMHG | OXYGEN SATURATION: 94 % | RESPIRATION RATE: 18 BRPM | DIASTOLIC BLOOD PRESSURE: 86 MMHG

## 2021-01-01 VITALS
HEART RATE: 87 BPM | SYSTOLIC BLOOD PRESSURE: 141 MMHG | RESPIRATION RATE: 18 BRPM | TEMPERATURE: 97.3 F | DIASTOLIC BLOOD PRESSURE: 72 MMHG | SYSTOLIC BLOOD PRESSURE: 122 MMHG | DIASTOLIC BLOOD PRESSURE: 78 MMHG | TEMPERATURE: 97.4 F | OXYGEN SATURATION: 98 % | HEART RATE: 61 BPM | OXYGEN SATURATION: 97 % | RESPIRATION RATE: 16 BRPM

## 2021-01-01 VITALS
TEMPERATURE: 97.8 F | SYSTOLIC BLOOD PRESSURE: 143 MMHG | OXYGEN SATURATION: 98 % | DIASTOLIC BLOOD PRESSURE: 85 MMHG | BODY MASS INDEX: 26.52 KG/M2 | HEART RATE: 80 BPM | RESPIRATION RATE: 14 BRPM | WEIGHT: 177 LBS

## 2021-01-01 VITALS
BODY MASS INDEX: 26.82 KG/M2 | OXYGEN SATURATION: 97 % | HEART RATE: 92 BPM | DIASTOLIC BLOOD PRESSURE: 74 MMHG | TEMPERATURE: 97.6 F | SYSTOLIC BLOOD PRESSURE: 140 MMHG | WEIGHT: 178 LBS | RESPIRATION RATE: 16 BRPM | OXYGEN SATURATION: 99 % | WEIGHT: 179 LBS | TEMPERATURE: 97.7 F | TEMPERATURE: 97.8 F | RESPIRATION RATE: 17 BRPM | RESPIRATION RATE: 16 BRPM | HEART RATE: 96 BPM | BODY MASS INDEX: 26.67 KG/M2 | DIASTOLIC BLOOD PRESSURE: 80 MMHG | HEART RATE: 77 BPM | SYSTOLIC BLOOD PRESSURE: 130 MMHG | OXYGEN SATURATION: 96 % | SYSTOLIC BLOOD PRESSURE: 148 MMHG | DIASTOLIC BLOOD PRESSURE: 68 MMHG

## 2021-01-01 VITALS
DIASTOLIC BLOOD PRESSURE: 96 MMHG | TEMPERATURE: 98 F | OXYGEN SATURATION: 96 % | SYSTOLIC BLOOD PRESSURE: 152 MMHG | BODY MASS INDEX: 26.51 KG/M2 | RESPIRATION RATE: 20 BRPM | HEART RATE: 76 BPM | HEIGHT: 69 IN | WEIGHT: 179 LBS

## 2021-01-01 VITALS
OXYGEN SATURATION: 95 % | HEART RATE: 86 BPM | DIASTOLIC BLOOD PRESSURE: 80 MMHG | RESPIRATION RATE: 16 BRPM | SYSTOLIC BLOOD PRESSURE: 120 MMHG

## 2021-01-01 VITALS
TEMPERATURE: 98.2 F | SYSTOLIC BLOOD PRESSURE: 120 MMHG | HEART RATE: 78 BPM | OXYGEN SATURATION: 95 % | DIASTOLIC BLOOD PRESSURE: 66 MMHG | RESPIRATION RATE: 12 BRPM

## 2021-01-01 VITALS
SYSTOLIC BLOOD PRESSURE: 138 MMHG | HEART RATE: 73 BPM | OXYGEN SATURATION: 96 % | WEIGHT: 178 LBS | DIASTOLIC BLOOD PRESSURE: 88 MMHG | HEIGHT: 67 IN | BODY MASS INDEX: 27.94 KG/M2

## 2021-01-01 VITALS
OXYGEN SATURATION: 95 % | WEIGHT: 177 LBS | SYSTOLIC BLOOD PRESSURE: 150 MMHG | DIASTOLIC BLOOD PRESSURE: 100 MMHG | HEART RATE: 88 BPM | BODY MASS INDEX: 27.78 KG/M2 | HEIGHT: 67 IN

## 2021-01-01 VITALS
DIASTOLIC BLOOD PRESSURE: 75 MMHG | TEMPERATURE: 98.1 F | HEART RATE: 85 BPM | OXYGEN SATURATION: 93 % | SYSTOLIC BLOOD PRESSURE: 140 MMHG

## 2021-01-01 DIAGNOSIS — R94.39 ABNORMAL NUCLEAR STRESS TEST: ICD-10-CM

## 2021-01-01 DIAGNOSIS — I25.10 CORONARY ARTERY DISEASE INVOLVING NATIVE CORONARY ARTERY OF NATIVE HEART WITHOUT ANGINA PECTORIS: ICD-10-CM

## 2021-01-01 DIAGNOSIS — Z79.899 ON AMIODARONE THERAPY: ICD-10-CM

## 2021-01-01 DIAGNOSIS — E78.00 PURE HYPERCHOLESTEROLEMIA: ICD-10-CM

## 2021-01-01 DIAGNOSIS — R79.89 ELEVATED LACTIC ACID LEVEL: ICD-10-CM

## 2021-01-01 DIAGNOSIS — Z95.1 S/P CABG X 3: ICD-10-CM

## 2021-01-01 DIAGNOSIS — U07.1 COVID-19: ICD-10-CM

## 2021-01-01 DIAGNOSIS — I10 ESSENTIAL HYPERTENSION: ICD-10-CM

## 2021-01-01 DIAGNOSIS — R74.01 TRANSAMINITIS: Primary | ICD-10-CM

## 2021-01-01 DIAGNOSIS — E86.0 DEHYDRATION: ICD-10-CM

## 2021-01-01 DIAGNOSIS — R74.8 ELEVATED LIVER ENZYMES: ICD-10-CM

## 2021-01-01 DIAGNOSIS — Z01.818 PREOP TESTING: ICD-10-CM

## 2021-01-01 DIAGNOSIS — N20.0 KIDNEY STONE: ICD-10-CM

## 2021-01-01 DIAGNOSIS — N40.1 BENIGN PROSTATIC HYPERPLASIA WITH LOWER URINARY TRACT SYMPTOMS, SYMPTOM DETAILS UNSPECIFIED: ICD-10-CM

## 2021-01-01 DIAGNOSIS — R94.39 ABNORMAL NUCLEAR STRESS TEST: Primary | ICD-10-CM

## 2021-01-01 DIAGNOSIS — I50.9 ACUTE ON CHRONIC CONGESTIVE HEART FAILURE, UNSPECIFIED HEART FAILURE TYPE (HCC): Primary | ICD-10-CM

## 2021-01-01 DIAGNOSIS — R57.8 HEMORRHAGIC SHOCK (HCC): ICD-10-CM

## 2021-01-01 DIAGNOSIS — R06.02 SOB (SHORTNESS OF BREATH): ICD-10-CM

## 2021-01-01 DIAGNOSIS — R73.03 PREDIABETES: ICD-10-CM

## 2021-01-01 DIAGNOSIS — I25.10 CORONARY ARTERY DISEASE INVOLVING NATIVE CORONARY ARTERY OF NATIVE HEART WITHOUT ANGINA PECTORIS: Primary | ICD-10-CM

## 2021-01-01 DIAGNOSIS — R41.0 CONFUSION: ICD-10-CM

## 2021-01-01 DIAGNOSIS — R07.9 CHEST PAIN: ICD-10-CM

## 2021-01-01 DIAGNOSIS — R06.02 SOB (SHORTNESS OF BREATH): Primary | ICD-10-CM

## 2021-01-01 DIAGNOSIS — K92.2 GASTROINTESTINAL HEMORRHAGE, UNSPECIFIED GASTROINTESTINAL HEMORRHAGE TYPE: ICD-10-CM

## 2021-01-01 DIAGNOSIS — Z95.1 S/P CABG X 3: Primary | ICD-10-CM

## 2021-01-01 LAB
ABO + RH BLD: NORMAL
ADMINISTERED INITIALS, ADMINIT: NORMAL
ALBUMIN SERPL-MCNC: 3.3 G/DL (ref 3.4–5)
ALBUMIN SERPL-MCNC: 3.6 G/DL (ref 3.4–5)
ALBUMIN SERPL-MCNC: 3.7 G/DL (ref 3.4–5)
ALBUMIN SERPL-MCNC: 3.9 G/DL (ref 3.4–5)
ALBUMIN/GLOB SERPL: 1 {RATIO} (ref 0.8–1.7)
ALBUMIN/GLOB SERPL: 1 {RATIO} (ref 0.8–1.7)
ALBUMIN/GLOB SERPL: 1.1 {RATIO} (ref 0.8–1.7)
ALBUMIN/GLOB SERPL: 1.1 {RATIO} (ref 0.8–1.7)
ALP SERPL-CCNC: 217 U/L (ref 45–117)
ALP SERPL-CCNC: 372 U/L (ref 45–117)
ALP SERPL-CCNC: 407 U/L (ref 45–117)
ALP SERPL-CCNC: 66 U/L (ref 45–117)
ALT SERPL-CCNC: 139 U/L (ref 16–61)
ALT SERPL-CCNC: 1576 U/L (ref 16–61)
ALT SERPL-CCNC: 1873 U/L (ref 16–61)
ALT SERPL-CCNC: 19 U/L (ref 16–61)
AMMONIA PLAS-SCNC: 28 UMOL/L (ref 11–32)
AMPHET UR QL SCN: NEGATIVE
ANION GAP SERPL CALC-SCNC: 10 MMOL/L (ref 3–18)
ANION GAP SERPL CALC-SCNC: 13 MMOL/L (ref 3–18)
ANION GAP SERPL CALC-SCNC: 3 MMOL/L (ref 3–18)
ANION GAP SERPL CALC-SCNC: 4 MMOL/L (ref 3–18)
ANION GAP SERPL CALC-SCNC: 5 MMOL/L (ref 3–18)
ANION GAP SERPL CALC-SCNC: 5 MMOL/L (ref 3–18)
ANION GAP SERPL CALC-SCNC: 8 MMOL/L (ref 3–18)
ANION GAP SERPL CALC-SCNC: 8 MMOL/L (ref 3–18)
APAP SERPL-MCNC: <2 UG/ML (ref 10–30)
APPEARANCE UR: CLEAR
APTT PPP: 33 SEC (ref 23–36.4)
APTT PPP: 33.9 SEC (ref 23–36.4)
ARTERIAL PATENCY WRIST A: ABNORMAL
AST SERPL-CCNC: 10 U/L (ref 10–38)
AST SERPL-CCNC: 1028 U/L (ref 10–38)
AST SERPL-CCNC: 179 U/L (ref 10–38)
AST SERPL-CCNC: 664 U/L (ref 10–38)
ATRIAL RATE: 112 BPM
ATRIAL RATE: 267 BPM
ATRIAL RATE: 66 BPM
ATRIAL RATE: 88 BPM
ATRIAL RATE: 94 BPM
BACTERIA SPEC CULT: NORMAL
BACTERIA URNS QL MICRO: ABNORMAL /HPF
BACTERIA URNS QL MICRO: NEGATIVE /HPF
BACTERIA URNS QL MICRO: NEGATIVE /HPF
BARBITURATES UR QL SCN: NEGATIVE
BASE DEFICIT BLD-SCNC: 0.8 MMOL/L
BASE DEFICIT BLD-SCNC: 1.3 MMOL/L
BASE DEFICIT BLD-SCNC: 1.5 MMOL/L
BASE DEFICIT BLD-SCNC: 2.4 MMOL/L
BASE DEFICIT BLD-SCNC: 3 MMOL/L
BASE DEFICIT BLD-SCNC: 3.1 MMOL/L
BASE DEFICIT BLD-SCNC: 3.1 MMOL/L
BASE DEFICIT BLD-SCNC: 3.3 MMOL/L
BASE DEFICIT BLD-SCNC: 3.6 MMOL/L
BASE DEFICIT BLD-SCNC: 4 MMOL/L
BASE DEFICIT BLD-SCNC: 4.1 MMOL/L
BASE DEFICIT BLD-SCNC: 6.9 MMOL/L
BASE EXCESS BLD CALC-SCNC: 1.2 MMOL/L
BASOPHILS # BLD: 0 K/UL (ref 0–0.1)
BASOPHILS # BLD: 0.1 K/UL (ref 0–0.1)
BASOPHILS NFR BLD: 0 % (ref 0–2)
BASOPHILS NFR BLD: 1 % (ref 0–2)
BDY SITE: ABNORMAL
BENZODIAZ UR QL: POSITIVE
BILIRUB DIRECT SERPL-MCNC: 0.1 MG/DL (ref 0–0.2)
BILIRUB SERPL-MCNC: 0.6 MG/DL (ref 0.2–1)
BILIRUB SERPL-MCNC: 2 MG/DL (ref 0.2–1)
BILIRUB SERPL-MCNC: 5.9 MG/DL (ref 0.2–1)
BILIRUB SERPL-MCNC: 6.5 MG/DL (ref 0.2–1)
BILIRUB UR QL: ABNORMAL
BILIRUB UR QL: ABNORMAL
BILIRUB UR QL: NEGATIVE
BLD PROD TYP BPU: NORMAL
BLD PROD TYP BPU: NORMAL
BLOOD BANK CMNT PATIENT-IMP: NORMAL
BLOOD GROUP ANTIBODIES SERPL: NORMAL
BNP SERPL-MCNC: 8747 PG/ML (ref 0–1800)
BODY TEMPERATURE: 96.6
BODY TEMPERATURE: 97.7
BPU ID: NORMAL
BPU ID: NORMAL
BUN SERPL-MCNC: 13 MG/DL (ref 7–18)
BUN SERPL-MCNC: 14 MG/DL (ref 7–18)
BUN SERPL-MCNC: 15 MG/DL (ref 7–18)
BUN SERPL-MCNC: 16 MG/DL (ref 7–18)
BUN SERPL-MCNC: 17 MG/DL (ref 7–18)
BUN SERPL-MCNC: 22 MG/DL (ref 7–18)
BUN SERPL-MCNC: 56 MG/DL (ref 7–18)
BUN SERPL-MCNC: 65 MG/DL (ref 7–18)
BUN/CREAT SERPL: 12 (ref 12–20)
BUN/CREAT SERPL: 15 (ref 12–20)
BUN/CREAT SERPL: 15 (ref 12–20)
BUN/CREAT SERPL: 17 (ref 12–20)
BUN/CREAT SERPL: 20 (ref 12–20)
BUN/CREAT SERPL: 23 (ref 12–20)
BUN/CREAT SERPL: 37 (ref 12–20)
BUN/CREAT SERPL: 42 (ref 12–20)
CA-I BLD-MCNC: 0.92 MMOL/L (ref 1.12–1.32)
CA-I BLD-MCNC: 0.93 MMOL/L (ref 1.12–1.32)
CA-I BLD-MCNC: 0.95 MMOL/L (ref 1.12–1.32)
CA-I BLD-MCNC: 0.96 MMOL/L (ref 1.12–1.32)
CA-I BLD-MCNC: 1.03 MMOL/L (ref 1.12–1.32)
CA-I BLD-MCNC: 1.08 MMOL/L (ref 1.12–1.32)
CA-I BLD-MCNC: 1.1 MMOL/L (ref 1.12–1.32)
CA-I BLD-MCNC: 1.2 MMOL/L (ref 1.12–1.32)
CA-I BLD-MCNC: 1.32 MMOL/L (ref 1.12–1.32)
CA-I BLD-SCNC: 1.15 MMOL/L (ref 1.12–1.32)
CA-I SERPL-SCNC: 1.18 MMOL/L (ref 1.12–1.32)
CALCIUM SERPL-MCNC: 7 MG/DL (ref 8.5–10.1)
CALCIUM SERPL-MCNC: 7.5 MG/DL (ref 8.5–10.1)
CALCIUM SERPL-MCNC: 7.6 MG/DL (ref 8.5–10.1)
CALCIUM SERPL-MCNC: 8.2 MG/DL (ref 8.5–10.1)
CALCIUM SERPL-MCNC: 8.2 MG/DL (ref 8.5–10.1)
CALCIUM SERPL-MCNC: 8.4 MG/DL (ref 8.5–10.1)
CALCIUM SERPL-MCNC: 8.8 MG/DL (ref 8.5–10.1)
CALCIUM SERPL-MCNC: 8.9 MG/DL (ref 8.5–10.1)
CALCIUM SERPL-MCNC: 9 MG/DL (ref 8.5–10.1)
CALCIUM SERPL-MCNC: 9.2 MG/DL (ref 8.5–10.1)
CALCULATED P AXIS, ECG09: 20 DEGREES
CALCULATED P AXIS, ECG09: 23 DEGREES
CALCULATED P AXIS, ECG09: 28 DEGREES
CALCULATED P AXIS, ECG09: 48 DEGREES
CALCULATED R AXIS, ECG10: -26 DEGREES
CALCULATED R AXIS, ECG10: -33 DEGREES
CALCULATED R AXIS, ECG10: -38 DEGREES
CALCULATED R AXIS, ECG10: -38 DEGREES
CALCULATED R AXIS, ECG10: -56 DEGREES
CALCULATED T AXIS, ECG11: 122 DEGREES
CALCULATED T AXIS, ECG11: 23 DEGREES
CALCULATED T AXIS, ECG11: 77 DEGREES
CALCULATED T AXIS, ECG11: 90 DEGREES
CALCULATED T AXIS, ECG11: 95 DEGREES
CANNABINOIDS UR QL SCN: NEGATIVE
CHLORIDE BLD-SCNC: 102 MMOL/L (ref 98–107)
CHLORIDE BLD-SCNC: 104 MMOL/L (ref 98–107)
CHLORIDE BLD-SCNC: 107 MMOL/L (ref 98–107)
CHLORIDE BLD-SCNC: 110 MMOL/L (ref 98–107)
CHLORIDE BLD-SCNC: 111 MMOL/L (ref 98–107)
CHLORIDE BLD-SCNC: 111 MMOL/L (ref 98–107)
CHLORIDE BLD-SCNC: 99 MMOL/L (ref 98–107)
CHLORIDE SERPL-SCNC: 106 MMOL/L (ref 100–111)
CHLORIDE SERPL-SCNC: 106 MMOL/L (ref 100–111)
CHLORIDE SERPL-SCNC: 107 MMOL/L (ref 100–111)
CHLORIDE SERPL-SCNC: 108 MMOL/L (ref 100–111)
CHLORIDE SERPL-SCNC: 109 MMOL/L (ref 100–111)
CHLORIDE SERPL-SCNC: 110 MMOL/L (ref 100–111)
CHLORIDE SERPL-SCNC: 111 MMOL/L (ref 100–111)
CHLORIDE SERPL-SCNC: 113 MMOL/L (ref 100–111)
CO2 BLD-SCNC: 22 MMOL/L (ref 19–24)
CO2 BLD-SCNC: 23 MMOL/L (ref 19–24)
CO2 BLD-SCNC: 24 MMOL/L (ref 19–24)
CO2 BLD-SCNC: 25 MMOL/L (ref 19–24)
CO2 BLD-SCNC: 26 MMOL/L (ref 19–24)
CO2 SERPL-SCNC: 22 MMOL/L (ref 21–32)
CO2 SERPL-SCNC: 22 MMOL/L (ref 21–32)
CO2 SERPL-SCNC: 24 MMOL/L (ref 21–32)
CO2 SERPL-SCNC: 24 MMOL/L (ref 21–32)
CO2 SERPL-SCNC: 25 MMOL/L (ref 21–32)
CO2 SERPL-SCNC: 27 MMOL/L (ref 21–32)
CO2 SERPL-SCNC: 28 MMOL/L (ref 21–32)
CO2 SERPL-SCNC: 29 MMOL/L (ref 21–32)
COCAINE UR QL SCN: NEGATIVE
COLOR UR: ABNORMAL
COVID-19 RAPID TEST, COVR: NOT DETECTED
CREAT BLD-MCNC: 1 MG/DL (ref 0.6–1.3)
CREAT BLD-MCNC: 1.03 MG/DL (ref 0.6–1.3)
CREAT BLD-MCNC: 1.03 MG/DL (ref 0.6–1.3)
CREAT BLD-MCNC: 1.12 MG/DL (ref 0.6–1.3)
CREAT BLD-MCNC: 1.13 MG/DL (ref 0.6–1.3)
CREAT BLD-MCNC: 1.17 MG/DL (ref 0.6–1.3)
CREAT BLD-MCNC: 1.2 MG/DL (ref 0.6–1.3)
CREAT SERPL-MCNC: 0.73 MG/DL (ref 0.6–1.3)
CREAT SERPL-MCNC: 0.83 MG/DL (ref 0.6–1.3)
CREAT SERPL-MCNC: 0.84 MG/DL (ref 0.6–1.3)
CREAT SERPL-MCNC: 0.97 MG/DL (ref 0.6–1.3)
CREAT SERPL-MCNC: 1.01 MG/DL (ref 0.6–1.3)
CREAT SERPL-MCNC: 1.05 MG/DL (ref 0.6–1.3)
CREAT SERPL-MCNC: 1.05 MG/DL (ref 0.6–1.3)
CREAT SERPL-MCNC: 1.3 MG/DL (ref 0.6–1.3)
CREAT SERPL-MCNC: 1.5 MG/DL (ref 0.6–1.3)
CREAT SERPL-MCNC: 1.53 MG/DL (ref 0.6–1.3)
CROSSMATCH RESULT,%XM: NORMAL
CROSSMATCH RESULT,%XM: NORMAL
D50 ADMINISTERED, D50ADM: 0 ML
D50 ORDER, D50ORD: 0 ML
DIAGNOSIS, 93000: NORMAL
DIFFERENTIAL METHOD BLD: ABNORMAL
ECHO AO ROOT DIAM: 3.46 CM
ECHO LA AREA 4C: 18.44 CM2
ECHO LA VOL 2C: 59.92 ML (ref 18–58)
ECHO LA VOL 4C: 47.55 ML (ref 18–58)
ECHO LA VOL BP: 62.32 ML (ref 18–58)
ECHO LA VOL/BSA BIPLANE: 32.46 ML/M2 (ref 16–28)
ECHO LA VOLUME INDEX A2C: 31.21 ML/M2 (ref 16–28)
ECHO LA VOLUME INDEX A4C: 24.77 ML/M2 (ref 16–28)
ECHO LV E' LATERAL VELOCITY: 3.58 CM/S
ECHO LV E' SEPTAL VELOCITY: 3.58 CM/S
ECHO LV EDV A2C: 129.64 ML
ECHO LV EDV A4C: 129.31 ML
ECHO LV EDV BP: 139.06 ML (ref 67–155)
ECHO LV EDV INDEX A4C: 67.3 ML/M2
ECHO LV EDV INDEX BP: 72.4 ML/M2
ECHO LV EDV NDEX A2C: 67.5 ML/M2
ECHO LV EJECTION FRACTION A2C: 45 PERCENT
ECHO LV EJECTION FRACTION A4C: 33 PERCENT
ECHO LV EJECTION FRACTION BIPLANE: 42.9 PERCENT (ref 55–100)
ECHO LV ESV A2C: 71.09 ML
ECHO LV ESV A4C: 86.2 ML
ECHO LV ESV BP: 79.37 ML (ref 22–58)
ECHO LV ESV INDEX A2C: 37 ML/M2
ECHO LV ESV INDEX A4C: 44.9 ML/M2
ECHO LV ESV INDEX BP: 41.3 ML/M2
ECHO LV INTERNAL DIMENSION DIASTOLIC: 4.98 CM (ref 4.2–5.9)
ECHO LV INTERNAL DIMENSION DIASTOLIC: 5.59 CM (ref 4.2–5.9)
ECHO LV INTERNAL DIMENSION SYSTOLIC: 4.53 CM
ECHO LV INTERNAL DIMENSION SYSTOLIC: 4.88 CM
ECHO LV IVSD: 1.03 CM (ref 0.6–1)
ECHO LV IVSD: 1.27 CM (ref 0.6–1)
ECHO LV MASS 2D: 242.6 G (ref 88–224)
ECHO LV MASS 2D: 246 G (ref 88–224)
ECHO LV MASS INDEX 2D: 120.7 G/M2 (ref 49–115)
ECHO LV MASS INDEX 2D: 128.1 G/M2 (ref 49–115)
ECHO LV POSTERIOR WALL DIASTOLIC: 1.13 CM (ref 0.6–1)
ECHO LV POSTERIOR WALL DIASTOLIC: 1.23 CM (ref 0.6–1)
ECHO LVOT DIAM: 2.22 CM
ECHO LVOT PEAK GRADIENT: 1.08 MMHG
ECHO LVOT PEAK VELOCITY: 52.02 CM/S
ECHO LVOT SV: 48 ML
ECHO LVOT SV: 58.6 ML
ECHO LVOT VTI: 12.38 CM
ECHO MV A VELOCITY: 52.97 CM/S
ECHO MV E DECELERATION TIME (DT): 210.92 MS
ECHO MV E VELOCITY: 48.05 CM/S
ECHO MV E/A RATIO: 0.91
ECHO MV E/E' LATERAL: 13.42
ECHO MV E/E' RATIO (AVERAGED): 13.42
ECHO MV E/E' SEPTAL: 13.42
ECHO PVEIN A DURATION: 112.27 MS
ECHO PVEIN A VELOCITY: 25.82 CM/S
EOSINOPHIL # BLD: 0 K/UL (ref 0–0.4)
EOSINOPHIL # BLD: 0 K/UL (ref 0–0.4)
EOSINOPHIL # BLD: 0.1 K/UL (ref 0–0.4)
EOSINOPHIL # BLD: 0.3 K/UL (ref 0–0.4)
EOSINOPHIL # BLD: 0.4 K/UL (ref 0–0.4)
EOSINOPHIL # BLD: 0.6 K/UL (ref 0–0.4)
EOSINOPHIL NFR BLD: 0 % (ref 0–5)
EOSINOPHIL NFR BLD: 0 % (ref 0–5)
EOSINOPHIL NFR BLD: 10 % (ref 0–5)
EOSINOPHIL NFR BLD: 2 % (ref 0–5)
EOSINOPHIL NFR BLD: 2 % (ref 0–5)
EOSINOPHIL NFR BLD: 6 % (ref 0–5)
EPITH CASTS URNS QL MICRO: ABNORMAL /LPF (ref 0–5)
EPITH CASTS URNS QL MICRO: ABNORMAL /LPF (ref 0–5)
EPITH CASTS URNS QL MICRO: NORMAL /LPF (ref 0–5)
ERYTHROCYTE [DISTWIDTH] IN BLOOD BY AUTOMATED COUNT: 12.9 % (ref 11.6–14.5)
ERYTHROCYTE [DISTWIDTH] IN BLOOD BY AUTOMATED COUNT: 13.1 % (ref 11.6–14.5)
ERYTHROCYTE [DISTWIDTH] IN BLOOD BY AUTOMATED COUNT: 13.3 % (ref 11.6–14.5)
ERYTHROCYTE [DISTWIDTH] IN BLOOD BY AUTOMATED COUNT: 13.4 % (ref 11.6–14.5)
ERYTHROCYTE [DISTWIDTH] IN BLOOD BY AUTOMATED COUNT: 13.7 % (ref 11.6–14.5)
ERYTHROCYTE [DISTWIDTH] IN BLOOD BY AUTOMATED COUNT: 14.2 % (ref 11.6–14.5)
ERYTHROCYTE [DISTWIDTH] IN BLOOD BY AUTOMATED COUNT: 14.6 % (ref 11.6–14.5)
ERYTHROCYTE [DISTWIDTH] IN BLOOD BY AUTOMATED COUNT: 16.2 % (ref 11.6–14.5)
ERYTHROCYTE [DISTWIDTH] IN BLOOD BY AUTOMATED COUNT: 17.2 % (ref 11.6–14.5)
EST. AVERAGE GLUCOSE BLD GHB EST-MCNC: 117 MG/DL
ETHANOL SERPL-MCNC: 3 MG/DL (ref 0–3)
ETHANOL SERPL-MCNC: <3 MG/DL (ref 0–3)
FLUAV RNA SPEC QL NAA+PROBE: NOT DETECTED
FLUBV RNA SPEC QL NAA+PROBE: NOT DETECTED
GAS FLOW.O2 O2 DELIVERY SYS: ABNORMAL L/MIN
GAS FLOW.O2 SETTING OXYMISER: 14 BPM
GAS FLOW.O2 SETTING OXYMISER: 16 BPM
GAS FLOW.O2 SETTING OXYMISER: 19 BPM
GGT SERPL-CCNC: 519 U/L (ref 15–85)
GLOBULIN SER CALC-MCNC: 3.3 G/DL (ref 2–4)
GLOBULIN SER CALC-MCNC: 3.3 G/DL (ref 2–4)
GLOBULIN SER CALC-MCNC: 3.6 G/DL (ref 2–4)
GLOBULIN SER CALC-MCNC: 3.8 G/DL (ref 2–4)
GLUCOSE BLD STRIP.AUTO-MCNC: 100 MG/DL (ref 70–110)
GLUCOSE BLD STRIP.AUTO-MCNC: 100 MG/DL (ref 74–106)
GLUCOSE BLD STRIP.AUTO-MCNC: 100 MG/DL (ref 74–106)
GLUCOSE BLD STRIP.AUTO-MCNC: 101 MG/DL (ref 70–110)
GLUCOSE BLD STRIP.AUTO-MCNC: 102 MG/DL (ref 70–110)
GLUCOSE BLD STRIP.AUTO-MCNC: 106 MG/DL (ref 70–110)
GLUCOSE BLD STRIP.AUTO-MCNC: 108 MG/DL (ref 70–110)
GLUCOSE BLD STRIP.AUTO-MCNC: 108 MG/DL (ref 70–110)
GLUCOSE BLD STRIP.AUTO-MCNC: 112 MG/DL (ref 70–110)
GLUCOSE BLD STRIP.AUTO-MCNC: 112 MG/DL (ref 70–110)
GLUCOSE BLD STRIP.AUTO-MCNC: 113 MG/DL (ref 70–110)
GLUCOSE BLD STRIP.AUTO-MCNC: 113 MG/DL (ref 74–106)
GLUCOSE BLD STRIP.AUTO-MCNC: 114 MG/DL (ref 70–110)
GLUCOSE BLD STRIP.AUTO-MCNC: 114 MG/DL (ref 70–110)
GLUCOSE BLD STRIP.AUTO-MCNC: 115 MG/DL (ref 70–110)
GLUCOSE BLD STRIP.AUTO-MCNC: 116 MG/DL (ref 70–110)
GLUCOSE BLD STRIP.AUTO-MCNC: 117 MG/DL (ref 70–110)
GLUCOSE BLD STRIP.AUTO-MCNC: 118 MG/DL (ref 70–110)
GLUCOSE BLD STRIP.AUTO-MCNC: 120 MG/DL (ref 70–110)
GLUCOSE BLD STRIP.AUTO-MCNC: 121 MG/DL (ref 70–110)
GLUCOSE BLD STRIP.AUTO-MCNC: 122 MG/DL (ref 70–110)
GLUCOSE BLD STRIP.AUTO-MCNC: 122 MG/DL (ref 74–106)
GLUCOSE BLD STRIP.AUTO-MCNC: 123 MG/DL (ref 70–110)
GLUCOSE BLD STRIP.AUTO-MCNC: 125 MG/DL (ref 70–110)
GLUCOSE BLD STRIP.AUTO-MCNC: 126 MG/DL (ref 70–110)
GLUCOSE BLD STRIP.AUTO-MCNC: 128 MG/DL (ref 74–106)
GLUCOSE BLD STRIP.AUTO-MCNC: 129 MG/DL (ref 70–110)
GLUCOSE BLD STRIP.AUTO-MCNC: 130 MG/DL (ref 74–106)
GLUCOSE BLD STRIP.AUTO-MCNC: 132 MG/DL (ref 70–110)
GLUCOSE BLD STRIP.AUTO-MCNC: 134 MG/DL (ref 70–110)
GLUCOSE BLD STRIP.AUTO-MCNC: 135 MG/DL (ref 70–110)
GLUCOSE BLD STRIP.AUTO-MCNC: 135 MG/DL (ref 74–106)
GLUCOSE BLD STRIP.AUTO-MCNC: 135 MG/DL (ref 74–106)
GLUCOSE BLD STRIP.AUTO-MCNC: 140 MG/DL (ref 70–110)
GLUCOSE BLD STRIP.AUTO-MCNC: 140 MG/DL (ref 70–110)
GLUCOSE BLD STRIP.AUTO-MCNC: 141 MG/DL (ref 74–106)
GLUCOSE BLD STRIP.AUTO-MCNC: 143 MG/DL (ref 70–110)
GLUCOSE BLD STRIP.AUTO-MCNC: 151 MG/DL (ref 70–110)
GLUCOSE BLD STRIP.AUTO-MCNC: 153 MG/DL (ref 70–110)
GLUCOSE BLD STRIP.AUTO-MCNC: 162 MG/DL (ref 70–110)
GLUCOSE BLD STRIP.AUTO-MCNC: 81 MG/DL (ref 70–110)
GLUCOSE BLD STRIP.AUTO-MCNC: 98 MG/DL (ref 70–110)
GLUCOSE SERPL-MCNC: 102 MG/DL (ref 74–99)
GLUCOSE SERPL-MCNC: 109 MG/DL (ref 74–99)
GLUCOSE SERPL-MCNC: 119 MG/DL (ref 74–99)
GLUCOSE SERPL-MCNC: 120 MG/DL (ref 74–99)
GLUCOSE SERPL-MCNC: 129 MG/DL (ref 74–99)
GLUCOSE SERPL-MCNC: 146 MG/DL (ref 74–99)
GLUCOSE SERPL-MCNC: 152 MG/DL (ref 74–99)
GLUCOSE SERPL-MCNC: 153 MG/DL (ref 74–99)
GLUCOSE SERPL-MCNC: 153 MG/DL (ref 74–99)
GLUCOSE SERPL-MCNC: 77 MG/DL (ref 74–99)
GLUCOSE UR STRIP.AUTO-MCNC: 100 MG/DL
GLUCOSE UR STRIP.AUTO-MCNC: NEGATIVE MG/DL
GLUCOSE UR STRIP.AUTO-MCNC: NEGATIVE MG/DL
GLUCOSE, GLC: 100 MG/DL
GLUCOSE, GLC: 102 MG/DL
GLUCOSE, GLC: 108 MG/DL
GLUCOSE, GLC: 109 MG/DL
GLUCOSE, GLC: 112 MG/DL
GLUCOSE, GLC: 113 MG/DL
GLUCOSE, GLC: 115 MG/DL
GLUCOSE, GLC: 116 MG/DL
GLUCOSE, GLC: 116 MG/DL
GLUCOSE, GLC: 117 MG/DL
GLUCOSE, GLC: 121 MG/DL
GLUCOSE, GLC: 122 MG/DL
GLUCOSE, GLC: 125 MG/DL
GLUCOSE, GLC: 134 MG/DL
GLUCOSE, GLC: 135 MG/DL
GLUCOSE, GLC: 140 MG/DL
GLUCOSE, GLC: 162 MG/DL
HAV IGM SER QL: NEGATIVE
HBA1C MFR BLD: 5.7 % (ref 4.2–5.6)
HBV CORE IGM SER QL: NEGATIVE
HBV SURFACE AG SER QL: <0.1 INDEX
HBV SURFACE AG SER QL: NEGATIVE
HCO3 BLD-SCNC: 17.1 MMOL/L (ref 22–26)
HCO3 BLD-SCNC: 21.4 MMOL/L (ref 22–26)
HCO3 BLD-SCNC: 22.2 MMOL/L (ref 22–26)
HCO3 BLD-SCNC: 22.4 MMOL/L (ref 22–26)
HCO3 BLD-SCNC: 22.5 MMOL/L (ref 22–26)
HCO3 BLD-SCNC: 22.7 MMOL/L (ref 22–26)
HCO3 BLD-SCNC: 22.8 MMOL/L (ref 22–26)
HCO3 BLD-SCNC: 22.9 MMOL/L (ref 22–26)
HCO3 BLD-SCNC: 23.3 MMOL/L (ref 22–26)
HCO3 BLD-SCNC: 23.3 MMOL/L (ref 22–26)
HCO3 BLD-SCNC: 24.3 MMOL/L (ref 22–26)
HCO3 BLD-SCNC: 24.5 MMOL/L (ref 22–26)
HCO3 BLD-SCNC: 25.9 MMOL/L (ref 22–26)
HCT VFR BLD AUTO: 22.8 % (ref 36–48)
HCT VFR BLD AUTO: 24.2 % (ref 36–48)
HCT VFR BLD AUTO: 26.1 % (ref 36–48)
HCT VFR BLD AUTO: 32.8 % (ref 36–48)
HCT VFR BLD AUTO: 36.4 % (ref 36–48)
HCT VFR BLD AUTO: 37.3 % (ref 36–48)
HCT VFR BLD AUTO: 37.4 % (ref 36–48)
HCT VFR BLD AUTO: 40.8 % (ref 36–48)
HCT VFR BLD AUTO: 42.2 % (ref 36–48)
HCT VFR BLD CALC: 22 % (ref 36–49)
HCT VFR BLD CALC: 24 % (ref 36–49)
HCT VFR BLD CALC: 24 % (ref 36–49)
HCT VFR BLD CALC: 25 % (ref 36–49)
HCT VFR BLD CALC: 25 % (ref 36–49)
HCT VFR BLD CALC: 26 % (ref 36–49)
HCT VFR BLD CALC: 27 % (ref 36–49)
HCT VFR BLD CALC: 30 % (ref 36–49)
HCT VFR BLD CALC: 34 % (ref 36–49)
HCV AB SER IA-ACNC: 0.2 INDEX
HCV AB SERPL QL IA: NEGATIVE
HCV COMMENT,HCGAC: NORMAL
HGB BLD-MCNC: 10.4 G/DL (ref 12–16)
HGB BLD-MCNC: 11.5 G/DL (ref 13–16)
HGB BLD-MCNC: 11.6 G/DL (ref 12–16)
HGB BLD-MCNC: 12.4 G/DL (ref 13–16)
HGB BLD-MCNC: 12.4 G/DL (ref 13–16)
HGB BLD-MCNC: 12.6 G/DL (ref 13–16)
HGB BLD-MCNC: 14.2 G/DL (ref 13–16)
HGB BLD-MCNC: 14.7 G/DL (ref 13–16)
HGB BLD-MCNC: 7.6 G/DL (ref 12–16)
HGB BLD-MCNC: 7.7 G/DL (ref 13–16)
HGB BLD-MCNC: 8.1 G/DL (ref 12–16)
HGB BLD-MCNC: 8.2 G/DL (ref 12–16)
HGB BLD-MCNC: 8.4 G/DL (ref 12–16)
HGB BLD-MCNC: 8.4 G/DL (ref 13–16)
HGB BLD-MCNC: 8.6 G/DL (ref 12–16)
HGB BLD-MCNC: 8.6 G/DL (ref 13–16)
HGB BLD-MCNC: 8.7 G/DL (ref 12–16)
HGB BLD-MCNC: 9.3 G/DL (ref 12–16)
HGB UR QL STRIP: ABNORMAL
HGB UR QL STRIP: NEGATIVE
HGB UR QL STRIP: NEGATIVE
HIGH TARGET, HITG: 150 MG/DL
HISTORY CHECKED?,CKHIST: NORMAL
IMM GRANULOCYTES # BLD AUTO: 0 K/UL (ref 0–0.04)
IMM GRANULOCYTES # BLD AUTO: 0.1 K/UL (ref 0–0.04)
IMM GRANULOCYTES # BLD AUTO: 0.1 K/UL (ref 0–0.04)
IMM GRANULOCYTES NFR BLD AUTO: 0 % (ref 0–0.5)
IMM GRANULOCYTES NFR BLD AUTO: 1 % (ref 0–0.5)
IMM GRANULOCYTES NFR BLD AUTO: 1 % (ref 0–0.5)
INR PPP: 1 (ref 0.8–1.2)
INR PPP: 1.1 (ref 0.8–1.2)
INR PPP: 1.3 (ref 0.8–1.2)
INR PPP: 1.5 (ref 0.8–1.2)
INSULIN ADMINSTERED, INSADM: 0.8 UNITS/HOUR
INSULIN ADMINSTERED, INSADM: 0.8 UNITS/HOUR
INSULIN ADMINSTERED, INSADM: 1 UNITS/HOUR
INSULIN ADMINSTERED, INSADM: 1.1 UNITS/HOUR
INSULIN ADMINSTERED, INSADM: 1.2 UNITS/HOUR
INSULIN ADMINSTERED, INSADM: 1.3 UNITS/HOUR
INSULIN ADMINSTERED, INSADM: 1.5 UNITS/HOUR
INSULIN ADMINSTERED, INSADM: 1.5 UNITS/HOUR
INSULIN ADMINSTERED, INSADM: 1.6 UNITS/HOUR
INSULIN ADMINSTERED, INSADM: 2 UNITS/HOUR
INSULIN ORDER, INSORD: 0.8 UNITS/HOUR
INSULIN ORDER, INSORD: 0.8 UNITS/HOUR
INSULIN ORDER, INSORD: 1 UNITS/HOUR
INSULIN ORDER, INSORD: 1.1 UNITS/HOUR
INSULIN ORDER, INSORD: 1.2 UNITS/HOUR
INSULIN ORDER, INSORD: 1.3 UNITS/HOUR
INSULIN ORDER, INSORD: 1.5 UNITS/HOUR
INSULIN ORDER, INSORD: 1.5 UNITS/HOUR
INSULIN ORDER, INSORD: 1.6 UNITS/HOUR
INSULIN ORDER, INSORD: 2 UNITS/HOUR
KETONES UR QL STRIP.AUTO: ABNORMAL MG/DL
KETONES UR QL STRIP.AUTO: ABNORMAL MG/DL
KETONES UR QL STRIP.AUTO: NEGATIVE MG/DL
LACTATE BLD-SCNC: 3.14 MMOL/L (ref 0.4–2)
LACTATE BLD-SCNC: 5.15 MMOL/L (ref 0.4–2)
LEFT BULB EDV: 8.7 CM/S
LEFT BULB PSV: 61.4 CM/S
LEFT CCA DIST DIAS: 14.4 CM/S
LEFT CCA DIST SYS: 77.4 CM/S
LEFT CCA MID DIAS: 12.76 CM/S
LEFT CCA MID SYS: 90.28 CM/S
LEFT CCA PROX DIAS: 14.4 CM/S
LEFT CCA PROX SYS: 116.1 CM/S
LEFT ECA DIAS: 0 CM/S
LEFT ECA SYS: 77.6 CM/S
LEFT ICA DIST DIAS: 12.5 CM/S
LEFT ICA DIST SYS: 48.1 CM/S
LEFT ICA MID DIAS: 16.9 CM/S
LEFT ICA MID SYS: 54.7 CM/S
LEFT ICA PROX DIAS: 16.1 CM/S
LEFT ICA PROX SYS: 57.2 CM/S
LEFT ICA/CCA SYS: 0.79
LEFT SUBCLAVIAN DIAS: 0 CM/S
LEFT SUBCLAVIAN SYS: 127.5 CM/S
LEFT VERTEBRAL DIAS: 7.29 CM/S
LEFT VERTEBRAL SYS: 38 CM/S
LEUKOCYTE ESTERASE UR QL STRIP.AUTO: ABNORMAL
LEUKOCYTE ESTERASE UR QL STRIP.AUTO: NEGATIVE
LEUKOCYTE ESTERASE UR QL STRIP.AUTO: NEGATIVE
LOW TARGET, LOT: 80 MG/DL
LVOT MG: 0.55 MMHG
LYMPHOCYTES # BLD: 0.5 K/UL (ref 0.9–3.6)
LYMPHOCYTES # BLD: 0.9 K/UL (ref 0.9–3.6)
LYMPHOCYTES # BLD: 1.3 K/UL (ref 0.9–3.6)
LYMPHOCYTES # BLD: 1.5 K/UL (ref 0.9–3.6)
LYMPHOCYTES # BLD: 1.5 K/UL (ref 0.9–3.6)
LYMPHOCYTES # BLD: 2.1 K/UL (ref 0.9–3.6)
LYMPHOCYTES NFR BLD: 16 % (ref 21–52)
LYMPHOCYTES NFR BLD: 19 % (ref 21–52)
LYMPHOCYTES NFR BLD: 22 % (ref 21–52)
LYMPHOCYTES NFR BLD: 25 % (ref 21–52)
LYMPHOCYTES NFR BLD: 5 % (ref 21–52)
LYMPHOCYTES NFR BLD: 8 % (ref 21–52)
MAGNESIUM SERPL-MCNC: 2 MG/DL (ref 1.6–2.6)
MAGNESIUM SERPL-MCNC: 2.1 MG/DL (ref 1.6–2.6)
MAGNESIUM SERPL-MCNC: 2.4 MG/DL (ref 1.6–2.6)
MAGNESIUM SERPL-MCNC: 2.6 MG/DL (ref 1.6–2.6)
MCH RBC QN AUTO: 32.3 PG (ref 24–34)
MCH RBC QN AUTO: 33 PG (ref 24–34)
MCH RBC QN AUTO: 33 PG (ref 24–34)
MCH RBC QN AUTO: 33.2 PG (ref 24–34)
MCH RBC QN AUTO: 33.5 PG (ref 24–34)
MCH RBC QN AUTO: 33.5 PG (ref 24–34)
MCH RBC QN AUTO: 33.9 PG (ref 24–34)
MCHC RBC AUTO-ENTMCNC: 33 G/DL (ref 31–37)
MCHC RBC AUTO-ENTMCNC: 33.2 G/DL (ref 31–37)
MCHC RBC AUTO-ENTMCNC: 33.2 G/DL (ref 31–37)
MCHC RBC AUTO-ENTMCNC: 33.8 G/DL (ref 31–37)
MCHC RBC AUTO-ENTMCNC: 34.6 G/DL (ref 31–37)
MCHC RBC AUTO-ENTMCNC: 34.7 G/DL (ref 31–37)
MCHC RBC AUTO-ENTMCNC: 34.8 G/DL (ref 31–37)
MCHC RBC AUTO-ENTMCNC: 34.8 G/DL (ref 31–37)
MCHC RBC AUTO-ENTMCNC: 35.1 G/DL (ref 31–37)
MCV RBC AUTO: 100 FL (ref 78–100)
MCV RBC AUTO: 100.4 FL (ref 74–97)
MCV RBC AUTO: 100.8 FL (ref 74–97)
MCV RBC AUTO: 94.8 FL (ref 74–97)
MCV RBC AUTO: 94.9 FL (ref 74–97)
MCV RBC AUTO: 95.6 FL (ref 74–97)
MCV RBC AUTO: 96 FL (ref 78–100)
MCV RBC AUTO: 96.4 FL (ref 74–97)
MCV RBC AUTO: 97.4 FL (ref 78–100)
METHADONE UR QL: NEGATIVE
MINUTES UNTIL NEXT BG, NBG: 60 MIN
MONOCYTES # BLD: 0.5 K/UL (ref 0.05–1.2)
MONOCYTES # BLD: 0.5 K/UL (ref 0.05–1.2)
MONOCYTES # BLD: 0.8 K/UL (ref 0.05–1.2)
MONOCYTES # BLD: 0.8 K/UL (ref 0.05–1.2)
MONOCYTES # BLD: 0.9 K/UL (ref 0.05–1.2)
MONOCYTES # BLD: 1.4 K/UL (ref 0.05–1.2)
MONOCYTES NFR BLD: 12 % (ref 3–10)
MONOCYTES NFR BLD: 5 % (ref 3–10)
MONOCYTES NFR BLD: 7 % (ref 3–10)
MONOCYTES NFR BLD: 9 % (ref 3–10)
MUCOUS THREADS URNS QL MICRO: ABNORMAL /LPF
MUCOUS THREADS URNS QL MICRO: ABNORMAL /LPF
MULTIPLIER, MUL: 0.02
NEUTS SEG # BLD: 3.2 K/UL (ref 1.8–8)
NEUTS SEG # BLD: 4.1 K/UL (ref 1.8–8)
NEUTS SEG # BLD: 4.3 K/UL (ref 1.8–8)
NEUTS SEG # BLD: 7.7 K/UL (ref 1.8–8)
NEUTS SEG # BLD: 9.4 K/UL (ref 1.8–8)
NEUTS SEG # BLD: 9.6 K/UL (ref 1.8–8)
NEUTS SEG NFR BLD: 55 % (ref 40–73)
NEUTS SEG NFR BLD: 60 % (ref 40–73)
NEUTS SEG NFR BLD: 66 % (ref 40–73)
NEUTS SEG NFR BLD: 74 % (ref 40–73)
NEUTS SEG NFR BLD: 79 % (ref 40–73)
NEUTS SEG NFR BLD: 88 % (ref 40–73)
NITRITE UR QL STRIP.AUTO: NEGATIVE
NRBC # BLD: 0 K/UL (ref 0–0.01)
NRBC # BLD: 0.15 K/UL (ref 0–0.01)
NRBC # BLD: 0.19 K/UL (ref 0–0.01)
NRBC BLD-RTO: 0 PER 100 WBC
NRBC BLD-RTO: 1.6 PER 100 WBC
NRBC BLD-RTO: 1.7 PER 100 WBC
O2/TOTAL GAS SETTING VFR VENT: 100 %
O2/TOTAL GAS SETTING VFR VENT: 40 %
OPIATES UR QL: POSITIVE
ORDER INITIALS, ORDINIT: NORMAL
OTHER,OTHU: ABNORMAL
P-R INTERVAL, ECG05: 148 MS
P-R INTERVAL, ECG05: 174 MS
P-R INTERVAL, ECG05: 180 MS
P-R INTERVAL, ECG05: 202 MS
PAW @ MEAN EXP FLOW ON VENT: 8.1 CMH2O
PCO2 BLD: 29.5 MMHG (ref 35–45)
PCO2 BLD: 33.2 MMHG (ref 35–45)
PCO2 BLD: 34.7 MMHG (ref 35–45)
PCO2 BLD: 38 MMHG (ref 35–45)
PCO2 BLD: 40.8 MMHG (ref 35–45)
PCO2 BLD: 42.2 MMHG (ref 35–45)
PCO2 BLD: 42.5 MMHG (ref 35–45)
PCO2 BLD: 42.7 MMHG (ref 35–45)
PCO2 BLD: 44.5 MMHG (ref 35–45)
PCO2 BLD: 45.9 MMHG (ref 35–45)
PCO2 BLD: 47.1 MMHG (ref 35–45)
PCO2 BLD: 47.6 MMHG (ref 35–45)
PCO2 BLD: 53 MMHG (ref 35–45)
PCP UR QL: NEGATIVE
PEEP RESPIRATORY: 5 CMH2O
PEEP RESPIRATORY: 5 CMH2O
PH BLD: 7.25 [PH] (ref 7.35–7.45)
PH BLD: 7.29 [PH] (ref 7.35–7.45)
PH BLD: 7.29 [PH] (ref 7.35–7.45)
PH BLD: 7.3 [PH] (ref 7.35–7.45)
PH BLD: 7.33 [PH] (ref 7.35–7.45)
PH BLD: 7.34 [PH] (ref 7.35–7.45)
PH BLD: 7.34 [PH] (ref 7.35–7.45)
PH BLD: 7.37 [PH] (ref 7.35–7.45)
PH BLD: 7.37 [PH] (ref 7.35–7.45)
PH BLD: 7.38 [PH] (ref 7.35–7.45)
PH BLD: 7.4 [PH] (ref 7.35–7.45)
PH BLD: 7.41 [PH] (ref 7.35–7.45)
PH BLD: 7.44 [PH] (ref 7.35–7.45)
PH UR STRIP: 5 [PH] (ref 5–8)
PH UR STRIP: 5 [PH] (ref 5–8)
PH UR STRIP: 5.5 [PH] (ref 5–8)
PIP ISTAT,IPIP: 15
PLATELET # BLD AUTO: 100 K/UL (ref 135–420)
PLATELET # BLD AUTO: 101 K/UL (ref 135–420)
PLATELET # BLD AUTO: 130 K/UL (ref 135–420)
PLATELET # BLD AUTO: 137 K/UL (ref 135–420)
PLATELET # BLD AUTO: 182 K/UL (ref 135–420)
PLATELET # BLD AUTO: 212 K/UL (ref 135–420)
PLATELET # BLD AUTO: 220 K/UL (ref 135–420)
PLATELET # BLD AUTO: 291 K/UL (ref 135–420)
PLATELET # BLD AUTO: 90 K/UL (ref 135–420)
PMV BLD AUTO: 12.3 FL (ref 9.2–11.8)
PMV BLD AUTO: 12.3 FL (ref 9.2–11.8)
PMV BLD AUTO: 9.3 FL (ref 9.2–11.8)
PMV BLD AUTO: 9.4 FL (ref 9.2–11.8)
PMV BLD AUTO: 9.5 FL (ref 9.2–11.8)
PMV BLD AUTO: 9.6 FL (ref 9.2–11.8)
PMV BLD AUTO: 9.6 FL (ref 9.2–11.8)
PMV BLD AUTO: 9.7 FL (ref 9.2–11.8)
PMV BLD AUTO: 9.8 FL (ref 9.2–11.8)
PO2 BLD: 107 MMHG (ref 80–100)
PO2 BLD: 113 MMHG (ref 80–100)
PO2 BLD: 142 MMHG (ref 80–100)
PO2 BLD: 221 MMHG (ref 80–100)
PO2 BLD: 244 MMHG (ref 80–100)
PO2 BLD: 301 MMHG (ref 80–100)
PO2 BLD: 319 MMHG (ref 80–100)
PO2 BLD: 362 MMHG (ref 80–100)
PO2 BLD: 417 MMHG (ref 80–100)
PO2 BLD: 562 MMHG (ref 80–100)
PO2 BLD: 90 MMHG (ref 80–100)
PO2 BLD: 95 MMHG (ref 80–100)
PO2 BLD: 98 MMHG (ref 80–100)
POTASSIUM BLD-SCNC: 3.2 MMOL/L (ref 3.5–5.5)
POTASSIUM BLD-SCNC: 3.9 MMOL/L (ref 3.5–5.5)
POTASSIUM BLD-SCNC: 4 MMOL/L (ref 3.5–5.5)
POTASSIUM BLD-SCNC: 4.2 MMOL/L (ref 3.5–5.5)
POTASSIUM BLD-SCNC: 4.3 MMOL/L (ref 3.5–5.5)
POTASSIUM BLD-SCNC: 4.3 MMOL/L (ref 3.5–5.5)
POTASSIUM BLD-SCNC: 4.4 MMOL/L (ref 3.5–5.5)
POTASSIUM BLD-SCNC: 4.4 MMOL/L (ref 3.5–5.5)
POTASSIUM BLD-SCNC: 4.6 MMOL/L (ref 3.5–5.5)
POTASSIUM SERPL-SCNC: 3.6 MMOL/L (ref 3.5–5.5)
POTASSIUM SERPL-SCNC: 3.7 MMOL/L (ref 3.5–5.5)
POTASSIUM SERPL-SCNC: 3.9 MMOL/L (ref 3.5–5.5)
POTASSIUM SERPL-SCNC: 4.1 MMOL/L (ref 3.5–5.5)
POTASSIUM SERPL-SCNC: 4.5 MMOL/L (ref 3.5–5.5)
POTASSIUM SERPL-SCNC: 4.5 MMOL/L (ref 3.5–5.5)
POTASSIUM SERPL-SCNC: 4.8 MMOL/L (ref 3.5–5.5)
PROT SERPL-MCNC: 6.6 G/DL (ref 6.4–8.2)
PROT SERPL-MCNC: 6.9 G/DL (ref 6.4–8.2)
PROT SERPL-MCNC: 7.5 G/DL (ref 6.4–8.2)
PROT SERPL-MCNC: 7.5 G/DL (ref 6.4–8.2)
PROT UR STRIP-MCNC: 100 MG/DL
PROT UR STRIP-MCNC: ABNORMAL MG/DL
PROT UR STRIP-MCNC: NEGATIVE MG/DL
PROTHROMBIN TIME: 13.3 SEC (ref 11.5–15.2)
PROTHROMBIN TIME: 13.7 SEC (ref 11.5–15.2)
PROTHROMBIN TIME: 16.1 SEC (ref 11.5–15.2)
PROTHROMBIN TIME: 18 SEC (ref 11.5–15.2)
Q-T INTERVAL, ECG07: 366 MS
Q-T INTERVAL, ECG07: 402 MS
Q-T INTERVAL, ECG07: 444 MS
Q-T INTERVAL, ECG07: 448 MS
Q-T INTERVAL, ECG07: 458 MS
QRS DURATION, ECG06: 120 MS
QRS DURATION, ECG06: 128 MS
QRS DURATION, ECG06: 128 MS
QRS DURATION, ECG06: 134 MS
QRS DURATION, ECG06: 134 MS
QTC CALCULATION (BEZET), ECG08: 469 MS
QTC CALCULATION (BEZET), ECG08: 537 MS
QTC CALCULATION (BEZET), ECG08: 548 MS
QTC CALCULATION (BEZET), ECG08: 556 MS
QTC CALCULATION (BEZET), ECG08: 572 MS
RBC # BLD AUTO: 2.27 M/UL (ref 4.35–5.65)
RBC # BLD AUTO: 2.51 M/UL (ref 4.35–5.65)
RBC # BLD AUTO: 2.59 M/UL (ref 4.35–5.65)
RBC # BLD AUTO: 3.43 M/UL (ref 4.35–5.65)
RBC # BLD AUTO: 3.73 M/UL (ref 4.35–5.65)
RBC # BLD AUTO: 3.79 M/UL (ref 4.35–5.65)
RBC # BLD AUTO: 3.84 M/UL (ref 4.35–5.65)
RBC # BLD AUTO: 4.3 M/UL (ref 4.35–5.65)
RBC # BLD AUTO: 4.45 M/UL (ref 4.35–5.65)
RBC #/AREA URNS HPF: ABNORMAL /HPF (ref 0–5)
RBC #/AREA URNS HPF: NEGATIVE /HPF (ref 0–5)
RBC #/AREA URNS HPF: NEGATIVE /HPF (ref 0–5)
RIGHT BULB EDV: 9 CM/S
RIGHT BULB PSV: 65.1 CM/S
RIGHT CCA DIST DIAS: 11.2 CM/S
RIGHT CCA DIST SYS: 76.4 CM/S
RIGHT CCA MID DIAS: 13.83 CM/S
RIGHT CCA MID SYS: 93.39 CM/S
RIGHT CCA PROX DIAS: 12.9 CM/S
RIGHT CCA PROX SYS: 92.5 CM/S
RIGHT ECA DIAS: 0 CM/S
RIGHT ECA SYS: 61.2 CM/S
RIGHT ICA DIST DIAS: 20.4 CM/S
RIGHT ICA DIST SYS: 69.9 CM/S
RIGHT ICA MID DIAS: 13.2 CM/S
RIGHT ICA MID SYS: 43.1 CM/S
RIGHT ICA PROX DIAS: 14.7 CM/S
RIGHT ICA PROX SYS: 46 CM/S
RIGHT ICA/CCA SYS: 0.9
RIGHT SUBCLAVIAN DIAS: 0 CM/S
RIGHT SUBCLAVIAN SYS: 100 CM/S
RIGHT VERTEBRAL DIAS: 11.1 CM/S
RIGHT VERTEBRAL SYS: 45.3 CM/S
SAO2 % BLD: 100 % (ref 92–97)
SAO2 % BLD: 95.8 % (ref 92–97)
SAO2 % BLD: 96.9 % (ref 92–97)
SAO2 % BLD: 97.2 % (ref 92–97)
SAO2 % BLD: 97.4 % (ref 92–97)
SAO2 % BLD: 98.4 % (ref 92–97)
SAO2 % BLD: 98.7 % (ref 92–97)
SAO2 % BLD: 99.8 % (ref 92–97)
SAO2 % BLD: 99.8 % (ref 92–97)
SAO2 % BLD: 99.9 % (ref 92–97)
SAO2 % BLD: 99.9 % (ref 92–97)
SARS-COV-2, COV2: DETECTED
SARS-COV-2, COV2: NORMAL
SERVICE CMNT-IMP: ABNORMAL
SERVICE CMNT-IMP: NORMAL
SODIUM BLD-SCNC: 136 MMOL/L (ref 136–145)
SODIUM BLD-SCNC: 138 MMOL/L (ref 136–145)
SODIUM BLD-SCNC: 139 MMOL/L (ref 136–145)
SODIUM BLD-SCNC: 143 MMOL/L (ref 136–145)
SODIUM BLD-SCNC: 145 MMOL/L (ref 136–145)
SODIUM SERPL-SCNC: 140 MMOL/L (ref 136–145)
SODIUM SERPL-SCNC: 140 MMOL/L (ref 136–145)
SODIUM SERPL-SCNC: 141 MMOL/L (ref 136–145)
SODIUM SERPL-SCNC: 142 MMOL/L (ref 136–145)
SOURCE, COVRS: NORMAL
SP GR UR REFRACTOMETRY: 1.02 (ref 1–1.03)
SP GR UR REFRACTOMETRY: 1.03 (ref 1–1.03)
SP GR UR REFRACTOMETRY: >1.03 (ref 1–1.03)
SP1: NORMAL
SP2: NORMAL
SP3: NORMAL
SPECIMEN EXP DATE BLD: NORMAL
SPECIMEN TYPE: ABNORMAL
STATUS OF UNIT,%ST: NORMAL
STATUS OF UNIT,%ST: NORMAL
STRESS BASELINE DIAS BP: 82 MMHG
STRESS BASELINE HR: 65 BPM
STRESS BASELINE SYS BP: 140 MMHG
STRESS ESTIMATED WORKLOAD: 1.5 METS
STRESS EXERCISE DUR MIN: NORMAL
STRESS PEAK DIAS BP: 80 MMHG
STRESS PEAK SYS BP: 150 MMHG
STRESS PERCENT HR ACHIEVED: 69 %
STRESS POST PEAK HR: 97 BPM
STRESS RATE PRESSURE PRODUCT: NORMAL BPM*MMHG
STRESS ST DEPRESSION: 0 MM
STRESS ST ELEVATION: 0 MM
STRESS STAGE 1 BP: NORMAL MMHG
STRESS STAGE 1 DURATION: 3 MIN:SEC
STRESS STAGE 1 HR: 95 BPM
STRESS STAGE RECOVERY 1 BP: NORMAL MMHG
STRESS STAGE RECOVERY 1 DURATION: 1 MIN:SEC
STRESS STAGE RECOVERY 1 HR: 73 BPM
STRESS TARGET HR: 141 BPM
TROPONIN-HIGH SENSITIVITY: 36 NG/L (ref 0–78)
TROPONIN-HIGH SENSITIVITY: 37 NG/L (ref 0–78)
TROPONIN-HIGH SENSITIVITY: 44 NG/L (ref 0–78)
TSH SERPL DL<=0.05 MIU/L-ACNC: 2.17 UIU/ML (ref 0.36–3.74)
TSH SERPL DL<=0.05 MIU/L-ACNC: 3.53 UIU/ML (ref 0.36–3.74)
UNIT DIVISION, %UDIV: 0
UNIT DIVISION, %UDIV: 0
UROBILINOGEN UR QL STRIP.AUTO: 1 EU/DL (ref 0.2–1)
UROBILINOGEN UR QL STRIP.AUTO: 1 EU/DL (ref 0.2–1)
UROBILINOGEN UR QL STRIP.AUTO: 2 EU/DL (ref 0.2–1)
VENTILATION MODE VENT: ABNORMAL
VENTILATION MODE VENT: ABNORMAL
VENTRICULAR RATE, ECG03: 112 BPM
VENTRICULAR RATE, ECG03: 139 BPM
VENTRICULAR RATE, ECG03: 66 BPM
VENTRICULAR RATE, ECG03: 88 BPM
VENTRICULAR RATE, ECG03: 94 BPM
VT SETTING VENT: 450 ML
WBC # BLD AUTO: 11.9 K/UL (ref 4.6–13.2)
WBC # BLD AUTO: 13 K/UL (ref 4.6–13.2)
WBC # BLD AUTO: 5.7 K/UL (ref 4.6–13.2)
WBC # BLD AUTO: 6.6 K/UL (ref 4.6–13.2)
WBC # BLD AUTO: 6.8 K/UL (ref 4.6–13.2)
WBC # BLD AUTO: 7.1 K/UL (ref 4.6–13.2)
WBC # BLD AUTO: 7.8 K/UL (ref 4.6–13.2)
WBC # BLD AUTO: 8.3 K/UL (ref 4.6–13.2)
WBC # BLD AUTO: 8.7 K/UL (ref 4.6–13.2)
WBC URNS QL MICRO: ABNORMAL /HPF (ref 0–5)
WBC URNS QL MICRO: ABNORMAL /HPF (ref 0–5)
WBC URNS QL MICRO: NORMAL /HPF (ref 0–4)

## 2021-01-01 PROCEDURE — 74011250637 HC RX REV CODE- 250/637: Performed by: PHYSICIAN ASSISTANT

## 2021-01-01 PROCEDURE — G8753 SYS BP > OR = 140: HCPCS | Performed by: INTERNAL MEDICINE

## 2021-01-01 PROCEDURE — 94002 VENT MGMT INPAT INIT DAY: CPT

## 2021-01-01 PROCEDURE — APPNB60 APP NON BILLABLE TIME 46-60 MINS: Performed by: PHYSICIAN ASSISTANT

## 2021-01-01 PROCEDURE — 85730 THROMBOPLASTIN TIME PARTIAL: CPT

## 2021-01-01 PROCEDURE — G8754 DIAS BP LESS 90: HCPCS | Performed by: INTERNAL MEDICINE

## 2021-01-01 PROCEDURE — 99024 POSTOP FOLLOW-UP VISIT: CPT | Performed by: PHYSICIAN ASSISTANT

## 2021-01-01 PROCEDURE — APPNB45 APP NON BILLABLE 31-45 MINUTES: Performed by: PHYSICIAN ASSISTANT

## 2021-01-01 PROCEDURE — 74011000258 HC RX REV CODE- 258: Performed by: ANESTHESIOLOGY

## 2021-01-01 PROCEDURE — 3331090001 HH PPS REVENUE CREDIT

## 2021-01-01 PROCEDURE — 2709999900 HC NON-CHARGEABLE SUPPLY

## 2021-01-01 PROCEDURE — 77030027845 HC BND COM RDL D-STAT TELE -B: Performed by: INTERNAL MEDICINE

## 2021-01-01 PROCEDURE — 3331090002 HH PPS REVENUE DEBIT

## 2021-01-01 PROCEDURE — G8536 NO DOC ELDER MAL SCRN: HCPCS | Performed by: NURSE PRACTITIONER

## 2021-01-01 PROCEDURE — 96365 THER/PROPH/DIAG IV INF INIT: CPT

## 2021-01-01 PROCEDURE — 83735 ASSAY OF MAGNESIUM: CPT

## 2021-01-01 PROCEDURE — G0300 HHS/HOSPICE OF LPN EA 15 MIN: HCPCS

## 2021-01-01 PROCEDURE — 82962 GLUCOSE BLOOD TEST: CPT

## 2021-01-01 PROCEDURE — 80074 ACUTE HEPATITIS PANEL: CPT

## 2021-01-01 PROCEDURE — 93005 ELECTROCARDIOGRAM TRACING: CPT

## 2021-01-01 PROCEDURE — G0157 HHC PT ASSISTANT EA 15: HCPCS

## 2021-01-01 PROCEDURE — 65620000000 HC RM CCU GENERAL

## 2021-01-01 PROCEDURE — 85610 PROTHROMBIN TIME: CPT

## 2021-01-01 PROCEDURE — 74011250636 HC RX REV CODE- 250/636: Performed by: SPECIALIST

## 2021-01-01 PROCEDURE — 1101F PT FALLS ASSESS-DOCD LE1/YR: CPT | Performed by: NURSE PRACTITIONER

## 2021-01-01 PROCEDURE — 74011636637 HC RX REV CODE- 636/637: Performed by: PHYSICIAN ASSISTANT

## 2021-01-01 PROCEDURE — P9045 ALBUMIN (HUMAN), 5%, 250 ML: HCPCS | Performed by: SPECIALIST

## 2021-01-01 PROCEDURE — G8755 DIAS BP > OR = 90: HCPCS | Performed by: SPECIALIST

## 2021-01-01 PROCEDURE — 99152 MOD SED SAME PHYS/QHP 5/>YRS: CPT | Performed by: INTERNAL MEDICINE

## 2021-01-01 PROCEDURE — C1751 CATH, INF, PER/CENT/MIDLINE: HCPCS | Performed by: SPECIALIST

## 2021-01-01 PROCEDURE — 80307 DRUG TEST PRSMV CHEM ANLYZR: CPT

## 2021-01-01 PROCEDURE — 06BP4ZZ EXCISION OF RIGHT SAPHENOUS VEIN, PERCUTANEOUS ENDOSCOPIC APPROACH: ICD-10-PCS | Performed by: SPECIALIST

## 2021-01-01 PROCEDURE — 85027 COMPLETE CBC AUTOMATED: CPT

## 2021-01-01 PROCEDURE — 77030013797 HC KT TRNSDUC PRSSR EDWD -A: Performed by: ANESTHESIOLOGY

## 2021-01-01 PROCEDURE — 84443 ASSAY THYROID STIM HORMONE: CPT

## 2021-01-01 PROCEDURE — 85025 COMPLETE CBC W/AUTO DIFF WBC: CPT

## 2021-01-01 PROCEDURE — 86901 BLOOD TYPING SEROLOGIC RH(D): CPT

## 2021-01-01 PROCEDURE — G0151 HHCP-SERV OF PT,EA 15 MIN: HCPCS

## 2021-01-01 PROCEDURE — 97110 THERAPEUTIC EXERCISES: CPT

## 2021-01-01 PROCEDURE — 74011250636 HC RX REV CODE- 250/636: Performed by: STUDENT IN AN ORGANIZED HEALTH CARE EDUCATION/TRAINING PROGRAM

## 2021-01-01 PROCEDURE — 97530 THERAPEUTIC ACTIVITIES: CPT

## 2021-01-01 PROCEDURE — 77030002986 HC SUT PROL J&J -A: Performed by: SPECIALIST

## 2021-01-01 PROCEDURE — 83605 ASSAY OF LACTIC ACID: CPT

## 2021-01-01 PROCEDURE — 74011250637 HC RX REV CODE- 250/637: Performed by: NURSE PRACTITIONER

## 2021-01-01 PROCEDURE — 74011000250 HC RX REV CODE- 250: Performed by: PHYSICIAN ASSISTANT

## 2021-01-01 PROCEDURE — 80076 HEPATIC FUNCTION PANEL: CPT

## 2021-01-01 PROCEDURE — 80048 BASIC METABOLIC PNL TOTAL CA: CPT

## 2021-01-01 PROCEDURE — 74011250637 HC RX REV CODE- 250/637: Performed by: SPECIALIST

## 2021-01-01 PROCEDURE — 74011250636 HC RX REV CODE- 250/636: Performed by: PHYSICIAN ASSISTANT

## 2021-01-01 PROCEDURE — G0152 HHCP-SERV OF OT,EA 15 MIN: HCPCS

## 2021-01-01 PROCEDURE — G8755 DIAS BP > OR = 90: HCPCS | Performed by: INTERNAL MEDICINE

## 2021-01-01 PROCEDURE — 021109W BYPASS CORONARY ARTERY, TWO ARTERIES FROM AORTA WITH AUTOLOGOUS VENOUS TISSUE, OPEN APPROACH: ICD-10-PCS | Performed by: SPECIALIST

## 2021-01-01 PROCEDURE — 74011000250 HC RX REV CODE- 250: Performed by: EMERGENCY MEDICINE

## 2021-01-01 PROCEDURE — 77030026855 HC PNCH AORT MYO AEMC -B: Performed by: SPECIALIST

## 2021-01-01 PROCEDURE — 77030003010 HC SUT SURG STL J&J -B: Performed by: SPECIALIST

## 2021-01-01 PROCEDURE — G8427 DOCREV CUR MEDS BY ELIG CLIN: HCPCS | Performed by: INTERNAL MEDICINE

## 2021-01-01 PROCEDURE — G9717 DOC PT DX DEP/BP F/U NT REQ: HCPCS | Performed by: INTERNAL MEDICINE

## 2021-01-01 PROCEDURE — 77030002996 HC SUT SLK J&J -A: Performed by: ANESTHESIOLOGY

## 2021-01-01 PROCEDURE — 77030013744: Performed by: INTERNAL MEDICINE

## 2021-01-01 PROCEDURE — 74011000636 HC RX REV CODE- 636: Performed by: INTERNAL MEDICINE

## 2021-01-01 PROCEDURE — 97535 SELF CARE MNGMENT TRAINING: CPT

## 2021-01-01 PROCEDURE — 77030013140 HC MSK NEB VYRM -A: Performed by: ANESTHESIOLOGY

## 2021-01-01 PROCEDURE — 94640 AIRWAY INHALATION TREATMENT: CPT

## 2021-01-01 PROCEDURE — 99204 OFFICE O/P NEW MOD 45 MIN: CPT | Performed by: INTERNAL MEDICINE

## 2021-01-01 PROCEDURE — G8419 CALC BMI OUT NRM PARAM NOF/U: HCPCS | Performed by: SPECIALIST

## 2021-01-01 PROCEDURE — 99153 MOD SED SAME PHYS/QHP EA: CPT | Performed by: INTERNAL MEDICINE

## 2021-01-01 PROCEDURE — 74011000258 HC RX REV CODE- 258: Performed by: SPECIALIST

## 2021-01-01 PROCEDURE — 74011000258 HC RX REV CODE- 258: Performed by: EMERGENCY MEDICINE

## 2021-01-01 PROCEDURE — 81001 URINALYSIS AUTO W/SCOPE: CPT

## 2021-01-01 PROCEDURE — 00567 ANES DIRECT CABG W/PUMP: CPT | Performed by: ANESTHESIOLOGY

## 2021-01-01 PROCEDURE — 99024 POSTOP FOLLOW-UP VISIT: CPT | Performed by: SPECIALIST

## 2021-01-01 PROCEDURE — 97116 GAIT TRAINING THERAPY: CPT

## 2021-01-01 PROCEDURE — 5A1221Z PERFORMANCE OF CARDIAC OUTPUT, CONTINUOUS: ICD-10-PCS | Performed by: SPECIALIST

## 2021-01-01 PROCEDURE — 77030031139 HC SUT VCRL2 J&J -A: Performed by: SPECIALIST

## 2021-01-01 PROCEDURE — 77010033678 HC OXYGEN DAILY

## 2021-01-01 PROCEDURE — 77030012390 HC DRN CHST BTL GTNG -B: Performed by: SPECIALIST

## 2021-01-01 PROCEDURE — 77030002982 HC SUT POLYSRB J&J -A: Performed by: SPECIALIST

## 2021-01-01 PROCEDURE — G0299 HHS/HOSPICE OF RN EA 15 MIN: HCPCS

## 2021-01-01 PROCEDURE — 99213 OFFICE O/P EST LOW 20 MIN: CPT | Performed by: NURSE PRACTITIONER

## 2021-01-01 PROCEDURE — 1101F PT FALLS ASSESS-DOCD LE1/YR: CPT | Performed by: INTERNAL MEDICINE

## 2021-01-01 PROCEDURE — 77030038692 HC WND DEB SYS IRMX -B: Performed by: SPECIALIST

## 2021-01-01 PROCEDURE — 77030013079 HC BLNKT BAIR HGGR 3M -A: Performed by: ANESTHESIOLOGY

## 2021-01-01 PROCEDURE — 97165 OT EVAL LOW COMPLEX 30 MIN: CPT

## 2021-01-01 PROCEDURE — 77030015766: Performed by: INTERNAL MEDICINE

## 2021-01-01 PROCEDURE — 77030008683 HC TU ET CUF COVD -A: Performed by: ANESTHESIOLOGY

## 2021-01-01 PROCEDURE — G8419 CALC BMI OUT NRM PARAM NOF/U: HCPCS | Performed by: NURSE PRACTITIONER

## 2021-01-01 PROCEDURE — 82977 ASSAY OF GGT: CPT

## 2021-01-01 PROCEDURE — 77030002987 HC SUT PROL J&J -B: Performed by: SPECIALIST

## 2021-01-01 PROCEDURE — 77030016037 HC MANFLD MULTI QUES -B: Performed by: ANESTHESIOLOGY

## 2021-01-01 PROCEDURE — 65660000004 HC RM CVT STEPDOWN

## 2021-01-01 PROCEDURE — 74011000272 HC RX REV CODE- 272: Performed by: SPECIALIST

## 2021-01-01 PROCEDURE — G8419 CALC BMI OUT NRM PARAM NOF/U: HCPCS | Performed by: INTERNAL MEDICINE

## 2021-01-01 PROCEDURE — 36415 COLL VENOUS BLD VENIPUNCTURE: CPT

## 2021-01-01 PROCEDURE — 65660000000 HC RM CCU STEPDOWN

## 2021-01-01 PROCEDURE — 77030010818: Performed by: SPECIALIST

## 2021-01-01 PROCEDURE — 77030007667 HC INSRT SUT HLD MEDT -B: Performed by: SPECIALIST

## 2021-01-01 PROCEDURE — 77030002933 HC SUT MCRYL J&J -A: Performed by: SPECIALIST

## 2021-01-01 PROCEDURE — 77030002916 HC SUT ETHLN J&J -A: Performed by: SPECIALIST

## 2021-01-01 PROCEDURE — 87635 SARS-COV-2 COVID-19 AMP PRB: CPT

## 2021-01-01 PROCEDURE — G8427 DOCREV CUR MEDS BY ELIG CLIN: HCPCS | Performed by: NURSE PRACTITIONER

## 2021-01-01 PROCEDURE — 77030018729 HC ELECTRD DEFIB PAD CARD -B: Performed by: ANESTHESIOLOGY

## 2021-01-01 PROCEDURE — 77030013797 HC KT TRNSDUC PRSSR EDWD -A: Performed by: INTERNAL MEDICINE

## 2021-01-01 PROCEDURE — 77030010516 HC APPL HEMA CLP TELE -B: Performed by: SPECIALIST

## 2021-01-01 PROCEDURE — 93458 L HRT ARTERY/VENTRICLE ANGIO: CPT | Performed by: INTERNAL MEDICINE

## 2021-01-01 PROCEDURE — 71275 CT ANGIOGRAPHY CHEST: CPT

## 2021-01-01 PROCEDURE — P9045 ALBUMIN (HUMAN), 5%, 250 ML: HCPCS | Performed by: ANESTHESIOLOGY

## 2021-01-01 PROCEDURE — 77030013313: Performed by: SPECIALIST

## 2021-01-01 PROCEDURE — 83880 ASSAY OF NATRIURETIC PEPTIDE: CPT

## 2021-01-01 PROCEDURE — B24BZZ4 ULTRASONOGRAPHY OF HEART WITH AORTA, TRANSESOPHAGEAL: ICD-10-PCS | Performed by: SPECIALIST

## 2021-01-01 PROCEDURE — 77030015683 HC ADPT CRDPLG MEDT -B: Performed by: SPECIALIST

## 2021-01-01 PROCEDURE — 70450 CT HEAD/BRAIN W/O DYE: CPT

## 2021-01-01 PROCEDURE — 71045 X-RAY EXAM CHEST 1 VIEW: CPT

## 2021-01-01 PROCEDURE — 76060000040 HC ANESTHESIA 4.5 TO 5 HR: Performed by: SPECIALIST

## 2021-01-01 PROCEDURE — 99215 OFFICE O/P EST HI 40 MIN: CPT | Performed by: INTERNAL MEDICINE

## 2021-01-01 PROCEDURE — 71046 X-RAY EXAM CHEST 2 VIEWS: CPT

## 2021-01-01 PROCEDURE — 74011000250 HC RX REV CODE- 250: Performed by: SPECIALIST

## 2021-01-01 PROCEDURE — C1769 GUIDE WIRE: HCPCS | Performed by: SPECIALIST

## 2021-01-01 PROCEDURE — 77030020061 HC IV BLD WRMR ADMIN SET 3M -B: Performed by: ANESTHESIOLOGY

## 2021-01-01 PROCEDURE — G8752 SYS BP LESS 140: HCPCS | Performed by: NURSE PRACTITIONER

## 2021-01-01 PROCEDURE — 77030012890

## 2021-01-01 PROCEDURE — 82803 BLOOD GASES ANY COMBINATION: CPT

## 2021-01-01 PROCEDURE — 83036 HEMOGLOBIN GLYCOSYLATED A1C: CPT

## 2021-01-01 PROCEDURE — 82140 ASSAY OF AMMONIA: CPT

## 2021-01-01 PROCEDURE — 77030034848: Performed by: SPECIALIST

## 2021-01-01 PROCEDURE — 33518 CABG ARTERY-VEIN TWO: CPT | Performed by: SPECIALIST

## 2021-01-01 PROCEDURE — G8754 DIAS BP LESS 90: HCPCS | Performed by: NURSE PRACTITIONER

## 2021-01-01 PROCEDURE — 33533 CABG ARTERIAL SINGLE: CPT | Performed by: SPECIALIST

## 2021-01-01 PROCEDURE — 84484 ASSAY OF TROPONIN QUANT: CPT

## 2021-01-01 PROCEDURE — 99283 EMERGENCY DEPT VISIT LOW MDM: CPT

## 2021-01-01 PROCEDURE — 82330 ASSAY OF CALCIUM: CPT

## 2021-01-01 PROCEDURE — C1769 GUIDE WIRE: HCPCS | Performed by: INTERNAL MEDICINE

## 2021-01-01 PROCEDURE — C1876 STENT, NON-COA/NON-COV W/DEL: HCPCS | Performed by: INTERNAL MEDICINE

## 2021-01-01 PROCEDURE — 77030002912 HC SUT ETHBND J&J -A: Performed by: SPECIALIST

## 2021-01-01 PROCEDURE — 77030002996 HC SUT SLK J&J -A: Performed by: SPECIALIST

## 2021-01-01 PROCEDURE — 82435 ASSAY OF BLOOD CHLORIDE: CPT

## 2021-01-01 PROCEDURE — 99100 ANES PT EXTEME AGE<1 YR&>70: CPT | Performed by: ANESTHESIOLOGY

## 2021-01-01 PROCEDURE — 80053 COMPREHEN METABOLIC PANEL: CPT

## 2021-01-01 PROCEDURE — 97162 PT EVAL MOD COMPLEX 30 MIN: CPT

## 2021-01-01 PROCEDURE — 74011000250 HC RX REV CODE- 250: Performed by: INTERNAL MEDICINE

## 2021-01-01 PROCEDURE — 77030018719 HC DRSG PTCH ANTIMIC J&J -A: Performed by: ANESTHESIOLOGY

## 2021-01-01 PROCEDURE — 93000 ELECTROCARDIOGRAM COMPLETE: CPT | Performed by: INTERNAL MEDICINE

## 2021-01-01 PROCEDURE — 77030019896 HC KT ARTERIAL LN TELE -B: Performed by: SPECIALIST

## 2021-01-01 PROCEDURE — 77030026918 HC ADMN ST IV BLD BD -A: Performed by: ANESTHESIOLOGY

## 2021-01-01 PROCEDURE — 02100Z9 BYPASS CORONARY ARTERY, ONE ARTERY FROM LEFT INTERNAL MAMMARY, OPEN APPROACH: ICD-10-PCS | Performed by: SPECIALIST

## 2021-01-01 PROCEDURE — 33508 ENDOSCOPIC VEIN HARVEST: CPT | Performed by: SPECIALIST

## 2021-01-01 PROCEDURE — C1729 CATH, DRAINAGE: HCPCS | Performed by: SPECIALIST

## 2021-01-01 PROCEDURE — 400018 HH-NO PAY CLAIM PROCEDURE

## 2021-01-01 PROCEDURE — 77030014491 HC PLEDG PTFE BARD -A: Performed by: SPECIALIST

## 2021-01-01 PROCEDURE — 93880 EXTRACRANIAL BILAT STUDY: CPT

## 2021-01-01 PROCEDURE — 74011250637 HC RX REV CODE- 250/637: Performed by: INTERNAL MEDICINE

## 2021-01-01 PROCEDURE — 77030027138 HC INCENT SPIROMETER -A

## 2021-01-01 PROCEDURE — 74011250636 HC RX REV CODE- 250/636: Performed by: INTERNAL MEDICINE

## 2021-01-01 PROCEDURE — G8752 SYS BP LESS 140: HCPCS | Performed by: INTERNAL MEDICINE

## 2021-01-01 PROCEDURE — 74011636637 HC RX REV CODE- 636/637: Performed by: SPECIALIST

## 2021-01-01 PROCEDURE — G9717 DOC PT DX DEP/BP F/U NT REQ: HCPCS | Performed by: NURSE PRACTITIONER

## 2021-01-01 PROCEDURE — 82077 ASSAY SPEC XCP UR&BREATH IA: CPT

## 2021-01-01 PROCEDURE — G8536 NO DOC ELDER MAL SCRN: HCPCS | Performed by: INTERNAL MEDICINE

## 2021-01-01 PROCEDURE — 76705 ECHO EXAM OF ABDOMEN: CPT

## 2021-01-01 PROCEDURE — 84295 ASSAY OF SERUM SODIUM: CPT

## 2021-01-01 PROCEDURE — APPNB30 APP NON BILLABLE TIME 0-30 MINS: Performed by: PHYSICIAN ASSISTANT

## 2021-01-01 PROCEDURE — 76010000199 HC CV SURG 4.5 TO 5 HR INTENSV-TIER 1: Performed by: SPECIALIST

## 2021-01-01 PROCEDURE — 74011000250 HC RX REV CODE- 250: Performed by: ANESTHESIOLOGY

## 2021-01-01 PROCEDURE — 99205 OFFICE O/P NEW HI 60 MIN: CPT | Performed by: SPECIALIST

## 2021-01-01 PROCEDURE — G9717 DOC PT DX DEP/BP F/U NT REQ: HCPCS | Performed by: SPECIALIST

## 2021-01-01 PROCEDURE — 77030005401 HC CATH RAD ARRO -A: Performed by: ANESTHESIOLOGY

## 2021-01-01 PROCEDURE — 77030008771 HC TU NG SALEM SUMP -A: Performed by: ANESTHESIOLOGY

## 2021-01-01 PROCEDURE — 99218 PR INITIAL OBSERVATION CARE/DAY 30 MINUTES: CPT | Performed by: INTERNAL MEDICINE

## 2021-01-01 PROCEDURE — 51798 US URINE CAPACITY MEASURE: CPT

## 2021-01-01 PROCEDURE — C8923 2D TTE W OR W/O FOL W/CON,CO: HCPCS

## 2021-01-01 PROCEDURE — 74011000636 HC RX REV CODE- 636: Performed by: EMERGENCY MEDICINE

## 2021-01-01 PROCEDURE — 2709999900 HC NON-CHARGEABLE SUPPLY: Performed by: SPECIALIST

## 2021-01-01 PROCEDURE — 99285 EMERGENCY DEPT VISIT HI MDM: CPT

## 2021-01-01 PROCEDURE — 87636 SARSCOV2 & INF A&B AMP PRB: CPT

## 2021-01-01 PROCEDURE — 74011250636 HC RX REV CODE- 250/636: Performed by: ANESTHESIOLOGY

## 2021-01-01 PROCEDURE — C1751 CATH, INF, PER/CENT/MIDLINE: HCPCS | Performed by: ANESTHESIOLOGY

## 2021-01-01 PROCEDURE — 1101F PT FALLS ASSESS-DOCD LE1/YR: CPT | Performed by: SPECIALIST

## 2021-01-01 PROCEDURE — G8536 NO DOC ELDER MAL SCRN: HCPCS | Performed by: SPECIALIST

## 2021-01-01 PROCEDURE — 86920 COMPATIBILITY TEST SPIN: CPT

## 2021-01-01 PROCEDURE — G8427 DOCREV CUR MEDS BY ELIG CLIN: HCPCS | Performed by: SPECIALIST

## 2021-01-01 PROCEDURE — 400013 HH SOC

## 2021-01-01 PROCEDURE — 77030022199 HC SYS HARV VESL GTNG -G1: Performed by: SPECIALIST

## 2021-01-01 PROCEDURE — 77030018390 HC SPNG HEMSTAT2 J&J -B: Performed by: SPECIALIST

## 2021-01-01 PROCEDURE — 77030020178 HC LP VESL TW QUES -B: Performed by: SPECIALIST

## 2021-01-01 PROCEDURE — 77030008500 HC TBNG CONN PRSS BD -A: Performed by: ANESTHESIOLOGY

## 2021-01-01 PROCEDURE — 87086 URINE CULTURE/COLONY COUNT: CPT

## 2021-01-01 PROCEDURE — 74011250636 HC RX REV CODE- 250/636: Performed by: EMERGENCY MEDICINE

## 2021-01-01 PROCEDURE — 80143 DRUG ASSAY ACETAMINOPHEN: CPT

## 2021-01-01 PROCEDURE — C1769 GUIDE WIRE: HCPCS | Performed by: ANESTHESIOLOGY

## 2021-01-01 PROCEDURE — 77030010929 HC CLMP VASC FGRTY EDWD -B: Performed by: SPECIALIST

## 2021-01-01 PROCEDURE — C1894 INTRO/SHEATH, NON-LASER: HCPCS | Performed by: INTERNAL MEDICINE

## 2021-01-01 PROCEDURE — G8753 SYS BP > OR = 140: HCPCS | Performed by: SPECIALIST

## 2021-01-01 RX ORDER — MORPHINE SULFATE 2 MG/ML
2-4 INJECTION, SOLUTION INTRAMUSCULAR; INTRAVENOUS
Status: DISPENSED | OUTPATIENT
Start: 2021-01-01 | End: 2021-01-01

## 2021-01-01 RX ORDER — HALOPERIDOL 5 MG/ML
5 INJECTION INTRAMUSCULAR ONCE
Status: COMPLETED | OUTPATIENT
Start: 2021-01-01 | End: 2021-01-01

## 2021-01-01 RX ORDER — LORAZEPAM 2 MG/ML
1 INJECTION INTRAMUSCULAR
Status: DISCONTINUED | OUTPATIENT
Start: 2021-01-01 | End: 2021-01-01

## 2021-01-01 RX ORDER — PROPOFOL 10 MG/ML
0-50 VIAL (ML) INTRAVENOUS
Status: DISCONTINUED | OUTPATIENT
Start: 2021-01-01 | End: 2021-01-01

## 2021-01-01 RX ORDER — HYDROCODONE BITARTRATE AND ACETAMINOPHEN 5; 325 MG/1; MG/1
1-2 TABLET ORAL
Status: DISCONTINUED | OUTPATIENT
Start: 2021-01-01 | End: 2021-01-01

## 2021-01-01 RX ORDER — ACETAMINOPHEN 650 MG/1
SUPPOSITORY RECTAL
Status: DISPENSED
Start: 2021-01-01 | End: 2021-01-01

## 2021-01-01 RX ORDER — MIDAZOLAM HYDROCHLORIDE 1 MG/ML
INJECTION, SOLUTION INTRAMUSCULAR; INTRAVENOUS AS NEEDED
Status: DISCONTINUED | OUTPATIENT
Start: 2021-01-01 | End: 2021-01-01 | Stop reason: HOSPADM

## 2021-01-01 RX ORDER — DEXTROSE MONOHYDRATE AND SODIUM CHLORIDE 5; .45 G/100ML; G/100ML
150 INJECTION, SOLUTION INTRAVENOUS CONTINUOUS
Status: DISCONTINUED | OUTPATIENT
Start: 2021-01-01 | End: 2021-01-01 | Stop reason: ALTCHOICE

## 2021-01-01 RX ORDER — SODIUM CHLORIDE 0.9 % (FLUSH) 0.9 %
5-40 SYRINGE (ML) INJECTION AS NEEDED
Status: CANCELLED | OUTPATIENT
Start: 2021-01-01

## 2021-01-01 RX ORDER — DEXTROSE MONOHYDRATE AND SODIUM CHLORIDE 5; .45 G/100ML; G/100ML
150 INJECTION, SOLUTION INTRAVENOUS CONTINUOUS
Status: CANCELLED | OUTPATIENT
Start: 2021-01-01 | End: 2021-01-01

## 2021-01-01 RX ORDER — METOPROLOL TARTRATE 25 MG/1
12.5 TABLET, FILM COATED ORAL EVERY 12 HOURS
Status: DISCONTINUED | OUTPATIENT
Start: 2021-01-01 | End: 2021-01-01 | Stop reason: HOSPADM

## 2021-01-01 RX ORDER — PHENYLEPHRINE HCL IN 0.9% NACL 1 MG/10 ML
SYRINGE (ML) INTRAVENOUS AS NEEDED
Status: DISCONTINUED | OUTPATIENT
Start: 2021-01-01 | End: 2021-01-01 | Stop reason: HOSPADM

## 2021-01-01 RX ORDER — ONDANSETRON 2 MG/ML
4 INJECTION INTRAMUSCULAR; INTRAVENOUS
Status: DISCONTINUED | OUTPATIENT
Start: 2021-01-01 | End: 2021-01-01 | Stop reason: HOSPADM

## 2021-01-01 RX ORDER — INSULIN GLARGINE 100 [IU]/ML
20 INJECTION, SOLUTION SUBCUTANEOUS DAILY
Status: DISCONTINUED | OUTPATIENT
Start: 2021-01-01 | End: 2021-01-01 | Stop reason: HOSPADM

## 2021-01-01 RX ORDER — SODIUM CHLORIDE, SODIUM LACTATE, POTASSIUM CHLORIDE, CALCIUM CHLORIDE 600; 310; 30; 20 MG/100ML; MG/100ML; MG/100ML; MG/100ML
75 INJECTION, SOLUTION INTRAVENOUS CONTINUOUS
Status: DISCONTINUED | OUTPATIENT
Start: 2021-01-01 | End: 2021-01-01

## 2021-01-01 RX ORDER — MANNITOL 20 G/100ML
INJECTION, SOLUTION INTRAVENOUS
Status: DISCONTINUED | OUTPATIENT
Start: 2021-01-01 | End: 2021-01-01 | Stop reason: HOSPADM

## 2021-01-01 RX ORDER — POTASSIUM CHLORIDE 20 MEQ/1
20 TABLET, EXTENDED RELEASE ORAL DAILY
Qty: 30 TABLET | Refills: 0 | Status: SHIPPED | OUTPATIENT
Start: 2021-01-01 | End: 2021-01-01

## 2021-01-01 RX ORDER — ALBUTEROL SULFATE 0.83 MG/ML
2.5 SOLUTION RESPIRATORY (INHALATION)
Status: DISCONTINUED | OUTPATIENT
Start: 2021-01-01 | End: 2022-01-01

## 2021-01-01 RX ORDER — ALBUMIN HUMAN 50 G/1000ML
12.5 SOLUTION INTRAVENOUS ONCE
Status: COMPLETED | OUTPATIENT
Start: 2021-01-01 | End: 2021-01-01

## 2021-01-01 RX ORDER — FUROSEMIDE 40 MG/1
40 TABLET ORAL DAILY
Status: DISCONTINUED | OUTPATIENT
Start: 2021-01-01 | End: 2021-01-01 | Stop reason: HOSPADM

## 2021-01-01 RX ORDER — POTASSIUM CHLORIDE 14.9 MG/ML
20 INJECTION INTRAVENOUS ONCE
Status: COMPLETED | OUTPATIENT
Start: 2021-01-01 | End: 2021-01-01

## 2021-01-01 RX ORDER — DEXTROSE 50 % IN WATER (D50W) INTRAVENOUS SYRINGE
25-50 AS NEEDED
Status: DISCONTINUED | OUTPATIENT
Start: 2021-01-01 | End: 2021-01-01

## 2021-01-01 RX ORDER — ZOLPIDEM TARTRATE 5 MG/1
5 TABLET ORAL
Status: DISCONTINUED | OUTPATIENT
Start: 2021-01-01 | End: 2021-01-01

## 2021-01-01 RX ORDER — ENOXAPARIN SODIUM 100 MG/ML
40 INJECTION SUBCUTANEOUS EVERY 24 HOURS
Status: DISCONTINUED | OUTPATIENT
Start: 2021-01-01 | End: 2021-01-01 | Stop reason: HOSPADM

## 2021-01-01 RX ORDER — ATORVASTATIN CALCIUM 40 MG/1
40 TABLET, FILM COATED ORAL DAILY
Qty: 90 TABLET | Refills: 3 | Status: SHIPPED | OUTPATIENT
Start: 2021-01-01

## 2021-01-01 RX ORDER — SODIUM CHLORIDE 0.9 % (FLUSH) 0.9 %
5-40 SYRINGE (ML) INJECTION EVERY 8 HOURS
Status: CANCELLED | OUTPATIENT
Start: 2021-01-01

## 2021-01-01 RX ORDER — FENTANYL CITRATE 50 UG/ML
25 INJECTION, SOLUTION INTRAMUSCULAR; INTRAVENOUS ONCE
Status: COMPLETED | OUTPATIENT
Start: 2021-01-01 | End: 2021-01-01

## 2021-01-01 RX ORDER — SODIUM CHLORIDE 0.9 % (FLUSH) 0.9 %
5-40 SYRINGE (ML) INJECTION AS NEEDED
Status: DISCONTINUED | OUTPATIENT
Start: 2021-01-01 | End: 2021-01-01

## 2021-01-01 RX ORDER — ATORVASTATIN CALCIUM 40 MG/1
40 TABLET, FILM COATED ORAL DAILY
Status: DISCONTINUED | OUTPATIENT
Start: 2022-01-01 | End: 2021-01-01

## 2021-01-01 RX ORDER — INSULIN LISPRO 100 [IU]/ML
INJECTION, SOLUTION INTRAVENOUS; SUBCUTANEOUS
Status: DISCONTINUED | OUTPATIENT
Start: 2021-01-01 | End: 2021-01-01 | Stop reason: HOSPADM

## 2021-01-01 RX ORDER — BISACODYL 5 MG
10 TABLET, DELAYED RELEASE (ENTERIC COATED) ORAL DAILY
Status: DISCONTINUED | OUTPATIENT
Start: 2021-01-01 | End: 2021-01-01 | Stop reason: HOSPADM

## 2021-01-01 RX ORDER — ALBUTEROL SULFATE 0.83 MG/ML
2.5 SOLUTION RESPIRATORY (INHALATION)
Status: DISCONTINUED | OUTPATIENT
Start: 2021-01-01 | End: 2021-01-01 | Stop reason: HOSPADM

## 2021-01-01 RX ORDER — AMIODARONE HYDROCHLORIDE 200 MG/1
200 TABLET ORAL 3 TIMES DAILY
Status: DISCONTINUED | OUTPATIENT
Start: 2021-01-01 | End: 2021-01-01 | Stop reason: HOSPADM

## 2021-01-01 RX ORDER — PAROXETINE HYDROCHLORIDE 20 MG/1
20 TABLET, FILM COATED ORAL DAILY
Status: DISCONTINUED | OUTPATIENT
Start: 2021-01-01 | End: 2021-01-01

## 2021-01-01 RX ORDER — MAGNESIUM SULFATE 100 %
4 CRYSTALS MISCELLANEOUS AS NEEDED
Status: DISCONTINUED | OUTPATIENT
Start: 2021-01-01 | End: 2021-01-01 | Stop reason: HOSPADM

## 2021-01-01 RX ORDER — POTASSIUM CHLORIDE 20 MEQ/1
20 TABLET, EXTENDED RELEASE ORAL ONCE
Status: COMPLETED | OUTPATIENT
Start: 2021-01-01 | End: 2021-01-01

## 2021-01-01 RX ORDER — INSULIN LISPRO 100 [IU]/ML
INJECTION, SOLUTION INTRAVENOUS; SUBCUTANEOUS ONCE
Status: DISCONTINUED | OUTPATIENT
Start: 2021-01-01 | End: 2021-01-01 | Stop reason: HOSPADM

## 2021-01-01 RX ORDER — DIPHENHYDRAMINE HCL 25 MG
50 CAPSULE ORAL
Status: COMPLETED | OUTPATIENT
Start: 2021-01-01 | End: 2021-01-01

## 2021-01-01 RX ORDER — LORAZEPAM 2 MG/ML
INJECTION INTRAMUSCULAR
Status: DISPENSED
Start: 2021-01-01 | End: 2021-01-01

## 2021-01-01 RX ORDER — SODIUM CHLORIDE 0.9 % (FLUSH) 0.9 %
5-40 SYRINGE (ML) INJECTION EVERY 8 HOURS
Status: DISCONTINUED | OUTPATIENT
Start: 2021-01-01 | End: 2022-01-01 | Stop reason: SDUPTHER

## 2021-01-01 RX ORDER — PAROXETINE HYDROCHLORIDE 20 MG/1
20 TABLET, FILM COATED ORAL DAILY
Status: DISCONTINUED | OUTPATIENT
Start: 2022-01-01 | End: 2022-01-01

## 2021-01-01 RX ORDER — GUAIFENESIN 100 MG/5ML
162 LIQUID (ML) ORAL
Status: CANCELLED | OUTPATIENT
Start: 2021-01-01 | End: 2021-01-01

## 2021-01-01 RX ORDER — SODIUM CHLORIDE 9 MG/ML
150 INJECTION, SOLUTION INTRAVENOUS CONTINUOUS
Status: DISCONTINUED | OUTPATIENT
Start: 2021-01-01 | End: 2021-01-01 | Stop reason: HOSPADM

## 2021-01-01 RX ORDER — ALBUMIN HUMAN 50 G/1000ML
12.5 SOLUTION INTRAVENOUS
Status: DISCONTINUED | OUTPATIENT
Start: 2021-01-01 | End: 2021-01-01

## 2021-01-01 RX ORDER — SODIUM CHLORIDE 0.9 % (FLUSH) 0.9 %
5-40 SYRINGE (ML) INJECTION EVERY 8 HOURS
Status: DISCONTINUED | OUTPATIENT
Start: 2021-01-01 | End: 2021-01-01 | Stop reason: SDUPTHER

## 2021-01-01 RX ORDER — HEPARIN SODIUM 1000 [USP'U]/ML
INJECTION, SOLUTION INTRAVENOUS; SUBCUTANEOUS
Status: COMPLETED
Start: 2021-01-01 | End: 2021-01-01

## 2021-01-01 RX ORDER — ALBUMIN HUMAN 50 G/1000ML
SOLUTION INTRAVENOUS
Status: COMPLETED
Start: 2021-01-01 | End: 2021-01-01

## 2021-01-01 RX ORDER — ASPIRIN 81 MG/1
81 TABLET ORAL DAILY
Status: DISCONTINUED | OUTPATIENT
Start: 2021-01-01 | End: 2021-01-01 | Stop reason: HOSPADM

## 2021-01-01 RX ORDER — DOCUSATE SODIUM 100 MG/1
100 CAPSULE, LIQUID FILLED ORAL 2 TIMES DAILY
Status: DISCONTINUED | OUTPATIENT
Start: 2021-01-01 | End: 2021-01-01 | Stop reason: HOSPADM

## 2021-01-01 RX ORDER — ASPIRIN 81 MG/1
81 TABLET ORAL DAILY
Qty: 30 TABLET | Refills: 5 | OUTPATIENT
Start: 2021-01-01

## 2021-01-01 RX ORDER — DEXTROSE 50 % IN WATER (D50W) INTRAVENOUS SYRINGE
25-50 AS NEEDED
Status: DISCONTINUED | OUTPATIENT
Start: 2021-01-01 | End: 2021-01-01 | Stop reason: HOSPADM

## 2021-01-01 RX ORDER — NOREPINEPHRINE BITARTRATE/D5W 8 MG/250ML
2-16 PLASTIC BAG, INJECTION (ML) INTRAVENOUS
Status: DISCONTINUED | OUTPATIENT
Start: 2021-01-01 | End: 2021-01-01

## 2021-01-01 RX ORDER — LORAZEPAM 2 MG/ML
3 INJECTION INTRAMUSCULAR
Status: DISCONTINUED | OUTPATIENT
Start: 2021-01-01 | End: 2021-01-01

## 2021-01-01 RX ORDER — MUPIROCIN 20 MG/G
OINTMENT TOPICAL DAILY
Qty: 22 G | Refills: 0 | Status: SHIPPED | COMMUNITY
Start: 2021-01-01 | End: 2021-01-01

## 2021-01-01 RX ORDER — VANCOMYCIN HYDROCHLORIDE 1 G/20ML
INJECTION, POWDER, LYOPHILIZED, FOR SOLUTION INTRAVENOUS AS NEEDED
Status: DISCONTINUED | OUTPATIENT
Start: 2021-01-01 | End: 2021-01-01 | Stop reason: HOSPADM

## 2021-01-01 RX ORDER — VANCOMYCIN HYDROCHLORIDE 1 G/20ML
INJECTION, POWDER, LYOPHILIZED, FOR SOLUTION INTRAVENOUS
Status: DISCONTINUED
Start: 2021-01-01 | End: 2021-01-01

## 2021-01-01 RX ORDER — ZOLPIDEM TARTRATE 5 MG/1
5 TABLET ORAL
Status: DISCONTINUED | OUTPATIENT
Start: 2021-01-01 | End: 2022-01-01

## 2021-01-01 RX ORDER — CALCIUM CHLORIDE INJECTION 100 MG/ML
1 INJECTION, SOLUTION INTRAVENOUS ONCE
Status: COMPLETED | OUTPATIENT
Start: 2021-01-01 | End: 2021-01-01

## 2021-01-01 RX ORDER — ETOMIDATE 2 MG/ML
INJECTION INTRAVENOUS AS NEEDED
Status: DISCONTINUED | OUTPATIENT
Start: 2021-01-01 | End: 2021-01-01 | Stop reason: HOSPADM

## 2021-01-01 RX ORDER — PAROXETINE HYDROCHLORIDE 20 MG/1
20 TABLET, FILM COATED ORAL DAILY
Status: DISCONTINUED | OUTPATIENT
Start: 2021-01-01 | End: 2021-01-01 | Stop reason: HOSPADM

## 2021-01-01 RX ORDER — LIDOCAINE HYDROCHLORIDE 10 MG/ML
INJECTION, SOLUTION EPIDURAL; INFILTRATION; INTRACAUDAL; PERINEURAL AS NEEDED
Status: DISCONTINUED | OUTPATIENT
Start: 2021-01-01 | End: 2021-01-01 | Stop reason: HOSPADM

## 2021-01-01 RX ORDER — FENTANYL CITRATE 50 UG/ML
12.5-25 INJECTION, SOLUTION INTRAMUSCULAR; INTRAVENOUS
Status: DISCONTINUED | OUTPATIENT
Start: 2021-01-01 | End: 2021-01-01

## 2021-01-01 RX ORDER — SODIUM CHLORIDE 0.9 % (FLUSH) 0.9 %
5-40 SYRINGE (ML) INJECTION AS NEEDED
Status: DISCONTINUED | OUTPATIENT
Start: 2021-01-01 | End: 2021-01-01 | Stop reason: SDUPTHER

## 2021-01-01 RX ORDER — POTASSIUM CHLORIDE 20 MEQ/1
20 TABLET, EXTENDED RELEASE ORAL DAILY
Status: DISCONTINUED | OUTPATIENT
Start: 2021-01-01 | End: 2021-01-01 | Stop reason: HOSPADM

## 2021-01-01 RX ORDER — MANNITOL 20 G/100ML
INJECTION, SOLUTION INTRAVENOUS
Status: COMPLETED
Start: 2021-01-01 | End: 2021-01-01

## 2021-01-01 RX ORDER — LORAZEPAM 2 MG/ML
0.5 INJECTION INTRAMUSCULAR ONCE
Status: COMPLETED | OUTPATIENT
Start: 2021-01-01 | End: 2021-01-01

## 2021-01-01 RX ORDER — POLYETHYLENE GLYCOL 3350 17 G/17G
17 POWDER, FOR SOLUTION ORAL DAILY PRN
Status: DISCONTINUED | OUTPATIENT
Start: 2021-01-01 | End: 2022-01-01

## 2021-01-01 RX ORDER — ENOXAPARIN SODIUM 100 MG/ML
30 INJECTION SUBCUTANEOUS EVERY 12 HOURS
Status: DISCONTINUED | OUTPATIENT
Start: 2021-01-01 | End: 2022-01-01

## 2021-01-01 RX ORDER — SODIUM CHLORIDE 9 MG/ML
50 INJECTION, SOLUTION INTRAVENOUS CONTINUOUS
Status: DISCONTINUED | OUTPATIENT
Start: 2021-01-01 | End: 2021-01-01 | Stop reason: HOSPADM

## 2021-01-01 RX ORDER — FLUTICASONE PROPIONATE 50 MCG
2 SPRAY, SUSPENSION (ML) NASAL DAILY
Status: DISCONTINUED | OUTPATIENT
Start: 2022-01-01 | End: 2022-01-01

## 2021-01-01 RX ORDER — MUPIROCIN 20 MG/G
OINTMENT TOPICAL 2 TIMES DAILY
Status: COMPLETED | OUTPATIENT
Start: 2021-01-01 | End: 2021-01-01

## 2021-01-01 RX ORDER — VANCOMYCIN/0.9 % SOD CHLORIDE 1.5G/250ML
1500 PLASTIC BAG, INJECTION (ML) INTRAVENOUS
Status: COMPLETED | OUTPATIENT
Start: 2021-01-01 | End: 2021-01-01

## 2021-01-01 RX ORDER — HEPARIN SODIUM 1000 [USP'U]/ML
INJECTION, SOLUTION INTRAVENOUS; SUBCUTANEOUS AS NEEDED
Status: DISCONTINUED | OUTPATIENT
Start: 2021-01-01 | End: 2021-01-01 | Stop reason: HOSPADM

## 2021-01-01 RX ORDER — LOXAPINE SUCCINATE 10 MG/1
10 TABLET ORAL 2 TIMES DAILY
Status: DISCONTINUED | OUTPATIENT
Start: 2021-01-01 | End: 2021-01-01 | Stop reason: HOSPADM

## 2021-01-01 RX ORDER — SODIUM CHLORIDE 0.9 % (FLUSH) 0.9 %
5-40 SYRINGE (ML) INJECTION AS NEEDED
Status: DISCONTINUED | OUTPATIENT
Start: 2021-01-01 | End: 2021-01-01 | Stop reason: HOSPADM

## 2021-01-01 RX ORDER — LABETALOL HCL 20 MG/4 ML
20 SYRINGE (ML) INTRAVENOUS ONCE
Status: DISCONTINUED | OUTPATIENT
Start: 2021-01-01 | End: 2021-01-01 | Stop reason: HOSPADM

## 2021-01-01 RX ORDER — LORAZEPAM 2 MG/ML
1 INJECTION INTRAMUSCULAR ONCE
Status: CANCELLED | OUTPATIENT
Start: 2021-01-01 | End: 2021-01-01

## 2021-01-01 RX ORDER — GUAIFENESIN 100 MG/5ML
162 LIQUID (ML) ORAL
Status: COMPLETED | OUTPATIENT
Start: 2021-01-01 | End: 2021-01-01

## 2021-01-01 RX ORDER — ONDANSETRON 2 MG/ML
4 INJECTION INTRAMUSCULAR; INTRAVENOUS
Status: DISCONTINUED | OUTPATIENT
Start: 2021-01-01 | End: 2022-01-01 | Stop reason: SDUPTHER

## 2021-01-01 RX ORDER — FAMOTIDINE 20 MG/1
20 TABLET, FILM COATED ORAL 2 TIMES DAILY
Status: DISCONTINUED | OUTPATIENT
Start: 2021-01-01 | End: 2021-01-01 | Stop reason: HOSPADM

## 2021-01-01 RX ORDER — PAPAVERINE HYDROCHLORIDE 30 MG/ML
INJECTION INTRAMUSCULAR; INTRAVENOUS
Status: DISCONTINUED
Start: 2021-01-01 | End: 2021-01-01

## 2021-01-01 RX ORDER — NALOXONE HYDROCHLORIDE 0.4 MG/ML
0.4 INJECTION, SOLUTION INTRAMUSCULAR; INTRAVENOUS; SUBCUTANEOUS AS NEEDED
Status: DISCONTINUED | OUTPATIENT
Start: 2021-01-01 | End: 2021-01-01 | Stop reason: HOSPADM

## 2021-01-01 RX ORDER — HALOPERIDOL 5 MG/1
5 TABLET ORAL ONCE
Status: DISCONTINUED | OUTPATIENT
Start: 2021-01-01 | End: 2021-01-01

## 2021-01-01 RX ORDER — ROCURONIUM BROMIDE 10 MG/ML
INJECTION, SOLUTION INTRAVENOUS AS NEEDED
Status: DISCONTINUED | OUTPATIENT
Start: 2021-01-01 | End: 2021-01-01 | Stop reason: HOSPADM

## 2021-01-01 RX ORDER — TAMSULOSIN HYDROCHLORIDE 0.4 MG/1
0.8 CAPSULE ORAL
Status: DISCONTINUED | OUTPATIENT
Start: 2022-01-01 | End: 2022-01-01

## 2021-01-01 RX ORDER — SODIUM CHLORIDE 0.9 % (FLUSH) 0.9 %
5-40 SYRINGE (ML) INJECTION EVERY 8 HOURS
Status: DISCONTINUED | OUTPATIENT
Start: 2021-01-01 | End: 2021-01-01

## 2021-01-01 RX ORDER — DEXAMETHASONE SODIUM PHOSPHATE 4 MG/ML
8 INJECTION, SOLUTION INTRA-ARTICULAR; INTRALESIONAL; INTRAMUSCULAR; INTRAVENOUS; SOFT TISSUE ONCE
Status: COMPLETED | OUTPATIENT
Start: 2021-01-01 | End: 2021-01-01

## 2021-01-01 RX ORDER — LORAZEPAM 2 MG/ML
1 INJECTION INTRAMUSCULAR ONCE
Status: DISCONTINUED | OUTPATIENT
Start: 2021-01-01 | End: 2021-01-01 | Stop reason: HOSPADM

## 2021-01-01 RX ORDER — METOPROLOL SUCCINATE 50 MG/1
50 TABLET, EXTENDED RELEASE ORAL DAILY
Qty: 30 TABLET | Refills: 6 | Status: ON HOLD | OUTPATIENT
Start: 2021-01-01 | End: 2021-01-01 | Stop reason: SDUPTHER

## 2021-01-01 RX ORDER — ACETAMINOPHEN 650 MG/1
650 SUPPOSITORY RECTAL
Status: DISCONTINUED | OUTPATIENT
Start: 2021-01-01 | End: 2022-01-01

## 2021-01-01 RX ORDER — LORAZEPAM 1 MG/1
2 TABLET ORAL
Status: DISCONTINUED | OUTPATIENT
Start: 2021-01-01 | End: 2021-01-01

## 2021-01-01 RX ORDER — DEXAMETHASONE SODIUM PHOSPHATE 4 MG/ML
6 INJECTION, SOLUTION INTRA-ARTICULAR; INTRALESIONAL; INTRAMUSCULAR; INTRAVENOUS; SOFT TISSUE EVERY 24 HOURS
Status: COMPLETED | OUTPATIENT
Start: 2021-01-01 | End: 2022-01-01

## 2021-01-01 RX ORDER — METOPROLOL SUCCINATE 50 MG/1
25 TABLET, EXTENDED RELEASE ORAL DAILY
Qty: 30 TABLET | Refills: 6 | Status: SHIPPED | OUTPATIENT
Start: 2021-01-01 | End: 2021-01-01

## 2021-01-01 RX ORDER — ATORVASTATIN CALCIUM 40 MG/1
40 TABLET, FILM COATED ORAL DAILY
Qty: 30 TABLET | Refills: 6 | Status: SHIPPED | OUTPATIENT
Start: 2021-01-01 | End: 2021-01-01 | Stop reason: SDUPTHER

## 2021-01-01 RX ORDER — METOPROLOL SUCCINATE 25 MG/1
25 TABLET, EXTENDED RELEASE ORAL DAILY
Status: DISCONTINUED | OUTPATIENT
Start: 2022-01-01 | End: 2022-01-01

## 2021-01-01 RX ORDER — FUROSEMIDE 40 MG/1
40 TABLET ORAL DAILY
Qty: 30 TABLET | Refills: 0 | Status: SHIPPED | OUTPATIENT
Start: 2021-01-01 | End: 2021-01-01

## 2021-01-01 RX ORDER — SODIUM CHLORIDE 9 MG/ML
INJECTION INTRAMUSCULAR; INTRAVENOUS; SUBCUTANEOUS
Status: DISCONTINUED
Start: 2021-01-01 | End: 2021-01-01

## 2021-01-01 RX ORDER — MAGNESIUM SULFATE 100 %
4 CRYSTALS MISCELLANEOUS AS NEEDED
Status: DISCONTINUED | OUTPATIENT
Start: 2021-01-01 | End: 2021-01-01

## 2021-01-01 RX ORDER — COLCHICINE 0.6 MG/1
0.6 CAPSULE ORAL DAILY
Status: DISCONTINUED | OUTPATIENT
Start: 2021-01-01 | End: 2021-01-01 | Stop reason: HOSPADM

## 2021-01-01 RX ORDER — ACETAMINOPHEN 325 MG/1
650 TABLET ORAL
Status: DISCONTINUED | OUTPATIENT
Start: 2021-01-01 | End: 2021-01-01

## 2021-01-01 RX ORDER — PROPOFOL 10 MG/ML
INJECTION, EMULSION INTRAVENOUS
Status: DISPENSED
Start: 2021-01-01 | End: 2021-01-01

## 2021-01-01 RX ORDER — ACETAMINOPHEN 325 MG/1
650 TABLET ORAL
Status: DISCONTINUED | OUTPATIENT
Start: 2021-01-01 | End: 2022-01-01

## 2021-01-01 RX ORDER — POTASSIUM CHLORIDE 29.8 MG/ML
INJECTION INTRAVENOUS AS NEEDED
Status: DISCONTINUED | OUTPATIENT
Start: 2021-01-01 | End: 2021-01-01 | Stop reason: HOSPADM

## 2021-01-01 RX ORDER — HYDROXYZINE HYDROCHLORIDE 10 MG/1
25 TABLET, FILM COATED ORAL
Status: DISCONTINUED | OUTPATIENT
Start: 2021-01-01 | End: 2022-01-01

## 2021-01-01 RX ORDER — SODIUM CHLORIDE 0.9 % (FLUSH) 0.9 %
5-40 SYRINGE (ML) INJECTION AS NEEDED
Status: DISCONTINUED | OUTPATIENT
Start: 2021-01-01 | End: 2022-01-01 | Stop reason: SDUPTHER

## 2021-01-01 RX ORDER — ACETAMINOPHEN 650 MG/1
SUPPOSITORY RECTAL AS NEEDED
Status: DISCONTINUED | OUTPATIENT
Start: 2021-01-01 | End: 2021-01-01 | Stop reason: HOSPADM

## 2021-01-01 RX ORDER — OXYCODONE HYDROCHLORIDE 5 MG/1
5-10 TABLET ORAL
Status: ACTIVE | OUTPATIENT
Start: 2021-01-01 | End: 2021-01-01

## 2021-01-01 RX ORDER — CHLORHEXIDINE GLUCONATE 1.2 MG/ML
10 RINSE ORAL 2 TIMES DAILY
Status: DISCONTINUED | OUTPATIENT
Start: 2021-01-01 | End: 2021-01-01 | Stop reason: HOSPADM

## 2021-01-01 RX ORDER — CHLORHEXIDINE GLUCONATE 4 G/100ML
1 SOLUTION TOPICAL DAILY
Qty: 3 PACKET | Refills: 0 | Status: SHIPPED | COMMUNITY
Start: 2021-01-01 | End: 2021-01-01

## 2021-01-01 RX ORDER — FENTANYL CITRATE 50 UG/ML
INJECTION, SOLUTION INTRAMUSCULAR; INTRAVENOUS AS NEEDED
Status: DISCONTINUED | OUTPATIENT
Start: 2021-01-01 | End: 2021-01-01 | Stop reason: HOSPADM

## 2021-01-01 RX ORDER — IPRATROPIUM BROMIDE AND ALBUTEROL SULFATE 2.5; .5 MG/3ML; MG/3ML
3 SOLUTION RESPIRATORY (INHALATION) ONCE
Status: COMPLETED | OUTPATIENT
Start: 2021-01-01 | End: 2021-01-01

## 2021-01-01 RX ORDER — LORAZEPAM 2 MG/ML
2 INJECTION INTRAMUSCULAR
Status: DISCONTINUED | OUTPATIENT
Start: 2021-01-01 | End: 2021-01-01

## 2021-01-01 RX ORDER — SODIUM CHLORIDE, SODIUM LACTATE, POTASSIUM CHLORIDE, CALCIUM CHLORIDE 600; 310; 30; 20 MG/100ML; MG/100ML; MG/100ML; MG/100ML
75 INJECTION, SOLUTION INTRAVENOUS CONTINUOUS
Status: DISCONTINUED | OUTPATIENT
Start: 2021-01-01 | End: 2021-01-01 | Stop reason: SDUPTHER

## 2021-01-01 RX ORDER — DIPHENHYDRAMINE HYDROCHLORIDE 50 MG/ML
25 INJECTION, SOLUTION INTRAMUSCULAR; INTRAVENOUS ONCE
Status: COMPLETED | OUTPATIENT
Start: 2021-01-01 | End: 2021-01-01

## 2021-01-01 RX ORDER — ACETAMINOPHEN 500 MG
1000 TABLET ORAL EVERY 6 HOURS
Status: DISCONTINUED | OUTPATIENT
Start: 2021-01-01 | End: 2021-01-01 | Stop reason: HOSPADM

## 2021-01-01 RX ORDER — SUCCINYLCHOLINE CHLORIDE 20 MG/ML
INJECTION INTRAMUSCULAR; INTRAVENOUS AS NEEDED
Status: DISCONTINUED | OUTPATIENT
Start: 2021-01-01 | End: 2021-01-01 | Stop reason: HOSPADM

## 2021-01-01 RX ORDER — LORAZEPAM 2 MG/ML
0.5 INJECTION INTRAMUSCULAR
Status: COMPLETED | OUTPATIENT
Start: 2021-01-01 | End: 2021-01-01

## 2021-01-01 RX ORDER — PREDNISONE 20 MG/1
TABLET ORAL
Qty: 6 TABLET | Refills: 0 | Status: SHIPPED | OUTPATIENT
Start: 2021-01-01 | End: 2021-01-01

## 2021-01-01 RX ORDER — POLYETHYLENE GLYCOL 3350 17 G/17G
17 POWDER, FOR SOLUTION ORAL DAILY
Status: DISCONTINUED | OUTPATIENT
Start: 2021-01-01 | End: 2021-01-01 | Stop reason: HOSPADM

## 2021-01-01 RX ORDER — ALBUMIN HUMAN 50 G/1000ML
SOLUTION INTRAVENOUS AS NEEDED
Status: DISCONTINUED | OUTPATIENT
Start: 2021-01-01 | End: 2021-01-01 | Stop reason: HOSPADM

## 2021-01-01 RX ORDER — POTASSIUM CHLORIDE 20 MEQ/1
40 TABLET, EXTENDED RELEASE ORAL
Status: COMPLETED | OUTPATIENT
Start: 2021-01-01 | End: 2021-01-01

## 2021-01-01 RX ORDER — CALCIUM CHLORIDE INJECTION 100 MG/ML
INJECTION, SOLUTION INTRAVENOUS
Status: DISPENSED
Start: 2021-01-01 | End: 2021-01-01

## 2021-01-01 RX ORDER — SODIUM CHLORIDE 0.9 % (FLUSH) 0.9 %
5-40 SYRINGE (ML) INJECTION EVERY 8 HOURS
Status: DISCONTINUED | OUTPATIENT
Start: 2021-01-01 | End: 2021-01-01 | Stop reason: HOSPADM

## 2021-01-01 RX ORDER — AMIODARONE HYDROCHLORIDE 200 MG/1
400 TABLET ORAL 2 TIMES DAILY WITH MEALS
Qty: 20 TABLET | Refills: 0 | Status: SHIPPED | OUTPATIENT
Start: 2021-01-01 | End: 2021-01-01

## 2021-01-01 RX ORDER — SODIUM CHLORIDE 9 MG/ML
10 INJECTION, SOLUTION INTRAVENOUS CONTINUOUS
Status: DISCONTINUED | OUTPATIENT
Start: 2021-01-01 | End: 2021-01-01

## 2021-01-01 RX ORDER — VERAPAMIL HYDROCHLORIDE 2.5 MG/ML
INJECTION, SOLUTION INTRAVENOUS AS NEEDED
Status: DISCONTINUED | OUTPATIENT
Start: 2021-01-01 | End: 2021-01-01 | Stop reason: HOSPADM

## 2021-01-01 RX ORDER — TAMSULOSIN HYDROCHLORIDE 0.4 MG/1
0.4 CAPSULE ORAL
Status: DISCONTINUED | OUTPATIENT
Start: 2021-01-01 | End: 2021-01-01 | Stop reason: HOSPADM

## 2021-01-01 RX ORDER — METOPROLOL SUCCINATE 50 MG/1
25 TABLET, EXTENDED RELEASE ORAL DAILY
Qty: 90 TABLET | Refills: 3 | Status: SHIPPED | OUTPATIENT
Start: 2021-01-01

## 2021-01-01 RX ORDER — LIDOCAINE HYDROCHLORIDE 20 MG/ML
INJECTION, SOLUTION EPIDURAL; INFILTRATION; INTRACAUDAL; PERINEURAL AS NEEDED
Status: DISCONTINUED | OUTPATIENT
Start: 2021-01-01 | End: 2021-01-01 | Stop reason: HOSPADM

## 2021-01-01 RX ORDER — ONDANSETRON 4 MG/1
4 TABLET, ORALLY DISINTEGRATING ORAL
Status: DISCONTINUED | OUTPATIENT
Start: 2021-01-01 | End: 2022-01-01 | Stop reason: SDUPTHER

## 2021-01-01 RX ORDER — LORAZEPAM 1 MG/1
1 TABLET ORAL
Status: DISCONTINUED | OUTPATIENT
Start: 2021-01-01 | End: 2021-01-01

## 2021-01-01 RX ORDER — AMINOCAPROIC ACID 250 MG/ML
INJECTION, SOLUTION INTRAVENOUS AS NEEDED
Status: DISCONTINUED | OUTPATIENT
Start: 2021-01-01 | End: 2021-01-01 | Stop reason: HOSPADM

## 2021-01-01 RX ORDER — ATORVASTATIN CALCIUM 40 MG/1
40 TABLET, FILM COATED ORAL DAILY
Status: DISCONTINUED | OUTPATIENT
Start: 2021-01-01 | End: 2021-01-01 | Stop reason: HOSPADM

## 2021-01-01 RX ADMIN — FENTANYL CITRATE 100 MCG: 50 INJECTION, SOLUTION INTRAMUSCULAR; INTRAVENOUS at 13:34

## 2021-01-01 RX ADMIN — ATORVASTATIN CALCIUM 40 MG: 40 TABLET, FILM COATED ORAL at 08:49

## 2021-01-01 RX ADMIN — LOXAPINE 10 MG: 10 CAPSULE ORAL at 09:12

## 2021-01-01 RX ADMIN — PAROXETINE HYDROCHLORIDE 20 MG: 20 TABLET, FILM COATED ORAL at 09:21

## 2021-01-01 RX ADMIN — AMIODARONE HYDROCHLORIDE 200 MG: 200 TABLET ORAL at 17:23

## 2021-01-01 RX ADMIN — CHLORHEXIDINE GLUCONATE 0.12% ORAL RINSE 10 ML: 1.2 LIQUID ORAL at 08:36

## 2021-01-01 RX ADMIN — POTASSIUM CHLORIDE 20 MEQ: 29.8 INJECTION, SOLUTION INTRAVENOUS at 13:50

## 2021-01-01 RX ADMIN — LOXAPINE 10 MG: 10 CAPSULE ORAL at 17:23

## 2021-01-01 RX ADMIN — AMIODARONE HYDROCHLORIDE 200 MG: 200 TABLET ORAL at 21:12

## 2021-01-01 RX ADMIN — COLCHICINE 0.6 MG: 0.6 CAPSULE ORAL at 09:13

## 2021-01-01 RX ADMIN — METOPROLOL TARTRATE 12.5 MG: 25 TABLET, FILM COATED ORAL at 11:36

## 2021-01-01 RX ADMIN — DEXAMETHASONE SODIUM PHOSPHATE 6 MG: 4 INJECTION, SOLUTION INTRA-ARTICULAR; INTRALESIONAL; INTRAMUSCULAR; INTRAVENOUS; SOFT TISSUE at 23:00

## 2021-01-01 RX ADMIN — FAMOTIDINE 20 MG: 20 TABLET ORAL at 18:11

## 2021-01-01 RX ADMIN — PROPOFOL 25 MCG/KG/MIN: 10 INJECTION, EMULSION INTRAVENOUS at 15:58

## 2021-01-01 RX ADMIN — DOCUSATE SODIUM 100 MG: 100 CAPSULE, LIQUID FILLED ORAL at 17:24

## 2021-01-01 RX ADMIN — ZOLPIDEM TARTRATE 5 MG: 5 TABLET ORAL at 21:56

## 2021-01-01 RX ADMIN — Medication 81 MG: at 09:13

## 2021-01-01 RX ADMIN — Medication 10 MG: at 08:47

## 2021-01-01 RX ADMIN — ROCURONIUM BROMIDE 50 MG: 50 INJECTION INTRAVENOUS at 10:10

## 2021-01-01 RX ADMIN — Medication 10 ML: at 06:02

## 2021-01-01 RX ADMIN — LORAZEPAM 1 MG: 2 INJECTION INTRAMUSCULAR; INTRAVENOUS at 23:06

## 2021-01-01 RX ADMIN — FAMOTIDINE 20 MG: 20 TABLET ORAL at 09:13

## 2021-01-01 RX ADMIN — HYDROCODONE BITARTRATE AND ACETAMINOPHEN 2 TABLET: 5; 325 TABLET ORAL at 04:55

## 2021-01-01 RX ADMIN — SODIUM CHLORIDE 10 ML/HR: 900 INJECTION, SOLUTION INTRAVENOUS at 15:28

## 2021-01-01 RX ADMIN — FAMOTIDINE 20 MG: 20 TABLET ORAL at 08:50

## 2021-01-01 RX ADMIN — Medication 81 MG: at 09:21

## 2021-01-01 RX ADMIN — FAMOTIDINE 20 MG: 20 TABLET ORAL at 09:29

## 2021-01-01 RX ADMIN — POLYETHYLENE GLYCOL 3350 17 G: 17 POWDER, FOR SOLUTION ORAL at 08:47

## 2021-01-01 RX ADMIN — DOCUSATE SODIUM 100 MG: 100 CAPSULE, LIQUID FILLED ORAL at 17:32

## 2021-01-01 RX ADMIN — FAMOTIDINE 20 MG: 20 TABLET ORAL at 08:49

## 2021-01-01 RX ADMIN — CHLORHEXIDINE GLUCONATE 0.12% ORAL RINSE 10 ML: 1.2 LIQUID ORAL at 17:44

## 2021-01-01 RX ADMIN — INSULIN GLARGINE 20 UNITS: 100 INJECTION, SOLUTION SUBCUTANEOUS at 08:23

## 2021-01-01 RX ADMIN — AMIODARONE HYDROCHLORIDE 150 MG: 1.5 INJECTION, SOLUTION INTRAVENOUS at 09:56

## 2021-01-01 RX ADMIN — CHLORHEXIDINE GLUCONATE 0.12% ORAL RINSE 10 ML: 1.2 LIQUID ORAL at 17:23

## 2021-01-01 RX ADMIN — PHENYLEPHRINE HYDROCHLORIDE 60 MCG/MIN: 10 INJECTION INTRAVENOUS at 14:45

## 2021-01-01 RX ADMIN — FENTANYL CITRATE 12.5 MCG: 50 INJECTION, SOLUTION INTRAMUSCULAR; INTRAVENOUS at 17:39

## 2021-01-01 RX ADMIN — FENTANYL CITRATE 100 MCG: 50 INJECTION, SOLUTION INTRAMUSCULAR; INTRAVENOUS at 11:27

## 2021-01-01 RX ADMIN — Medication 100 MCG: at 13:40

## 2021-01-01 RX ADMIN — SODIUM CHLORIDE 1.1 UNITS/HR: 9 INJECTION, SOLUTION INTRAVENOUS at 23:56

## 2021-01-01 RX ADMIN — CHLORHEXIDINE GLUCONATE 0.12% ORAL RINSE 10 ML: 1.2 LIQUID ORAL at 09:35

## 2021-01-01 RX ADMIN — TAMSULOSIN HYDROCHLORIDE 0.4 MG: 0.4 CAPSULE ORAL at 17:24

## 2021-01-01 RX ADMIN — AMIODARONE HYDROCHLORIDE 200 MG: 200 TABLET ORAL at 16:58

## 2021-01-01 RX ADMIN — Medication 10 ML: at 21:47

## 2021-01-01 RX ADMIN — HYDROCODONE BITARTRATE AND ACETAMINOPHEN 2 TABLET: 5; 325 TABLET ORAL at 21:46

## 2021-01-01 RX ADMIN — MIDAZOLAM HYDROCHLORIDE 2 MG: 2 INJECTION, SOLUTION INTRAMUSCULAR; INTRAVENOUS at 12:02

## 2021-01-01 RX ADMIN — ALBUMIN (HUMAN) 250 ML: 12.5 SOLUTION INTRAVENOUS at 13:52

## 2021-01-01 RX ADMIN — DOCUSATE SODIUM 100 MG: 100 CAPSULE, LIQUID FILLED ORAL at 18:11

## 2021-01-01 RX ADMIN — MANNITOL: 20 INJECTION, SOLUTION INTRAVENOUS at 10:46

## 2021-01-01 RX ADMIN — Medication 10 ML: at 06:00

## 2021-01-01 RX ADMIN — ATORVASTATIN CALCIUM 40 MG: 40 TABLET, FILM COATED ORAL at 09:35

## 2021-01-01 RX ADMIN — INSULIN LISPRO 2 UNITS: 100 INJECTION, SOLUTION INTRAVENOUS; SUBCUTANEOUS at 17:25

## 2021-01-01 RX ADMIN — INSULIN GLARGINE 20 UNITS: 100 INJECTION, SOLUTION SUBCUTANEOUS at 11:35

## 2021-01-01 RX ADMIN — METOPROLOL TARTRATE 12.5 MG: 25 TABLET, FILM COATED ORAL at 08:48

## 2021-01-01 RX ADMIN — TAMSULOSIN HYDROCHLORIDE 0.4 MG: 0.4 CAPSULE ORAL at 17:32

## 2021-01-01 RX ADMIN — PHENYLEPHRINE HYDROCHLORIDE 40 MCG/MIN: 10 INJECTION INTRAVENOUS at 13:25

## 2021-01-01 RX ADMIN — PHENYLEPHRINE HYDROCHLORIDE 35 MCG/MIN: 10 INJECTION INTRAVENOUS at 03:07

## 2021-01-01 RX ADMIN — POLYETHYLENE GLYCOL 3350 17 G: 17 POWDER, FOR SOLUTION ORAL at 09:27

## 2021-01-01 RX ADMIN — ATORVASTATIN CALCIUM 40 MG: 40 TABLET, FILM COATED ORAL at 08:48

## 2021-01-01 RX ADMIN — POTASSIUM CHLORIDE 20 MEQ: 1500 TABLET, EXTENDED RELEASE ORAL at 09:29

## 2021-01-01 RX ADMIN — HYDROCODONE BITARTRATE AND ACETAMINOPHEN 2 TABLET: 5; 325 TABLET ORAL at 20:55

## 2021-01-01 RX ADMIN — LOXAPINE 10 MG: 10 CAPSULE ORAL at 17:13

## 2021-01-01 RX ADMIN — Medication 10 ML: at 13:07

## 2021-01-01 RX ADMIN — TAMSULOSIN HYDROCHLORIDE 0.4 MG: 0.4 CAPSULE ORAL at 17:16

## 2021-01-01 RX ADMIN — INSULIN GLARGINE 20 UNITS: 100 INJECTION, SOLUTION SUBCUTANEOUS at 08:46

## 2021-01-01 RX ADMIN — AMINOCAPROIC ACID 5 G: 250 INJECTION, SOLUTION INTRAVENOUS at 10:46

## 2021-01-01 RX ADMIN — AMINOCAPROIC ACID 1 G/HR: 250 INJECTION, SOLUTION INTRAVENOUS at 10:46

## 2021-01-01 RX ADMIN — SODIUM CHLORIDE 1.1 UNITS/HR: 9 INJECTION, SOLUTION INTRAVENOUS at 15:41

## 2021-01-01 RX ADMIN — ENOXAPARIN SODIUM 40 MG: 40 INJECTION SUBCUTANEOUS at 17:31

## 2021-01-01 RX ADMIN — INSULIN GLARGINE 20 UNITS: 100 INJECTION, SOLUTION SUBCUTANEOUS at 09:30

## 2021-01-01 RX ADMIN — Medication 81 MG: at 08:50

## 2021-01-01 RX ADMIN — VANCOMYCIN HYDROCHLORIDE 750 MG: 750 INJECTION, POWDER, LYOPHILIZED, FOR SOLUTION INTRAVENOUS at 08:04

## 2021-01-01 RX ADMIN — CHLORHEXIDINE GLUCONATE 0.12% ORAL RINSE 10 ML: 1.2 LIQUID ORAL at 08:49

## 2021-01-01 RX ADMIN — HYDROCODONE BITARTRATE AND ACETAMINOPHEN 1 TABLET: 5; 325 TABLET ORAL at 18:27

## 2021-01-01 RX ADMIN — LIDOCAINE HYDROCHLORIDE 100 MG: 20 INJECTION, SOLUTION EPIDURAL; INFILTRATION; INTRACAUDAL; PERINEURAL at 10:06

## 2021-01-01 RX ADMIN — METOPROLOL TARTRATE 12.5 MG: 25 TABLET, FILM COATED ORAL at 21:56

## 2021-01-01 RX ADMIN — HEPARIN SODIUM 5000 UNITS: 1000 INJECTION, SOLUTION INTRAVENOUS; SUBCUTANEOUS at 11:20

## 2021-01-01 RX ADMIN — ACETAMINOPHEN 1000 MG: 500 TABLET, FILM COATED ORAL at 05:57

## 2021-01-01 RX ADMIN — COLCHICINE 0.6 MG: 0.6 CAPSULE ORAL at 08:48

## 2021-01-01 RX ADMIN — Medication 10 ML: at 21:49

## 2021-01-01 RX ADMIN — ENOXAPARIN SODIUM 30 MG: 100 INJECTION SUBCUTANEOUS at 23:07

## 2021-01-01 RX ADMIN — AMIODARONE HYDROCHLORIDE 200 MG: 200 TABLET ORAL at 21:56

## 2021-01-01 RX ADMIN — AMIODARONE HYDROCHLORIDE 1 MG/MIN: 1.8 INJECTION, SOLUTION INTRAVENOUS at 18:23

## 2021-01-01 RX ADMIN — Medication 10 ML: at 13:15

## 2021-01-01 RX ADMIN — FENTANYL CITRATE 100 MCG: 50 INJECTION, SOLUTION INTRAMUSCULAR; INTRAVENOUS at 12:02

## 2021-01-01 RX ADMIN — TAMSULOSIN HYDROCHLORIDE 0.4 MG: 0.4 CAPSULE ORAL at 18:00

## 2021-01-01 RX ADMIN — SODIUM CHLORIDE 1500 MG: 900 INJECTION, SOLUTION INTRAVENOUS at 10:40

## 2021-01-01 RX ADMIN — PERFLUTREN 2 ML: 6.52 INJECTION, SUSPENSION INTRAVENOUS at 10:09

## 2021-01-01 RX ADMIN — DOCUSATE SODIUM 100 MG: 100 CAPSULE, LIQUID FILLED ORAL at 08:37

## 2021-01-01 RX ADMIN — METOPROLOL TARTRATE 12.5 MG: 25 TABLET, FILM COATED ORAL at 21:46

## 2021-01-01 RX ADMIN — AZITHROMYCIN 500 MG: 500 INJECTION, POWDER, LYOPHILIZED, FOR SOLUTION INTRAVENOUS at 07:53

## 2021-01-01 RX ADMIN — DOCUSATE SODIUM 100 MG: 100 CAPSULE, LIQUID FILLED ORAL at 09:21

## 2021-01-01 RX ADMIN — AMIODARONE HYDROCHLORIDE 200 MG: 200 TABLET ORAL at 21:46

## 2021-01-01 RX ADMIN — Medication 10 ML: at 05:58

## 2021-01-01 RX ADMIN — ATORVASTATIN CALCIUM 40 MG: 40 TABLET, FILM COATED ORAL at 09:21

## 2021-01-01 RX ADMIN — CHLORHEXIDINE GLUCONATE 0.12% ORAL RINSE 10 ML: 1.2 LIQUID ORAL at 17:13

## 2021-01-01 RX ADMIN — METOPROLOL TARTRATE 12.5 MG: 25 TABLET, FILM COATED ORAL at 09:21

## 2021-01-01 RX ADMIN — VANCOMYCIN HYDROCHLORIDE 1500 MG: 10 INJECTION, POWDER, LYOPHILIZED, FOR SOLUTION INTRAVENOUS at 08:58

## 2021-01-01 RX ADMIN — ACETAMINOPHEN 1000 MG: 500 TABLET, FILM COATED ORAL at 18:11

## 2021-01-01 RX ADMIN — PAROXETINE HYDROCHLORIDE 20 MG: 20 TABLET, FILM COATED ORAL at 09:29

## 2021-01-01 RX ADMIN — FENTANYL CITRATE 100 MCG: 50 INJECTION, SOLUTION INTRAMUSCULAR; INTRAVENOUS at 13:02

## 2021-01-01 RX ADMIN — HYDROCODONE BITARTRATE AND ACETAMINOPHEN 2 TABLET: 5; 325 TABLET ORAL at 01:20

## 2021-01-01 RX ADMIN — FAMOTIDINE 20 MG: 20 TABLET ORAL at 17:32

## 2021-01-01 RX ADMIN — PAROXETINE HYDROCHLORIDE 20 MG: 20 TABLET, FILM COATED ORAL at 09:35

## 2021-01-01 RX ADMIN — COLCHICINE 0.6 MG: 0.6 CAPSULE ORAL at 11:54

## 2021-01-01 RX ADMIN — METOPROLOL TARTRATE 12.5 MG: 25 TABLET, FILM COATED ORAL at 08:37

## 2021-01-01 RX ADMIN — MUPIROCIN: 20 OINTMENT TOPICAL at 09:30

## 2021-01-01 RX ADMIN — METOPROLOL TARTRATE 12.5 MG: 25 TABLET, FILM COATED ORAL at 09:13

## 2021-01-01 RX ADMIN — METOPROLOL TARTRATE 12.5 MG: 25 TABLET, FILM COATED ORAL at 22:10

## 2021-01-01 RX ADMIN — POTASSIUM CHLORIDE 20 MEQ: 14.9 INJECTION, SOLUTION INTRAVENOUS at 15:46

## 2021-01-01 RX ADMIN — LORAZEPAM 1 MG: 2 INJECTION INTRAMUSCULAR; INTRAVENOUS at 05:57

## 2021-01-01 RX ADMIN — SODIUM CHLORIDE 10 ML/HR: 900 INJECTION, SOLUTION INTRAVENOUS at 02:50

## 2021-01-01 RX ADMIN — PAROXETINE HYDROCHLORIDE 20 MG: 20 TABLET, FILM COATED ORAL at 08:47

## 2021-01-01 RX ADMIN — MUPIROCIN: 20 OINTMENT TOPICAL at 18:11

## 2021-01-01 RX ADMIN — POTASSIUM CHLORIDE 20 MEQ: 1500 TABLET, EXTENDED RELEASE ORAL at 13:30

## 2021-01-01 RX ADMIN — PAROXETINE HYDROCHLORIDE 20 MG: 20 TABLET, FILM COATED ORAL at 09:13

## 2021-01-01 RX ADMIN — Medication 10 MG: at 09:35

## 2021-01-01 RX ADMIN — SUCCINYLCHOLINE CHLORIDE 100 MG: 20 INJECTION, SOLUTION INTRAMUSCULAR; INTRAVENOUS at 10:05

## 2021-01-01 RX ADMIN — DIPHENHYDRAMINE HYDROCHLORIDE 25 MG: 50 INJECTION, SOLUTION INTRAMUSCULAR; INTRAVENOUS at 07:23

## 2021-01-01 RX ADMIN — Medication 81 MG: at 08:37

## 2021-01-01 RX ADMIN — CHLORHEXIDINE GLUCONATE 0.12% ORAL RINSE 10 ML: 1.2 LIQUID ORAL at 09:21

## 2021-01-01 RX ADMIN — DOCUSATE SODIUM 100 MG: 100 CAPSULE, LIQUID FILLED ORAL at 08:49

## 2021-01-01 RX ADMIN — FAMOTIDINE 20 MG: 10 INJECTION INTRAVENOUS at 16:10

## 2021-01-01 RX ADMIN — Medication 10 MG: at 08:49

## 2021-01-01 RX ADMIN — ATORVASTATIN CALCIUM 40 MG: 40 TABLET, FILM COATED ORAL at 08:37

## 2021-01-01 RX ADMIN — LORAZEPAM 0.5 MG: 2 INJECTION INTRAMUSCULAR; INTRAVENOUS at 19:29

## 2021-01-01 RX ADMIN — ETOMIDATE 20 MG: 2 INJECTION, SOLUTION INTRAVENOUS at 10:07

## 2021-01-01 RX ADMIN — HYDROCODONE BITARTRATE AND ACETAMINOPHEN 2 TABLET: 5; 325 TABLET ORAL at 09:05

## 2021-01-01 RX ADMIN — SODIUM CHLORIDE 500 ML: 900 INJECTION, SOLUTION INTRAVENOUS at 05:00

## 2021-01-01 RX ADMIN — AMIODARONE HYDROCHLORIDE 200 MG: 200 TABLET ORAL at 09:22

## 2021-01-01 RX ADMIN — POTASSIUM CHLORIDE 40 MEQ: 1500 TABLET, EXTENDED RELEASE ORAL at 09:35

## 2021-01-01 RX ADMIN — AMIODARONE HYDROCHLORIDE 200 MG: 200 TABLET ORAL at 08:48

## 2021-01-01 RX ADMIN — HYDROCODONE BITARTRATE AND ACETAMINOPHEN 2 TABLET: 5; 325 TABLET ORAL at 20:02

## 2021-01-01 RX ADMIN — DEXAMETHASONE SODIUM PHOSPHATE 8 MG: 4 INJECTION, SOLUTION INTRAMUSCULAR; INTRAVENOUS at 11:00

## 2021-01-01 RX ADMIN — AMIODARONE HYDROCHLORIDE 200 MG: 200 TABLET ORAL at 16:16

## 2021-01-01 RX ADMIN — METOPROLOL TARTRATE 12.5 MG: 25 TABLET, FILM COATED ORAL at 21:12

## 2021-01-01 RX ADMIN — ROCURONIUM BROMIDE 20 MG: 50 INJECTION INTRAVENOUS at 14:13

## 2021-01-01 RX ADMIN — Medication 10 ML: at 21:57

## 2021-01-01 RX ADMIN — POLYETHYLENE GLYCOL 3350 17 G: 17 POWDER, FOR SOLUTION ORAL at 09:21

## 2021-01-01 RX ADMIN — TAMSULOSIN HYDROCHLORIDE 0.4 MG: 0.4 CAPSULE ORAL at 17:13

## 2021-01-01 RX ADMIN — LORAZEPAM 1 MG: 2 INJECTION INTRAMUSCULAR; INTRAVENOUS at 21:26

## 2021-01-01 RX ADMIN — DOCUSATE SODIUM 100 MG: 100 CAPSULE, LIQUID FILLED ORAL at 09:35

## 2021-01-01 RX ADMIN — ENOXAPARIN SODIUM 40 MG: 40 INJECTION SUBCUTANEOUS at 17:25

## 2021-01-01 RX ADMIN — Medication 10 ML: at 06:03

## 2021-01-01 RX ADMIN — FUROSEMIDE 40 MG: 40 TABLET ORAL at 09:13

## 2021-01-01 RX ADMIN — FAMOTIDINE 20 MG: 20 TABLET ORAL at 17:24

## 2021-01-01 RX ADMIN — SODIUM CHLORIDE 150 ML/HR: 900 INJECTION, SOLUTION INTRAVENOUS at 09:49

## 2021-01-01 RX ADMIN — Medication 10 ML: at 16:57

## 2021-01-01 RX ADMIN — POTASSIUM CHLORIDE 20 MEQ: 1500 TABLET, EXTENDED RELEASE ORAL at 09:12

## 2021-01-01 RX ADMIN — FENTANYL CITRATE 100 MCG: 50 INJECTION, SOLUTION INTRAMUSCULAR; INTRAVENOUS at 10:06

## 2021-01-01 RX ADMIN — FUROSEMIDE 40 MG: 40 TABLET ORAL at 09:30

## 2021-01-01 RX ADMIN — AMIODARONE HYDROCHLORIDE 200 MG: 200 TABLET ORAL at 22:10

## 2021-01-01 RX ADMIN — Medication 10 MG: at 09:29

## 2021-01-01 RX ADMIN — CHLORHEXIDINE GLUCONATE 0.12% ORAL RINSE 10 ML: 1.2 LIQUID ORAL at 17:31

## 2021-01-01 RX ADMIN — CEFEPIME HYDROCHLORIDE 1 G: 1 INJECTION, POWDER, FOR SOLUTION INTRAMUSCULAR; INTRAVENOUS at 07:48

## 2021-01-01 RX ADMIN — MUPIROCIN: 20 OINTMENT TOPICAL at 08:43

## 2021-01-01 RX ADMIN — HYDROCODONE BITARTRATE AND ACETAMINOPHEN 1 TABLET: 5; 325 TABLET ORAL at 21:56

## 2021-01-01 RX ADMIN — HALOPERIDOL LACTATE 5 MG: 5 INJECTION, SOLUTION INTRAMUSCULAR at 05:57

## 2021-01-01 RX ADMIN — CHLORHEXIDINE GLUCONATE 0.12% ORAL RINSE 10 ML: 1.2 LIQUID ORAL at 09:16

## 2021-01-01 RX ADMIN — Medication 10 ML: at 22:00

## 2021-01-01 RX ADMIN — ENOXAPARIN SODIUM 40 MG: 40 INJECTION SUBCUTANEOUS at 17:18

## 2021-01-01 RX ADMIN — AMIODARONE HYDROCHLORIDE 200 MG: 200 TABLET ORAL at 15:19

## 2021-01-01 RX ADMIN — CHLORHEXIDINE GLUCONATE 0.12% ORAL RINSE 10 ML: 1.2 LIQUID ORAL at 09:30

## 2021-01-01 RX ADMIN — ENOXAPARIN SODIUM 40 MG: 40 INJECTION SUBCUTANEOUS at 17:14

## 2021-01-01 RX ADMIN — METOPROLOL TARTRATE 12.5 MG: 25 TABLET, FILM COATED ORAL at 21:55

## 2021-01-01 RX ADMIN — SODIUM CHLORIDE, SODIUM LACTATE, POTASSIUM CHLORIDE, AND CALCIUM CHLORIDE 75 ML/HR: 600; 310; 30; 20 INJECTION, SOLUTION INTRAVENOUS at 08:30

## 2021-01-01 RX ADMIN — DOCUSATE SODIUM 100 MG: 100 CAPSULE, LIQUID FILLED ORAL at 08:47

## 2021-01-01 RX ADMIN — Medication 81 MG: at 08:49

## 2021-01-01 RX ADMIN — MORPHINE SULFATE 4 MG: 2 INJECTION, SOLUTION INTRAMUSCULAR; INTRAVENOUS at 23:43

## 2021-01-01 RX ADMIN — FUROSEMIDE 40 MG: 40 TABLET ORAL at 08:49

## 2021-01-01 RX ADMIN — ROCURONIUM BROMIDE 50 MG: 50 INJECTION INTRAVENOUS at 11:29

## 2021-01-01 RX ADMIN — IOPAMIDOL 80 ML: 755 INJECTION, SOLUTION INTRAVENOUS at 04:20

## 2021-01-01 RX ADMIN — MUPIROCIN: 20 OINTMENT TOPICAL at 09:22

## 2021-01-01 RX ADMIN — MUPIROCIN: 20 OINTMENT TOPICAL at 17:18

## 2021-01-01 RX ADMIN — ALBUTEROL SULFATE 2.5 MG: 2.5 SOLUTION RESPIRATORY (INHALATION) at 14:51

## 2021-01-01 RX ADMIN — FAMOTIDINE 20 MG: 20 TABLET ORAL at 17:23

## 2021-01-01 RX ADMIN — DIPHENHYDRAMINE HYDROCHLORIDE 50 MG: 25 CAPSULE ORAL at 03:27

## 2021-01-01 RX ADMIN — FAMOTIDINE 20 MG: 20 TABLET ORAL at 09:35

## 2021-01-01 RX ADMIN — ACETAMINOPHEN 1000 MG: 500 TABLET, FILM COATED ORAL at 12:26

## 2021-01-01 RX ADMIN — FENTANYL CITRATE 25 MCG: 50 INJECTION, SOLUTION INTRAMUSCULAR; INTRAVENOUS at 10:35

## 2021-01-01 RX ADMIN — AMIODARONE HYDROCHLORIDE 200 MG: 200 TABLET ORAL at 08:49

## 2021-01-01 RX ADMIN — LORAZEPAM 0.5 MG: 2 INJECTION INTRAMUSCULAR; INTRAVENOUS at 14:50

## 2021-01-01 RX ADMIN — ASPIRIN 162 MG: 81 TABLET, CHEWABLE ORAL at 09:49

## 2021-01-01 RX ADMIN — ACETAMINOPHEN 1000 MG: 500 TABLET, FILM COATED ORAL at 07:03

## 2021-01-01 RX ADMIN — ACETAMINOPHEN 1000 MG: 500 TABLET, FILM COATED ORAL at 17:25

## 2021-01-01 RX ADMIN — EPINEPHRINE 1 MCG/MIN: 1 INJECTION INTRAMUSCULAR; INTRAVENOUS; SUBCUTANEOUS at 20:00

## 2021-01-01 RX ADMIN — INSULIN GLARGINE 20 UNITS: 100 INJECTION, SOLUTION SUBCUTANEOUS at 08:36

## 2021-01-01 RX ADMIN — MUPIROCIN: 20 OINTMENT TOPICAL at 17:27

## 2021-01-01 RX ADMIN — ATORVASTATIN CALCIUM 40 MG: 40 TABLET, FILM COATED ORAL at 09:13

## 2021-01-01 RX ADMIN — MUPIROCIN: 20 OINTMENT TOPICAL at 17:32

## 2021-01-01 RX ADMIN — CHLORHEXIDINE GLUCONATE 0.12% ORAL RINSE 10 ML: 1.2 LIQUID ORAL at 17:16

## 2021-01-01 RX ADMIN — Medication 81 MG: at 09:35

## 2021-01-01 RX ADMIN — MUPIROCIN: 20 OINTMENT TOPICAL at 09:06

## 2021-01-01 RX ADMIN — THIAMINE HYDROCHLORIDE 100 MG: 100 INJECTION, SOLUTION INTRAMUSCULAR; INTRAVENOUS at 05:35

## 2021-01-01 RX ADMIN — MUPIROCIN: 20 OINTMENT TOPICAL at 09:56

## 2021-01-01 RX ADMIN — DOCUSATE SODIUM 100 MG: 100 CAPSULE, LIQUID FILLED ORAL at 09:29

## 2021-01-01 RX ADMIN — LORAZEPAM 0.5 MG: 2 INJECTION INTRAMUSCULAR; INTRAVENOUS at 09:24

## 2021-01-01 RX ADMIN — Medication 10 ML: at 21:54

## 2021-01-01 RX ADMIN — TAMSULOSIN HYDROCHLORIDE 0.4 MG: 0.4 CAPSULE ORAL at 18:11

## 2021-01-01 RX ADMIN — ACETAMINOPHEN 1000 MG: 500 TABLET, FILM COATED ORAL at 11:38

## 2021-01-01 RX ADMIN — CHLORHEXIDINE GLUCONATE 0.12% ORAL RINSE 10 ML: 1.2 LIQUID ORAL at 08:47

## 2021-01-01 RX ADMIN — Medication 10 MG: at 09:21

## 2021-01-01 RX ADMIN — PAROXETINE HYDROCHLORIDE 20 MG: 20 TABLET, FILM COATED ORAL at 08:37

## 2021-01-01 RX ADMIN — PAROXETINE HYDROCHLORIDE 20 MG: 20 TABLET, FILM COATED ORAL at 08:48

## 2021-01-01 RX ADMIN — FENTANYL CITRATE 100 MCG: 50 INJECTION, SOLUTION INTRAMUSCULAR; INTRAVENOUS at 10:49

## 2021-01-01 RX ADMIN — FAMOTIDINE 20 MG: 20 TABLET ORAL at 17:16

## 2021-01-01 RX ADMIN — AMIODARONE HYDROCHLORIDE 200 MG: 200 TABLET ORAL at 17:13

## 2021-01-01 RX ADMIN — Medication 2 MCG/MIN: at 13:07

## 2021-01-01 RX ADMIN — ENOXAPARIN SODIUM 40 MG: 40 INJECTION SUBCUTANEOUS at 17:23

## 2021-01-01 RX ADMIN — ACETAMINOPHEN 1000 MG: 500 TABLET, FILM COATED ORAL at 23:03

## 2021-01-01 RX ADMIN — FAMOTIDINE 20 MG: 20 TABLET ORAL at 08:37

## 2021-01-01 RX ADMIN — Medication 10 ML: at 15:09

## 2021-01-01 RX ADMIN — COLCHICINE 0.6 MG: 0.6 CAPSULE ORAL at 09:29

## 2021-01-01 RX ADMIN — POLYETHYLENE GLYCOL 3350 17 G: 17 POWDER, FOR SOLUTION ORAL at 08:49

## 2021-01-01 RX ADMIN — CALCIUM CHLORIDE 1 G: 100 INJECTION, SOLUTION INTRAVENOUS; INTRAVENTRICULAR at 20:16

## 2021-01-01 RX ADMIN — AMIODARONE HYDROCHLORIDE 200 MG: 200 TABLET ORAL at 17:32

## 2021-01-01 RX ADMIN — IPRATROPIUM BROMIDE AND ALBUTEROL SULFATE 3 ML: .5; 2.5 SOLUTION RESPIRATORY (INHALATION) at 04:05

## 2021-01-01 RX ADMIN — INSULIN GLARGINE 20 UNITS: 100 INJECTION, SOLUTION SUBCUTANEOUS at 09:11

## 2021-01-01 RX ADMIN — MORPHINE SULFATE 4 MG: 2 INJECTION, SOLUTION INTRAMUSCULAR; INTRAVENOUS at 20:03

## 2021-01-01 RX ADMIN — ACETAMINOPHEN 1000 MG: 500 TABLET, FILM COATED ORAL at 00:14

## 2021-01-01 RX ADMIN — MIDAZOLAM HYDROCHLORIDE 2 MG: 2 INJECTION, SOLUTION INTRAMUSCULAR; INTRAVENOUS at 10:05

## 2021-01-01 RX ADMIN — ACETAMINOPHEN 1000 MG: 500 TABLET, FILM COATED ORAL at 12:20

## 2021-01-01 RX ADMIN — LOXAPINE 10 MG: 10 CAPSULE ORAL at 05:57

## 2021-01-01 RX ADMIN — FUROSEMIDE 40 MG: 40 TABLET ORAL at 13:30

## 2021-01-01 RX ADMIN — Medication 81 MG: at 09:30

## 2021-01-01 RX ADMIN — POTASSIUM CHLORIDE 20 MEQ: 1500 TABLET, EXTENDED RELEASE ORAL at 09:23

## 2021-01-01 RX ADMIN — ONDANSETRON 4 MG: 2 INJECTION INTRAMUSCULAR; INTRAVENOUS at 20:21

## 2021-01-01 RX ADMIN — Medication 10 MG: at 08:37

## 2021-01-01 RX ADMIN — AMIODARONE HYDROCHLORIDE 200 MG: 200 TABLET ORAL at 21:55

## 2021-01-01 RX ADMIN — CHLORHEXIDINE GLUCONATE 0.12% ORAL RINSE 10 ML: 1.2 LIQUID ORAL at 17:24

## 2021-01-01 RX ADMIN — HYDROCODONE BITARTRATE AND ACETAMINOPHEN 2 TABLET: 5; 325 TABLET ORAL at 15:19

## 2021-01-01 RX ADMIN — LOXAPINE 10 MG: 10 CAPSULE ORAL at 08:48

## 2021-01-01 RX ADMIN — POTASSIUM CHLORIDE 20 MEQ: 1500 TABLET, EXTENDED RELEASE ORAL at 08:49

## 2021-01-01 RX ADMIN — AMIODARONE HYDROCHLORIDE 200 MG: 200 TABLET ORAL at 08:37

## 2021-01-01 RX ADMIN — ALBUMIN (HUMAN) 12.5 G: 12.5 INJECTION, SOLUTION INTRAVENOUS at 00:32

## 2021-01-01 RX ADMIN — ATORVASTATIN CALCIUM 40 MG: 40 TABLET, FILM COATED ORAL at 09:29

## 2021-01-01 RX ADMIN — VANCOMYCIN HYDROCHLORIDE 750 MG: 750 INJECTION, POWDER, LYOPHILIZED, FOR SOLUTION INTRAVENOUS at 08:46

## 2021-01-01 RX ADMIN — AMIODARONE HYDROCHLORIDE 200 MG: 200 TABLET ORAL at 09:35

## 2021-01-01 RX ADMIN — AMIODARONE HYDROCHLORIDE 200 MG: 200 TABLET ORAL at 20:55

## 2021-01-01 RX ADMIN — FAMOTIDINE 20 MG: 10 INJECTION INTRAVENOUS at 09:22

## 2021-01-01 RX ADMIN — FAMOTIDINE 20 MG: 20 TABLET ORAL at 09:22

## 2021-01-01 RX ADMIN — FAMOTIDINE 20 MG: 20 TABLET ORAL at 17:13

## 2021-01-01 RX ADMIN — PHENYLEPHRINE HYDROCHLORIDE 35 MCG/MIN: 10 INJECTION INTRAVENOUS at 10:23

## 2021-01-01 RX ADMIN — CHLORHEXIDINE GLUCONATE 0.12% ORAL RINSE 10 ML: 1.2 LIQUID ORAL at 18:13

## 2021-01-01 RX ADMIN — DOCUSATE SODIUM 100 MG: 100 CAPSULE, LIQUID FILLED ORAL at 17:16

## 2021-01-01 RX ADMIN — VANCOMYCIN HYDROCHLORIDE 1000 MG: 1 INJECTION, POWDER, LYOPHILIZED, FOR SOLUTION INTRAVENOUS at 09:30

## 2021-01-01 RX ADMIN — METOPROLOL TARTRATE 12.5 MG: 25 TABLET, FILM COATED ORAL at 09:35

## 2021-01-01 RX ADMIN — AMIODARONE HYDROCHLORIDE 200 MG: 200 TABLET ORAL at 09:13

## 2021-01-01 RX ADMIN — MUPIROCIN: 20 OINTMENT TOPICAL at 17:44

## 2021-01-01 RX ADMIN — ACETAMINOPHEN 1000 MG: 500 TABLET, FILM COATED ORAL at 06:43

## 2021-01-01 RX ADMIN — INSULIN GLARGINE 20 UNITS: 100 INJECTION, SOLUTION SUBCUTANEOUS at 09:34

## 2021-01-01 RX ADMIN — METOPROLOL TARTRATE 12.5 MG: 25 TABLET, FILM COATED ORAL at 20:55

## 2021-01-01 RX ADMIN — Medication 10 ML: at 16:00

## 2021-01-01 RX ADMIN — VANCOMYCIN HYDROCHLORIDE 750 MG: 750 INJECTION, POWDER, LYOPHILIZED, FOR SOLUTION INTRAVENOUS at 21:09

## 2021-01-01 RX ADMIN — ALBUTEROL SULFATE 2.5 MG: 2.5 SOLUTION RESPIRATORY (INHALATION) at 09:32

## 2021-01-01 RX ADMIN — Medication 10 ML: at 03:09

## 2021-01-01 RX ADMIN — INSULIN LISPRO 2 UNITS: 100 INJECTION, SOLUTION INTRAVENOUS; SUBCUTANEOUS at 16:45

## 2021-01-04 ENCOUNTER — OFFICE VISIT (OUTPATIENT)
Dept: FAMILY MEDICINE CLINIC | Age: 81
End: 2021-01-04
Payer: MEDICARE

## 2021-01-04 VITALS
RESPIRATION RATE: 16 BRPM | HEIGHT: 67 IN | TEMPERATURE: 98.4 F | BODY MASS INDEX: 27.47 KG/M2 | WEIGHT: 175 LBS | OXYGEN SATURATION: 97 % | SYSTOLIC BLOOD PRESSURE: 130 MMHG | DIASTOLIC BLOOD PRESSURE: 74 MMHG | HEART RATE: 101 BPM

## 2021-01-04 DIAGNOSIS — I67.89 CEREBRAL MICROVASCULAR DISEASE: ICD-10-CM

## 2021-01-04 DIAGNOSIS — M10.9 GOUT, UNSPECIFIED CAUSE, UNSPECIFIED CHRONICITY, UNSPECIFIED SITE: ICD-10-CM

## 2021-01-04 DIAGNOSIS — J30.9 ALLERGIC RHINITIS, UNSPECIFIED SEASONALITY, UNSPECIFIED TRIGGER: ICD-10-CM

## 2021-01-04 DIAGNOSIS — I10 ESSENTIAL HYPERTENSION: ICD-10-CM

## 2021-01-04 DIAGNOSIS — G47.9 SLEEPING DIFFICULTY: ICD-10-CM

## 2021-01-04 DIAGNOSIS — F33.9 RECURRENT DEPRESSION (HCC): Primary | ICD-10-CM

## 2021-01-04 DIAGNOSIS — R73.03 PREDIABETES: ICD-10-CM

## 2021-01-04 DIAGNOSIS — Z23 ENCOUNTER FOR IMMUNIZATION: ICD-10-CM

## 2021-01-04 DIAGNOSIS — R97.20 ELEVATED PSA: ICD-10-CM

## 2021-01-04 PROCEDURE — G9717 DOC PT DX DEP/BP F/U NT REQ: HCPCS | Performed by: FAMILY MEDICINE

## 2021-01-04 PROCEDURE — 99214 OFFICE O/P EST MOD 30 MIN: CPT | Performed by: FAMILY MEDICINE

## 2021-01-04 PROCEDURE — G8536 NO DOC ELDER MAL SCRN: HCPCS | Performed by: FAMILY MEDICINE

## 2021-01-04 PROCEDURE — 1101F PT FALLS ASSESS-DOCD LE1/YR: CPT | Performed by: FAMILY MEDICINE

## 2021-01-04 PROCEDURE — G8754 DIAS BP LESS 90: HCPCS | Performed by: FAMILY MEDICINE

## 2021-01-04 PROCEDURE — G8427 DOCREV CUR MEDS BY ELIG CLIN: HCPCS | Performed by: FAMILY MEDICINE

## 2021-01-04 PROCEDURE — G8752 SYS BP LESS 140: HCPCS | Performed by: FAMILY MEDICINE

## 2021-01-04 PROCEDURE — 90662 IIV NO PRSV INCREASED AG IM: CPT | Performed by: FAMILY MEDICINE

## 2021-01-04 PROCEDURE — G0463 HOSPITAL OUTPT CLINIC VISIT: HCPCS | Performed by: FAMILY MEDICINE

## 2021-01-04 PROCEDURE — G8419 CALC BMI OUT NRM PARAM NOF/U: HCPCS | Performed by: FAMILY MEDICINE

## 2021-01-04 RX ORDER — HYDROXYZINE 25 MG/1
25 TABLET, FILM COATED ORAL
COMMUNITY
Start: 2020-10-21

## 2021-01-04 RX ORDER — PAROXETINE HYDROCHLORIDE 20 MG/1
TABLET, FILM COATED ORAL
Qty: 90 TAB | Refills: 0 | Status: CANCELLED | OUTPATIENT
Start: 2021-01-04

## 2021-01-04 RX ORDER — PRAVASTATIN SODIUM 40 MG/1
40 TABLET ORAL
Qty: 90 TAB | Refills: 0 | Status: SHIPPED | OUTPATIENT
Start: 2021-01-04 | End: 2021-01-01 | Stop reason: ALTCHOICE

## 2021-01-04 NOTE — PROGRESS NOTES
Solitario Husbands, 68 y.o.,  male SUBJECTIVE Ff-up Insomnia/depression- reports to be doing fairly given our current pandemic circumstances, hopeful. He continues to follow psychiatrist. On paxil/ambien HTN- taking medication without problems. Prediabetes and HL- on pravachol. He has forgotten to get labs drawn prior to this visit. Reordered/reminded Gout- reports about 2 x a year flares, usually shellfish triggers ROS: 
See HPI, all others negative Patient Active Problem List  
Diagnosis Code  Gross hematuria R31.0  Elevated PSA R97.20  Prediabetes R73.03  
 Insomnia G47.00  Joint pain M25.50  Anxiety F41.9  Anxiety and depression F41.9, F32.9  Hypertension I10  
 Kidney stone N20.0  Malaria B54  
 Skin cancer C44.90  Gout M10.9  Essential hypertension I10  Pure hypercholesterolemia E78.00  Sleeping difficulty G47.9  Mild episode of recurrent major depressive disorder ([facility] [address] F33.0  Cerebral microvascular disease I67.89 Current Outpatient Medications Medication Sig Dispense Refill  pravastatin (PRAVACHOL) 40 mg tablet Take 1 Tab by mouth nightly. 90 Tab 0  
 olmesartan-hydroCHLOROthiazide (BENICAR HCT) 20-12.5 mg per tablet Take 1 Tab by mouth daily. 90 Tab 1  
 fluticasone propionate (FLONASE) 50 mcg/actuation nasal spray 2 Sprays by Both Nostrils route daily. 3 Bottle 1  
 tamsulosin (FLOMAX) 0.4 mg capsule Take 1 Cap by mouth daily (after dinner). 90 Cap 3  
 PARoxetine (PAXIL) 20 mg tablet TAKE 1 TABLET BY MOUTH EVERY DAY 90 Tab 0  
 zolpidem (AMBIEN) 10 mg tablet Take  by mouth nightly as needed for Sleep.  HYDROcodone-acetaminophen (VICODIN) 5-300 mg tablet Take  by mouth.  hydrOXYzine HCL (ATARAX) 25 mg tablet Take 25 mg by mouth as needed.  diclofenac (VOLTAREN) 1 % gel Apply 2 g to affected area every six (6) hours as needed for Pain (left thumb). 100 g 5 Allergies Allergen Reactions  Fluconazole Hives and Itching  Penicillin G Hives Past Medical History:  
Diagnosis Date  Anxiety and depression  Asthma  Elevated PSA  Gout  Hypertension  Insomnia  Kidney stone  Malaria  Prediabetes  Skin cancer Social History Socioeconomic History  Marital status:  Spouse name: Not on file  Number of children: Not on file  Years of education: Not on file  Highest education level: Not on file Occupational History  Not on file Social Needs  Financial resource strain: Not on file  Food insecurity Worry: Not on file Inability: Not on file  Transportation needs Medical: Not on file Non-medical: Not on file Tobacco Use  Smoking status: Never Smoker  Smokeless tobacco: Never Used Substance and Sexual Activity  Alcohol use: No  
 Drug use: No  
 Sexual activity: Not on file Lifestyle  Physical activity Days per week: Not on file Minutes per session: Not on file  Stress: Not on file Relationships  Social connections Talks on phone: Not on file Gets together: Not on file Attends Cheondoism service: Not on file Active member of club or organization: Not on file Attends meetings of clubs or organizations: Not on file Relationship status: Not on file  Intimate partner violence Fear of current or ex partner: Not on file Emotionally abused: Not on file Physically abused: Not on file Forced sexual activity: Not on file Other Topics Concern  Not on file Social History Narrative  Not on file History reviewed. No pertinent family history. OBJECTIVE Physical Exam:  
 
Visit Vitals /74 (BP 1 Location: Right arm, BP Patient Position: Sitting) Pulse (!) 101 Temp 98.4 °F (36.9 °C) (Oral) Resp 16 Ht 5' 7\" (1.702 m) Wt 175 lb (79.4 kg) SpO2 97% BMI 27.41 kg/m² General: alert, well-appearing, in no apparent distress or pain Head: atraumatic. Non-tender maxillary and frontal sinuses CVS: normal rate, regular rhythm, distinct S1 and S2 Lungs:clear to ausculation bilaterally, no crackles, wheezing or rhonchi noted Abdomen: soft, NT, ND Extremities: no edema Psych:  mood and affect normal 
 
 
ASSESSMENT/PLAN Diagnoses and all orders for this visit: 
 
 Essential hypertension Controlled,  cont benicar hct Dash diet, monitoring CMP/lipid panel soon Prediabetes Persistent, tlcs Pure hypercholesterolemia Calc cv risk 29 % vs 18 % On  pravachol 20 mg qhs Will monitor Recurrent depression ([facility] [address] Fair control, On Kevin Marko Following psychiatry Allergic rhinitis, unspecified seasonality, unspecified trigger 
controlled, cont zyrtec and flonase Sleeping difficulty On ambien, following psychiatry Elevated PSA Refer to [facility] Gout, unspecified cause, unspecified chronicity, unspecified site Fair control, cont prn colchicine Cerebral microvascular disease [employer] Offered ASA addition, he declines Cont statin, BP control Encounter for immunization High dose flu vaccine given Ff-up in  3 months or sooner prn, plan on MWV then Patient understands plan of care. Patient has provided input and agrees with goals.

## 2021-01-04 NOTE — PROGRESS NOTES
1. Have you been to the ER, urgent care clinic since your last visit? Hospitalized since your last visit? No 
 
2. Have you seen or consulted any other health care providers outside of the [address] [place] [address] since your last visit? Include any pap smears or colon screening. No  
 
Lot # [id number] Exp Date: 06- Miche[address]Eliceo [address] # [id number]

## 2021-01-04 NOTE — PATIENT INSTRUCTIONS
DASH Diet: Care Instructions Your Care Instructions The DASH diet is an eating plan that can help lower your blood pressure. DASH stands for Dietary Approaches to Stop Hypertension. Hypertension is high blood pressure. The DASH diet focuses on eating foods that are high in calcium, potassium, and magnesium. These nutrients can lower blood pressure. The foods that are highest in these nutrients are fruits, vegetables, low-fat dairy products, nuts, seeds, and legumes. But taking calcium, potassium, and magnesium supplements instead of eating foods that are high in those nutrients does not have the same effect. The DASH diet also includes whole grains, fish, and poultry. The DASH diet is one of several lifestyle changes your doctor may recommend to lower your high blood pressure. Your doctor may also want you to decrease the amount of sodium in your diet. Lowering sodium while following the DASH diet can lower blood pressure even further than just the DASH diet alone. Follow-up care is a key part of your treatment and safety. Be sure to make and go to all appointments, and call your doctor if you are having problems. It's also a good idea to know your test results and keep a list of the medicines you take. How can you care for yourself at home? Following the DASH diet · Eat 4 to 5 servings of fruit each day. A serving is 1 medium-sized piece of fruit, ½ cup chopped or canned fruit, 1/4 cup dried fruit, or 4 ounces (½ cup) of fruit juice. Choose fruit more often than fruit juice. · Eat 4 to 5 servings of vegetables each day. A serving is 1 cup of lettuce or raw leafy vegetables, ½ cup of chopped or cooked vegetables, or 4 ounces (½ cup) of vegetable juice. Choose vegetables more often than vegetable juice. · Get 2 to 3 servings of low-fat and fat-free dairy each day. A serving is 8 ounces of milk, 1 cup of yogurt, or 1 ½ ounces of cheese. · Eat 6 to 8 servings of grains each day. A serving is 1 slice of bread, 1 ounce of dry cereal, or ½ cup of cooked rice, pasta, or cooked cereal. Try to choose whole-grain products as much as possible. · Limit lean meat, poultry, and fish to 2 servings each day. A serving is 3 ounces, about the size of a deck of cards. · Eat 4 to 5 servings of nuts, seeds, and legumes (cooked dried beans, lentils, and split peas) each week. A serving is 1/3 cup of nuts, 2 tablespoons of seeds, or ½ cup of cooked beans or peas. · Limit fats and oils to 2 to 3 servings each day. A serving is 1 teaspoon of vegetable oil or 2 tablespoons of salad dressing. · Limit sweets and added sugars to 5 servings or less a week. A serving is 1 tablespoon jelly or jam, ½ cup sorbet, or 1 cup of lemonade. · Eat less than 2,300 milligrams (mg) of sodium a day. If you limit your sodium to 1,500 mg a day, you can lower your blood pressure even more. Tips for success · Start small. Do not try to make dramatic changes to your diet all at once. You might feel that you are missing out on your favorite foods and then be more likely to not follow the plan. Make small changes, and stick with them. Once those changes become habit, add a few more changes. · Try some of the following: ? Make it a goal to eat a fruit or vegetable at every meal and at snacks. This will make it easy to get the recommended amount of fruits and vegetables each day. ? Try yogurt topped with fruit and nuts for a snack or healthy dessert. ? Add lettuce, tomato, cucumber, and onion to sandwiches. ? Combine a ready-made pizza crust with low-fat mozzarella cheese and lots of vegetable toppings. Try using tomatoes, squash, spinach, broccoli, carrots, cauliflower, and onions. ? Have a variety of cut-up vegetables with a low-fat dip as an appetizer instead of chips and dip. ? Sprinkle sunflower seeds or chopped almonds over salads. Or try adding chopped walnuts or almonds to cooked vegetables. ? Try some vegetarian meals using beans and peas. Add garbanzo or kidney beans to salads. Make burritos and tacos with mashed irving beans or black beans. Where can you learn more? Go to http://www.gray.com/ Enter Z466 in the search box to learn more about \"DASH Diet: Care Instructions. \" Current as of: December 16, 2019               Content Version: 12.6 © 0654-2499 [employer]. Care instructions adapted under license by [employer] (which disclaims liability or warranty for this information). If you have questions about a medical condition or this instruction, always ask your healthcare professional. [address] any warranty or liability for your use of this information. Influenza (Flu) Vaccine (Inactivated or Recombinant): What You Need to Know Why get vaccinated? Influenza vaccine can prevent influenza (flu). Flu is a contagious disease that spreads around the [place] every year, usually between October and May. Anyone can get the flu, but it is more dangerous for some people. Infants and young children, people 72years of age and older, pregnant women, and people with certain health conditions or a weakened immune system are at greatest risk of flu complications. Pneumonia, bronchitis, sinus infections and ear infections are examples of flu-related complications. If you have a medical condition, such as heart disease, cancer or diabetes, flu can make it worse. Flu can cause fever and chills, sore throat, muscle aches, fatigue, cough, headache, and runny or stuffy nose. Some people may have vomiting and diarrhea, though this is more common in children than adults. Each year, thousands of people in the [facility] die from flu, and many more are hospitalized. Flu vaccine prevents millions of illnesses and flu-related visits to the doctor each year. Influenza vaccine CDC recommends everyone 10months of age and older get vaccinated every flu season. Children 6 months through 6years of age may need 2 doses during a single flu season. Everyone else needs only 1 dose each flu season. It takes about 2 weeks for protection to develop after vaccination. There are many flu viruses, and they are always changing. Each year a new flu vaccine is made to protect against three or four viruses that are likely to cause disease in the upcoming flu season. Even when the vaccine doesn't exactly match these viruses, it may still provide some protection. Influenza vaccine does not cause flu. Influenza vaccine may be given at the same time as other vaccines. Talk with your health care provider Tell your vaccine provider if the person getting the vaccine: 
· Has had an allergic reaction after a previous dose of influenza vaccine, or has any severe, life-threatening allergies. · Has ever had Guillain-Barré Syndrome (also called GBS). In some cases, your health care provider may decide to postpone influenza vaccination to a future visit. People with minor illnesses, such as a cold, may be vaccinated. People who are moderately or severely ill should usually wait until they recover before getting influenza vaccine. Your health care provider can give you more information. Risks of a vaccine reaction · Soreness, redness, and swelling where shot is given, fever, muscle aches, and headache can happen after influenza vaccine. · There may be a very small increased risk of Guillain-Barré Syndrome (GBS) after inactivated influenza vaccine (the flu shot). Aria Castillo children who get the flu shot along with pneumococcal vaccine (PCV13), and/or DTaP vaccine at the same time might be slightly more likely to have a seizure caused by fever. Tell your health care provider if a child who is getting flu vaccine has ever had a seizure. People sometimes faint after medical procedures, including vaccination. Tell your provider if you feel dizzy or have vision changes or ringing in the ears. As with any medicine, there is a very remote chance of a vaccine causing a severe allergic reaction, other serious injury, or death. What if there is a serious problem? An allergic reaction could occur after the vaccinated person leaves the clinic. If you see signs of a severe allergic reaction (hives, swelling of the face and throat, difficulty breathing, a fast heartbeat, dizziness, or weakness), call 9-1-1 and get the person to the nearest hospital. 
For other signs that concern you, call your health care provider. Adverse reactions should be reported to the Vaccine Adverse Event Reporting System (VAERS). Your health care provider will usually file this report, or you can do it yourself. Visit the VAERS website at www.vaers. hhs.gov or call 2-392.810.5775. VAERS is only for reporting reactions, and VAERS staff do not give medical advice. The National Vaccine Injury Compensation Program 
The National Vaccine Injury Compensation Program (VICP) is a federal program that was created to compensate people who may have been injured by certain vaccines. Visit the VICP website at www.hrsa.gov/vaccinecompensation or call 1-333.198.3274 to learn about the program and about filing a claim. There is a time limit to file a claim for compensation. How can I learn more? · Ask your healthcare provider. · Call your local or state health department. · Contact the Centers for Disease Control and Prevention (CDC): 
? Call 5-654.697.5312 (8-616-HGB-INFO) or 
? Visit CDC's website at www.cdc.gov/flu Vaccine Information Statement (Interim) Inactivated Influenza Vaccine 8/15/2019 
42 KAREY Anthony [id number] Department of Health and [employer] Centers for Disease Control and Prevention Many Vaccine Information Statements are available in [place]er and other languages. See www.immunize.org/vis. Muchas hojas de información sobre vacunas están disponibles en español y en otros idiomas. Visite www.immunize.org/vis. Care instructions adapted under license by [employer] (which disclaims liability or warranty for this information). If you have questions about a medical condition or this instruction, always ask your healthcare professional. [address] any warranty or liability for your use of this information.

## 2021-06-30 NOTE — PROGRESS NOTES
Ladonna Nevarez presents today for   Chief Complaint   Patient presents with    New Patient     Ref by PCP for an [address] preferred language for health care discussion is english/other. Is someone accompanying this pt? no    Is the patient using any DME equipment during [address]? no    Depression Screening:  3 most recent PHQ Screens 6/30/2021   PHQ Not Done -   Little interest or pleasure in doing things Not at all   Feeling down, depressed, irritable, or hopeless Not at all   Total Score PHQ 2 0   Trouble falling or staying asleep, or sleeping too much -   Feeling tired or having little energy -   Poor appetite, weight loss, or overeating -   Feeling bad about yourself - or that you are a failure or have let yourself or your family down -   Trouble concentrating on things such as school, work, reading, or watching TV -   Moving or speaking so slowly that other people could have noticed; or the opposite being so fidgety that others notice -   Thoughts of being better off dead, or hurting yourself in some way -   PHQ 9 Score -   How difficult have these problems made it for you to do your work, take care of your home and get along with others -       Learning Assessment:  Learning Assessment 6/30/2021   PRIMARY LEARNER Patient   HIGHEST LEVEL OF EDUCATION - PRIMARY LEARNER  -   BARRIERS PRIMARY LEARNER -   CO-LEARNER CAREGIVER -   PRIMARY LANGUAGE ENGLISH   LEARNER PREFERENCE PRIMARY DEMONSTRATION   ANSWERED BY patient   RELATIONSHIP SELF       Abuse Screening:  Abuse Screening Questionnaire 6/30/2021   Do you ever feel afraid of your partner? N   Are you in a relationship with someone who physically or mentally threatens you? N   Is it safe for you to go home? Y       Fall Risk  Fall Risk Assessment, last 12 mths 6/30/2021   Able to walk? Yes   Fall in past 12 months? 0   Do you feel unsteady?  0   Are you worried about falling 0   Number of falls in past 12 months -   Fall with injury? -           Pt currently taking Anticoagulant therapy? no    Pt currently taking Antiplatelet therapy ? no      Coordination of Care:  1. Have you been to the ER, urgent care clinic since your last visit? Hospitalized since your last visit? no    2. Have you seen or consulted any other health care providers outside of the [address] [place] [address] since your last visit? Include any pap smears or colon screening.  no

## 2021-06-30 NOTE — PROGRESS NOTES
Dexter Rose    Chief Complaint   Patient presents with    New Patient     Ref by PCP for an ABN Nuclear Stress Test   abn testing    HPI    Dexter Rose is a 80 y.o. gentleman with HTN, HL who is here for further recs after abnormal echo and stress testing. First he says he doesn't know why he's here, doesn't know why testing was even ordered. After further discussion, does admit to fatigue and had \"small case of pneumonia\" back in 4/2021. Never had ramsey CP. CV RFs; HTN, HL, +FH    Past Medical History:   Diagnosis Date    Anxiety and depression     Asthma     Elevated PSA     Gout     Hypertension     Insomnia     Kidney stone     Malaria     Prediabetes     Skin cancer        Past Surgical History:   Procedure Laterality Date    HX COLONOSCOPY  2011    f/u 2021    HX HERNIA REPAIR  2000    [address]    HX SKIN BIOPSY  2013    [address] [place]       Current Outpatient Medications   Medication Sig Dispense Refill    Bystolic 10 mg tablet take 1 tablet by mouth once daily      tamsulosin (FLOMAX) 0.4 mg capsule Take 1 Capsule by mouth daily (after dinner). 90 Capsule 3    pravastatin (PRAVACHOL) 40 mg tablet Take 1 Tab by mouth nightly. 90 Tab 0    hydrOXYzine HCL (ATARAX) 25 mg tablet Take 25 mg by mouth as needed.  fluticasone propionate (FLONASE) 50 mcg/actuation nasal spray 2 Sprays by Both Nostrils route daily. 3 Bottle 1    diclofenac (VOLTAREN) 1 % gel Apply 2 g to affected area every six (6) hours as needed for Pain (left thumb). 100 g 5    PARoxetine (PAXIL) 20 mg tablet TAKE 1 TABLET BY MOUTH EVERY DAY 90 Tab 0    zolpidem (AMBIEN) 10 mg tablet Take  by mouth nightly as needed for Sleep.  HYDROcodone-acetaminophen (VICODIN) 5-300 mg tablet Take  by mouth.          Allergies   Allergen Reactions    Fluconazole Hives and Itching    Penicillin G Hives       Social History     Socioeconomic History    Marital status:      Spouse name: Not on file    Number of children: Not on file    Years of education: Not on file    Highest education level: Not on file   Occupational History    Not on file   Tobacco Use    Smoking status: Never Smoker    Smokeless tobacco: Never Used   Vaping Use    Vaping Use: Never used   Substance and Sexual Activity    Alcohol use: No    Drug use: No    Sexual activity: Not on file   Other Topics Concern    Not on file   Social History Narrative    Not on file     Social Determinants of Health     Financial Resource Strain:     Difficulty of Paying Living Expenses:    Food Insecurity:     Worried About Running Out of Food in the Last Year:     [address] in the Last Year:    Transportation Needs:     Lack of Transportation (Medical):  Lack of Transportation (Non-Medical):    Physical Activity:     Days of Exercise per Week:     Minutes of Exercise per Session:    Stress:     Feeling of Stress :    Social Connections:     Frequency of Communication with Friends and Family:     Frequency of Social Gatherings with Friends and Family:     Attends Bahai Services:     Active Member of Clubs or Organizations:     Attends Club or Organization Meetings:     Marital Status:    Intimate Partner Violence:     Fear of Current or Ex-Partner:     Emotionally Abused:     Physically Abused:     Sexually Abused:     works on [employer]    FH: +mother  in 46s of heart disease    Review of Systems    14 pt Review of Systems is negative unless otherwise mentioned in the HPI.     Wt Readings from Last 3 Encounters:   21 80.7 kg (178 lb)   21 80.7 kg (178 lb)   21 80.7 kg (178 lb)     Temp Readings from Last 3 Encounters:   21 98.4 °F (36.9 °C) (Oral)   19 98.5 °F (36.9 °C) (Oral)   19 99 °F (37.2 °C) (Oral)     BP Readings from Last 3 Encounters:   21 138/88   21 130/74   21 (!) 140/82     Pulse Readings from Last 3 Encounters:   21 73   21 (!) 101 12/27/19 84       04/02/21    ECHO ADULT COMPLETE 06/24/2021 6/24/2021    Interpretation Summary  · LV: Calculated LVEF is 35%. Biplane method used to measure ejection fraction. Normal cavity size. Mildly increased wall thickness. Moderately and globally reduced systolic function. Age-appropriate left ventricular diastolic function. · AO: Mild aortic root dilatation; diameter is 3.5 cm. Signed by: Cira Valero MD on 6/24/2021  1:01 PM      Physical Exam:    Visit Vitals  /88 (BP 1 Location: Left upper arm, BP Patient Position: Sitting, BP Cuff Size: Small adult)   Pulse 73   Ht 5' 7\" (1.702 m)   Wt 80.7 kg (178 lb)   SpO2 96%   BMI 27.88 kg/m²      Physical Exam  HENT:      Head: Normocephalic and atraumatic. Eyes:      General: No scleral icterus. Pupils: Pupils are equal, round, and reactive to light. Cardiovascular:      Rate and Rhythm: Normal rate and regular rhythm. Heart sounds: Normal heart sounds. No murmur heard. No friction rub. No gallop. Pulmonary:      Effort: Pulmonary effort is normal. No respiratory distress. Breath sounds: Normal breath sounds. No wheezing or rales. Chest:      Chest wall: No tenderness. Abdominal:      General: Bowel sounds are normal.      Palpations: Abdomen is soft. Skin:     General: Skin is warm and dry. Findings: No rash. Neurological:      Mental Status: He is alert and oriented to person, place, and time.          EKG today shows: NSR, normal axis and intervals, no ST segment abnormalities,PVCs    Lab Results   Component Value Date/Time    Cholesterol, total 215 (H) 06/19/2020 04:34 PM    HDL Cholesterol 43 06/19/2020 04:34 PM    LDL, calculated 121.8 (H) 06/19/2020 04:34 PM    VLDL, calculated 50.2 06/19/2020 04:34 PM    Triglyceride 251 (H) 06/19/2020 04:34 PM    CHOL/HDL Ratio 5.0 06/19/2020 04:34 PM     Lab Results   Component Value Date/Time    Sodium 142 06/19/2020 04:34 PM    Potassium 4.4 06/19/2020 04:34 PM Chloride 106 06/19/2020 04:34 PM    CO2 30 06/19/2020 04:34 PM    Anion gap 6 06/19/2020 04:34 PM    Glucose 94 06/19/2020 04:34 PM    BUN 22 (H) 06/19/2020 04:34 PM    Creatinine 1.16 06/19/2020 04:34 PM    BUN/Creatinine ratio 19 06/19/2020 04:34 PM    GFR est AA >60 06/19/2020 04:34 PM    GFR est non-AA >60 06/19/2020 04:34 PM    Calcium 9.2 06/19/2020 04:34 PM    Bilirubin, total 0.4 06/19/2020 04:34 PM    Alk. phosphatase 58 06/19/2020 04:34 PM    Protein, total 7.9 06/19/2020 04:34 PM    Albumin 4.2 06/19/2020 04:34 PM    Globulin 3.7 06/19/2020 04:34 PM    A-G Ratio 1.1 06/19/2020 04:34 PM    ALT (SGPT) 27 06/19/2020 04:34 PM    AST (SGOT) 16 06/19/2020 04:34 PM     Lab Results   Component Value Date/Time    WBC 7.5 01/27/2019 08:17 PM    HGB 12.4 (L) 01/27/2019 08:17 PM    HCT 37.0 01/27/2019 08:17 PM    PLATELET 761 71/92/5776 08:17 PM    .8 (H) 01/27/2019 08:17 PM     04/02/21    NUCLEAR CARDIAC STRESS TEST 06/24/2021 6/24/2021    Interpretation Summary  · Baseline ECG: Sinus rhythm, occasional PVCs. Left axis deviation; cannot r/0 anterior infarct age undetermined  · SPECT: Left ventricular function post-stress was abnormal. Calculated ejection fraction is 30%. There is no evidence of transient ischemic dilation (TID). The TID ratio is 1.1.  · SPECT: Myocardial perfusion imaging defect 1: There is a defect that is small in size present in the apical location(s) that is partially reversible. There is normal wall motion in the defect area. The possibility of ischemia cannot be excluded. Perfusion defect was visually and quantitatively present. Myocardial perfusion imaging defect 2: There is a defect that is small to medium in size affecting the inferolateral location(s) that is partially reversible. There is normal wall motion in the defect area. The possibility of ischemia cannot be excluded. Perfusion defect was visually and quantitatively present.   · SPECT: Left ventricular perfusion is abnormal. Myocardial perfusion imaging supports a high risk stress test due to EF <40%. Signed by: Hoa Juares DO on 6/24/2021  3:53 PM  04/02/21    ECHO ADULT COMPLETE 06/24/2021 6/24/2021    Interpretation Summary  · LV: Calculated LVEF is 35%. Biplane method used to measure ejection fraction. Normal cavity size. Mildly increased wall thickness. Moderately and globally reduced systolic function. Age-appropriate left ventricular diastolic function. · AO: Mild aortic root dilatation; diameter is 3.5 cm. Signed by: Verner Basket, MD on 6/24/2021  1:01 PM        Impression and Plan:  Dexter Rose is a 80 y.o. 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%  3.) PVCs  4.) HTN  5.) HL  6.) Fatigue    Results reviewed with pt in detail  Suggested outpt [facility], he wants to call me back to schedule  His wife used to work at [employer], says wants to dw her and his PCP before scheduling here  Importance of doing procedure within next week or two max explained in detail and he expressed understanding  Will fax to PCP as well  Tentatively will make follow up after [facility] with me ~6-8 weeks    Thank you for allowing me to participate in the care of your patient, please do not hesitate to call with questions or concerns. Follow-up and Dispositions    · Return in about 8 weeks (around 8/25/2021).      Kindest Regards,    Keesha Doshi DO

## 2021-06-30 NOTE — TELEPHONE ENCOUNTER
----- Message from Vicki Torres DO sent at 6/25/2021  4:50 PM EDT -----  Thanks for following up with this! Perry Hendrickson said Dr. Aj Flores office called but when she called back to see regarding which pt- no answer. I'm guessing it's for this patient! They do need new pt appt then- id schedule with one of our interventionalists for abnormal stress test.    Thanks  ----- Message -----  From: Traci Smith  Sent: 6/25/2021   1:59 PM EDT  To: Vicki Torres DO    This is a outside provider. Results will be faxed to DR. Paul Montes.

## 2021-07-07 NOTE — TELEPHONE ENCOUNTER
Instructions    Patients Name:  Vanesa Pfeiffer    1. You are scheduled to have a left heart cath on 7/12/2021  at 9:15am Please check in at 8:15am    2. Please go to [facility] and park in the outpatient parking lot that is located around to the back of the hospital and enter through the [facility] building. Once you enter through the [facility] check in with the  there. The  will either give you directions or assist you in getting to the cath holding area. 3. You are not to eat or drink anything after midnight the night before your procedure. Small sips of water to take your medications is ok. 4. If you are diabetic, do not take your insulin/sugar pill the morning of the procedure. 5. MEDICATION INSTRUCTIONS:   Please take your morning medications with the following special instructions:    [x]          Please make sure to take your Blood pressure medication . [x]          Take Prednisone 60 mg and Benadryl 25 mg by mouth at Bedtime on 7/11/2021 and again on 7/12/2021 at 8:15am. DO NOT drive after taking the Benadryl. This is to prevent you from having an allergic reaction to the dye. 6. We encourage families to wait in the waiting room on the first floor while the procedure is being done. The Doctor will come out and talk with you as soon as the procedure is over. 7. There is the possibility that you may spend the night in the hospital, depending on the results of the procedure. This will be determined after the procedure is done. If angioplasty or stent is planned, you will stay at least one day. 8. If you or your family have any questions, please call our office Monday -Friday, 9:00 a.m.-4:30 p.m.,  At [phone], and ask to speak to one of the nurses. This has been fully explained to the patient, who indicates understanding.

## 2021-07-12 NOTE — Clinical Note
Attempted to call patients room/cell phone [phone] to do admission screen, no answer. called daughter homer left vm     Case Management Initial Discharge Plan  You Swann,         Daughter [facility] returned call to verify information       Information verified: address, contacts, phone number, , insurance Yes    Emergency Contact/Next of Kin name & number: keyanna coronel [phone]    PCP: Pina Ugarte  Date of last visit: unsure  [address] Provider: medicare/humana    Discharge Planning    Living Arrangements:    lives alone   Support Systems:   daughter homer said she lives out of town & that he has lady friend near by. Home has 2 stories  2 stairs to climb to get into front door, Location of bedroom/bathroom in home 1st    Patient able to perform ADL's:Independent    Current Services (outpatient & in home) none, said she was talking to [facility] [employer] to make arrangements for home care/mobile meals    DME equipment: just recently using a walker    [facility]: [facility] patient unsure of his medication list called [facility], kian munguia called [facility] only filled tramadol there. Asked if friend could go to home to get bottles, called [facility] was discharged home thursday requested they fax dc papers to unit. Receiving oral anticoagulation therapy?   No      Potential Assistance Needed:   rehab    Patients daughter  agreeable to home care: Yes  Freedom of choice provided:  yes    Prior SNF/Rehab Placement and Facility: none  Agreeable to SNF/Rehab: Yes if recommended   Freedom of choice provided: await pt/ot evals when appropriate     Evaluation: no    Expected Discharge date:       Patient expects to be discharged to:   tbd  Follow Up Appointment: Best Day/ Time:      Transportation provider: [facility]ree  Transportation arrangements needed for discharge: tbd    Readmission Risk              Risk of Unplanned Readmission:        8             Does patient have Diagnostic wire removed. Guidewire tip is intact. a readmission risk score greater than 14?: No  If yes, follow-up appointment must be made within 7 days of discharge.      Goals of Care: return to adl without shortness of breath       Discharge Plan: await pt/ot evals when appropriate to determine if safe to return home alone          Electronically signed by Katelynn Gerardo RN on 8/5/20 at 4:58 PM EDT

## 2021-07-12 NOTE — Clinical Note
TRANSFER - OUT REPORT:     Verbal report given to: Kunal Reyes RN. Report consisted of patient's Situation, Background, Assessment and   Recommendations(SBAR). Opportunity for questions and clarification was provided. Patient transported with a Registered Nurse. Patient transported to: holding area.

## 2021-07-12 NOTE — H&P
Addendum:    Patient with abnormal nuclear perfusion, diminished LV function. We will proceed with coronary angiography. All questions answered. He agrees with the plan. [address]  7/12/2021      HPI    Melodie Barrios is a 80 y.o. gentleman with HTN, HL who is here for further recs after abnormal echo and stress testing.     First he says he doesn't know why he's here, doesn't know why testing was even ordered. After further discussion, does admit to fatigue and had \"small case of pneumonia\" back in 4/2021. Never had ramsey CP. CV RFs; HTN, HL, +FH          Past Medical History:   Diagnosis Date    Anxiety and depression      Asthma      Elevated PSA      Gout      Hypertension      Insomnia      Kidney stone      Malaria      Prediabetes      Skin cancer                 Past Surgical History:   Procedure Laterality Date    HX COLONOSCOPY   2011 f/u 2021    [facility]ERNIA REPAIR   2000     [facility]    [facility] SKIN BIOPSY   2013     [facility]                Current Outpatient Medications   Medication Sig Dispense Refill    Bystolic 10 mg tablet take 1 tablet by mouth once daily        tamsulosin (FLOMAX) 0.4 mg capsule Take 1 Capsule by mouth daily (after dinner). 90 Capsule 3    pravastatin (PRAVACHOL) 40 mg tablet Take 1 Tab by mouth nightly. 90 Tab 0    hydrOXYzine HCL (ATARAX) 25 mg tablet Take 25 mg by mouth as needed.        fluticasone propionate (FLONASE) 50 mcg/actuation nasal spray 2 Sprays by Both Nostrils route daily. 3 Bottle 1    diclofenac (VOLTAREN) 1 % gel Apply 2 g to affected area every six (6) hours as needed for Pain (left thumb).  100 g 5    PARoxetine (PAXIL) 20 mg tablet TAKE 1 TABLET BY MOUTH EVERY DAY 90 Tab 0    zolpidem (AMBIEN) 10 mg tablet Take  by mouth nightly as needed for Sleep.        HYDROcodone-acetaminophen (VICODIN) 5-300 mg tablet Take  by mouth.                  Allergies   Allergen Reactions    Fluconazole Hives and Itching    Penicillin G Hives       Social History            Socioeconomic History    Marital status:        Spouse name: Not on file    Number of children: Not on file    Years of education: Not on file    Highest education level: Not on file   Occupational History    Not on file   Tobacco Use    Smoking status: Never Smoker    Smokeless tobacco: Never Used   Vaping Use    Vaping Use: Never used   Substance and Sexual Activity    Alcohol use: No    Drug use: No    Sexual activity: Not on file   Other Topics Concern    Not on file   Social History Narrative    Not on file      Social Determinants of Health          Financial Resource Strain:     Difficulty of Paying Living Expenses:    Food Insecurity:     Worried About Running Out of Food in the Last Year:     Ran Out of Food in the Last Year:    Transportation Needs:     Lack of Transportation (Medical):      Lack of Transportation (Non-Medical):    Physical Activity:     Days of Exercise per Week:     Minutes of Exercise per Session:    Stress:     Feeling of Stress :    Social Connections:     Frequency of Communication with Friends and Family:     Frequency of Social Gatherings with Friends and Family:     Attends Adventist Services:     Active Member of Clubs or Organizations:     Attends Club or Organization Meetings:     Marital Status:    Intimate Partner Violence:     Fear of Current or Ex-Partner:     Emotionally Abused:     Physically Abused:     Sexually Abused:     works on [employer]     FH: +mother  in 46s of heart disease     Review of Systems    14 pt Review of Systems is negative unless otherwise mentioned in the HPI.         Wt Readings from Last 3 Encounters:   21 80.7 kg (178 lb)   21 80.7 kg (178 lb)   21 80.7 kg (178 lb)          Temp Readings from Last 3 Encounters:   21 98.4 °F (36.9 °C) (Oral)   19 98.5 °F (36.9 °C) (Oral)   19 99 °F (37.2 °C) (Oral)          BP Readings from Last 3 Encounters:   06/30/21 138/88   06/24/21 130/74   06/24/21 (!) 140/82          Pulse Readings from Last 3 Encounters:   06/30/21 73   01/04/21 (!) 101   12/27/19 84         04/02/21     ECHO ADULT COMPLETE 06/24/2021 6/24/2021     Interpretation Summary  · LV: Calculated LVEF is 35%. Biplane method used to measure ejection fraction. Normal cavity size. Mildly increased wall thickness. Moderately and globally reduced systolic function. Age-appropriate left ventricular diastolic function. · AO: Mild aortic root dilatation; diameter is 3.5 cm.     Signed by: Ricky Munoz MD on 6/24/2021  1:01 PM        Physical Exam:    Visit Vitals  /88 (BP 1 Location: Left upper arm, BP Patient Position: Sitting, BP Cuff Size: Small adult)   Pulse 73   Ht 5' 7\" (1.702 m)   Wt 80.7 kg (178 lb)   SpO2 96%   BMI 27.88 kg/m²      Physical Exam  HENT:      Head: Normocephalic and atraumatic. Eyes:      General: No scleral icterus. Pupils: Pupils are equal, round, and reactive to light. Cardiovascular:      Rate and Rhythm: Normal rate and regular rhythm. Heart sounds: Normal heart sounds. No murmur heard. No friction rub. No gallop. Pulmonary:      Effort: Pulmonary effort is normal. No respiratory distress. Breath sounds: Normal breath sounds. No wheezing or rales. Chest:      Chest wall: No tenderness. Abdominal:      General: Bowel sounds are normal.      Palpations: Abdomen is soft. Skin:     General: Skin is warm and dry. Findings: No rash.    Neurological:      Mental Status: He is alert and oriented to person, place, and time.          EKG today shows: NSR, normal axis and intervals, no ST segment abnormalities,PVCs           Lab Results   Component Value Date/Time     Cholesterol, total 215 (H) 06/19/2020 04:34 PM     HDL Cholesterol 43 06/19/2020 04:34 PM     LDL, calculated 121.8 (H) 06/19/2020 04:34 PM     VLDL, calculated 50.2 06/19/2020 04:34 PM     Triglyceride 251 (H) 06/19/2020 04:34 PM     CHOL/HDL Ratio 5.0 06/19/2020 04:34 PM            Lab Results   Component Value Date/Time     Sodium 142 06/19/2020 04:34 PM     Potassium 4.4 06/19/2020 04:34 PM     Chloride 106 06/19/2020 04:34 PM     CO2 30 06/19/2020 04:34 PM     Anion gap 6 06/19/2020 04:34 PM     Glucose 94 06/19/2020 04:34 PM     BUN 22 (H) 06/19/2020 04:34 PM     Creatinine 1.16 06/19/2020 04:34 PM     BUN/Creatinine ratio 19 06/19/2020 04:34 PM     GFR est AA >60 06/19/2020 04:34 PM     GFR est non-AA >60 06/19/2020 04:34 PM     Calcium 9.2 06/19/2020 04:34 PM     Bilirubin, total 0.4 06/19/2020 04:34 PM     Alk. phosphatase 58 06/19/2020 04:34 PM     Protein, total 7.9 06/19/2020 04:34 PM     Albumin 4.2 06/19/2020 04:34 PM     Globulin 3.7 06/19/2020 04:34 PM     A-G Ratio 1.1 06/19/2020 04:34 PM     ALT (SGPT) 27 06/19/2020 04:34 PM     AST (SGOT) 16 06/19/2020 04:34 PM            Lab Results   Component Value Date/Time     WBC 7.5 01/27/2019 08:17 PM     HGB 12.4 (L) 01/27/2019 08:17 PM     HCT 37.0 01/27/2019 08:17 PM     PLATELET 943 47/74/8412 08:17 PM     .8 (H) 01/27/2019 08:17 PM      04/02/21     NUCLEAR CARDIAC STRESS TEST 06/24/2021 6/24/2021     Interpretation Summary  · Baseline ECG: Sinus rhythm, occasional PVCs. Left axis deviation; cannot r/0 anterior infarct age undetermined  · SPECT: Left ventricular function post-stress was abnormal. Calculated ejection fraction is 30%. There is no evidence of transient ischemic dilation (TID). The TID ratio is 1.1.  · SPECT: Myocardial perfusion imaging defect 1: There is a defect that is small in size present in the apical location(s) that is partially reversible. There is normal wall motion in the defect area. The possibility of ischemia cannot be excluded. Perfusion defect was visually and quantitatively present.  Myocardial perfusion imaging defect 2: There is a defect that is small to medium in size affecting the inferolateral location(s) that is partially reversible. There is normal wall motion in the defect area. The possibility of ischemia cannot be excluded. Perfusion defect was visually and quantitatively present. · SPECT: Left ventricular perfusion is abnormal. Myocardial perfusion imaging supports a high risk stress test due to EF <40%.    Signed by: Al Vega DO on 6/24/2021  3:53 PM  04/02/21     ECHO ADULT COMPLETE 06/24/2021 6/24/2021     Interpretation Summary  · LV: Calculated LVEF is 35%. Biplane method used to measure ejection fraction. Normal cavity size. Mildly increased wall thickness. Moderately and globally reduced systolic function. Age-appropriate left ventricular diastolic function.   · AO: Mild aortic root dilatation; diameter is 3.5 cm.     Signed by: Jacqueline Boateng MD on 6/24/2021  1:01 PM           Impression and Plan:  Chivo Hardin is a 80 y.o. with:     1.) Abn nuc perfusion  2.) Newly recognized HFrEF/ CMP EF 35%  3.) PVCs  4.) HTN  5.) HL  6.) Fatigue     Results reviewed with pt in detail  Suggested outpt [facility], he wants to call me back to schedule  His wife used to work at [employer], says wants to dw her and his PCP before scheduling here  Importance of doing procedure within next week or two max explained in detail and he expressed understanding  Will fax to PCP as well  Tentatively will make follow up after [facility] with me ~6-8 weeks     Thank you for allowing me to participate in the care of your patient, please do not hesitate to call with questions or concerns.

## 2021-07-12 NOTE — ROUTINE PROCESS
A+Ox4, ambulatory without difficulty, denied any complaints. Right wrist dressing intact, no bleeding or swelling. Discharge information reviewed with the patient. Escorted to car, tot his son in law for transport home. AVS Discharge instructions reviewed with patient and copy given to patient. All questions answered. Patient verbalized understanding to all discharge instructions. PIV removed. Procedural site within normal limits. No hematoma or bleeding noted from procedural and PIV site. No pain noted at discharge. Patient discharged with support person in stable condition. Escorted out to vehicle for transport home.

## 2021-07-12 NOTE — DISCHARGE INSTRUCTIONS
DISCHARGE SUMMARY from Nurse    PATIENT INSTRUCTIONS:    After general anesthesia or intravenous sedation, for 24 hours or while taking prescription Narcotics:  · Limit your activities  · Do not drive and operate hazardous machinery  · Do not make important personal or business decisions  · Do  not drink alcoholic beverages  · If you have not urinated within 8 hours after discharge, please contact your surgeon on call. Report the following to your surgeon:  · Excessive pain, swelling, redness or odor of or around the surgical area  · Temperature over 100.5  · Nausea and vomiting lasting longer than 4 hours or if unable to take medications  · Any signs of decreased circulation or nerve impairment to extremity: change in color, persistent  numbness, tingling, coldness or increase pain  · Any questions    What to do at Home:  Recommended activity: No lifting, Driving, or Strenuous exercise for 24 hours. If you experience any of the following symptoms bleeding,swelling,acute pain or numbness, fever, please follow up with Dr. Johanny Tanner MD.    *  Please give a list of your current medications to your Primary Care Provider. *  Please update this list whenever your medications are discontinued, doses are      changed, or new medications (including over-the-counter products) are added. *  Please carry medication information at all times in case of emergency situations. These are general instructions for a healthy lifestyle:    No smoking/ No tobacco products/ Avoid exposure to second hand smoke  Surgeon General's Warning:  Quitting smoking now greatly reduces serious risk to your health.     Obesity, smoking, and sedentary lifestyle greatly increases your risk for illness    A healthy diet, regular physical exercise & weight monitoring are important for maintaining a healthy lifestyle    You may be retaining fluid if you have a history of heart failure or if you experience any of the following symptoms: Weight gain of 3 pounds or more overnight or 5 pounds in a week, increased swelling in our hands or feet or shortness of breath while lying flat in bed. Please call your doctor as soon as you notice any of these symptoms; do not wait until your next office visit. The discharge information has been reviewed with the patient. The patient verbalized understanding. Discharge medications reviewed with the patient and appropriate educational materials and side effects teaching were provided. ___________________________________________________________________________________________________________________________________                       Cardiac Catheterization/Angiography Discharge Instructions    *Check the puncture site frequently for swelling or bleeding. If you see any bleeding, lie down and apply pressure over the area with a clean town or washcloth. Notify your doctor for any redness, swelling, drainage or oozing from the puncture site. Notify your doctor for any fever or chills. *If the leg or arm with the puncture becomes cold, numb or painful, call Dr Azalea Ashford MD at  [phone]. *Activity should be limited for the next 48 hours. Climb stairs as little as possible and avoid any stooping, bending or strenuous activity for 48 hours. No heavy lifting (anything over 10 pounds) for three days. *Do not drive for 48 hours. *You may resume your usual diet. Drink more fluids than usual.    *Have a responsible person drive you home and stay with you for at least 24 hours after your heart catheterization/angiography. *You may remove the bandage from your Right Wrist in 24 hours. You may shower in 24 hours. No tub baths, hot tubs or swimming for one week. Do not place any lotions, creams, powders, ointments over the puncture site for one week. You may place a clean band-aid over the puncture site each day for 5 days. Change this daily.     "[employer]t Activation    Thank you for requesting access to [employer]. Please follow the instructions below to securely access and download your online medical record. [employer] allows you to send messages to your doctor, view your test results, renew your prescriptions, schedule appointments, and more. How Do I Sign Up? 1. In your internet browser, go to [email or web address]. 2. Click on the First Time User? Click Here link in the Sign In box. You will see the New Member Sign Up page. 3. Enter your Veot[employer] Access Code exactly as it appears below. You will not need to use this code after youve completed the sign-up process. If you do not sign up before the expiration date, you must request a new code. [employer]ot[employer] Access Code: Activation code not generated  Current [employer]ot[employer] Status: Active (This is the date your [employer]ot[employer] access code will )    4. Enter the last four digits of your Social Security Number (xxxx) and Date of Birth (mm/dd/yyyy) as indicated and click Submit. You will be taken to the next sign-up page. 5. Create a Veotag ID. This will be your [employer]ot[employer] login ID and cannot be changed, so think of one that is secure and easy to remember. 6. Create a [employer]ot[employer] password. You can change your password at any time. 7. Enter your Password Reset Question and Answer. This can be used at a later time if you forget your password. 8. Enter your e-mail address. You will receive e-mail notification when new information is available in [address] 9. Click Sign Up. You can now view and download portions of your medical record. 10. Click the Download Summary menu link to download a portable copy of your medical information. Additional Information    If you have questions, please visit the Frequently Asked Questions section of the [employer]ot[employer] website at [email or web address] Remember, [employer] is NOT to be used for urgent needs. For medical emergencies, dial 911.     Patient armband removed and shredded

## 2021-07-12 NOTE — Clinical Note
TRANSFER - IN REPORT:     Verbal report received from: RAGHU Moreland. Report consisted of patient's Situation, Background, Assessment and   Recommendations(SBAR). Opportunity for questions and clarification was provided. Assessment completed upon patient's arrival to unit and care assumed. Patient transported with a Registered Nurse.

## 2021-07-12 NOTE — PROGRESS NOTES
Right wrist D-STAT band removed, no bleeding or swelling. Sterile hemostat dressing applied. Normal radial pulse, normal distal circulation and neuro check. Safety splint applied. Safety instructions reviewed with the patient.

## 2021-07-14 NOTE — TELEPHONE ENCOUNTER
----- Message from Danette Chavarria DO sent at 7/14/2021  8:32 AM EDT -----  Can you please call him to follow up with his recent LHC  See if he has questions,does he want to come in sooner to talk or did he feel understood Dr. Kelli Eric recommendations (stent vs surgery)

## 2021-07-21 NOTE — PROGRESS NOTES
Guy Brown    mv CAD, HFrEF    HPI    Guy Brown is a 80 y.o. gentleman with HTN, HL who I met last month for further recs after abnormal echo and stress testing. First he said he doesn't know why he's here, doesn't know why testing was even ordered. After further discussion, does admit to fatigue and had \"small case of pneumonia\" back in 4/2021. I suspected CAD and sent him for outpt C, see details below, LM >50%/ mv CAD. Since feels \"I need help. \" Very fatigued,sleeping during day, excessively dyspneic with activity (going to [employer], working in yard with son). They come in today to discuss options moving forward. CV RFs; HTN, HL, +FH    Past Medical History:   Diagnosis Date    Anxiety and depression     Asthma     Elevated PSA     Gout     Hypertension     Insomnia     Kidney stone     Malaria     Prediabetes     Skin cancer        Past Surgical History:   Procedure Laterality Date    HX COLONOSCOPY  2011    f/u 2021    HX HERNIA REPAIR  2000    [id number]    HX SKIN BIOPSY  2013    [id number], [place]       Current Outpatient Medications   Medication Sig Dispense Refill    metoprolol succinate (TOPROL-XL) 50 mg XL tablet Take 1 Tablet by mouth daily. 30 Tablet 6    atorvastatin (LIPITOR) 40 mg tablet Take 1 Tablet by mouth daily. 30 Tablet 6    predniSONE (DELTASONE) 20 mg tablet Take 60mg the night prior to procedure at bedtime and take 60mg again the morning of the procedure 1 hour before. 6 Tablet 0    tamsulosin (FLOMAX) 0.4 mg capsule Take 1 Capsule by mouth daily (after dinner). 90 Capsule 3    hydrOXYzine HCL (ATARAX) 25 mg tablet Take 25 mg by mouth as needed.  fluticasone propionate (FLONASE) 50 mcg/actuation nasal spray 2 Sprays by Both Nostrils route daily. 3 Bottle 1    diclofenac (VOLTAREN) 1 % gel Apply 2 g to affected area every six (6) hours as needed for Pain (left thumb).  100 g 5    PARoxetine (PAXIL) 20 mg tablet TAKE 1 TABLET BY MOUTH EVERY DAY 90 Tab 0    zolpidem (AMBIEN) 10 mg tablet Take  by mouth nightly as needed for Sleep.  HYDROcodone-acetaminophen (VICODIN) 5-300 mg tablet Take  by mouth. Allergies   Allergen Reactions    Fluconazole Hives and Itching    Penicillin G Hives       Social History     Socioeconomic History    Marital status:      Spouse name: Not on file    Number of children: Not on file    Years of education: Not on file    Highest education level: Not on file   Occupational History    Not on file   Tobacco Use    Smoking status: Never Smoker    Smokeless tobacco: Never Used   Vaping Use    Vaping Use: Never used   Substance and Sexual Activity    Alcohol use: No    Drug use: No    Sexual activity: Not on file   Other Topics Concern    Not on file   Social History Narrative    Not on file     Social Determinants of Health     Financial Resource Strain:     Difficulty of Paying Living Expenses:    Food Insecurity:     Worried About Running Out of Food in the Last Year:     Ran Out of Food in the Last Year:    Transportation Needs:     Lack of Transportation (Medical):  Lack of Transportation (Non-Medical):    Physical Activity:     Days of Exercise per Week:     Minutes of Exercise per Session:    Stress:     Feeling of Stress :    Social Connections:     Frequency of Communication with Friends and Family:     Frequency of Social Gatherings with Friends and Family:     Attends Restoration Services:     Active Member of Clubs or Organizations:     Attends Club or Organization Meetings:     Marital Status:    Intimate Partner Violence:     Fear of Current or Ex-Partner:     Emotionally Abused:     Physically Abused:     Sexually Abused:     works on [employer] cars    FH: +mother  in 46s of heart disease    Review of Systems    14 pt Review of Systems is negative unless otherwise mentioned in the HPI.     Wt Readings from Last 3 Encounters:   21 80.3 kg (177 lb)   21 80.7 kg (178 lb)   06/24/21 80.7 kg (178 lb)     Temp Readings from Last 3 Encounters:   07/12/21 97.6 °F (36.4 °C)   01/04/21 98.4 °F (36.9 °C) (Oral)   12/27/19 98.5 °F (36.9 °C) (Oral)     BP Readings from Last 3 Encounters:   07/21/21 (!) 150/100   07/12/21 (!) 141/79   06/30/21 138/88     Pulse Readings from Last 3 Encounters:   07/21/21 88   07/12/21 70   06/30/21 73       04/02/21    ECHO ADULT COMPLETE 06/24/2021 6/24/2021    Interpretation Summary  · LV: Calculated LVEF is 35%. Biplane method used to measure ejection fraction. Normal cavity size. Mildly increased wall thickness. Moderately and globally reduced systolic function. Age-appropriate left ventricular diastolic function. · AO: Mild aortic root dilatation; diameter is 3.5 cm. Signed by: Del Adams MD on 6/24/2021  1:01 PM      Physical Exam:    Visit Vitals  BP (!) 150/100 (BP 1 Location: Left upper arm, BP Patient Position: Sitting, BP Cuff Size: Adult)   Pulse 88   Ht 5' 7\" (1.702 m)   Wt 80.3 kg (177 lb)   SpO2 95%   BMI 27.72 kg/m²      Physical Exam  HENT:      Head: Normocephalic and atraumatic. Eyes:      General: No scleral icterus. Pupils: Pupils are equal, round, and reactive to light. Cardiovascular:      Rate and Rhythm: Normal rate and regular rhythm. Heart sounds: Normal heart sounds. No murmur heard. No friction rub. No gallop. Pulmonary:      Effort: Pulmonary effort is normal. No respiratory distress. Breath sounds: Normal breath sounds. No wheezing or rales. Chest:      Chest wall: No tenderness. Abdominal:      General: Bowel sounds are normal.      Palpations: Abdomen is soft. Skin:     General: Skin is warm and dry. Findings: No rash. Neurological:      Mental Status: He is alert and oriented to person, place, and time.          EKG today shows: NSR, normal axis and intervals, no ST segment abnormalities,PVCs    Lab Results   Component Value Date/Time    Cholesterol, total 215 (H) 06/19/2020 04:34 PM    HDL Cholesterol 43 06/19/2020 04:34 PM    LDL, calculated 121.8 (H) 06/19/2020 04:34 PM    VLDL, calculated 50.2 06/19/2020 04:34 PM    Triglyceride 251 (H) 06/19/2020 04:34 PM    CHOL/HDL Ratio 5.0 06/19/2020 04:34 PM     Lab Results   Component Value Date/Time    Sodium 140 07/08/2021 11:04 AM    Potassium 4.5 07/08/2021 11:04 AM    Chloride 106 07/08/2021 11:04 AM    CO2 29 07/08/2021 11:04 AM    Anion gap 5 07/08/2021 11:04 AM    Glucose 109 (H) 07/08/2021 11:04 AM    BUN 17 07/08/2021 11:04 AM    Creatinine 1.01 07/08/2021 11:04 AM    BUN/Creatinine ratio 17 07/08/2021 11:04 AM    GFR est AA >60 07/08/2021 11:04 AM    GFR est non-AA >60 07/08/2021 11:04 AM    Calcium 9.2 07/08/2021 11:04 AM    Bilirubin, total 0.4 06/19/2020 04:34 PM    Alk. phosphatase 58 06/19/2020 04:34 PM    Protein, total 7.9 06/19/2020 04:34 PM    Albumin 4.2 06/19/2020 04:34 PM    Globulin 3.7 06/19/2020 04:34 PM    A-G Ratio 1.1 06/19/2020 04:34 PM    ALT (SGPT) 27 06/19/2020 04:34 PM    AST (SGOT) 16 06/19/2020 04:34 PM     Lab Results   Component Value Date/Time    WBC 7.8 07/08/2021 11:04 AM    HGB 14.7 07/08/2021 11:04 AM    HCT 42.2 07/08/2021 11:04 AM    PLATELET 502 85/49/2147 11:04 AM    MCV 94.8 07/08/2021 11:04 AM     04/02/21    NUCLEAR CARDIAC STRESS TEST 06/24/2021 6/24/2021    Interpretation Summary  · Baseline ECG: Sinus rhythm, occasional PVCs. Left axis deviation; cannot r/0 anterior infarct age undetermined  · SPECT: Left ventricular function post-stress was abnormal. Calculated ejection fraction is 30%. There is no evidence of transient ischemic dilation (TID). The TID ratio is 1.1.  · SPECT: Myocardial perfusion imaging defect 1: There is a defect that is small in size present in the apical location(s) that is partially reversible. There is normal wall motion in the defect area. The possibility of ischemia cannot be excluded. Perfusion defect was visually and quantitatively present.  Myocardial perfusion imaging defect 2: There is a defect that is small to medium in size affecting the inferolateral location(s) that is partially reversible. There is normal wall motion in the defect area. The possibility of ischemia cannot be excluded. Perfusion defect was visually and quantitatively present. · SPECT: Left ventricular perfusion is abnormal. Myocardial perfusion imaging supports a high risk stress test due to EF <40%. Signed by: Doe Alfonso DO on 6/24/2021  3:53 PM  04/02/21    ECHO ADULT COMPLETE 06/24/2021 6/24/2021    Interpretation Summary  · LV: Calculated LVEF is 35%. Biplane method used to measure ejection fraction. Normal cavity size. Mildly increased wall thickness. Moderately and globally reduced systolic function. Age-appropriate left ventricular diastolic function. · AO: Mild aortic root dilatation; diameter is 3.5 cm. Signed by: Jasper Eubanks MD on 6/24/2021  1:01 PM    07/12/21    CARDIAC PROCEDURE 07/12/2021 7/12/2021    Conclusion  · Right dominant system. Dominant RCA is large. 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 6 [facility] catheter ventricularized. · LAD with mid segment 70-75% stenosis, distal 70-75% stenosis. · Circumflex with mid AV segment 70% and small vessel. · Would recommend continued medical therapy rather than proceeding to open heart surgery. This can be discussed with his primary cardiologist as outpatient.     Signed by: Hector Baltazar MD on 7/12/2021 11:27 AM      Impression and Plan:  Paul Mckeon is a 80 y.o. 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 Carlos Enrique Nolan for consideration for CABG  2.) Change Prava to Atorva  3.) Continue ASA 81 mg  4.) Change Bystolic to Metoprolol Succ 50  5.) Pending surgical plans/ timing, please see our NP ~6 weeks to see if additional med tx needed, and me postop ~3 months, call sooner if needed    >60 mins spent with pt and son, reviewing testing to date and options moving forward. Asked him not to skip meals, not to exert himself too much, and low sodium diet    Thank you for allowing me to participate in the care of your patient, please do not hesitate to call with questions or concerns. Follow-up and Dispositions    · Return in about 3 months (around 10/21/2021).      Kindest Regards,    Amrik Apodaca, DO

## 2021-07-26 NOTE — LETTER
7/26/2021    Patient: Will Deal   YOB: 1940   Date of Visit: 7/26/2021     Tram Oneil MD  [facility] [phone]  Via Fax: [phone]     Anjali Thomson [address] [place] [address]  [address]  [facility] [id number]  Via In Basket    Dear MD Anjali Joya DO,      Thank you for referring Mr. Jaja Polanco to [address] for evaluation. My notes for this consultation are attached. If you have questions, please do not hesitate to call me. I look forward to following your patient along with you.       Sincerely,    Gloria Pina MD

## 2021-07-26 NOTE — PROGRESS NOTES
[facility]  -  Consult      7/26/2021      Quinn Sheets is a 80 y.o. male who is being seen on consult for coronary artery disease, at  Dr. Alston Copping request.      Assessment:     Patient Active Problem List    Diagnosis Date Noted    Cerebral microvascular disease 09/25/2019    Mild episode of recurrent major depressive disorder ([facility] [address] 10/26/2018    Pure hypercholesterolemia 05/07/2018    Sleeping difficulty 05/07/2018    Essential hypertension 04/23/2018    Anxiety and depression     Hypertension     Kidney stone     Malaria     Skin cancer     Gout     Anxiety 04/24/2017    Elevated PSA 11/18/2013    Prediabetes 11/18/2013    Insomnia 11/18/2013    Joint pain 11/18/2013    Gross hematuria 05/31/2013       Subjective:     Chief Complaint   Patient presents with    Coronary Artery Disease    Referral / Consult       History of Present Illness: The patient is a pleasant 82-year-old male who has had significantly decreased energy and stamina for the past 6 months. His work-up included a stress test which was positive which was followed by a cardiac catheterization, revealing severe three-vessel coronary disease. He also has 65% left main stenosis. He denies anginal symptoms, but both he and his son in law note his significant dyspnea on exertion and decreased stamina over the past 6 months which has not been subtle. Past Medical History:     Past Medical History:   Diagnosis Date    Anxiety and depression     Asthma     Elevated PSA     Gout     Hypertension     Insomnia     Kidney stone     Malaria     Prediabetes     Skin cancer        Past Surgical History:     Past Surgical History:   Procedure Laterality Date    HX COLONOSCOPY  2011    f/u 2021    HX HERNIA REPAIR  2000    [address]    HX SKIN BIOPSY  2013    [address], [place]       Social History:   He  reports that he has never smoked.  He has never used smokeless tobacco.  He reports no history of alcohol use. Family History:   No family history on file. Allergies and Intolerances: Allergies   Allergen Reactions    Fluconazole Hives and Itching    Penicillin G Hives       Home Medications:     Cannot display prior to admission medications because the patient has not been admitted in this contact. Current Outpatient Medications   Medication Sig Dispense Refill    metoprolol succinate (TOPROL-XL) 50 mg XL tablet Take 1 Tablet by mouth daily. 30 Tablet 6    atorvastatin (LIPITOR) 40 mg tablet Take 1 Tablet by mouth daily. 30 Tablet 6    tamsulosin (FLOMAX) 0.4 mg capsule Take 1 Capsule by mouth daily (after dinner). 90 Capsule 3    hydrOXYzine HCL (ATARAX) 25 mg tablet Take 25 mg by mouth as needed.  fluticasone propionate (FLONASE) 50 mcg/actuation nasal spray 2 Sprays by Both Nostrils route daily. 3 Bottle 1    diclofenac (VOLTAREN) 1 % gel Apply 2 g to affected area every six (6) hours as needed for Pain (left thumb). 100 g 5    PARoxetine (PAXIL) 20 mg tablet TAKE 1 TABLET BY MOUTH EVERY DAY 90 Tab 0    zolpidem (AMBIEN) 10 mg tablet Take  by mouth nightly as needed for Sleep.  HYDROcodone-acetaminophen (VICODIN) 5-300 mg tablet Take  by mouth.  predniSONE (DELTASONE) 20 mg tablet Take 60mg the night prior to procedure at bedtime and take 60mg again the morning of the procedure 1 hour before. (Patient not taking: Reported on 7/26/2021) 6 Tablet 0   . Review of Systems:   As in  HPI including and:    Constitutional: No fever, unintentional weight loss or weight gain. Skin: No rashes, petechia or easy bruising. HENT: No headache, hearing loss or nosebleeds. No loose or infected teeth. Eyes: No visual field cuts or double vision. Yes glasses. No drainage from eyes. Respiratory: No frequent respiratory infections or pneumonia. No history of difficult intubation. No history of wheezing. No hemoptysis.      Cardiovascular: Positive for dyspnea on exertion and decreased stamina. Negative for angina   Gastrointestinal: No constipation or diarrhea. No dark or bloody stools. No frequent nausea or vomiting. Genitourinary: No difficulty with urination. No urgency or frequency. No blood in urine. Muscularskeletal: No muscle or joint pain. No difficulty walking or climbing. Endo/Heme/Allergy: No diabetes. No skin rashes. No easy or severe bleeding history. Neurological: No loss of consciousness, one sided weakness, difficulty with speech or eating or drinking. No foot drop or difficulty with walking. All other systems reviewed and negative. Physical Examination:     Visit Vitals  BP (!) 152/96 (BP 1 Location: Right upper arm, BP Patient Position: Sitting, BP Cuff Size: Adult)   Pulse 76   Temp 98 °F (36.7 °C) (Temporal)   Resp 20   Ht 5' 8.5\" (1.74 m)   Wt 81.2 kg (179 lb)   SpO2 96%   BMI 26.82 kg/m²       HENT:      Head: Normocephalic and atraumatic. Eyes:      General: No scleral icterus. Pupils: Pupils are equal, round, and reactive to light. Cardiovascular:      Rate and Rhythm: Normal rate and regular rhythm. Heart sounds: Normal heart sounds. No murmur heard. No friction rub. No gallop. Pulmonary:      Effort: Pulmonary effort is normal. No respiratory distress. Breath sounds: Normal breath sounds. No wheezing or rales. Chest:      Chest wall: No tenderness. Abdominal:      General: Bowel sounds are normal.      Palpations: Abdomen is soft. Skin:     General: Skin is warm and dry. Findings: No rash. Neurological:      Mental Status: He is alert and oriented to person, place, and time.    Laboratory Data:     Lab Results   Component Value Date/Time    WBC 7.8 07/08/2021 11:04 AM    HGB 14.7 07/08/2021 11:04 AM    HCT 42.2 07/08/2021 11:04 AM    PLATELET 410 33/56/3847 11:04 AM     Lab Results   Component Value Date/Time    Sodium 140 07/08/2021 11:04 AM    Potassium 4.5 07/08/2021 11:04 AM    Chloride 106 07/08/2021 11:04 AM    CO2 29 07/08/2021 11:04 AM    Glucose 109 (H) 07/08/2021 11:04 AM    BUN 17 07/08/2021 11:04 AM    Creatinine 1.01 07/08/2021 11:04 AM     Lab Results   Component Value Date/Time    Cholesterol, total 215 (H) 06/19/2020 04:34 PM    HDL Cholesterol 43 06/19/2020 04:34 PM    LDL, calculated 121.8 (H) 06/19/2020 04:34 PM    Triglyceride 251 (H) 06/19/2020 04:34 PM     Lab Results   Component Value Date/Time    Hemoglobin A1c 6.1 (H) 06/19/2020 04:34 PM     No results for input(s): CA, PHOS, ALB, TP, ALKP, TBILI in the last 72 hours. No lab exists for component: SGOT, SGPT, CBILI  ABG:  No results found for: PH, PHI, PCO2, PCO2I, PO2, PO2I, HCO3, HCO3I, FIO2, FIO2I  No results for input(s): TROIQ, CPK, CKMB in the last 72 hours. EKG: NSR, normal axis and intervals, no ST segment abnormalities,PVCs    ECHO ADULT COMPLETE 06/24/2021 6/24/2021     Interpretation Summary  · LV: Calculated LVEF is 35%. Biplane method used to measure ejection fraction. Normal cavity size. Mildly increased wall thickness. Moderately and globally reduced systolic function. Age-appropriate left ventricular diastolic function. · AO: Mild aortic root dilatation; diameter is 3.5 cm.     Signed by: Andre Monroe MD on 6/24/2021  1:01 PM    NUCLEAR CARDIAC STRESS TEST 06/24/2021 6/24/2021     Interpretation Summary  · Baseline ECG: Sinus rhythm, occasional PVCs. Left axis deviation; cannot r/0 anterior infarct age undetermined  · SPECT: Left ventricular function post-stress was abnormal. Calculated ejection fraction is 30%. There is no evidence of transient ischemic dilation (TID). The TID ratio is 1.1.  · SPECT: Myocardial perfusion imaging defect 1: There is a defect that is small in size present in the apical location(s) that is partially reversible. There is normal wall motion in the defect area. The possibility of ischemia cannot be excluded.  Perfusion defect was visually and quantitatively present. Myocardial perfusion imaging defect 2: There is a defect that is small to medium in size affecting the inferolateral location(s) that is partially reversible. There is normal wall motion in the defect area. The possibility of ischemia cannot be excluded. Perfusion defect was visually and quantitatively present. · SPECT: Left ventricular perfusion is abnormal. Myocardial perfusion imaging supports a high risk stress test due to EF <40%.    Signed by: Salvador Ramey DO on 6/24/2021  3:53 PM    CARDIAC PROCEDURE 07/12/2021 7/12/2021     Conclusion  · Right dominant system. Dominant RCA is large. 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 6 [facility] catheter ventricularized. · LAD with mid segment 70-75% stenosis, distal 70-75% stenosis. · Circumflex with mid AV segment 70% and small vessel. · Would recommend continued medical therapy rather than proceeding to open heart surgery. This can be discussed with his primary cardiologist as outpatient.     Signed by: Lilliana Baker MD on 7/12/2021 11:27 AM      ASSESSMENT AND PLAN:    This 51-year-old gentleman has severe three-vessel coronary artery disease with left main stenosis of 65%. His ejection fraction is decreased which places him at somewhat higher risk, but not prohibitive. It is also somewhat concerning that his symptoms are not consistent with angina, however the very marked change in his stamina as well as his significant dyspnea on exertion are very likely to be cardiac in nature. The patient was an extremely active person until approximately 6 months ago. I believe that it is reasonable to proceed with coronary artery bypass grafting and that the potential risks are outweighed by the benefits. I would like for him to undergo carotid artery duplex as there is some correlation between left main disease and carotid artery disease.     The plan is to proceed with surgery early next week. I discussed this with Dr. Marley Sy. I spoke to the patient and his son in law at significant length and answered all of their questions. I explained the general risks of the surgery as well as what to expect perioperatively. Thank you for allowing me to participate in the care of this patient. I spent over 90 minutes reviewing this patient's history and his studies, conferring with his cardiologist, meeting with the patient and his son-in-law, and devising a plan.       Arelis Thibodeaux MD [facility]  Chief, Cardiothoracic Surgery   [facility]  [phone]

## 2021-07-26 NOTE — H&P (VIEW-ONLY)
[facility]  -  Consult      7/26/2021      Beka Rapp is a 80 y.o. male who is being seen on consult for coronary artery disease, at  Dr. Thomas Park request.      Assessment:     Patient Active Problem List    Diagnosis Date Noted    Cerebral microvascular disease 09/25/2019    Mild episode of recurrent major depressive disorder ([facility] [address] 10/26/2018    Pure hypercholesterolemia 05/07/2018    Sleeping difficulty 05/07/2018    Essential hypertension 04/23/2018    Anxiety and depression     Hypertension     Kidney stone     Malaria     Skin cancer     Gout     Anxiety 04/24/2017    Elevated PSA 11/18/2013    Prediabetes 11/18/2013    Insomnia 11/18/2013    Joint pain 11/18/2013    Gross hematuria 05/31/2013       Subjective:     Chief Complaint   Patient presents with    Coronary Artery Disease    Referral / Consult       History of Present Illness: The patient is a pleasant 72-year-old male who has had significantly decreased energy and stamina for the past 6 months. His work-up included a stress test which was positive which was followed by a cardiac catheterization, revealing severe three-vessel coronary disease. He also has 65% left main stenosis. He denies anginal symptoms, but both he and his son in law note his significant dyspnea on exertion and decreased stamina over the past 6 months which has not been subtle. Past Medical History:     Past Medical History:   Diagnosis Date    Anxiety and depression     Asthma     Elevated PSA     Gout     Hypertension     Insomnia     Kidney stone     Malaria     Prediabetes     Skin cancer        Past Surgical History:     Past Surgical History:   Procedure Laterality Date    HX COLONOSCOPY  2011    f/u 2021    HX HERNIA REPAIR  2000    [address]    HX SKIN BIOPSY  2013    [address], [place] and [place]       Social History:   He  reports that he has never smoked.  He has never used smokeless tobacco.  He reports no history of alcohol use. Family History:   No family history on file. Allergies and Intolerances: Allergies   Allergen Reactions    Fluconazole Hives and Itching    Penicillin G Hives       Home Medications:     Cannot display prior to admission medications because the patient has not been admitted in this contact. Current Outpatient Medications   Medication Sig Dispense Refill    metoprolol succinate (TOPROL-XL) 50 mg XL tablet Take 1 Tablet by mouth daily. 30 Tablet 6    atorvastatin (LIPITOR) 40 mg tablet Take 1 Tablet by mouth daily. 30 Tablet 6    tamsulosin (FLOMAX) 0.4 mg capsule Take 1 Capsule by mouth daily (after dinner). 90 Capsule 3    hydrOXYzine HCL (ATARAX) 25 mg tablet Take 25 mg by mouth as needed.  fluticasone propionate (FLONASE) 50 mcg/actuation nasal spray 2 Sprays by Both Nostrils route daily. 3 Bottle 1    diclofenac (VOLTAREN) 1 % gel Apply 2 g to affected area every six (6) hours as needed for Pain (left thumb). 100 g 5    PARoxetine (PAXIL) 20 mg tablet TAKE 1 TABLET BY MOUTH EVERY DAY 90 Tab 0    zolpidem (AMBIEN) 10 mg tablet Take  by mouth nightly as needed for Sleep.  HYDROcodone-acetaminophen (VICODIN) 5-300 mg tablet Take  by mouth.  predniSONE (DELTASONE) 20 mg tablet Take 60mg the night prior to procedure at bedtime and take 60mg again the morning of the procedure 1 hour before. (Patient not taking: Reported on 7/26/2021) 6 Tablet 0   . Review of Systems:   As in  HPI including and:    Constitutional: No fever, unintentional weight loss or weight gain. Skin: No rashes, petechia or easy bruising. HENT: No headache, hearing loss or nosebleeds. No loose or infected teeth. Eyes: No visual field cuts or double vision. Yes glasses. No drainage from eyes. Respiratory: No frequent respiratory infections or pneumonia. No history of difficult intubation. No history of wheezing. No hemoptysis.      Cardiovascular: Positive for dyspnea on exertion and decreased stamina. Negative for angina   Gastrointestinal: No constipation or diarrhea. No dark or bloody stools. No frequent nausea or vomiting. Genitourinary: No difficulty with urination. No urgency or frequency. No blood in urine. Muscularskeletal: No muscle or joint pain. No difficulty walking or climbing. Endo/Heme/Allergy: No diabetes. No skin rashes. No easy or severe bleeding history. Neurological: No loss of consciousness, one sided weakness, difficulty with speech or eating or drinking. No foot drop or difficulty with walking. All other systems reviewed and negative. Physical Examination:     Visit Vitals  BP (!) 152/96 (BP 1 Location: Right upper arm, BP Patient Position: Sitting, BP Cuff Size: Adult)   Pulse 76   Temp 98 °F (36.7 °C) (Temporal)   Resp 20   Ht 5' 8.5\" (1.74 m)   Wt 81.2 kg (179 lb)   SpO2 96%   BMI 26.82 kg/m²       HENT:      Head: Normocephalic and atraumatic. Eyes:      General: No scleral icterus. Pupils: Pupils are equal, round, and reactive to light. Cardiovascular:      Rate and Rhythm: Normal rate and regular rhythm. Heart sounds: Normal heart sounds. No murmur heard. No friction rub. No gallop. Pulmonary:      Effort: Pulmonary effort is normal. No respiratory distress. Breath sounds: Normal breath sounds. No wheezing or rales. Chest:      Chest wall: No tenderness. Abdominal:      General: Bowel sounds are normal.      Palpations: Abdomen is soft. Skin:     General: Skin is warm and dry. Findings: No rash. Neurological:      Mental Status: He is alert and oriented to person, place, and time.    Laboratory Data:     Lab Results   Component Value Date/Time    WBC 7.8 07/08/2021 11:04 AM    HGB 14.7 07/08/2021 11:04 AM    HCT 42.2 07/08/2021 11:04 AM    PLATELET 943 90/58/0555 11:04 AM     Lab Results   Component Value Date/Time    Sodium 140 07/08/2021 11:04 AM    Potassium 4.5 07/08/2021 11:04 AM    Chloride 106 07/08/2021 11:04 AM    CO2 29 07/08/2021 11:04 AM    Glucose 109 (H) 07/08/2021 11:04 AM    BUN 17 07/08/2021 11:04 AM    Creatinine 1.01 07/08/2021 11:04 AM     Lab Results   Component Value Date/Time    Cholesterol, total 215 (H) 06/19/2020 04:34 PM    HDL Cholesterol 43 06/19/2020 04:34 PM    LDL, calculated 121.8 (H) 06/19/2020 04:34 PM    Triglyceride 251 (H) 06/19/2020 04:34 PM     Lab Results   Component Value Date/Time    Hemoglobin A1c 6.1 (H) 06/19/2020 04:34 PM     No results for input(s): CA, PHOS, ALB, TP, ALKP, TBILI in the last 72 hours. No lab exists for component: SGOT, SGPT, CBILI  ABG:  No results found for: PH, PHI, PCO2, PCO2I, PO2, PO2I, HCO3, HCO3I, FIO2, FIO2I  No results for input(s): TROIQ, CPK, CKMB in the last 72 hours. EKG: NSR, normal axis and intervals, no ST segment abnormalities,PVCs    ECHO ADULT COMPLETE 06/24/2021 6/24/2021     Interpretation Summary  · LV: Calculated LVEF is 35%. Biplane method used to measure ejection fraction. Normal cavity size. Mildly increased wall thickness. Moderately and globally reduced systolic function. Age-appropriate left ventricular diastolic function. · AO: Mild aortic root dilatation; diameter is 3.5 cm.     Signed by: Brenda Grewal MD on 6/24/2021  1:01 PM    NUCLEAR CARDIAC STRESS TEST 06/24/2021 6/24/2021     Interpretation Summary  · Baseline ECG: Sinus rhythm, occasional PVCs. Left axis deviation; cannot r/0 anterior infarct age undetermined  · SPECT: Left ventricular function post-stress was abnormal. Calculated ejection fraction is 30%. There is no evidence of transient ischemic dilation (TID). The TID ratio is 1.1.  · SPECT: Myocardial perfusion imaging defect 1: There is a defect that is small in size present in the apical location(s) that is partially reversible. There is normal wall motion in the defect area. The possibility of ischemia cannot be excluded.  Perfusion defect was visually and quantitatively present. Myocardial perfusion imaging defect 2: There is a defect that is small to medium in size affecting the inferolateral location(s) that is partially reversible. There is normal wall motion in the defect area. The possibility of ischemia cannot be excluded. Perfusion defect was visually and quantitatively present. · SPECT: Left ventricular perfusion is abnormal. Myocardial perfusion imaging supports a high risk stress test due to EF <40%.    Signed by: Lisa Boykin DO on 6/24/2021  3:53 PM    CARDIAC PROCEDURE 07/12/2021 7/12/2021     Conclusion  · Right dominant system. Dominant RCA is large. 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 6 [place] catheter ventricularized. · LAD with mid segment 70-75% stenosis, distal 70-75% stenosis. · Circumflex with mid AV segment 70% and small vessel. · Would recommend continued medical therapy rather than proceeding to open heart surgery. This can be discussed with his primary cardiologist as outpatient.     Signed by: Lila Ramachandran MD on 7/12/2021 11:27 AM      ASSESSMENT AND PLAN:    This 68-year-old gentleman has severe three-vessel coronary artery disease with left main stenosis of 65%. His ejection fraction is decreased which places him at somewhat higher risk, but not prohibitive. It is also somewhat concerning that his symptoms are not consistent with angina, however the very marked change in his stamina as well as his significant dyspnea on exertion are very likely to be cardiac in nature. The patient was an extremely active person until approximately 6 months ago. I believe that it is reasonable to proceed with coronary artery bypass grafting and that the potential risks are outweighed by the benefits. I would like for him to undergo carotid artery duplex as there is some correlation between left main disease and carotid artery disease.     The plan is to proceed with surgery early next week. I discussed this with Dr. Lorena French. I spoke to the patient and his son in law at significant length and answered all of their questions. I explained the general risks of the surgery as well as what to expect perioperatively. Thank you for allowing me to participate in the care of this patient. I spent over 90 minutes reviewing this patient's history and his studies, conferring with his cardiologist, meeting with the patient and his son-in-law, and devising a plan.       Lizzie Biggs MD [facility]  Chief, Cardiothoracic Surgery   [facility]  [phone]

## 2021-07-29 NOTE — TELEPHONE ENCOUNTER
Patient called to give Co-Vid vaccination information as follows:    1st dose Moderna dose [id number] given 5/20/2021    2nd dose Moderna dose [id number] given 6/19/2021     Both were given at [facility].

## 2021-07-29 NOTE — TELEPHONE ENCOUNTER
Met patient and his daughter in the registration area at [facility] to review pre-op instructions and dispense Bactroban and Hibiclens. Surgical Instructions for Marilyn Post:    · Start taking the newly prescribed medication - Amiodarone  Prescription was sent to your [facility] pharmacy on [address] [address] [facility]. Patient verbalized he has started the Rx as of this morning. · Complete showers using the Hibiclens surgical soap on Saturday night, Sunday night and Monday night. Use soap on chest/sternal area only. **DO NOT wash face or privates with the surgical soap. · Use Bactroban nasal ointment to both nostrils as instructed after each shower. · Nothing to eat or drink after midnight on Monday night. · The morning of surgery take only your metoprolol succinate (Toprol XL) medication with a very small sip of water. · Check in at 6:30am Tuesday morning [facility] · Please bring all medications bottles to the hospital the morning of surgery. Call [phone] if any questions or concerns. They verbalized understanding. Patient will call office when he gets home to give Co-Vid vaccination information so chart can be updated with information.

## 2021-08-03 PROBLEM — I10 HYPERTENSION: Status: RESOLVED | Noted: 2021-01-01 | Resolved: 2021-01-01

## 2021-08-03 NOTE — TELEPHONE ENCOUNTER
S/w pt's son-in- law to reschedule a date for surgery to Thursday Aug 5th at [facility].  Revised check in time to 7:30am. On that day. Confirmed with ALLISON Sullivan that pt does not need additional days of Amiodarone  Patient's son-in law confirmed understanding.

## 2021-08-04 NOTE — PROGRESS NOTES
[facility]         Spoke with son-in-law. No changes in medical history, still has blood band on and understands arrival time. Blood bank has blood.       Patient has been COVID vaccinated: 1st dose Moderna dose [id number] given 5/20/2021    2nd dose Moderna dose [id number] given 6/19/2021

## 2021-08-05 PROBLEM — I25.10 CAD (CORONARY ARTERY DISEASE): Status: ACTIVE | Noted: 2021-01-01

## 2021-08-05 NOTE — H&P
History and Physical    Office Visit  7/26/2021  CARDIOVASCULAR AND THORACIC SPEC   Gaby Hardy MD  Cardiothoracic Surgery  Coronary artery disease involving native coronary artery of native heart without angina pectoris +5 more  Dx  Coronary Artery Disease  Referral / Consult; Referred by Yonis Da Silva DO  Reason for Visit   Progress Notes  Jeffrey Lewis PA-C (Physician Assistant) [facility] Physician Assistant     preop orders entered      Note Details   Progress Notes  Gaby Hardy MD (Physician) [facility] Cardiothor[facility]              [facility]  -  Consult        7/26/2021        Beka Rapp is a 80 y.o. male who is being seen on consult for coronary artery disease, at  Dr. Thomas Park request.        Assessment:           Patient Active Problem List     Diagnosis Date Noted    Cerebral microvascular disease 09/25/2019    Mild episode of recurrent major depressive disorder ([facility] [address] 10/26/2018    Pure hypercholesterolemia 05/07/2018    Sleeping difficulty 05/07/2018    Essential hypertension 04/23/2018    Anxiety and depression      Hypertension      Kidney stone      Malaria      Skin cancer      Gout      Anxiety 04/24/2017    Elevated PSA 11/18/2013    Prediabetes 11/18/2013    Insomnia 11/18/2013    Joint pain 11/18/2013    Gross hematuria 05/31/2013         Subjective:          Chief Complaint   Patient presents with    Coronary Artery Disease    Referral / Consult         History of Present Illness: The patient is a pleasant 79-year-old male who has had significantly decreased energy and stamina for the past 6 months. His work-up included a stress test which was positive which was followed by a cardiac catheterization, revealing severe three-vessel coronary disease. He also has 65% left main stenosis.   He denies anginal symptoms, but both he and his son in law note his significant dyspnea on exertion and decreased stamina over the past 6 months which has not been subtle.        Past Medical History:           Past Medical History:   Diagnosis Date    Anxiety and depression      Asthma      Elevated PSA      Gout      Hypertension      Insomnia      Kidney stone      Malaria      Prediabetes      Skin cancer           Past Surgical History:            Past Surgical History:   Procedure Laterality Date    HX COLONOSCOPY   2011     f/u 2021    HX HERNIA REPAIR   2000     [address]    HX SKIN BIOPSY   2013     [address], [place]         Social History:   He  reports that he has never smoked. He has never used smokeless tobacco.  He  reports no history of alcohol use.     Family History:   No family history on file.     Allergies and Intolerances:           Allergies   Allergen Reactions    Fluconazole Hives and Itching    Penicillin G Hives         Home Medications:      Cannot display prior to admission medications because the patient has not been admitted in this contact.                Current Outpatient Medications   Medication Sig Dispense Refill    metoprolol succinate (TOPROL-XL) 50 mg XL tablet Take 1 Tablet by mouth daily. 30 Tablet 6    atorvastatin (LIPITOR) 40 mg tablet Take 1 Tablet by mouth daily. 30 Tablet 6    tamsulosin (FLOMAX) 0.4 mg capsule Take 1 Capsule by mouth daily (after dinner). 90 Capsule 3    hydrOXYzine HCL (ATARAX) 25 mg tablet Take 25 mg by mouth as needed.        fluticasone propionate (FLONASE) 50 mcg/actuation nasal spray 2 Sprays by Both Nostrils route daily. 3 Bottle 1    diclofenac (VOLTAREN) 1 % gel Apply 2 g to affected area every six (6) hours as needed for Pain (left thumb).  100 g 5    PARoxetine (PAXIL) 20 mg tablet TAKE 1 TABLET BY MOUTH EVERY DAY 90 Tab 0    zolpidem (AMBIEN) 10 mg tablet Take  by mouth nightly as needed for Sleep.        HYDROcodone-acetaminophen (VICODIN) 5-300 mg tablet Take  by mouth.        predniSONE (DELTASONE) 20 mg tablet Take 60mg the night prior to procedure at bedtime and take 60mg again the morning of the procedure 1 hour before. (Patient not taking: Reported on 7/26/2021) 6 Tablet 0   .      Review of Systems:   As in  HPI including and:     Constitutional: No fever, unintentional weight loss or weight gain. Skin: No rashes, petechia or easy bruising. HENT: No headache, hearing loss or nosebleeds. No loose or infected teeth. Eyes: No visual field cuts or double vision. Yes glasses. No drainage from eyes. Respiratory: No frequent respiratory infections or pneumonia. No history of difficult intubation. No history of wheezing. No hemoptysis. Cardiovascular: Positive for dyspnea on exertion and decreased stamina. Negative for angina   Gastrointestinal: No constipation or diarrhea. No dark or bloody stools. No frequent nausea or vomiting. Genitourinary: No difficulty with urination. No urgency or frequency. No blood in urine. Muscularskeletal: No muscle or joint pain. No difficulty walking or climbing. Endo/Heme/Allergy: No diabetes. No skin rashes. No easy or severe bleeding history. Neurological: No loss of consciousness, one sided weakness, difficulty with speech or eating or drinking. No foot drop or difficulty with walking. All other systems reviewed and negative.     Physical Examination:      Visit Vitals  BP (!) 152/96 (BP 1 Location: Right upper arm, BP Patient Position: Sitting, BP Cuff Size: Adult)   Pulse 76   Temp 98 °F (36.7 °C) (Temporal)   Resp 20   Ht 5' 8.5\" (1.74 m)   Wt 81.2 kg (179 lb)   SpO2 96%   BMI 26.82 kg/m²         HENT:      Head: Normocephalic and atraumatic. Eyes:      General: No scleral icterus.     Pupils: Pupils are equal, round, and reactive to light. Cardiovascular:      Rate and Rhythm: Normal rate and regular rhythm.      Heart sounds: Normal heart sounds. No murmur heard. No friction rub. No gallop.    Pulmonary:      Effort: Pulmonary effort is normal. No respiratory distress.    Breath sounds: Normal breath sounds. No wheezing or rales. Chest:      Chest wall: No tenderness. Abdominal:      General: Bowel sounds are normal.      Palpations: Abdomen is soft. Skin:     General: Skin is warm and dry.      Findings: No rash. Neurological:      Mental Status: He is alert and oriented to person, place, and time.   Laboratory Data:            Lab Results   Component Value Date/Time     WBC 7.8 07/08/2021 11:04 AM     HGB 14.7 07/08/2021 11:04 AM     HCT 42.2 07/08/2021 11:04 AM     PLATELET 102 61/33/0337 11:04 AM            Lab Results   Component Value Date/Time     Sodium 140 07/08/2021 11:04 AM     Potassium 4.5 07/08/2021 11:04 AM     Chloride 106 07/08/2021 11:04 AM     CO2 29 07/08/2021 11:04 AM     Glucose 109 (H) 07/08/2021 11:04 AM     BUN 17 07/08/2021 11:04 AM     Creatinine 1.01 07/08/2021 11:04 AM            Lab Results   Component Value Date/Time     Cholesterol, total 215 (H) 06/19/2020 04:34 PM     HDL Cholesterol 43 06/19/2020 04:34 PM     LDL, calculated 121.8 (H) 06/19/2020 04:34 PM     Triglyceride 251 (H) 06/19/2020 04:34 PM            Lab Results   Component Value Date/Time     Hemoglobin A1c 6.1 (H) 06/19/2020 04:34 PM      No results for input(s): CA, PHOS, ALB, TP, ALKP, TBILI in the last 72 hours.     No lab exists for component: SGOT, SGPT, CBILI  ABG:  No results found for: PH, PHI, PCO2, PCO2I, PO2, PO2I, HCO3, HCO3I, FIO2, FIO2I  No results for input(s): TROIQ, CPK, CKMB in the last 72 hours.     EKG: NSR, normal axis and intervals, no ST segment abnormalities,PVCs     ECHO ADULT COMPLETE 06/24/2021 6/24/2021     Interpretation Summary  · LV: Calculated LVEF is 35%. Biplane method used to measure ejection fraction. Normal cavity size. Mildly increased wall thickness. Moderately and globally reduced systolic function. Age-appropriate left ventricular diastolic function.   · AO: Mild aortic root dilatation; diameter is 3.5 cm.     Signed by: Hemanth Bhatt MD LEXA on 6/24/2021  1:01 PM     NUCLEAR CARDIAC STRESS TEST 06/24/2021 6/24/2021     Interpretation Summary  · Baseline ECG: Sinus rhythm, occasional PVCs. Left axis deviation; cannot r/0 anterior infarct age undetermined  · SPECT: Left ventricular function post-stress was abnormal. Calculated ejection fraction is 30%. There is no evidence of transient ischemic dilation (TID). The TID ratio is 1.1.  · SPECT: Myocardial perfusion imaging defect 1: There is a defect that is small in size present in the apical location(s) that is partially reversible. There is normal wall motion in the defect area. The possibility of ischemia cannot be excluded. Perfusion defect was visually and quantitatively present. Myocardial perfusion imaging defect 2: There is a defect that is small to medium in size affecting the inferolateral location(s) that is partially reversible. There is normal wall motion in the defect area. The possibility of ischemia cannot be excluded. Perfusion defect was visually and quantitatively present. · SPECT: Left ventricular perfusion is abnormal. Myocardial perfusion imaging supports a high risk stress test due to EF <40%.    Signed by: Brooks Snow DO on 6/24/2021  3:53 PM     CARDIAC PROCEDURE 07/12/2021 7/12/2021     Conclusion  · Right dominant system. Dominant RCA is large. 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 [facility]. · LAD with mid segment 70-75% stenosis, distal 70-75% stenosis. · Circumflex with mid AV segment 70% and small vessel. · Would recommend continued medical therapy rather than proceeding to open heart surgery. This can be discussed with his primary cardiologist as outpatient.     Signed by: Igor Sharma MD on 7/12/2021 11:27 AM        ASSESSMENT AND PLAN:     This 79-year-old gentleman has severe three-vessel coronary artery disease with left main stenosis of 65%.   His ejection fraction is decreased which places him at somewhat higher risk, but not prohibitive. It is also somewhat concerning that his symptoms are not consistent with angina, however the very marked change in his stamina as well as his significant dyspnea on exertion are very likely to be cardiac in nature. The patient was an extremely active person until approximately 6 months ago.     I believe that it is reasonable to proceed with coronary artery bypass grafting and that the potential risks are outweighed by the benefits. I would like for him to undergo carotid artery duplex as there is some correlation between left main disease and carotid artery disease.     The plan is to proceed with surgery early next week. I discussed this with Dr. Dimitri Mantilla. I spoke to the patient and his son in law at significant length and answered all of their questions.   I explained the general risks of the surgery as well as what to expect perioperatively.     Thank you for allowing me to participate in the care of this patient.        I spent over 90 minutes reviewing this patient's history and his studies, conferring with his cardiologist, meeting with the patient and his son-in-law, and devising a plan.        Dae Ordoñez MD FACS  Chief, Cardiothoracic Surgery   [facility]  [phone]         Note Details   Other Notes       Addendum Note from Aaliyah Rudolph PA-C (Physician Assistant)  Instructions     To Take With You (Printed 7/26/2021)  Additional Documentation    Vitals:  /96Important   (BP 1 Location: Right upper arm, BP Patient Position: Sitting, BP Cuff Size: Adult)   Pulse 76   Temp 98 °F (36.7 °C) (Temporal)   Resp 20   Ht 5' 8.5\" (1.74 m)   Wt 81.2 kg (179 lb)   SpO2 96%   BMI 26.82 kg/m²   BSA 1.98 m²   Pain Sc   5 (Loc: Shoulder) (Edu: Yes)       More Vitals   Flowsheets:  Fluid Management      Encounter Info:  Billing Info,   History,   Allergies,   Detailed Report      Communications       Letter sent to Diana Tian MD and Ashutosh Duong DO   Provider Notes  BestPractice Advisories    Click to view BestPractice Advisory history   Encounter Messages    Read Composed From To  Subject   N 7/23/2021 11:19 AM Tiny Watson  Appointment Scheduled   Orders Placed       Labs     CBC WITH AUTOMATED DIFF     HEMOGLOBIN A1C WITH EAG     METABOLIC PANEL, BASIC     URINALYSIS W/MICROSCOPIC     CULTURE, URINE     HEPATIC FUNCTION PANEL     PROTHROMBIN TIME + INR     RBC, ALLOCATE     TSH 3RD GENERATION     TYPE & SCREEN   . Ani Maid .(8 more)     Imaging     DUPLEX CAROTID BILATERAL        Other Orders     EKG, 12 LEAD, INITIAL        All Encounter Results   Outpatient Medications at End of Encounter as of 7/26/2021    amiodarone (CORDARONE) 200 mg tablet (Taking) Take 2 Tablets by mouth two (2) times daily (with meals) for 5 days. Indications: Afib prevention post Heart surgery   metoprolol succinate (TOPROL-XL) 50 mg XL tablet (Taking) Take 1 Tablet by mouth daily. atorvastatin (LIPITOR) 40 mg tablet (Taking) Take 1 Tablet by mouth daily. tamsulosin (FLOMAX) 0.4 mg capsule (Taking) Take 1 Capsule by mouth daily (after dinner). hydrOXYzine HCL (ATARAX) 25 mg tablet (Taking) Take 25 mg by mouth as needed. fluticasone propionate (FLONASE) 50 mcg/actuation nasal spray (Taking) 2 Sprays by Both Nostrils route daily. diclofenac (VOLTAREN) 1 % gel (Taking) Apply 2 g to affected area every six (6) hours as needed for Pain (left thumb). PARoxetine (PAXIL) 20 mg tablet (Taking) TAKE 1 TABLET BY MOUTH EVERY DAY   zolpidem (AMBIEN) 10 mg tablet (Taking) Take  by mouth nightly as needed for Sleep. HYDROcodone-acetaminophen (VICODIN) 5-300 mg tablet (Taking) Take  by mouth.   predniSONE (DELTASONE) 20 mg tablet Take 60mg the night prior to procedure at bedtime and take 60mg again the morning of the procedure 1 hour before.    Visit Diagnoses       Coronary artery disease involving native coronary artery of native heart without angina pectoris     Preop testing     Prediabetes     On amiodarone therapy     Kidney stone     Benign prostatic hyperplasia with lower urinary tract symptoms, symptom details unspecified     Problem List

## 2021-08-05 NOTE — OP NOTES
CARDIAC SURGERY OPERATIVE NOTE     08/05/21       PREOPERATIVE DIAGNOSIS:  Coronary artery disease with HF     POSTOPERATIVE DIAGNOSIS:  Coronary artery disease with HF    PROCEDURE:      1. Coronary artery bypass grafting x3 with the left internal mammary artery to the mid left anterior descending coronary artery, SVG to R PLV, and SVG to OM    2. Endoscopic SVG harvest, right lower extremity         ATTENDING SURGEON:  Cait Echeverria MD        ASSISTANT:  JON Mccloud, JON Chinchilla, Madhu Schwartz     ANESTHESIA:  General with endotracheal tube. ANESTHESIOLOGIST: Summer Aleman MD     ESTIMATED BLOOD LOSS: not applicable (cardiopulmonary bypass)     IMPLANTS: none      INDICATIONS: The patient is a 80 y.o. male who presents with worsening dyspnea on exertion, exercise intolerance and fatigue. His work-up revealed that his ejection fraction was depressed. A cardiac catheterization revealed severe three-vessel coronary disease with left main involvement. I was consulted for CABG. PROCEDURE:  He was taken to the operating room and was placed on the table in supine position, and after being placed under general anesthesia, was intubated without difficulty. Appropriate monitoring lines were then placed, including a radial  arterial line, a pulmonary artery catheter, a Vera catheter, and a transesophegeal echo probe. He was then prepped and draped in sterile fashion from neck to toes. A full Time Out was performed and the Cardiac Surgery Operative Checklist was then reviewed, which included confirming the patient's identity and planned operation, and also that the preoperative antibiotics were appropriate and had been delivered in appropriate timely fashion. A median sternotomy incision was then performed with a sternal saw. The left internal mammary artery was then exposed by a Rultract retractor and then harvested and was divided distally after systemic heparinization.   The LIMA appeared to be of good quality. It was sprayed with a papaverine solution and then placed in a sponge soaked with the same solution. Simultaneously, endoscopic SVG harvesting was performed on the left lower extremity. In addition to harvesting conduit (SVG), the PA assisted with retraction, exposure, suture management, and chest closure. He was then cannulated for cardiopulmonary bypass using a single cannula in the distal ascending aorta through double purse-string sutures; and a single two-staged cannula was placed into the right atrium through a single purse-string suture, all of which were secured by tourniquets. An antegrade cardioplegia cannula was placed into the ascending aorta and was secured by a tourniquet. After confirming that the ACT was greater than 400 seconds, the patient was placed on cardiopulmonary bypass and cooled tto 34 degrees Celsius. A cross clamp was then placed on the distal ascending aorta, and I liter of cardioplegia was given. Additional doses were given as appropriate. Topical cooling was utilized as well. The first distal anastomosis was SVG to the R PLV. This was a good target. The vessel was opened and an end-to-side anastomosis was created with saphenous vein graft. Cardioplegia was administered down this graft and a flow rate of 80 cc/min was appreciated. The vein was then brought up along the right side of the heart and a proximal anastomosis was made to the ascending aorta. I then grafted the obtuse marginal branch. This was somewhat smaller than expected but was still a reasonable target. Again, an end-to-side anastomosis was created with saphenous vein graft. Cardioplegia was administered on the graft and a flow rate of 70 cc/min was appreciated. The vein was brought up along the left side of the heart and a separate proximal anastomosis was made to the ascending aorta. Rewarming commenced at this time.      The left internal mammary artery was then anastomosed end-to-side to the mid left anterior descending coronary artery, using 7-0 Prolene running suture. The LIMA had excellent flow, and the LAD was a good target. It was tacked down with 6-0s. The patient had been rewarmed by this time. He was weaned off of CPB easily. The NAVEEN showed marked improvement in his ejection fraction from approximately 30% to approximately 50% post bypass. Decannulation and protamine administration were performed in the usual fashion. Chest tubes and a ventricular pacing wire were placed. The chest was thoroughly irrigated. Hemostasis was ensured. Closure preceded in standard fashion. I was present in the operating room from the time the patient arrived until the patient left the room and performed the entire procedure as dictated above. The cross-clamp time was 77 minutes. The cardiopulmonary bypass time was 92 minutes.     Haile Zaidi MD [facility]  Chief, Cardiothoracic Surgery   [facility]  [phone]

## 2021-08-05 NOTE — ANESTHESIA PREPROCEDURE EVALUATION
Relevant Problems   No relevant active problems       Anesthetic History   No history of anesthetic complications            Review of Systems / Medical History  Patient summary reviewed and pertinent labs reviewed    Pulmonary            Asthma : well controlled       Neuro/Psych         Psychiatric history     Cardiovascular    Hypertension      CHF: NYHA Classification II, dyspnea on exertion    Past MI, CAD and hyperlipidemia    Exercise tolerance: <4 METS  Comments: EF 35%   GI/Hepatic/Renal  Within defined limits              Endo/Other    Diabetes: type 2         Other Findings              Physical Exam    Airway  Mallampati: III  TM Distance: > 6 cm  Neck ROM: normal range of motion   Mouth opening: Normal     Cardiovascular  Regular rate and rhythm,  S1 and S2 normal,  no murmur, click, rub, or gallop             Dental    Dentition: Poor dentition     Pulmonary  Breath sounds clear to auscultation               Abdominal  GI exam deferred       Other Findings            Anesthetic Plan    ASA: 4  Anesthesia type: general    Monitoring Plan: Arterial line, BIS, CVP, [place] and NAVEEN  Post-op pain plan if not by surgeon: IV PCA  Post procedure ventilation   Induction: Intravenous  Anesthetic plan and risks discussed with: Patient

## 2021-08-05 NOTE — PROGRESS NOTES
Post op CABG x 3    Received at this time without incident      - Vent AC/VC+ to SIMV as he awakenings     - 14/450 / 100%/5peep    - good bibasilar breath sounds / ET 22 at lip on left cuff pressure 32      Plan : Wean settings per assessment and tolerance . Goal : Safe extubation upon patient awakening     1630 Agree with RN on ABG and awakening of patient      - To SBT / PS slowly reduced to 8    - airway cleared of thick blood tinged secretions    - patient at extubation settings for assessment to extubate   - Patient relaxed and responding appropriately     - SPO2 96% , FIO2 40 / Vt 250- 400 on PS on 8    - RSBI 69     - chest a little coarse / plan just prior to extubation     - HR 73 / RR 24         1715 Per RT/RN assessment agreed patient ready for extubation . Patient a little drowsy but following commands as asked . - Patient airway was cleared  And extubated without incident at 1700 .  Patient placed on 4 lpm by N/C . SPO2 985 / RR 15 / HR 74

## 2021-08-05 NOTE — DIABETES MGMT
Diabetes Plan of Care    History of pre diabetes. A1c of 5.7 % (7/29/201)  Home diabetes medications: None. 8/05: Patient is status post CABG x3 today and placed onregular insulin drip protocol via GlucoStabilizer. Recommendation: follow insulin infusion protocol via GlucoStabilizer and criteria for transition to SC insulin. Glycemic assessment:  See below blood glucose values for patient on GlucoStabilizer at time of this review. Most recent blood glucose values: Within target range : Yes. Current A1c 5.7 % (7/29/201) which is equivalent to estimated average blood glucose of 117 mg/dL during the past 2-3 months    Adequate glycemic control PTA: Yes. Current hospital diabetes medications:  Regular insulin drip via GlucoStabilizer. BG target: . Drip rate at time of review: 0.8 units/hr    TDD previous day N/A.  Patient admitted on 8/05/2021    Diet: Clear liquid; no concentrated sweets when patient is ready to start po post op    Goals: Blood glucose will be within target of 70 - 180 mg/dl by: 8/08/2021    Education:  _____ Refer to Diabetes Education Record                       __X___ Education not indicated at this time       Fabián De Anda RN [facility]  Pager: [phone]

## 2021-08-05 NOTE — INTERVAL H&P NOTE
Update History & Physical    The Patient's History and Physical of July 26,    was reviewed with the patient and I examined the patient. There was no change. The surgical site was confirmed by the patient and me. Plan:  The risk, benefits, expected outcome, and alternative to the recommended procedure have been discussed with the patient. Patient understands and wants to proceed with the procedure.     Electronically signed by Esteban Schwarz PA-C on 8/5/2021 at 9:46 AM

## 2021-08-05 NOTE — PROGRESS NOTES
conducted an initial consultation and Spiritual Assessment for Paul Mckeon, who is a 80 y.o.,male. Patient's Primary Language is: [place]. According to the patient's EMR Christianity Affiliation is: Jehovah's witness. The reason the Patient came to the hospital is:   Patient Active Problem List    Diagnosis Date Noted    CAD (coronary artery disease) 08/05/2021    Benign prostatic hyperplasia with lower urinary tract symptoms     Cerebral microvascular disease 09/25/2019    Mild episode of recurrent major depressive disorder ([facility] [address] 10/26/2018    Pure hypercholesterolemia 05/07/2018    Sleeping difficulty 05/07/2018    Essential hypertension 04/23/2018    Anxiety and depression     Kidney stone     Malaria     Skin cancer     Gout     Anxiety 04/24/2017    Elevated PSA 11/18/2013    Prediabetes 11/18/2013    Insomnia 11/18/2013    Joint pain 11/18/2013    Gross hematuria 05/31/2013        The  provided the following Interventions:  Offered prayer and assurance of continued prayers on patient's behalf. Chart reviewed. The following outcomes where achieved:    Assessment:    Plan:  Chaplains will continue to follow and will provide pastoral care on an as needed/requested basis.  recommends bedside caregivers page  on duty if patient shows signs of acute spiritual or emotional distress.     Rick [address]   [phone]

## 2021-08-05 NOTE — PROGRESS NOTES
1445: Received pt from OR. Intubated 21 at the teeth. Araceli Goltz noted to right neck with right IJ cordis, ~46. 3 chest tubes noted. Output documented in flowsheet. No air leaks noted. Vera noted, draining clear yellow urine. Pt currently receiving epi at 2, thomas at 60 and amicar. 1500: Thomas reduced. Epi reduced to 1.     1515: First index= 2.3. Thomas reduced. Vent changed made by RT per ABG results. 1520: Thomas turned off.     1600: Epi turned off.     1610: Cardiac index dropped to 1.7. Surgical PA aware. Encouraged to wake pt as tolerated and recheck index. If below 2, restart epi at 1. Propofol turned off.    1630: Pt awake, following commands. SBT initiated by RT.     1700: Pt extubated. Awake but drowsy. 1730: Family at bedside. 1800: Post extubation gas results shared with surgical PA. No new orders received. Will continue to stimulate pt.     1900: Bedside and Verbal shift change report given to Massimo Silva RN and Madison Grajeda RN (oncoming nurse) by Joanie Kent RN (offgoing nurse). Report included the following information SBAR, Kardex, Procedure Summary, Intake/Output, MAR, Recent Results, Med Rec Status and Cardiac Rhythm NSR.

## 2021-08-05 NOTE — PROGRESS NOTES
Physician Progress Note      Chapincito Espinoza  [facility] #:                  [id number]  :                       1940  ADMIT DATE:       2021 7:32 AM  DISCH DATE:  RESPONDING  PROVIDER #:        Paddy Hall MD          QUERY TEXT:    Pt admitted with CAD for CABG. Pt noted to have decreased EF \"placing him at somewhat higher risk\"  per H&P. Cardiology office note  \" Newly recognized HFrEF/ CMP EF 35%\". Bystolic changed to Metoprolol. If possible, please document in progress notes and discharge summary if you are evaluating and/or treating any of the following: The medical record reflects the following:  Risk Factors: CAD with 6mo hx decreased stamina  Clinical Indicators:  H&P ALLEN and decreased stamina  anesthesia assessment:  CHF: NYHA Classification II, dyspnea on exertion, ASA 4   echocardiogram:  LV: Calculated LVEF is 35%   card cath: The estimated EF = 40 - 45%. There are wall motion abnormalities in the left ventricle.  card office note:  Sita Juan DO,  Newly recognized HFrEF/ CMP EF 47%  Change Bystolic to Metoprolol Succ 50  Treatment: increased surgical risk indicated by H&P and anesthesia ASA  Thank you. [email or web address]  Options provided:  -- Chronic Systolic CHF/HFrEF  -- Other - I will add my own diagnosis  -- Disagree - Not applicable / Not valid  -- Disagree - Clinically unable to determine / Unknown  -- Refer to Clinical Documentation Reviewer    PROVIDER RESPONSE TEXT:    This patient has chronic systolic CHF/HFrEF. Query created by:  Jose Law on 2021 10:56 AM      Electronically signed by:  Paddy Hall MD 2021 2:36 PM

## 2021-08-05 NOTE — ROUTINE PROCESS
TRANSFER - OUT REPORT:    Verbal report given to Michelle Gleason RN on Enedina Camarillo  being transferred to CVT ICU for routine post - op       Report consisted of patients Situation, Background, Assessment and   Recommendations(SBAR). Information from the following report(s) SBAR, Kardex, OR Summary, Procedure Summary, Intake/Output and Recent Results was reviewed with the receiving nurse. Lines:   Peripheral IV 08/05/21 Left;Posterior Hand (Active)   Site Assessment Clean, dry, & intact 08/05/21 1100   Phlebitis Assessment 0 08/05/21 1100   Infiltration Assessment 0 08/05/21 1100   Dressing Status Clean, dry, & intact 08/05/21 1100   Dressing Type Tape;Transparent 08/05/21 0855   Hub Color/Line Status Pink 08/05/21 1100       Arterial Line 08/05/21 (Active)   Site Assessment Clean, dry, & intact 08/05/21 1100   Dressing Status Clean, dry, & intact 08/05/21 1100       Arterial Line 08/05/21 (Active)        Opportunity for questions and clarification was provided.       Patient transported with:   Monitor  Registered Nurse

## 2021-08-06 PROBLEM — E78.00 PURE HYPERCHOLESTEROLEMIA: Chronic | Status: ACTIVE | Noted: 2018-05-07

## 2021-08-06 PROBLEM — I10 ESSENTIAL HYPERTENSION: Chronic | Status: ACTIVE | Noted: 2018-04-23

## 2021-08-06 PROBLEM — Z95.1 S/P CABG X 3: Status: ACTIVE | Noted: 2021-01-01

## 2021-08-06 NOTE — PROGRESS NOTES
Patient's blood pressure was noted to be 95/66 at 20:00. Cardiac index on shift change was 1.9, yanci was stopped and epi started back on 1mcg/min. . Index was shot after an hour and it came up to 2.1. Last index before pulling out swan read 2.

## 2021-08-06 NOTE — CONSULTS
Comprehensive Nutrition Assessment    Type and Reason for Visit: Initial, Consult    Nutrition Recommendations/Plan:  - Monitor po intake and encourage po intake as tolerated. - Assess need for reinforcement of diet education prior to discharge. Nutrition Assessment:  S/p CABG x3 on 8/5. Extubated last night and started on clear liquid diet. Episode of nausea earlier this morning requiring Zofran but no further complaints. Diet advanced to solids for dinner meal tonight. Malnutrition Assessment:  Malnutrition Status:  No malnutrition      Nutrition History and Allergies: PMHx- anxiety, depression, HTN, skin cancer and prediabetes. Significantly decreased energy and stamina x 6 months, cardiac cath revealing severe three-vessel coronary disease. Wt hx stable per chart review 176# (3/4/20), 178# (6/24/21) & 175# (8/6/21). Reports good po intake and appetite PTA. NKFA. Estimated Daily Nutrient Needs:  Energy (kcal): 7925-1398; Weight Used for Energy Requirements: Current (80 kg)  Protein (g): 80-96; Weight Used for Protein Requirements: Current (1-1.2)  Fluid (ml/day): 0883-0314; Method Used for Fluid Requirements: 1 ml/kcal    Nutrition Related Findings:  BM 8/4- bowel regimen. Transitioning off insulin drip and lantus to be given. Wounds:    Surgical incision (chest tubes)       Current Nutrition Therapies:  ADULT DIET Clear Liquid; Low Fat/Low Chol/High Fiber/2 gm Na; no concentrated sweets. no red meat. ADULT DIET Regular; Low Fat/Low Chol/High Fiber/2 gm Na; No Concentrated Sweets; no concentrated sweets. no red meat. Anthropometric Measures:  · Height:  5' 8.5\" (174 cm)  · Current Body Wt:  79.8 kg (175 lb 14.8 oz)   · Admission Body Wt:  180 lb    · Usual Body Wt:  80.7 kg (178 lb) (6/24/21)     · Ideal Body Wt:  157 lbs:  112.1 %   · BMI Category: Overweight (BMI 25.0-29. 9)       Nutrition Diagnosis:   · Predicted inadequate energy intake related to cardiac dysfunction (s/p CABG) as evidenced by intake 26-50% (clear liquid diet with plan to advance to solid diet for dinner meal 8/6)    Nutrition Interventions:   Food and/or Nutrient Delivery: Continue current diet  Nutrition Education and Counseling: Education initiated (cardiac diet education handouts provided 8/6)  Coordination of Nutrition Care: Continue to monitor while inpatient    Goals:  PO nutrition intake will meet >75% of patient estimated nutritional needs within the next 7 days.        Nutrition Monitoring and Evaluation:   Behavioral-Environmental Outcomes: Knowledge or skill  Food/Nutrient Intake Outcomes: Food and nutrient intake  Physical Signs/Symptoms Outcomes: Biochemical data, GI status, Meal time behavior, Nutrition focused physical findings    Discharge Planning:    Continue current diet     Electronically signed by Leslie Ramirez RD on 8/6/2021 at 1:15 PM    Contact: [phone]

## 2021-08-06 NOTE — PROGRESS NOTES
07:15  Received pt in recliner chair. No air leak noted to chest tubes. Vera draining clear, yellow urine. Neosynephrine infusing at 35 mcg/min. Pt. With situational confusion. Able to recite month, year, and place. 15:30  Chair alarm sounded - pt found out of chair next to bed with lines wrapping around iv pole and chest tubes wrapped around legs. Pt. Assisted back to chair and repeatedly that pt may not get up unassisted. Pt verbalized with teach-back method. Chair alarm armed. 18:00  Pt. With all monitoring equipment unplugged. Holding IV tip as he had disconnected it from cordis. Pt. Re-oriented. 18:30  Pt. With no urge to urinate. Bladder scanned for 193 ml ALLISON Kerr notified. Will give pt more time. 19:00  Bedside and Verbal shift change report given to Gonzalez Zambrano (oncoming nurse) by Dotty oCughlin RN (offgoing nurse). Report included the following information SBAR, Kardex, OR Summary, Procedure Summary, Intake/Output, MAR, Recent Results and Cardiac Rhythm Sinus rhythm with BBB.

## 2021-08-06 NOTE — PROGRESS NOTES
PT did well overnight. Easily met goals for CO/CI, on low dose of KACY for BP support, gave one 250mL bottle of albumin, stayed in sinus rhythm with occasional PVC's. Stayed on 4L NC.  CO2 was slightly elevated at the beginning of the shift, pt was however recently extubated and still drowsy. Rechecked ABG's to verify CO2 trending down. One episode of nausea early in shift. Gave one dose of zofran, pt did not experience any nausea again. Drinking water without issues     Good urine output, normal chest tube output, he did well standing and moving to the chair this AM.  Bathed with CHG and labs sent    PT did not sleep any overnight. And while he still answers questions appropriately, he has started to become impulsive this morning and fixated on his urge to urinate despite several attempts to explain his dobbins catheter.

## 2021-08-06 NOTE — PROGRESS NOTES
Problem: Mobility Impaired (Adult and Pediatric)  Goal: *Acute Goals and Plan of Care (Insert Text)  Description: Physical Therapy Goals  Initiated 8/6/2021 and to be accomplished within 7 day(s)  1. Patient will move from supine to sit and sit to supine  in bed with modified independence. 2.  Patient will transfer from bed to chair and chair to bed with modified independence using the least restrictive device. 3.  Patient will perform sit to stand with modified independence. 4.  Patient will ambulate with modified independence for 250 feet with the least restrictive device. 5.  Patient will ascend/descend 4 stairs with handrail(s) with supervision/set-up. PLOF: Patient reports he was independent with functional mobility PTA. He live with spouse in single story home. Outcome: Progressing Towards Goal       PHYSICAL THERAPY EVALUATION    Patient: Zenaida Williamson (87 y.o. male)  Date: 8/6/2021  Primary Diagnosis: CAD (coronary artery disease) [I25.10]  Procedure(s) (LRB):  CORONARY ARTERY BYPASS GRAFT (CABG) TIMES THREE (N/A) 1 Day Post-Op   Precautions:   Fall, Skin, Sternal      ASSESSMENT :  Patient is 81 yo male admitted to hospital for CABG and presents today alert and agreeable to therapy. He was A& Ox4, though presents with intermittent confusion and decreased attention. Patient was educated on role of therapy and sternal precautions and demonstrated poor compliance throughout session. Objective assessment performed and patient scooted towards edge of recliner with CGA and stood with CGA for safety. Once standing patient used Rollator to ambulate in hallway with verbal cues for direction and safety awareness. Patient required cues for manage of wheeled walker. At conclusion of gait training patient transferred to the chair and was left resting with call bell by their side. Patient educated not to get up without assist and oriented to call bell; patient verbalizes understanding.  Patient also educated on deep breathing techniques. Patient demonstrates decreased strength, mobility, and endurance and will continue to benefit from acute skilled PT. Recommend home health upon discharge with family support. Patient will benefit from skilled intervention to address the above impairments. Patient's rehabilitation potential is considered to be Good  Factors which may influence rehabilitation potential include:   []         None noted  []         Mental ability/status  [x]         Medical condition  [x]         Home/family situation and support systems  [x]         Safety awareness  []         Pain tolerance/management  []         Other:      PLAN :  Recommendations and Planned Interventions:   [x]           Bed Mobility Training             [x]    Neuromuscular Re-Education  [x]           Transfer Training                   []    Orthotic/Prosthetic Training  [x]           Gait Training                          []    Modalities  [x]           Therapeutic Exercises           []    Edema Management/Control  [x]           Therapeutic Activities            []    Family Training/Education  [x]           Patient Education  []           Other (comment):    Frequency/Duration: Patient will be followed by physical therapy 1-2 times per day/4-7 days per week to address goals. 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    Skin cancer      Past Surgical History:   Procedure Laterality Date    HX COLONOSCOPY  2011    f/u 2021    HX HERNIA REPAIR  2000    [id number]    HX SKIN BIOPSY  2013    [id number] [place] [address]     Barriers to Learning/Limitations: yes;  intermittent confusion  Compensate with: Visual Cues, Verbal Cues, and Tactile Cues  Home Situation:  Home Situation  Home Environment: Private residence  # Steps to Enter: 4  Rails to Enter: Yes  One/Two Story Residence: One story  Living Alone: No  Support Systems: Spouse/Significant Other/Partner  Current DME Used/Available at Home: Shower chair, Grab bars, Raised toilet seat, Cane, straight, Walker, rolling  Tub or Shower Type: Shower  Critical Behavior:  Neurologic State: Alert  Orientation Level: Oriented X4  Cognition: Follows commands; Impulsive  Safety/Judgement: Fall prevention  Psychosocial  Patient Behaviors: Calm; Cooperative (intermittent confusion)  Needs Expressed: Educational;Emotional;Spiritual;Nutritional  Purposeful Interaction: Yes  Pt Identified Daily Priority: Clinical issues (comment)  Caritas Process: Enable kole/hope;Establish trust;Attend basic human needs  Caring Interventions: Reassure; Therapeutic modalities  Reassure: Quiet presence;Caring rounds  Therapeutic Modalities: Intentional therapeutic touch  Other Caring Modalities: Hourly Rounds                 Strength BLE:    Strength: Within functional limits             Tone & Sensation BLE:   Tone: Normal    Sensation: Intact           Range Of Motion BLE:  AROM: Within functional limits           Functional Mobility:  Bed Mobility:    Scooting: Contact guard assistance (cues for using core, not elbows)  Transfers:  Sit to Stand: Contact guard assistance (cues for using heart hugger first)  Stand to Sit: Contact guard assistance     Balance:   Sitting: Intact  Standing: Impaired; With support  Standing - Static: Good  Standing - Dynamic : Fair    Ambulation/Gait Training:  Distance (ft): 80 Feet (ft)  Assistive Device: Walker, rollator  Ambulation - Level of Assistance: Contact guard assistance  Gait Abnormalities: Decreased step clearance     Pain:  Pain level pre-treatment:5 /10 chest pain  Pain level post-treatment: 5/ 10   Pain Intervention(s) : Medication (see MAR); Rest, Ice, Repositioning  Response to intervention: Nurse notified, See doc flow    Activity Tolerance:   Fair  Please refer to the flowsheet for vital signs taken during this treatment. After treatment:   [x]         Patient left in no apparent distress sitting up in chair  []         Patient left in no apparent distress in bed  [x]         Call bell left within reach  [x]         Nursing notified  [x]         Caregiver present  [x]         Chair alarm activated  []         SCDs applied    COMMUNICATION/EDUCATION:   [x]         Role of Physical Therapy in the acute care setting. [x]         Fall prevention education was provided and the patient/caregiver indicated understanding. [x]         Patient/family have participated as able in goal setting and plan of care. [x]         Patient/family agree to work toward stated goals and plan of care. []         Patient understands intent and goals of therapy, but is neutral about his/her participation. []         Patient is unable to participate in goal setting/plan of care: ongoing with therapy staff.  []         Other:     Thank you for this referral.  Duy Boucher, PT   Time Calculation: 42 mins      Eval Complexity: History: MEDIUM  Complexity : 1-2 comorbidities / personal factors will impact the outcome/ POC Exam:MEDIUM Complexity : 3 Standardized tests and measures addressing body structure, function, activity limitation and / or participation in recreation  Presentation: LOW Complexity : Stable, uncomplicated  Clinical Decision Making:Low Complexity    Overall Complexity:MEDIUM

## 2021-08-06 NOTE — PROGRESS NOTES
Problem: Self Care Deficits Care Plan (Adult)  Goal: *Acute Goals and Plan of Care (Insert Text)  Description: Occupational Therapy Goals  Initiated 8/6/2021 within 7 day(s). 1.  Patient will perform grooming task standing for 5-8 minutes with supervision/set-up. 2.  Patient will perform upper body dressing with supervision/set-up including management of heart hugger. 3.  Patient will perform lower body dressing with supervision/set-up. 4.  Patient will perform toilet transfers with supervision/set-up. 5.  Patient will perform all aspects of toileting with supervision/set-up. 6.  Patient will recall all sternal precautions during selfcare tasks with minimal verbal cues. 7.  Patient will utilize energy conservation techniques during functional activities with verbal cues. Prior Level of Function: Patient was independent with self-care and functional mobility PTA. 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 Outcome: Progressing Towards Goal     OCCUPATIONAL THERAPY EVALUATION    Patient: Jai Florez (60 y.o. male)  Date: 8/6/2021  Primary Diagnosis: CAD (coronary artery disease) [I25.10]  Procedure(s) (LRB):  CORONARY ARTERY BYPASS GRAFT (CABG) TIMES THREE (N/A) 1 Day Post-Op   Precautions: Fall, Skin, Sternal    ASSESSMENT :  Patient cleared to participate in OT evaluation by RN. Upon entering the room, the patient was seated in chair, alert, and agreeable to participate in OT evaluation. Patient was seen with PT to maximize pt safety and participation. Patient educated on sternal precautions, heart hugger, importance of getting up and moving at least 3x/day per protocol, and safety during this hospital admission. Extensive encouragement on heart hugger use this session to mitigate pain and maintain sternal precautions. Patient educated on energy conservation techniques, pursed lip breathing, and self pacing during daily activities.  Patient verbalized understanding. Patient performed functional transfers with contact guard assist assist, LBD seated with stand by assist assist and  feeding/draping gown on backside with stand by assistance. Patient will benefit from skilled intervention to address the above impairments. Patient's rehabilitation potential is considered to be Fair+  Factors which may influence rehabilitation potential include:   []             None noted  [x]             Mental ability/status  [x]             Medical condition  [x]             Home/family situation and support systems  [x]             Safety awareness  [x]             Pain tolerance/management  []             Other:      PLAN :  Recommendations and Planned Interventions:   [x]               Self Care Training                  [x]      Therapeutic Activities  [x]               Functional Mobility Training   []      Cognitive Retraining  [x]               Therapeutic Exercises           [x]      Endurance Activities  [x]               Balance Training                    [x]      Neuromuscular Re-Education  []               Visual/Perceptual Training     [x]      Home Safety Training  [x]               Patient Education                   [x]      Family Training/Education  []               Other (comment):    Frequency/Duration: Patient will be followed by occupational therapy 1-2 times per day/4-7 days per week to address goals. Discharge Recommendations: Home Health  Further Equipment Recommendations for Discharge: Patient has all DME     SUBJECTIVE:   Patient stated I do everything.  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    Elevated PSA     Gout     Hypertension     Insomnia     Kidney stone     Malaria     Prediabetes     Skin cancer      Past Surgical History:   Procedure Laterality Date    HX COLONOSCOPY  2011    f/u 2021    HX HERNIA REPAIR  2000    [facility]    [facility] SKIN [facility]OPSY  2013    [facility]     Barriers to Learning/Limitations: None  Compensate with: visual, verbal, tactile, kinesthetic cues/model    Home Situation:   Home Situation  Home Environment: Private residence  # Steps to Enter: 4  Rails to Enter: Yes  One/Two Story Residence: One story  Living Alone: No  Support Systems: Spouse/Significant Other/Partner  Current DME Used/Available at Home: Shower chair, Grab bars, Raised toilet seat, Cane, straight, Walker, rolling  Tub or Shower Type: Shower  [x]  Right hand dominant   []  Left hand dominant    Cognitive/Behavioral Status:  Neurologic State: Alert  Orientation Level: Oriented X4  Cognition: Follows commands; Impulsive  Safety/Judgement: Fall prevention    Skin: Intact  Edema: None noted    Vision/Perceptual:    Acuity: Within Defined Limits      Coordination: BUE  Fine Motor Skills-Upper: Left Intact; Right Intact    Gross Motor Skills-Upper: Left Intact; Right Intact    Balance:  Sitting: Intact  Standing: Impaired; With support  Standing - Static: Good  Standing - Dynamic : Fair    Strength: BUE  Strength: Generally decreased, functional       Tone & Sensation: BUE  Tone: Normal  Sensation: Intact    Range of Motion: BUE  AROM: Within functional limits (folllowing sternal precautions)    Functional Mobility and Transfers for ADLs:  Bed Mobility:  Scooting: Contact guard assistance (cues for using core, not elbows)    Transfers:  Sit to Stand: Contact guard assistance (cues for using heart hugger first)  Stand to Sit: Contact guard assistance   Toilet Transfer : Contact guard assistance (simulation with recliner)    ADL Assessment:   Feeding: Independent    Oral Facial Hygiene/Grooming: Independent    Bathing: Stand-by assistance;Contact guard assistance    Upper Body Dressing: Contact guard assistance;Minimum assistance (Beena heart shana    Lower Body Dressing: Stand-by assistance;Contact guard assistance    Toileting: Contact guard assistance    ADL Intervention:  Feeding  Feeding Assistance: Stand-by assistance  Drink to Mouth: Stand-by assistance    Upper Body Dressing Assistance  Dressing Assistance: Stand-by assistance  Hospital Gown: Stand-by assistance    Lower Body Dressing Assistance  Dressing Assistance: Stand-by assistance  Socks: Stand-by assistance (vcs for tailor sit, additional time needed for RLE)  Leg Crossed Method Used: Yes  Position Performed: Seated in chair    Cognitive Retraining  Safety/Judgement: Fall prevention      Pain:  Pain level pre-treatment:5/10, chest   Pain level post-treatment: 5/10   Pain Intervention(s): Medication (see MAR); Response to intervention: Nurse notified, See doc flow    Activity Tolerance:   Good    Please refer to the flowsheet for vital signs taken during this treatment. After treatment:   [x] Patient left in no apparent distress sitting up in chair  [] Patient left in no apparent distress in bed  [x] Call bell left within reach  [x] Nursing notified  [] Caregiver present  [x]  alarm activated    COMMUNICATION/EDUCATION:   [x] Role of Occupational Therapy in the acute care setting  [x] Home safety education was provided and the patient/caregiver indicated understanding. [x] Patient/family have participated as able in goal setting and plan of care. [x] Patient/family agree to work toward stated goals and plan of care. [] Patient understands intent and goals of therapy, but is neutral about his/her participation. [] Patient is unable to participate in goal setting and plan of care. Thank you for this referral.  John Gilbert OTR/L  Time Calculation: 44 mins    Eval Complexity: History: MEDIUM Complexity : Expanded review of history including physical, cognitive and psychosocial  history ;    Examination: MEDIUM Complexity : 3-5 performance deficits relating to physical, cognitive , or psychosocial skils that result in activity limitations and / or participation restrictions; Decision Making:MEDIUM Complexity : Patient may present with comorbidities that affect occupational performnce.  Miniml to moderate modification of tasks or assistance (eg, physical or verbal ) with assesment(s) is necessary to enable patient to complete evaluation

## 2021-08-06 NOTE — PROGRESS NOTES
CARDIAC SURGERY PROGRESS NOTE    2021     Post Operative Day # 1     Chart reviewed. Interval History/Events of Past 24 hours:   Extubated last pm. No pressors. Ambulated with PT/OT in halls. Assessment:  CAD S/P  CABG x3  Respiratory parameters stable  Cardiac status stable     Plans:  1. resume BB, ASA, Statin and Amio for afib ppx  2. Lantus   3. d/c lisa   4. d/c dobbins   5. wean oxygen     Heart Hugger use encouraged to mitigate pain and will also assist with deep breathing and incentive spirometry goals. Possible discharge 4 days. Edilberto Izaguirre PA-C  [facility]  [phone]  _____________________________________________________________________________________________________________________________________________  Subjective:  Patient seen and examined on rounds today. Pain level: controlled     Objective:  Vital signs:   Visit Vitals  /64   Pulse 99   Temp 98.9 °F (37.2 °C)   Resp 14   Ht 5' 8.5\" (1.74 m)   Wt 79.8 kg (175 lb 13.8 oz)   SpO2 96%   BMI 26.35 kg/m²     Temp (24hrs), Av.6 °F (36.4 °C), Min:96.3 °F (35.7 °C), Max:98.9 °F (37.2 °C)    Admission Weight: Last Weight   Weight: 81.6 kg (180 lb) Weight: 79.8 kg (175 lb 13.8 oz)     Last 3 Recorded Weights in this Encounter    21 0830 21 0400   Weight: 81.6 kg (180 lb) 79.8 kg (175 lb 13.8 oz)       Telemetry: NSR    Physical Examination:     General:  Alert, oriented  Lungs: Clear to ascultation without rales, wheezes or rhonchi. Chest: Dressings clean and dry. Sternum stable. Heart: regular rate and rhythm, No murmur. Abdomen: Soft and non-tender without masses. Bowel sounds present. Extremities: Warm and well perfused. Edema absent. Incisions: clean and dry  Neuro: No deficit.     SUP Prophylaxis: Pepcid  DVT Prophylaxis:   pneumatic compression boots: yes  Compression stockings:  Yes  Enoxaparin:  Yes    Chest tubes:  present    Pacing wires:  present    DME needs: tbd          Labs:  Lab Results   Component Value Date/Time    WBC 8.3 08/06/2021 04:15 AM    HCT 26.1 (L) 08/06/2021 04:15 AM     (L) 08/06/2021 04:15 AM      Lab Results   Component Value Date/Time     08/06/2021 04:15 AM    K 4.8 08/06/2021 04:15 AM     (H) 08/06/2021 04:15 AM    CO2 24 08/06/2021 04:15 AM     (H) 08/06/2021 04:15 AM    BUN 13 08/06/2021 04:15 AM    CREA 1.05 08/06/2021 04:15 AM    CREA 0.97 08/05/2021 03:10 PM    CREA 1.05 07/29/2021 08:33 AM     Lab Results   Component Value Date/Time    HBA1C 5.7 (H) 07/29/2021 08:33 AM     pH (POC)   Date Value Ref Range Status   08/05/2021 7.29 (L) 7.35 - 7.45   Final     pCO2 (POC)   Date Value Ref Range Status   08/05/2021 45.9 (H) 35.0 - 45.0 MMHG Final     pO2 (POC)   Date Value Ref Range Status   08/05/2021 90 80 - 100 MMHG Final     HCO3 (POC)   Date Value Ref Range Status   08/05/2021 22.2 22 - 26 MMOL/L Final     sO2 (POC)   Date Value Ref Range Status   08/05/2021 95.8 92 - 97 % Final     Base excess (POC)   Date Value Ref Range Status   08/05/2021 1.2 mmol/L Final          EKG: NSR     CXR: t/l ok. No space issues          PLEASE NOTE:  This document may have been produced using voice recognition software. Unrecognized errors in transcription may be present. Please call with any questions.

## 2021-08-07 NOTE — PROGRESS NOTES
Problem: Falls - Risk of  Goal: *Absence of Falls  Description: Document [place] Fall Risk and appropriate interventions in the flowsheet.   Outcome: Progressing Towards Goal  Note: Fall Risk Interventions:  Mobility Interventions: Assess mobility with egress test, Bed/chair exit alarm, Communicate number of staff needed for ambulation/transfer, Patient to call before getting OOB, Strengthening exercises (ROM-active/passive), Utilize walker, cane, or other assistive device, Utilize gait belt for transfers/ambulation    Mentation Interventions: Adequate sleep, hydration, pain control, Bed/chair exit alarm, Door open when patient unattended, Eyeglasses and hearing aids, Evaluate medications/consider consulting pharmacy, Increase mobility, More frequent rounding, Reorient patient, Room close to nurse's station, Toileting rounds, Update white board    Medication Interventions: Bed/chair exit alarm, Assess postural VS orthostatic hypotension, Evaluate medications/consider consulting pharmacy, Patient to call before getting OOB, Teach patient to arise slowly    Elimination Interventions: Bed/chair exit alarm, Call light in reach, Patient to call for help with toileting needs, Toileting schedule/hourly rounds, Urinal in reach DISPLAY PLAN FREE TEXT

## 2021-08-07 NOTE — PROGRESS NOTES
0700 - Assumed patient care. Handoff received from [facility]. Patient in recliner. Restless with intermittent confusion. 0730 - Thomas synephrine turned off  1000 - Patient ambulated to bed.  1100 - CT placed to water seal.  1500 - Family at bedside. 1645 - Ambulated in hallways on RA. Tolerated well, maintained SpO2: 90% and returned to chair. CT dressing changed. 1800 - Ambulated in hallways on RA. Fatigued care home, rolled back to room and into bed. SpO2: 87% on RA when in room. 1830 - Increasingly confused, removing equipment. Requiring frequent reorientation. 1900 - End of shift.  Handoff given to [facility] [place]

## 2021-08-07 NOTE — PROGRESS NOTES
Problem: Falls - Risk of  Goal: *Absence of Falls  Description: Document Mayra Tovar Fall Risk and appropriate interventions in the flowsheet.   8/6/2021 2052 by Estrella Membreno RN  Outcome: Progressing Towards Goal  Note: Fall Risk Interventions:  Mobility Interventions: Assess mobility with egress test, PT Consult for mobility concerns, OT consult for ADLs, PT Consult for assist device competence, Utilize walker, cane, or other assistive device, Bed/chair exit alarm    Mentation Interventions: Adequate sleep, hydration, pain control, Bed/chair exit alarm, Door open when patient unattended, Evaluate medications/consider consulting pharmacy, Increase mobility, Reorient patient, Room close to nurse's station    Medication Interventions: Bed/chair exit alarm, Evaluate medications/consider consulting pharmacy, Patient to call before getting OOB, Teach patient to arise slowly    Elimination Interventions: Bed/chair exit alarm, Call light in reach, Patient to call for help with toileting needs           8/6/2021 2050 by Estrella Membreno RN  Outcome: Progressing Towards Goal  Note: Fall Risk Interventions:  Mobility Interventions: Assess mobility with egress test, PT Consult for mobility concerns, OT consult for ADLs, PT Consult for assist device competence, Utilize walker, cane, or other assistive device, Bed/chair exit alarm    Mentation Interventions: Adequate sleep, hydration, pain control, Bed/chair exit alarm, Door open when patient unattended, Evaluate medications/consider consulting pharmacy, Increase mobility, Reorient patient, Room close to nurse's station    Medication Interventions: Bed/chair exit alarm, Evaluate medications/consider consulting pharmacy, Patient to call before getting OOB, Teach patient to arise slowly    Elimination Interventions: Bed/chair exit alarm, Call light in reach, Patient to call for help with toileting needs              Problem: Pressure Injury - Risk of  Goal: *Prevention of pressure injury  Description: Document [place] Scale and appropriate interventions in the flowsheet. 8/6/2021 2052 by Eleanor Casey RN  Outcome: Progressing Towards Goal  Note: Pressure Injury Interventions:  Sensory Interventions: Assess changes in LOC    Moisture Interventions: Absorbent underpads, Apply protective barrier, creams and emollients, Minimize layers, Maintain skin hydration (lotion/cream), Moisture barrier    Activity Interventions: Assess need for specialty bed, Chair cushion, Increase time out of bed, Pressure redistribution bed/mattress(bed type), PT/OT evaluation    Mobility Interventions: Assess need for specialty bed, Chair cushion, Pressure redistribution bed/mattress (bed type), Turn and reposition approx. every two hours(pillow and wedges)    Nutrition Interventions: Document food/fluid/supplement intake    Friction and Shear Interventions: Apply protective barrier, creams and emollients, Minimize layers             8/6/2021 2050 by Eleanor Casey RN  Outcome: Progressing Towards Goal  Note: Pressure Injury Interventions:  Sensory Interventions: Assess changes in LOC    Moisture Interventions: Absorbent underpads, Apply protective barrier, creams and emollients, Minimize layers, Maintain skin hydration (lotion/cream), Moisture barrier    Activity Interventions: Assess need for specialty bed, Chair cushion, Increase time out of bed, Pressure redistribution bed/mattress(bed type), PT/OT evaluation    Mobility Interventions: Assess need for specialty bed, Chair cushion, Pressure redistribution bed/mattress (bed type), Turn and reposition approx.  every two hours(pillow and wedges)    Nutrition Interventions: Document food/fluid/supplement intake    Friction and Shear Interventions: Apply protective barrier, creams and emollients, Minimize layers                Problem: Delirium Treatment  Goal: *Level of consciousness restored to baseline  Outcome: Not Progressing Towards Goal  Goal: *Level of environmental perceptions restored to baseline  Outcome: Not Progressing Towards Goal  Goal: *Sensory perception restored to baseline  Outcome: Not Progressing Towards Goal  Goal: *Emotional stability restored to baseline  Outcome: Progressing Towards Goal  Goal: *Functional assessment restored to baseline  Outcome: Progressing Towards Goal  Goal: *Absence of falls  Outcome: Progressing Towards Goal  Goal: *Will remain free of delirium, CAM Score negative  Outcome: Not Progressing Towards Goal  Goal: *Cognitive status will be restored to baseline  Outcome: Progressing Towards Goal  Goal: Interventions  Outcome: Progressing Towards Goal

## 2021-08-07 NOTE — PROGRESS NOTES
CARDIAC SURGERY PROGRESS NOTE    2021     Post Operative Day # 2     Chart reviewed. Interval History/Events of Past 24 hours:   Slept poorly last pm. Thomas weaned off this am.     Assessment:  CAD S/P  CABG x 3 - on BB, ASA, statin  Amiodarone for Afib ppx  Respiratory parameters stable  Cardiac status stable     Plans:    1. Pulmonary hygiene. wean oxygen   2. d/c cordis in am after labs  3. ambulate      Heart Hugger use encouraged to mitigate pain and will also assist with deep breathing and incentive spirometry goals. Possible discharge 2 days. Waunita Dakin, PA-C  [facility]  [phone]  _____________________________________________________________________________________________________________________________________________  Subjective:  Patient seen and examined on rounds today. BM:no    Objective:  Vital signs:   Visit Vitals  /80   Pulse 93   Temp 98.7 °F (37.1 °C)   Resp 22   Ht 5' 8.5\" (1.74 m)   Wt 79.8 kg (175 lb 13.8 oz)   SpO2 90%   BMI 26.35 kg/m²     Temp (24hrs), Av.5 °F (36.9 °C), Min:98.2 °F (36.8 °C), Max:98.9 °F (37.2 °C)    Admission Weight: Last Weight   Weight: 81.6 kg (180 lb) Weight: 79.8 kg (175 lb 13.8 oz)     Last 3 Recorded Weights in this Encounter    21 0830 21 0400   Weight: 81.6 kg (180 lb) 79.8 kg (175 lb 13.8 oz)       Telemetry: NSR    Physical Examination:   Deferred sleeping      Beta-blocker:  Yes  ASA: Yes   Statin: Yes  ACE-I: No  Diuretic: No     SUP Prophylaxis: Pepcid  DVT Prophylaxis:   pneumatic compression boots: Yes  Compression stockings:  Yes  Enoxaparin:  Yes    Chest tubes:  present    Pacing wires:  present    DME needs:  tbd          Labs:  Lab Results   Component Value Date/Time    WBC 8.3 2021 04:15 AM    HCT 26.1 (L) 2021 04:15 AM     (L) 2021 04:15 AM      Lab Results   Component Value Date/Time     2021 04:15 AM    K 4.8 2021 04:15 AM  (H) 08/06/2021 04:15 AM    CO2 24 08/06/2021 04:15 AM     (H) 08/06/2021 04:15 AM    BUN 13 08/06/2021 04:15 AM    CREA 1.05 08/06/2021 04:15 AM    CREA 0.97 08/05/2021 03:10 PM    CREA 1.05 07/29/2021 08:33 AM     Lab Results   Component Value Date/Time    HBA1C 5.7 (H) 07/29/2021 08:33 AM          PLEASE NOTE:  This document may have been produced using voice recognition software. Unrecognized errors in transcription may be present. Please call with any questions.

## 2021-08-07 NOTE — PROGRESS NOTES
Problem: Infection - Risk of, Ventilator-Associated Pneumonia  Goal: *Absence of infection signs and symptoms  Outcome: Resolved/Met     Problem: Patient Education: Go to Patient Education Activity  Goal: Patient/Family Education  8/7/2021 0735 by Kelvin Ruth RN  Outcome: Progressing Towards Goal  8/7/2021 0429 by Kelvin Ruth RN  Outcome: Progressing Towards Goal     Problem: Pressure Injury - Risk of  Goal: *Prevention of pressure injury  Description: Document Jamar Scale and appropriate interventions in the flowsheet. 8/7/2021 0735 by Kelvin Ruth RN  Outcome: Progressing Towards Goal  Note: Pressure Injury Interventions:  Sensory Interventions: Assess changes in LOC, Assess need for specialty bed, Chair cushion, Maintain/enhance activity level, Minimize linen layers, Pad between skin to skin, Monitor skin under medical devices, Pressure redistribution bed/mattress (bed type)    Moisture Interventions: Absorbent underpads, Apply protective barrier, creams and emollients, Assess need for specialty bed, Check for incontinence Q2 hours and as needed, Maintain skin hydration (lotion/cream), Minimize layers, Moisture barrier, Offer toileting Q_hr    Activity Interventions: Assess need for specialty bed, Chair cushion, Increase time out of bed, Pressure redistribution bed/mattress(bed type)    Mobility Interventions: Assess need for specialty bed, Pressure redistribution bed/mattress (bed type), Turn and reposition approx.  every two hours(pillow and wedges), Chair cushion    Nutrition Interventions: Document food/fluid/supplement intake, Offer support with meals,snacks and hydration    Friction and Shear Interventions: Apply protective barrier, creams and emollients, Lift sheet, Minimize layers, Transferring/repositioning devices             8/7/2021 0429 by Kelvin Ruth RN  Outcome: Progressing Towards Goal  Note: Pressure Injury Interventions:  Sensory Interventions: Assess changes in LOC, Assess need for specialty bed, Chair cushion, Maintain/enhance activity level, Minimize linen layers, Pad between skin to skin, Monitor skin under medical devices, Pressure redistribution bed/mattress (bed type)    Moisture Interventions: Absorbent underpads, Apply protective barrier, creams and emollients, Assess need for specialty bed, Check for incontinence Q2 hours and as needed, Maintain skin hydration (lotion/cream), Minimize layers, Moisture barrier, Offer toileting Q_hr    Activity Interventions: Assess need for specialty bed, Chair cushion, Increase time out of bed, Pressure redistribution bed/mattress(bed type)    Mobility Interventions: Assess need for specialty bed, Pressure redistribution bed/mattress (bed type), Turn and reposition approx.  every two hours(pillow and wedges), Chair cushion    Nutrition Interventions: Document food/fluid/supplement intake, Offer support with meals,snacks and hydration    Friction and Shear Interventions: Apply protective barrier, creams and emollients, Lift sheet, Minimize layers, Transferring/repositioning devices                Problem: Patient Education: Go to Patient Education Activity  Goal: Patient/Family Education  8/7/2021 0735 by Deepa Hernandez RN  Outcome: Progressing Towards Goal  8/7/2021 0429 by Deepa Hernandez RN  Outcome: Progressing Towards Goal     Problem: Patient Education: Go to Patient Education Activity  Goal: Patient/Family Education  8/7/2021 0735 by Deepa Hernandez RN  Outcome: Progressing Towards Goal  8/7/2021 0429 by Deepa Hernandez RN  Outcome: Progressing Towards Goal     Problem: Nutrition Deficit  Goal: *Optimize nutritional status  8/7/2021 0735 by Deepa Hernandez RN  Outcome: Progressing Towards Goal  8/7/2021 0429 by Deepa Hernandez RN  Outcome: Progressing Towards Goal     Problem: Patient Education: Go to Patient Education Activity  Goal: Patient/Family Education  8/7/2021 0735 by Deepa Hernandez RN  Outcome: Progressing Towards Goal  8/7/2021 0429 by Mike Bullard RN  Outcome: Progressing Towards Goal     Problem: Delirium Treatment  Goal: *Level of consciousness restored to baseline  8/7/2021 0735 by Mike Bullard RN  Outcome: Progressing Towards Goal  8/7/2021 0429 by Mike Bullard RN  Outcome: Progressing Towards Goal  Goal: *Level of environmental perceptions restored to baseline  8/7/2021 0735 by Mike Bullard RN  Outcome: Progressing Towards Goal  8/7/2021 0429 by Mike Bullard RN  Outcome: Progressing Towards Goal  Goal: *Sensory perception restored to baseline  8/7/2021 0735 by Mike Bullard RN  Outcome: Progressing Towards Goal  8/7/2021 0429 by Mike Bullard RN  Outcome: Progressing Towards Goal  Goal: *Emotional stability restored to baseline  8/7/2021 0735 by Mike Bullard RN  Outcome: Progressing Towards Goal  8/7/2021 0429 by Mike Bullard RN  Outcome: Progressing Towards Goal  Goal: *Functional assessment restored to baseline  8/7/2021 0735 by Mike Bullard RN  Outcome: Progressing Towards Goal  8/7/2021 0429 by Mike Bullard RN  Outcome: Progressing Towards Goal  Goal: *Absence of falls  8/7/2021 0735 by Mike Bullard RN  Outcome: Progressing Towards Goal  8/7/2021 0429 by Mike Bullard RN  Outcome: Progressing Towards Goal  Goal: *Will remain free of delirium, CAM Score negative  8/7/2021 0735 by Mike Bullard RN  Outcome: Progressing Towards Goal  8/7/2021 0429 by Mike Bullard RN  Outcome: Progressing Towards Goal  Goal: *Cognitive status will be restored to baseline  8/7/2021 0735 by Mike Bullard RN  Outcome: Progressing Towards Goal  8/7/2021 0429 by Mike Bullard RN  Outcome: Progressing Towards Goal  Goal: Interventions  8/7/2021 0735 by Mike Bullard RN  Outcome: Progressing Towards Goal  8/7/2021 0429 by Mike Bullard RN  Outcome: Progressing Towards Goal     Problem: Patient Education: Go to Patient Education Activity  Goal: Patient/Family Education  8/7/2021 0735 by Mike Bullard RN  Outcome: Progressing Towards Goal  8/7/2021 0429 by Deepa Hernandez RN  Outcome: Progressing Towards Goal

## 2021-08-08 NOTE — PROGRESS NOTES
Problem: Falls - Risk of  Goal: *Absence of Falls  Description: Document Louie Moreno Fall Risk and appropriate interventions in the flowsheet.   Outcome: Progressing Towards Goal  Note: Fall Risk Interventions:  Mobility Interventions: Assess mobility with egress test, Bed/chair exit alarm, Communicate number of staff needed for ambulation/transfer, Patient to call before getting OOB, Strengthening exercises (ROM-active/passive), Utilize walker, cane, or other assistive device, Utilize gait belt for transfers/ambulation    Mentation Interventions: Adequate sleep, hydration, pain control, Bed/chair exit alarm, Door open when patient unattended, Eyeglasses and hearing aids, Evaluate medications/consider consulting pharmacy, Increase mobility, More frequent rounding, Reorient patient, Room close to nurse's station, Toileting rounds, Update white board    Medication Interventions: Bed/chair exit alarm, Assess postural VS orthostatic hypotension, Evaluate medications/consider consulting pharmacy, Patient to call before getting OOB, Teach patient to arise slowly    Elimination Interventions: Bed/chair exit alarm, Call light in reach, Patient to call for help with toileting needs, Toileting schedule/hourly rounds, Urinal in reach              Problem: Patient Education: Go to Patient Education Activity  Goal: Patient/Family Education  Outcome: Progressing Towards Goal     Problem: Delirium Treatment  Goal: *Level of consciousness restored to baseline  Outcome: Not Progressing Towards Goal  Goal: *Level of environmental perceptions restored to baseline  Outcome: Not Progressing Towards Goal  Goal: *Sensory perception restored to baseline  Outcome: Not Progressing Towards Goal  Goal: *Emotional stability restored to baseline  Outcome: Not Progressing Towards Goal  Goal: *Functional assessment restored to baseline  Outcome: Not Progressing Towards Goal  Goal: *Absence of falls  Outcome: Progressing Towards Goal  Goal: *Cognitive status will be restored to baseline  Outcome: Not Progressing Towards Goal     Problem: Non-Violent Restraints  Goal: No harm/injury to patient while restraints in use  Outcome: Progressing Towards Goal  Goal: Patient's dignity will be maintained  Outcome: Progressing Towards Goal  Goal: Patient Interventions  Outcome: Progressing Towards Goal

## 2021-08-08 NOTE — PROGRESS NOTES
1900: Bedside and Verbal shift change report given to Janak Warren RN (oncoming nurse) by Roe Jackson RN (offgoing nurse). Report included the following information SBAR, Kardex, ED Summary, Procedure Summary, Intake/Output, MAR, Recent Results, Cardiac Rhythm SR, Quality Measures and Dual Neuro Assessment. Pt. is currently A&O x4, pleasant and cooperative. Resting quietly in bed.    1929: Assessment completed. Pt. Is A&O x4, pleasant and cooperative. On RA. VSS. Denies pain. Call-light within reach, no needs at this time. 2000: Pt. SpO2 dropping into low 80's when asleep. Placed on 2L NC and maintaining <95%. 2145 Meds given. No needs at this time. 0000: Reassessment completed. No changes. Repositioned in bed. Used urinal.    0200: Pt. Appears to be sleeping.     0400: Reassessment completed. VSS. A&O x4. Some intermittent confusion noted throughout the night, but pt was easily redirectable and remained pleasant and cooperative. Used Urinal. No other needs at this time. 0545: CHG bath given. Gown, linens, and all leads changed. Standing weight obtained. Ambulated to recliner. 0700: Bedside and Verbal shift change report given to \[facility]\"", RN (oncoming nurse) by Janak Warren RN (offgoing nurse). Report included the following information SBAR, Kardex, ED Summary, OR Summary, Procedure Summary, Intake/Output, MAR, Cardiac Rhythm SR and Alarm Parameters .

## 2021-08-08 NOTE — PROGRESS NOTES
CARDIAC SURGERY PROGRESS NOTE    2021     Post Operative Day # 3     Chart reviewed. Interval History/Events of Past 24 hours:   Agitated and combative overnight, kicked staff. S/p ativan x2. Daughter came to bedside to assist.  Pleasantly confused this AM, no metabolic or toxic cause. Brief bouts of A. fib s/p amio bolus. Oxygen weaned off and Cordis removed. Chest tube output tapering    Assessment:  CAD S/P  CABG x 3 - on BB, ASA, statin  Amiodarone for Afib ppx  Respiratory parameters stable  Cardiac status stable     Plans:    1. D/C chest tubes/PW-done without difficulty. 2. Sleep hygiene. 3. ambulate      Heart Hugger use encouraged to mitigate pain and will also assist with deep breathing and incentive spirometry goals. Possible discharge 2 days. Joselin Hernandez PA-C  [facility]  [phone]  _____________________________________________________________________________________________________________________________________________  Subjective:  Patient seen and examined on rounds today. Pain controlled  Sleep poorly  Eating okay  Ambulating okay  BM: yes      Objective:  Vital signs:   Visit Vitals  BP (!) 121/93   Pulse 86   Temp 98.7 °F (37.1 °C)   Resp 16   Ht 5' 8.5\" (1.74 m)   Wt 79.8 kg (175 lb 13.8 oz)   SpO2 90%   BMI 26.35 kg/m²     Temp (24hrs), Av.4 °F (36.9 °C), Min:97.9 °F (36.6 °C), Max:98.7 °F (37.1 °C)    Admission Weight: Last Weight   Weight: 81.6 kg (180 lb) Weight: 79.8 kg (175 lb 13.8 oz)     Last 3 Recorded Weights in this Encounter    21 0830 21 0400   Weight: 81.6 kg (180 lb) 79.8 kg (175 lb 13.8 oz)       Telemetry: NSR    Physical Examination:   General:  Alert, oriented  Lungs: Clear to ascultation without rales, wheezes or rhonchi. Heart: regular rate and rhythm, No murmur. Abdomen: Soft and non-tender without masses. Bowel sounds present. Extremities: Warm and well perfused.    Edema absent  Incisions: Clean dry and intact. Neuro: No deficit. Beta-blocker: Yes  ASA: Yes   Statin: Yes  ACE-I: No  Diuretic: No     SUP Prophylaxis: Pepcid  DVT Prophylaxis:   pneumatic compression boots: Yes  Compression stockings:  Yes  Enoxaparin:  Yes    Chest tubes:  present    Pacing wires:  present    DME needs:  tbd          Labs:  Lab Results   Component Value Date/Time    WBC 6.8 08/08/2021 04:30 AM    HCT 22.8 (L) 08/08/2021 04:30 AM     (L) 08/08/2021 04:30 AM      Lab Results   Component Value Date/Time     08/08/2021 04:30 AM    K 3.6 08/08/2021 04:30 AM     08/08/2021 04:30 AM    CO2 29 08/08/2021 04:30 AM    GLU 77 08/08/2021 04:30 AM    BUN 17 08/08/2021 04:30 AM    CREA 0.73 08/08/2021 04:30 AM    CREA 0.83 08/07/2021 11:00 AM    CREA 1.05 08/06/2021 04:15 AM     Lab Results   Component Value Date/Time    HBA1C 5.7 (H) 07/29/2021 08:33 AM          PLEASE NOTE:  This document may have been produced using voice recognition software. Unrecognized errors in transcription may be present. Please call with any questions.

## 2021-08-08 NOTE — PROGRESS NOTES
0700 - Assumed patient care. Handoff received from [facility]. Patient asleep in bed. Daughter at bedside. 0800 - Chest tubes unclamped. [id number] - Introducer removed. Developed subsequent atrial fibrillation with -120bpm. Asymptomatic.  0845 - Reverted from atrial fibrillation to normal sinus rhythm with HR 80s.  0945 - Ambulated across hallways on RA. SpO2: 93%. Tolerated well, but converted to atrial fibrillation. Assisted back to recliner. Morning medications administered including lopressor and amiodarone. Dea, León Telles notified, orders received for amiodarone bolus. 1000 - Reverted back to normal sinus rhythm  1030 - CT/PW removed by ALLISON Malin.   1200 - Ambulated across hallways on RA. Completed lap but fatigued with stumbling gait. Returned to bed. Maintained NSR. Intermittently confused. [phone] - Family at bedside  1630 - Ambulated in hallway. Tolerated well on RA. Returned to AMIE Reilly. AOx4  1700 - Family at bedside. 1745 - Ambulated in hallway. Tolerated well on RA. Returned to bed. AOx4  1900 - End of shift.  Handoff given to [facility]
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
## 2021-08-09 NOTE — PROGRESS NOTES
Bedside turnover given from Phuong Goncalves. SBAR,MAR,ED summary given with updates R/T the night, a chance to ask questions was given. PT is on the cardiac tele monitor in stable condition. Bed is in the lowest position with the wheels locked. Call bell and personal effects  are on the bedside table within reach. Double checked with RN coming on the IV drips. Patient resting comfortably. Whiteboard updated.

## 2021-08-09 NOTE — PROGRESS NOTES
Problem: Falls - Risk of  Goal: *Absence of Falls  Description: Document Giana Bruce Fall Risk and appropriate interventions in the flowsheet. Outcome: Progressing Towards Goal  Note: Fall Risk Interventions:  Mobility Interventions: Bed/chair exit alarm, Communicate number of staff needed for ambulation/transfer, Patient to call before getting OOB    Mentation Interventions: Adequate sleep, hydration, pain control, Bed/chair exit alarm, Door open when patient unattended, Evaluate medications/consider consulting pharmacy, Familiar objects from home, Gait belt with transfers/ambulation, Increase mobility, More frequent rounding, Reorient patient, Room close to nurse's station, Toileting rounds, Update white board    Medication Interventions: Bed/chair exit alarm, Patient to call before getting OOB    Elimination Interventions: Call light in reach, Bed/chair exit alarm, Toileting schedule/hourly rounds, Urinal in reach, Toilet paper/wipes in reach, Stay With Me (per policy), Patient to call for help with toileting needs              Problem: Pressure Injury - Risk of  Goal: *Prevention of pressure injury  Description: Document Jamar Scale and appropriate interventions in the flowsheet. Outcome: Progressing Towards Goal  Note: Pressure Injury Interventions:  Sensory Interventions: Assess changes in LOC, Assess need for specialty bed, Avoid rigorous massage over bony prominences, Keep linens dry and wrinkle-free, Maintain/enhance activity level, Minimize linen layers, Turn and reposition approx.  every two hours (pillows and wedges if needed)    Moisture Interventions: Absorbent underpads, Apply protective barrier, creams and emollients, Assess need for specialty bed, Check for incontinence Q2 hours and as needed, Maintain skin hydration (lotion/cream), Minimize layers, Moisture barrier    Activity Interventions: Assess need for specialty bed, Increase time out of bed, Pressure redistribution bed/mattress(bed type)    Mobility Interventions: Assess need for specialty bed, HOB 30 degrees or less, Turn and reposition approx.  every two hours(pillow and wedges)    Nutrition Interventions: Document food/fluid/supplement intake    Friction and Shear Interventions: Apply protective barrier, creams and emollients, HOB 30 degrees or less, Transferring/repositioning devices                Problem: Delirium Treatment  Goal: *Level of consciousness restored to baseline  Outcome: Progressing Towards Goal  Goal: *Level of environmental perceptions restored to baseline  Outcome: Progressing Towards Goal  Goal: *Sensory perception restored to baseline  Outcome: Progressing Towards Goal  Goal: *Emotional stability restored to baseline  Outcome: Progressing Towards Goal  Goal: *Absence of falls  Outcome: Progressing Towards Goal  Goal: *Cognitive status will be restored to baseline  Outcome: Progressing Towards Goal     Problem: Non-Violent Restraints  Goal: Removal from restraints as soon as assessed to be safe  Outcome: Resolved/Met  Goal: No harm/injury to patient while restraints in use  Outcome: Resolved/Met  Goal: Patient's dignity will be maintained  Outcome: Resolved/Met  Goal: Patient Interventions  Outcome: Resolved/Met

## 2021-08-09 NOTE — PROGRESS NOTES
Patient's daughter confirms she has patient's red travel bag.  Daughter aware patient is being transferred to [facility].

## 2021-08-09 NOTE — PROGRESS NOTES
CARDIAC SURGERY PROGRESS NOTE    2021  1:14 PM     Post Operative Day # 4     Chart, images and labs reviewed. Discussed with available staff. Interval History/Events of Past 24 hours:   Walks in [place] without supplemental oxygen. Saturations dipped during sleep requiring oxygen. Not interested in eating the food. Had bowel movement this morning. Follow-up echo this morning with ejection fraction up to 45 to 50%    Subjective:  Patient seen and examined on rounds today. No complaints  Pain level: Controlled  Ambulating: Fairly well   Sleeping: Fairly well  Eating: Poor  Sternal pain: Yes    BM: Yes    Objective:  Vital signs:   Visit Vitals  /88   Pulse 89   Temp 97.2 °F (36.2 °C)   Resp 20   Ht 5' 8.5\" (1.74 m)   Wt 85.7 kg (189 lb)   SpO2 93%   BMI 28.32 kg/m²     Temp (24hrs), Av.1 °F (36.7 °C), Min:97.2 °F (36.2 °C), Max:98.5 °F (36.9 °C)    Admission Weight: Last Weight   Weight: 81.6 kg (180 lb) Weight: 85.7 kg (189 lb)     Physical Examination:     General:  Alert, oriented   Lungs: Clear to ascultation without rales, wheezes or rhonchi. Chest:  Midline incision healing well. Sternum stable. Heart: regular rate and rhythm, no murmur. Abdomen: Soft and non-tender without masses. Bowel sounds present. Extremities: Warm and well perfused. Edema mild. Incisions: clean, dry, no drainage. Neuro: No deficit. Beta-blocker: Yes  ASA: Yes   Statin: Yes  ACE-I: No  Diuretic: No     DVT Prophylaxis:   pneumatic compression boots: Yes  Compression stockings:  Yes  Enoxaparin:  Yes    Chest tubes:  not present.     Pacing wires:  not present    DME needs: SNF or rehab          Labs:  Lab Results   Component Value Date/Time    WBC 6.8 2021 04:30 AM    HCT 22.8 (L) 2021 04:30 AM     (L) 2021 04:30 AM      Lab Results   Component Value Date/Time     2021 06:24 AM    K 3.7 2021 06:24 AM     2021 06:24 AM    CO2 28 2021 06:24 AM  (H) 08/09/2021 06:24 AM    BUN 17 08/09/2021 06:24 AM    CREA 0.84 08/09/2021 06:24 AM    CREA 0.73 08/08/2021 04:30 AM    CREA 0.83 08/07/2021 11:00 AM       Assessment:  S/P  CABG x 3  Respiratory parameters stable  Cardiac status stable     Plans:  1. Add Lasix and potassium. 2.  Continue to increase activity level  3. Continue aspirin, beta-blocker, statin and annual prophylaxis. 4.  Expect will need SNF for rehab. Heart Hugger use encouraged to mitigate pain and will also assist with deep breathing and incentive spirometry goals. Possible discharge 2 days. Reginald Godinez PA-C    PLEASE NOTE:  This document has been produced using voice recognition software. Unrecognized errors in transcription may be present.

## 2021-08-09 NOTE — PROGRESS NOTES
Problem: Mobility Impaired (Adult and Pediatric)  Goal: *Acute Goals and Plan of Care (Insert Text)  Description: Physical Therapy Goals  Initiated 8/6/2021 and to be accomplished within 7 day(s)  1. Patient will move from supine to sit and sit to supine  in bed with modified independence. 2.  Patient will transfer from bed to chair and chair to bed with modified independence using the least restrictive device. 3.  Patient will perform sit to stand with modified independence. 4.  Patient will ambulate with modified independence for 250 feet with the least restrictive device. 5.  Patient will ascend/descend 4 stairs with handrail(s) with supervision/set-up. PLOF: Patient reports he was independent with functional mobility PTA. He live with spouse in single story home. Outcome: Progressing Towards Goal    PHYSICAL THERAPY TREATMENT    Patient: Christopher Badillo (89 y.o. male)  Date: 8/9/2021  Diagnosis: CAD (coronary artery disease) [I25.10] <principal problem not specified>  Procedure(s) (LRB):  CORONARY ARTERY BYPASS GRAFT (CABG) TIMES THREE (N/A) 4 Days Post-Op  Precautions: Fall, Skin, Sternal      ASSESSMENT:  Patient received with nursing present and ready to transport rooms. Patient continues to require minimal cues to maintain sternal precaution with mobility. He stands up at rollator with minimal assistance and cues for rocking to gain momentum. Patient ambulates 60 ft to new room on CVT step down. He has slow, steady gait, given CG for safety. Patient sat down for rest break for about 2-5 minutes and answered families questions. Pt returns to standing and ambulates another about 60 ft in hallway. Educated on activity pacing and energy conservation. Patient's family relays a concern about the spouses ability to assist the patient at home. Patient may benefit from acute rehab in order to maximize independence with function mobility.      Progression toward goals:   []      Improving appropriately and progressing toward goals  [x]      Improving slowly and progressing toward goals  []      Not making progress toward goals and plan of care will be adjusted     PLAN:  Patient continues to benefit from skilled intervention to address the above impairments. Continue treatment per established plan of care. Discharge Recommendations:  Inpatient Rehab  Further Equipment Recommendations for Discharge:  rolling walker     SUBJECTIVE:   Patient stated     OBJECTIVE DATA SUMMARY:   Critical Behavior:  Neurologic State: Alert  Orientation Level: Oriented X4  Cognition: Follows commands  Safety/Judgement: Fall prevention  Functional Mobility Training:    Transfers:  Sit to Stand: Minimum assistance (w/RW)         Balance:  Sitting: Intact  Standing: Impaired; With support  Standing - Static: Fair  Standing - Dynamic : Fair     Ambulation/Gait Training:  Distance (ft): 60 Feet (ft) (x2 )  Assistive Device: Walker, rollator  Ambulation - Level of Assistance: Contact guard assistance  Gait Abnormalities: Decreased step clearance              Pain:  Pain level pre-treatment: 5/10 chest pain and 10/10 right knee pain with mobility   Pain level post-treatment: 5/10   Pain Intervention(s): Medication (see MAR); Rest, Ice, Repositioning   Response to intervention: Nurse notified, See doc flow    Activity Tolerance:   Fair  Please refer to the flowsheet for vital signs taken during this treatment. After treatment:   [x] Patient left in no apparent distress sitting up in chair  [] Patient left in no apparent distress in bed  [x] Call bell left within reach  [x] Nursing notified  [] Caregiver present  [x] Chair alarm activated  [] SCDs applied      COMMUNICATION/EDUCATION:   [x]         Role of Physical Therapy in the acute care setting. [x]         Fall prevention education was provided and the patient/caregiver indicated understanding.   [x]         Patient/family have participated as able in working toward goals and plan of care.  [x]         Patient/family agree to work toward stated goals and plan of care. []         Patient understands intent and goals of therapy, but is neutral about his/her participation.   []         Patient is unable to participate in stated goals/plan of care: ongoing with therapy staff.  []         Other:        Liborio Mayo, PT   Time Calculation: 23 mins

## 2021-08-09 NOTE — ANESTHESIA POSTPROCEDURE EVALUATION
Procedure(s):  CORONARY ARTERY BYPASS GRAFT (CABG) TIMES THREE.    general    Anesthesia Post Evaluation      Multimodal analgesia: multimodal analgesia used between 6 hours prior to anesthesia start to PACU discharge  Patient location during evaluation: ICU  Patient participation: complete - patient participated  Level of consciousness: awake  Pain score: 4  Pain management: adequate  Airway patency: patent  Anesthetic complications: no  Cardiovascular status: acceptable  Respiratory status: acceptable  Hydration status: acceptable  Post anesthesia nausea and vomiting:  none  Final Post Anesthesia Temperature Assessment:  Normothermia (36.0-37.5 degrees C)      INITIAL Post-op Vital signs:   Vitals Value Taken Time   /88 08/09/21 0602   Temp 36.7 °C (98 °F) 08/09/21 0400   Pulse 80 08/09/21 0642   Resp 17 08/09/21 0642   SpO2 93 % 08/09/21 0642   Vitals shown include unvalidated device data.

## 2021-08-09 NOTE — PROGRESS NOTES
PT/OT ariana noted. CM spoke with pt regarding d/c planning. Pt is agreeable to ARU if accepted. Pt does not want to go to a SNF if not accepted to ARU. CM called Karolina from ARU and asked her to evaluate pt.     Kevin Fleischer RN BSN  Care Manager  [phone]

## 2021-08-09 NOTE — PROGRESS NOTES
Problem: Self Care Deficits Care Plan (Adult)  Goal: *Acute Goals and Plan of Care (Insert Text)  Description: Occupational Therapy Goals  Initiated 8/6/2021 within 7 day(s). 1.  Patient will perform grooming task standing for 5-8 minutes with supervision/set-up. 2.  Patient will perform upper body dressing with supervision/set-up including management of heart hugger. 3.  Patient will perform lower body dressing with supervision/set-up. 4.  Patient will perform toilet transfers with supervision/set-up. 5.  Patient will perform all aspects of toileting with supervision/set-up. 6.  Patient will recall all sternal precautions during selfcare tasks with minimal verbal cues. 7.  Patient will utilize energy conservation techniques during functional activities with verbal cues. Prior Level of Function: Patient was independent with self-care and functional mobility PTA. 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 Outcome: Progressing Towards Goal   OCCUPATIONAL THERAPY TREATMENT    Patient: Savage Dasilva (73 y.o. male)  Date: 8/9/2021  Diagnosis: CAD (coronary artery disease) [I25.10] <principal problem not specified>  Procedure(s) (LRB):  CORONARY ARTERY BYPASS GRAFT (CABG) TIMES THREE (N/A) 4 Days Post-Op  Precautions: Fall, Skin, Sternal    Chart, occupational therapy assessment, plan of care, and goals were reviewed. ASSESSMENT:  Pt out of ICU, now on step-down unit. Pt OOB seated in chair upon entry. Pt seen for toileting ADL retraining. Reviewed sternal precautions and importance of maintaining w/ADLs and functional mobility/transfers. Pt requires max vc's to comply w/sternal precautions and Max Assist w/toileting ADL. Educated on energy conservation techniques w/ADL and benefits of shower chair. Pt does not tolerate standing for hand hygiene 2/2 c/o 10/10 R knee pain. Pt demonstrates energy conservation technique w/LB dressing task, donning underwear.  Pt will benefit from short ARU stay to address sternal precautions compliance w/ADLs and safety w/RW and functional mobility/transfers. HR 100s w/activity, 90s at rest. SpO2% 93 on RA  Progression toward goals:  []          Improving appropriately and progressing toward goals  [x]          Improving slowly and progressing toward goals  []          Not making progress toward goals and plan of care will be adjusted     PLAN:  Patient continues to benefit from skilled intervention to address the above impairments. Continue treatment per established plan of care. Discharge Recommendations:  Inpatient Rehab  Further Equipment Recommendations for Discharge:  shower chair and rolling walker     SUBJECTIVE:   Patient stated I have therapy for my shoulder and that was real good.     OBJECTIVE DATA SUMMARY:   Cognitive/Behavioral Status:  Neurologic State: Alert  Orientation Level: Oriented X4  Cognition: Follows commands  Safety/Judgement: Fall prevention    Functional Mobility and Transfers for ADLs:   Transfers:  Sit to Stand: Minimum assistance (w/RW)  Bed to Chair: Minimum assistance (w/RW)   Toilet Transfer : Minimum assistance   Bathroom Mobility: Minimum assistance (w/RW)  Balance:  Sitting: Intact  Standing: Impaired; With support  Standing - Static: Fair  Standing - Dynamic : Fair    ADL Intervention:  Grooming  Position Performed: Seated in chair  Washing Face: Set-up  Washing Hands: Set-up    Lower Body Dressing Assistance  Underpants: Contact guard assistance  Leg Crossed Method Used: Yes  Position Performed: Seated in chair  Cues: [employer]  Toileting Assistance: Maximum assistance  Bowel Hygiene: Maximum assistance  Clothing Management: Maximum assistance    Pain:  Pain level pre-treatment: 4/10   Pain level post-treatment: 4/10  Pt c/o R knee pain 10/10 w/functional mobility/transfers.     Activity Tolerance:    Fair 2/2 c/o pain    Please refer to the flowsheet for vital signs taken during this treatment. After treatment:   [x]  Patient left in no apparent distress sitting up in chair  []  Patient left in no apparent distress in bed  [x]  Call bell left within reach  [x]  Nursing notified  []  Caregiver present  [x]  chair alarm activated    COMMUNICATION/EDUCATION:   [] Role of Occupational Therapy in the acute care setting  [] Home safety education was provided and the patient/caregiver indicated understanding. [] Patient/family have participated as able in working towards goals and plan of care. [x] Patient/family agree to work toward stated goals and plan of care. [] Patient understands intent and goals of therapy, but is neutral about his/her participation. [] Patient is unable to participate in goal setting and plan of care.       Thank you for this referral.  ELEAZAR Choi  Time Calculation: 38 mins

## 2021-08-09 NOTE — PROGRESS NOTES
TRANSFER - IN REPORT:    Verbal report received from Lazaro Cano RN(name) on [place]  being received from CVT ICU(unit) for routine progression of care      Report consisted of patients Situation, Background, Assessment and   Recommendations(SBAR). Information from the following report(s) SBAR, Intake/Output, MAR and Cardiac Rhythm NSR was reviewed with the receiving nurse. Opportunity for questions and clarification was provided. Assessment completed upon patients arrival to unit and care assumed. Pt in recliner, OT currently in room      1730 pt gone into afib, ekg done.  MD notified   Orders for Amiodarone drip to be started     1820 amiodarone drip started at 1mg

## 2021-08-10 NOTE — PROGRESS NOTES
Bedside turnover given to [employer]. SBAR,MAR,ED summary given with updates R/T the night, a chance to ask questions was given. PT is on the cardiac tele monitor in stable condition. Bed is in the lowest position with the wheels locked. Call bell and personal effects  are on the bedside table within reach. Double checked with RN coming on the IV drips.

## 2021-08-10 NOTE — PROGRESS NOTES
Pt not seen for skilled PT due to:    [ ]  Nausea/vomiting  [ ]  Eating  [ ]  Pain  [ ]  Pt lethargic  [ ]  Off Unit  Other: 09:44 & 13:00- Per RN, pt is sleeping and needs rest 2/2 being confused/puling out lines. Will check back tomorrow. Will f/u later as schedule allows. Thank you.   Kamala Lynn, PT, DPT

## 2021-08-10 NOTE — PROGRESS NOTES
CARDIAC SURGERY PROGRESS NOTE    8/10/2021     Post Operative Day # 5     Chart reviewed. Interval History/Events of Past 24 hours:   Into Afib late yest now in NSR on IV Amio. agitated last pm received Benadryl, haldol, ativan and loxapine. S/w pt sister and daughter -> who came to bedside to help last pm.   Assessment:  CAD S/P  CABG x 3 -  BB, ASA, statin  PAF now NSR on IV Amio x 24h  Respiratory parameters stable  Cardiac status stable     Plans:  1. Cont IV Amio load  2. D/c ativan  3. Cont loxapine  4. OOB when able  5. Encourage Sleep/ wake cycle    Heart Hugger use encouraged to mitigate pain and will also assist with deep breathing and incentive spirometry goals. Possible discharge 1 days. Sadi Rudolph PA-C  [facility]  [phone]  _____________________________________________________________________________________________________________________________________________  Subjective:  Patient seen and examined on rounds today. Somnolent and difficult to arouse    Objective:  Vital signs:   Visit Vitals  /83   Pulse 68   Temp 97.6 °F (36.4 °C)   Resp 13   Ht 5' 8.5\" (1.74 m)   Wt 85.7 kg (189 lb)   SpO2 97%   BMI 28.32 kg/m²     Temp (24hrs), Av.1 °F (36.7 °C), Min:97.6 °F (36.4 °C), Max:98.6 °F (37 °C)    Admission Weight: Last Weight   Weight: 81.6 kg (180 lb) Weight: 85.7 kg (189 lb)     Last 3 Recorded Weights in this Encounter    21 0400 21 0545 21 0950   Weight: 79.8 kg (175 lb 13.8 oz) 86 kg (189 lb 8 oz) 85.7 kg (189 lb)       Telemetry: NSR    Physical Examination:     General:  Alert, oriented  Lungs: Clear to ascultation without rales, wheezes or rhonchi. Chest: Midline incision healing well. Sternum stable. Heart: regular rate and rhythm, No murmur. Abdomen: Soft and non-tender without masses. Bowel sounds present. Extremities: Warm and well perfused. Edema absent. Incisions: clean and dry  Neuro:  No deficit. Beta-blocker: Yes  ASA: Yes   Statin: Yes  ACE-I: No  Diuretic: Yes     SUP Prophylaxis: Pepcid  DVT Prophylaxis:   pneumatic compression boots: Yes  Compression stockings:  Yes  Enoxaparin:  Yes      DME needs: tbd          Labs:  Lab Results   Component Value Date/Time    WBC 7.1 08/10/2021 05:27 AM    HCT 24.2 (L) 08/10/2021 05:27 AM     08/10/2021 05:27 AM      Lab Results   Component Value Date/Time     08/09/2021 06:24 AM    K 3.7 08/09/2021 06:24 AM     08/09/2021 06:24 AM    CO2 28 08/09/2021 06:24 AM     (H) 08/09/2021 06:24 AM    BUN 17 08/09/2021 06:24 AM    CREA 0.84 08/09/2021 06:24 AM    CREA 0.73 08/08/2021 04:30 AM    CREA 0.83 08/07/2021 11:00 AM     Lab Results   Component Value Date/Time    HBA1C 5.7 (H) 07/29/2021 08:33 AM        PLEASE NOTE:  This document may have been produced using voice recognition software. Unrecognized errors in transcription may be present. Please call with any questions.

## 2021-08-10 NOTE — PROGRESS NOTES
Patient resting quietly. Due to lack of sleep all night RN will have patient sleep verse getting them up in the chair.

## 2021-08-10 NOTE — ROUTINE PROCESS
1920;Bedside and Verbal shift change report received from [facility] nurse). Report included the following information SBAR, Kardex, Intake/Output, MAR, Recent Results and Cardiac Rhythm SR.     [id number]: The documentation for this period is being entered following the guidelines as defined in the [facility] by Ondina Fonseca RN.    2575: Slept god thru night. Needs attended. Bedside and Verbal shift change report given to Marylouise Brunner RN (oncoming nurse) by me (offgoing nurse). Report included the following information SBAR, Kardex, Intake/Output, MAR, Recent Results and Cardiac Rhythm SR/ST.

## 2021-08-10 NOTE — PROGRESS NOTES
Patient family called to stay with patient due to patient being restless and picking at equipment. Patient daughter agreed to come up to stay with patient for patient safety.

## 2021-08-10 NOTE — PROGRESS NOTES
Paged MD answering service. MD paged due to patient pulling out Iv, pulling off monitoring equipment,picking at site, pulling off heart hugging and trying to get out of bed.

## 2021-08-10 NOTE — DISCHARGE INSTR - DIET
Continue Oral Nutrition Supplement (ONS) at discharge. Recommend Ensure Enlive or a comparable/similiar product Three times daily for 30 days unless otherwise directed by your Primary Care Physician.      Francisco J Richardson RD

## 2021-08-10 NOTE — PROGRESS NOTES
ARU/IPR REFERRAL CONTACT NOTE  [id number] [place] [address] for Physical Rehabilitation    RE: Belen Nichole   Thank you for the opportunity to review this patient's case for admission to [id number] [place] [address] for Physical Rehabilitation. Based on our pre-admission screening:     [x ] Our Team/Medical Director is following this case. Comments: Patient is on an amiodarone gtt and had episodes last night and early am with interfering with medical equipment (pulling out IV,pulling off monitoring equipment,etc) and trying to get out of bed. Will continue to follow for medical stability, therapy progress and behavioral improvement. Again, Thank you for this referral. Should you have any questions please do not hesitate to call. Sincerely,  Maria T Spaulding. Cesar Diane, [address] [address]   Cesar Diane RN  Admissions [facility] for Physical Rehabilitation  [phone]

## 2021-08-10 NOTE — PROGRESS NOTES
Nutrition Assessment     Type and Reason for Visit: Reassess, Consult    Nutrition Recommendations/Plan:  - Continue oral diet as tolerated with Ensure Enlive, TID  - Monitor and encourage po intake as tolerated. - Cardiac diet education provided today. Nutrition Assessment:  Pt with increased restlessness throughout the night with agitation, requiring Benadryl and Haldol to aid in sleep last night. Slept through breakfast this morning and pt awake during lunch meal today with 50% intake. Ensure supplements started yesterday due to poor appetite. Denies nausea or vomiting with BM 8/9. Pts wife and family at bedside during visit and cardiac diet education provided. Malnutrition Assessment:  Malnutrition Status: At risk for malnutrition (specify) (decreased appetite s/p CABG)     Estimated Daily Nutrient Needs:  Energy (kcal):  9530-5875  Protein (g):  80-96       Fluid (ml/day):  6178-8686    Nutrition Related Findings:  BM /9- bowel regimen. Lasix with KCl replacement, Recent BG levels 106-140 mg/dL- 20 units lantus & SSI. Current Nutrition Therapies:  ADULT DIET Regular; 3 carb choices (45 gm/meal); Low Fat/Low Chol/High Fiber/2 gm Na; no concentrated sweets. no red meat. ADULT ORAL NUTRITION SUPPLEMENT AM Snack, PM Snack, HS Snack; Standard High Calorie/High Protein    Anthropometric Measures:  · Height:  5' 8.5\" (174 cm)  · Current Body Wt:  85.7 kg (189 lb)  · BMI: 28.3    Nutrition Diagnosis:   · Inadequate energy intake related to cardiac dysfunction (s/p CABG) as evidenced by intake 26-50%    Nutrition Intervention:  Food and/or Nutrient Delivery: Continue current diet, Continue oral nutrition supplement  Nutrition Education and Counseling: Education completed (cardiac diet provided 8/10)  Coordination of Nutrition Care: Continue to monitor while inpatient    Goals:  PO nutrition intake will meet >75% of patient estimated nutritional needs within the next 7 days.        Nutrition Monitoring and Evaluation:   Behavioral-Environmental Outcomes: Knowledge or skill  Food/Nutrient Intake Outcomes: Food and nutrient intake, Supplement intake  Physical Signs/Symptoms Outcomes: Biochemical data, GI status, Meal time behavior, Nutrition focused physical findings    Discharge Planning:    Continue oral nutrition supplement, Continue current diet     Electronically signed by Hortencia Solares RD on 8/10/2021 at 3:00 PM    Contact Number: [phone]

## 2021-08-10 NOTE — PROGRESS NOTES
Problem: Falls - Risk of  Goal: *Absence of Falls  Description: Document Bhanu Jocy Fall Risk and appropriate interventions in the flowsheet. Outcome: Progressing Towards Goal  Note: Fall Risk Interventions:  Mobility Interventions: Bed/chair exit alarm, Communicate number of staff needed for ambulation/transfer, Patient to call before getting OOB    Mentation Interventions: Bed/chair exit alarm, Adequate sleep, hydration, pain control, Door open when patient unattended, Evaluate medications/consider consulting pharmacy    Medication Interventions: Bed/chair exit alarm, Evaluate medications/consider consulting pharmacy, Patient to call before getting OOB    Elimination Interventions: Bed/chair exit alarm, Call light in reach, Patient to call for help with toileting needs, Toileting schedule/hourly rounds, Urinal in reach              Problem: Patient Education: Go to Patient Education Activity  Goal: Patient/Family Education  Outcome: Progressing Towards Goal     Problem: Pressure Injury - Risk of  Goal: *Prevention of pressure injury  Description: Document Jamar Scale and appropriate interventions in the flowsheet. Outcome: Progressing Towards Goal  Note: Pressure Injury Interventions:  Sensory Interventions: Assess changes in LOC, Discuss PT/OT consult with provider, Turn and reposition approx. every two hours (pillows and wedges if needed)    Moisture Interventions: Absorbent underpads, Check for incontinence Q2 hours and as needed    Activity Interventions: PT/OT evaluation, Pressure redistribution bed/mattress(bed type), Increase time out of bed    Mobility Interventions: HOB 30 degrees or less, Pressure redistribution bed/mattress (bed type), PT/OT evaluation, Turn and reposition approx.  every two hours(pillow and wedges)    Nutrition Interventions: Document food/fluid/supplement intake    Friction and Shear Interventions: Foam dressings/transparent film/skin sealants                Problem: Patient Education: Go to Patient Education Activity  Goal: Patient/Family Education  Outcome: Progressing Towards Goal     Problem: Patient Education: Go to Patient Education Activity  Goal: Patient/Family Education  Outcome: Progressing Towards Goal     Problem: Nutrition Deficit  Goal: *Optimize nutritional status  Outcome: Progressing Towards Goal     Problem: Patient Education: Go to Patient Education Activity  Goal: Patient/Family Education  Outcome: Progressing Towards Goal     Problem: Delirium Treatment  Goal: *Level of consciousness restored to baseline  Outcome: Progressing Towards Goal  Goal: *Level of environmental perceptions restored to baseline  Outcome: Progressing Towards Goal  Goal: *Sensory perception restored to baseline  Outcome: Progressing Towards Goal  Goal: *Emotional stability restored to baseline  Outcome: Progressing Towards Goal  Goal: *Functional assessment restored to baseline  Outcome: Progressing Towards Goal  Goal: *Absence of falls  Outcome: Progressing Towards Goal  Goal: *Will remain free of delirium, CAM Score negative  Outcome: Progressing Towards Goal  Goal: *Cognitive status will be restored to baseline  Outcome: Progressing Towards Goal  Goal: Interventions  Outcome: Progressing Towards Goal     Problem: Patient Education: Go to Patient Education Activity  Goal: Patient/Family Education  Outcome: Progressing Towards Goal

## 2021-08-10 NOTE — PROGRESS NOTES
Problem: Self Care Deficits Care Plan (Adult)  Goal: *Acute Goals and Plan of Care (Insert Text)  Description: Occupational Therapy Goals  Initiated 8/6/2021 within 7 day(s). 1.  Patient will perform grooming task standing for 5-8 minutes with supervision/set-up. 2.  Patient will perform upper body dressing with supervision/set-up including management of heart hugger. 3.  Patient will perform lower body dressing with supervision/set-up. 4.  Patient will perform toilet transfers with supervision/set-up. 5.  Patient will perform all aspects of toileting with supervision/set-up. 6.  Patient will recall all sternal precautions during selfcare tasks with minimal verbal cues. 7.  Patient will utilize energy conservation techniques during functional activities with verbal cues. Prior Level of Function: Patient was independent with self-care and functional mobility PTA. 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 Outcome: Progressing Towards Goal   OCCUPATIONAL THERAPY TREATMENT    Patient: Sims Friday (66 y.o. male)  Date: 8/10/2021  Diagnosis: CAD (coronary artery disease) [I25.10] <principal problem not specified>  Procedure(s) (LRB):  CORONARY ARTERY BYPASS GRAFT (CABG) TIMES THREE (N/A) 5 Days Post-Op  Precautions: Fall, Skin, Sternal    Chart, occupational therapy assessment, plan of care, and goals were reviewed. ASSESSMENT:  Pt alert, visiting w/family upon entry. Pt agreeable to OOB activity. Requires max vc's for safety 2/2 impulsivity and max vc's for sternal precautions. Pt demonstrates energy conservation techniques w/LB dressing ADLs and requires increase time. Max verbal/tactile cues required for sternal precautions w/functional transfer to standing and descending w/functional transfer to chair. Pt left in chair and reinforced importance of calling for assistance. Vitals WNL throughout session. R knee pain much improved compared to yesterday. Progression toward goals:  []          Improving appropriately and progressing toward goals  [x]          Improving slowly and progressing toward goals  []          Not making progress toward goals and plan of care will be adjusted     PLAN:  Patient continues to benefit from skilled intervention to address the above impairments. Continue treatment per established plan of care. Discharge Recommendations:  Rehab  Further Equipment Recommendations for Discharge:  shower chair and rolling walker     SUBJECTIVE:   Patient stated I'm ready to go home.     OBJECTIVE DATA SUMMARY:   Cognitive/Behavioral Status:  Neurologic State: Alert, Confused  Orientation Level: Disoriented to time  Cognition: Impulsive  Safety/Judgement: Fall prevention    Functional Mobility and Transfers for ADLs:   Bed Mobility:  Supine to Sit: Contact guard assistance   Transfers:  Sit to Stand: Minimum assistance  Bed to Chair: Minimum assistance (w/RW)  Balance:  Sitting: Intact  Standing: Impaired; With support  Standing - Static: Fair  Standing - Dynamic : Fair    ADL Intervention:  Lower Body Dressing Assistance  Socks: Stand-by assistance  Slip on Shoes with Back: Stand-by assistance  Leg Crossed Method Used: Yes  Position Performed: Seated edge of bed  Cues: Don    Pain:  Pain level pre-treatment: 0/10   Pain level post-treatment: 0/10    Activity Tolerance:    Fair    Please refer to the flowsheet for vital signs taken during this treatment. After treatment:   [x]  Patient left in no apparent distress sitting up in chair  []  Patient left in no apparent distress in bed  [x]  Call bell left within reach  []  Nursing notified  []  Caregiver present  [x]  chair alarm activated    COMMUNICATION/EDUCATION:   [] Role of Occupational Therapy in the acute care setting  [] Home safety education was provided and the patient/caregiver indicated understanding.   [] Patient/family have participated as able in working towards goals and plan of care.  [x] Patient/family agree to work toward stated goals and plan of care. [] Patient understands intent and goals of therapy, but is neutral about his/her participation. [] Patient is unable to participate in goal setting and plan of care.       Thank you for this referral.  ELEAZAR Mendoza  Time Calculation: 25 mins

## 2021-08-11 NOTE — PROGRESS NOTES
Problem: Self Care Deficits Care Plan (Adult)  Goal: *Acute Goals and Plan of Care (Insert Text)  Description: Occupational Therapy Goals  Initiated 8/6/2021 within 7 day(s). 1.  Patient will perform grooming task standing for 5-8 minutes with supervision/set-up. 2.  Patient will perform upper body dressing with supervision/set-up including management of heart hugger. 3.  Patient will perform lower body dressing with supervision/set-up. 4.  Patient will perform toilet transfers with supervision/set-up. 5.  Patient will perform all aspects of toileting with supervision/set-up. 6.  Patient will recall all sternal precautions during selfcare tasks with minimal verbal cues. 7.  Patient will utilize energy conservation techniques during functional activities with verbal cues. Prior Level of Function: Patient was independent with self-care and functional mobility PTA. 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 Outcome: Progressing Towards Goal   OCCUPATIONAL THERAPY TREATMENT    Patient: Swetha Bill (75 y.o. male)  Date: 8/11/2021  Diagnosis: CAD (coronary artery disease) [I25.10] <principal problem not specified>  Procedure(s) (LRB):  CORONARY ARTERY BYPASS GRAFT (CABG) TIMES THREE (N/A) 6 Days Post-Op  Precautions: Fall, Skin, Sternal  PLOF: Patient was independent with self-care and functional mobility PTA. 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 Chart, occupational therapy assessment, plan of care, and goals were reviewed. ASSESSMENT:  RN cleared pt for OT tx. PT co tx to maximize pt safety and participation. Pt with improved overall functional performance skills this date. Pt able to recall 2/3 sternal precautions with G compliance throughout tx session.  STS transfer Supervision with RW and performed functional mobility in room and hallway ~ 200 ft with SBA to increase functional activity tolerance needed for self cares. Once back in room pt engaged in oral care task @ sink side with Supervision and 1/0 UE support for ~ 4 mins, no LOB noted. Pt returned self back to recliner chair at end of session and was left with B LE's elevated and all needs left within reach. Progression toward goals:  [x]          Improving appropriately and progressing toward goals  []          Improving slowly and progressing toward goals  []          Not making progress toward goals and plan of care will be adjusted     PLAN:  Patient continues to benefit from skilled intervention to address the above impairments. Continue treatment per established plan of care. Discharge Recommendations: [facility] with increased supervision from family  Further Equipment Recommendations for Discharge: RW     SUBJECTIVE:   Patient stated  I want to go home and see my 2 cats. \"     OBJECTIVE DATA SUMMARY:   Cognitive/Behavioral Status:  Neurologic State: Alert  Orientation Level: Oriented X4  Cognition: Appropriate decision making  Safety/Judgement: Fall prevention    Functional Mobility and Transfers for ADLs:      Transfers:  Sit to Stand: Supervision  Stand to Sit: Supervision      Balance:  Sitting: Intact; With support  Standing: Intact; With support    ADL Intervention:       Grooming  Position Performed: Standing  Brushing Teeth: Supervision  Cues: Verbal cues provided      Cognitive Retraining  Safety/Judgement: Fall prevention    Pain:  Pain level pre-treatment: 0/10   Pain level post-treatment: 0/10    Activity Tolerance:    Fair   Please refer to the flowsheet for vital signs taken during this treatment.   After treatment:   [x]  Patient left in no apparent distress sitting up in chair  []  Patient left in no apparent distress in bed  [x]  Call bell left within reach  [x]  Nursing notified  []  Caregiver present  [x]  Chair alarm activated    COMMUNICATION/EDUCATION:   [x] Role of Occupational Therapy in the acute care setting  [x] Home safety education was provided and the patient/caregiver indicated understanding. [x] Patient/family have participated as able in working towards goals and plan of care. [x] Patient/family agree to work toward stated goals and plan of care. [] Patient understands intent and goals of therapy, but is neutral about his/her participation. [] Patient is unable to participate in goal setting and plan of care.       Thank you for this referral.  ELEAZAR Ramírez  Time Calculation: 23 mins

## 2021-08-11 NOTE — PROGRESS NOTES
Problem: Falls - Risk of  Goal: *Absence of Falls  Description: Document [place] Pillow Fall Risk and appropriate interventions in the flowsheet. Outcome: Progressing Towards Goal  Note: Fall Risk Interventions:  Mobility Interventions: Bed/chair exit alarm, Patient to call before getting OOB, OT consult for ADLs, PT Consult for mobility concerns, PT Consult for assist device competence    Mentation Interventions: Bed/chair exit alarm, Door open when patient unattended, More frequent rounding, Toileting rounds    Medication Interventions: Bed/chair exit alarm, Patient to call before getting OOB, Teach patient to arise slowly    Elimination Interventions: Bed/chair exit alarm, Call light in reach, Patient to call for help with toileting needs, Urinal in reach              Problem: Pressure Injury - Risk of  Goal: *Prevention of pressure injury  Description: Document Jamar Scale and appropriate interventions in the flowsheet. Outcome: Progressing Towards Goal  Note: Pressure Injury Interventions:  Sensory Interventions: Discuss PT/OT consult with provider, Keep linens dry and wrinkle-free, Maintain/enhance activity level, Sit a 90-degree angle/use footstool if needed    Moisture Interventions: Maintain skin hydration (lotion/cream), Moisture barrier    Activity Interventions: Pressure redistribution bed/mattress(bed type), PT/OT evaluation    Mobility Interventions: Chair cushion, HOB 30 degrees or less, Turn and reposition approx.  every two hours(pillow and wedges), PT/OT evaluation, Pressure redistribution bed/mattress (bed type)    Nutrition Interventions: Document food/fluid/supplement intake    Friction and Shear Interventions: Feet elevated on foot rest, HOB 30 degrees or less, Sit at 90-degree angle                Problem: Pain  Goal: *Control of Pain  Outcome: Progressing Towards Goal     Problem: CABG: Discharge Outcomes  Goal: *Hemodynamically stable  Outcome: Progressing Towards Goal  Goal: *Stable cardiac rhythm  Outcome: Progressing Towards Goal  Goal: *Lungs clear or at baseline  Outcome: Progressing Towards Goal  Goal: *Demonstrates ability to perform prescribed activity without shortness of breath or discomfort  Outcome: Progressing Towards Goal  Goal: *Verbalizes home exercise program, activity guidelines, cardiac precautions  Outcome: Progressing Towards Goal  Goal: *Optimal pain control at patient's stated goal  Outcome: Progressing Towards Goal  Goal: *Verbalizes understanding and describes prescribed diet  Outcome: Progressing Towards Goal  Goal: *Verbalizes name, dosage, time, side effects, and number of days to continue medications  Outcome: Progressing Towards Goal  Goal: *Weight  is stable  Outcome: Progressing Towards Goal  Goal: *No signs and symptoms of infection or wound complications  Outcome: Progressing Towards Goal  Goal: *Anxiety reduced or absent  Outcome: Progressing Towards Goal  Goal: *Verbalizes and demonstrates incision care  Outcome: Progressing Towards Goal

## 2021-08-11 NOTE — ROUTINE PROCESS
[id number] Received report from [address] [facility]. Patient alert and oriented. Denies any pain at this time. 0900 Patient ambulated from room to the end of the hallway. Patient sitting in the recliner. [address] with patient's wife Gómez Motta and she's asking when Patient will be going to Rehab. Will update her once Doctors rounded. 1300 Patient sitting in the recliner. Can ambulate to the bathroom with walker. Need reminders to hold on bear hugger to stand and sit. Instructed to use incentive spirometer - need encouragement. 1800 Patient ambulated from the room to the end of the hallway and tolerated well. 1920 Bedside and Verbal shift change report given to Jatin Larios (oncoming nurse) by me (offgoing nurse). Report included the following information SBAR, Kardex, Intake/Output, MAR, Recent Results and Cardiac Rhythm NSR/ST.

## 2021-08-11 NOTE — PROGRESS NOTES
Physician Progress Note      Daniel Levin  CSN #:                  [id number]  :                       1940  ADMIT DATE:       2021 7:32 AM  DISCH DATE:  RESPONDING  PROVIDER #:        Anali Hanna MD          QUERY TEXT:    Pt admitted for CABG done . Pt noted to have confusion and trying to get OOB. Patient without history of neurologic debility or alcohol use. If possible, please document in the progress notes and discharge summary if you are evaluating and / or treating any of the following: The medical record reflects the following:  Risk Factors: 81 yo without history of neurologic debility or alcohol use  Clinical Indicators:  H&P:  The patient was an extremely active person until approximately 6 months ago. ..the very marked change in his stamina as well as his significant dyspnea on exertion are very likely to be cardiac in nature  H&P:  He  reports no history of alcohol use. Haldol 5mg IV given 8/10 0500, Norco 5-325 2 tabs given 8/10 0400, Ativan 1mg IV given 8/10 0500 per CWIA pathway   CIWA scores:  14, 9, 15, 10, 12   CIWA scores:  6, 7, 6, 6, 1   CIWA scores:  0, 0   0715:  Pt. With situational confusion.  1800:  Pt. With all monitoring equipment unplugged. Holding IV tip as he had disconnected it from cordis. Pt. Re-oriented.  PN: Agitated and combative overnight, kicked staff. S/p ativan x2. Pleasantly confused this AM, no metabolic or toxic cause  2234 Patient family called to stay with patient due to patient being restless and picking at equipment. Patient daughter agreed to come up to stay with patient for patient safety. 2836:  Paged MD answering service.  MD paged due to patient pulling out Iv, pulling off monitoring equipment,picking at site, pulling off heart hugging and trying to get out of bed.  4600 Nay COOPER ordered haldol, loxapine and acetaminophen  8/10 PT:  09:44 & 13:00- Per RN, pt is sleeping and needs rest 2/2 being confused/puling out lines. Will check back tomorrow. 8/10 agitated last pm received Benadryl, haldol, ativan and loxapine. S/w pt sister and daughter -> who came to bedside to help last pm.  Somnolent and difficult to arouse. D/c Ativan, Encourage Sleep/ wake cycle  Treatment: Ativan DC'd, Encourage Sleep/ wake cycle, PT deferred, family in for safety measures over night  Thank you. Dorota Mc RN BSN [email or web address]  Options provided:  -- Drug-induced encephalopathy due to Ativan, Haldol, Norco  -- Drug-induced encephalopathy due to, Please specify substance. -- Drug-induced encephalopathy, substance(s) unknown  -- Toxic encephalopathy  -- Toxic metabolic encephalopathy  -- Other - I will add my own diagnosis  -- Disagree - Not applicable / Not valid  -- Disagree - Clinically unable to determine / Unknown  -- Refer to Clinical Documentation Reviewer    PROVIDER RESPONSE TEXT:    Postoperative Delirium is the presumed etiology. Query created by:  Erendira Carey on 8/11/2021 11:36 AM      Electronically signed by:  Lorenzo Davies MD 8/11/2021 1:17 PM

## 2021-08-11 NOTE — PROGRESS NOTES
CARDIAC SURGERY PROGRESS NOTE    2021     Post Operative Day # 6     Chart reviewed. Interval History/Events of Past 24 hours:   Seen earlier this am. Much more alert and lucid today after sleeping all day until 3 yesterday. Ambulated in halls with PT. Remains in NSR, now on PO amio s/p IV Amio    Assessment:  CAD S/P  CABG x 3 -  BB, ASA, statin  Post op delirium, resolved  Respiratory parameters stable  Cardiac status stable     Plans:  1. OOB to chair  2. May be ready for home in am    Heart Hugger use encouraged to mitigate pain and will also assist with deep breathing and incentive spirometry goals. Possible discharge 1 days. Maria Infante PA-C  [facility]  [phone]  _____________________________________________________________________________________________________________________________________________  Subjective:  Patient seen and examined on rounds today. Pain controlled  Eating improving  Sleeping improved  Ambulating improved    Objective:  Vital signs:   Visit Vitals  BP 93/63   Pulse 94   Temp 97.7 °F (36.5 °C)   Resp 15   Ht 5' 8.5\" (1.74 m)   Wt 83.5 kg (184 lb)   SpO2 91%   BMI 27.57 kg/m²     Temp (24hrs), Av.4 °F (36.9 °C), Min:97.6 °F (36.4 °C), Max:98.9 °F (37.2 °C)    Admission Weight: Last Weight   Weight: 81.6 kg (180 lb) Weight: 83.5 kg (184 lb)     Last 3 Recorded Weights in this Encounter    21 0545 21 0950 21 0607   Weight: 86 kg (189 lb 8 oz) 85.7 kg (189 lb) 83.5 kg (184 lb)       Telemetry: NSR    Physical Examination:     General:  Alert, oriented  Lungs: Clear to ascultation without rales, wheezes or rhonchi. Chest: Midline incision healing well. Sternum stable. Heart: regular rate and rhythm, No murmur. Abdomen: Soft and non-tender without masses. Bowel sounds present. Extremities: Warm and well perfused. Edema absent. Incisions: clean and dry  Neuro: No deficit. Beta-blocker:  Yes  ASA: Yes Statin: Yes  ACE-I: No  Diuretic: Yes     SUP Prophylaxis: Pepcid  DVT Prophylaxis:   pneumatic compression boots: Yes  Compression stockings:  Yes  Enoxaparin:  Yes      DME needs: tbd          Labs:  Lab Results   Component Value Date/Time    WBC 7.1 08/10/2021 05:27 AM    HCT 24.2 (L) 08/10/2021 05:27 AM     08/10/2021 05:27 AM      Lab Results   Component Value Date/Time     08/09/2021 06:24 AM    K 3.7 08/09/2021 06:24 AM     08/09/2021 06:24 AM    CO2 28 08/09/2021 06:24 AM     (H) 08/09/2021 06:24 AM    BUN 17 08/09/2021 06:24 AM    CREA 0.84 08/09/2021 06:24 AM    CREA 0.73 08/08/2021 04:30 AM    CREA 0.83 08/07/2021 11:00 AM     Lab Results   Component Value Date/Time    HBA1C 5.7 (H) 07/29/2021 08:33 AM        PLEASE NOTE:  This document may have been produced using voice recognition software. Unrecognized errors in transcription may be present. Please call with any questions.

## 2021-08-11 NOTE — PROGRESS NOTES
ARU/IPR REFERRAL CONTACT NOTE  [address] [place] [address] for Physical Rehabilitation    RE: Ashkan Peng     Thank you for the opportunity to review this patient's case for admission to [address] [place] [address] for Physical Rehabilitation. Based on our pre-admission screening:     [x ] Our Team/Medical Director is following this case. Comments: PT was unable to treat yesterday x2  because the patient was sleeping and was in need of rest due to being confused overnight and pulling out lines. Will continue to follow for medical stability, therapy progress,  behavioral improvement and bed availability. Again, Thank you for this referral. Should you have any questions please do not hesitate to call. Sincerely,  Low Davies. Alyssa Robert, [address]   Alyssa Robert, [place]  Admissions [facility] for Physical Rehabilitation  [phone]

## 2021-08-11 NOTE — PROGRESS NOTES
Discharge Planning:    ARU following along for recommendations for rehab. CM spoke with pt regarding d/c planning. He continues to decline going to a SNF and wishes to go home if not accepted to ARU. [facility] for [address]. Pt reports that he lives with his wife. He has a rolling walker at home. Pt reports that he has good family support from his wife, daughters and two son in laws. CM continues to follow along.     Meghna Elaine RN BSN  Care Manager  [phone]

## 2021-08-11 NOTE — PROGRESS NOTES
Problem: Mobility Impaired (Adult and Pediatric)  Goal: *Acute Goals and Plan of Care (Insert Text)  Description: Physical Therapy Goals  Initiated 8/6/2021 and to be accomplished within 7 day(s)  1. Patient will move from supine to sit and sit to supine  in bed with modified independence. 2.  Patient will transfer from bed to chair and chair to bed with modified independence using the least restrictive device. 3.  Patient will perform sit to stand with modified independence. 4.  Patient will ambulate with modified independence for 250 feet with the least restrictive device. 5.  Patient will ascend/descend 4 stairs with handrail(s) with supervision/set-up. PLOF: Patient reports he was independent with functional mobility PTA. He live with spouse in single story home. Outcome: Progressing Towards Goal     PHYSICAL THERAPY TREATMENT    Patient: Jai Florez (89 y.o. male)  Date: 8/11/2021  Diagnosis: CAD (coronary artery disease) [I25.10] <principal problem not specified>  Procedure(s) (LRB):  CORONARY ARTERY BYPASS GRAFT (CABG) TIMES THREE (N/A) 6 Days Post-Op  Precautions: Fall, Skin, Sternal    ASSESSMENT:  RN cleared for mobility. Pt seen with both PT and OT to maximize patients safety, mobility, and participation. Pt reports feeling much better, able to recall most sternal precautions. No cueing needed for compliance during mobility to use heart hugger. STS with supervision and RW, pt amb 200 ft in hallways with SBA and steady gait. Once returned to room, pt up standing at sink to brush his teeth indep. No LOB during all amb. Pt back sitting in recliner to perform exercises per flow sheet. Discussed d.c plans with pt, pt states his wife will be at home with him for supervision. He was left with B LE's elevated, call bell nearby, all needs met. Recommend d/c home with [facility] and supervision from wife.      Progression toward goals:   []      Improving appropriately and progressing toward goals  [x] Improving slowly and progressing toward goals  []      Not making progress toward goals and plan of care will be adjusted     PLAN:  Patient continues to benefit from skilled intervention to address the above impairments. Continue treatment per established plan of care. Discharge Recommendations:  Home Health with increased supervision from wife  Further Equipment Recommendations for Discharge:  rolling walker     SUBJECTIVE:   Patient stated that sounds like a great plan.     OBJECTIVE DATA SUMMARY:   Critical Behavior:  Neurologic State: Alert  Orientation Level: Oriented X4  Cognition: Appropriate decision making  Safety/Judgement: Fall prevention  Functional Mobility Training:    Transfers:  Sit to Stand: Supervision  Stand to Sit: Supervision       Balance:  Sitting: Intact; With support  Standing: Intact; With support   Ambulation/Gait Training:  Distance (ft): 200 Feet (ft)  Assistive Device: Walker, rolling  Ambulation - Level of Assistance: Stand-by assistance        Gait Abnormalities: Decreased step clearance  Therapeutic Exercises:   Pt performed exercises per flow sheet      EXERCISE   Sets   Reps   Active Active Assist   Passive Self ROM   Comments   Ankle Pumps 1 15  [x] [] [] []    Quad Sets/Glut Sets    [] [] [] [] Hold for 5 secs   Hamstring Sets   [] [] [] []    Short Arc Quads   [] [] [] []    Heel Slides   [] [] [] []    Straight Leg Raises   [] [] [] []    Hip Add   [] [] [] [] Hold for 5 secs, w/ pillow squeeze   Long Arc Quads 1 15 [x] [] [] []    Seated Marching 1 15 [x] [] [] []    Standing Marching   [] [] [] []       [] [] [] []        Pain:  Pain level pre-treatment: 0/10  Pain level post-treatment: 0/10   Pain Intervention(s): Medication (see MAR); Rest, Ice, Repositioning   Response to intervention: Nurse notified, See doc flow    Activity Tolerance:   Pt tolerated mobility well  Please refer to the flowsheet for vital signs taken during this treatment.   After treatment:   [x] Patient left in no apparent distress sitting up in chair  [] Patient left in no apparent distress in bed  [x] Call bell left within reach  [x] Nursing notified  [] Caregiver present  [] Bed alarm activated  [] SCDs applied      COMMUNICATION/EDUCATION:   [x]         Role of Physical Therapy in the acute care setting. [x]         Fall prevention education was provided and the patient/caregiver indicated understanding. [x]         Patient/family have participated as able in working toward goals and plan of care. []         Patient/family agree to work toward stated goals and plan of care. []         Patient understands intent and goals of therapy, but is neutral about his/her participation.   []         Patient is unable to participate in stated goals/plan of care: ongoing with therapy staff.  []         Other:        Satya Freeman   Time Calculation: 23 mins

## 2021-08-11 NOTE — PROGRESS NOTES
Problem: Falls - Risk of  Goal: *Absence of Falls  Description: Document Ilabelkis Rodriguez Fall Risk and appropriate interventions in the flowsheet. Outcome: Progressing Towards Goal  Note: Fall Risk Interventions:  Mobility Interventions: Bed/chair exit alarm, Communicate number of staff needed for ambulation/transfer, Patient to call before getting OOB, PT Consult for mobility concerns, Utilize walker, cane, or other assistive device    Mentation Interventions: Adequate sleep, hydration, pain control, Bed/chair exit alarm, Door open when patient unattended, Eyeglasses and hearing aids, Family/sitter at bedside, Room close to nurse's station    Medication Interventions: Bed/chair exit alarm, Evaluate medications/consider consulting pharmacy, Patient to call before getting OOB, Teach patient to arise slowly    Elimination Interventions: Bed/chair exit alarm, Call light in reach, Patient to call for help with toileting needs, Urinal in reach              Problem: Patient Education: Go to Patient Education Activity  Goal: Patient/Family Education  Outcome: Progressing Towards Goal     Problem: Pressure Injury - Risk of  Goal: *Prevention of pressure injury  Description: Document Jamar Scale and appropriate interventions in the flowsheet.   Outcome: Progressing Towards Goal  Note: Pressure Injury Interventions:  Sensory Interventions: Assess changes in LOC, Check visual cues for pain, Maintain/enhance activity level, Minimize linen layers, Pressure redistribution bed/mattress (bed type)    Moisture Interventions: Absorbent underpads, Minimize layers    Activity Interventions: Pressure redistribution bed/mattress(bed type), PT/OT evaluation    Mobility Interventions: HOB 30 degrees or less, Pressure redistribution bed/mattress (bed type), PT/OT evaluation    Nutrition Interventions: Document food/fluid/supplement intake    Friction and Shear Interventions: Foam dressings/transparent film/skin sealants, HOB 30 degrees or less, Minimize layers                Problem: Patient Education: Go to Patient Education Activity  Goal: Patient/Family Education  Outcome: Progressing Towards Goal     Problem: Nutrition Deficit  Goal: *Optimize nutritional status  Outcome: Progressing Towards Goal     Problem: Delirium Treatment  Goal: *Level of consciousness restored to baseline  Outcome: Progressing Towards Goal  Goal: *Level of environmental perceptions restored to baseline  Outcome: Progressing Towards Goal  Goal: *Sensory perception restored to baseline  Outcome: Progressing Towards Goal  Goal: *Emotional stability restored to baseline  Outcome: Progressing Towards Goal  Goal: *Functional assessment restored to baseline  Outcome: Progressing Towards Goal  Goal: *Absence of falls  Outcome: Progressing Towards Goal  Goal: *Will remain free of delirium, CAM Score negative  Outcome: Progressing Towards Goal  Goal: *Cognitive status will be restored to baseline  Outcome: Progressing Towards Goal  Goal: Interventions  Outcome: Progressing Towards Goal     Problem: Patient Education: Go to Patient Education Activity  Goal: Patient/Family Education  Outcome: Progressing Towards Goal     Problem: Pain  Goal: *Control of Pain  Outcome: Progressing Towards Goal     Problem: CABG: Discharge Outcomes  Goal: *Hemodynamically stable  Outcome: Progressing Towards Goal  Goal: *Stable cardiac rhythm  Outcome: Progressing Towards Goal  Goal: *Lungs clear or at baseline  Outcome: Progressing Towards Goal  Goal: *Demonstrates ability to perform prescribed activity without shortness of breath or discomfort  Outcome: Progressing Towards Goal  Goal: *Verbalizes home exercise program, activity guidelines, cardiac precautions  Outcome: Progressing Towards Goal  Goal: *Optimal pain control at patient's stated goal  Outcome: Progressing Towards Goal  Goal: *Verbalizes understanding and describes prescribed diet  Outcome: Progressing Towards Goal  Goal: *Verbalizes name, dosage, time, side effects, and number of days to continue medications  Outcome: Progressing Towards Goal  Goal: *Weight  is stable  Outcome: Progressing Towards Goal  Goal: *No signs and symptoms of infection or wound complications  Outcome: Progressing Towards Goal  Goal: *Anxiety reduced or absent  Outcome: Progressing Towards Goal  Goal: *Verbalizes and demonstrates incision care  Outcome: Progressing Towards Goal  Goal: *Understands and describes signs and symptoms to report to providers(Stroke Metric)  Outcome: Progressing Towards Goal  Goal: *Describes follow-up/return visits to physicians  Outcome: Progressing Towards Goal  Goal: *Describes available resources and support systems  Outcome: Progressing Towards Goal  Goal: *Expresses feelings about diagnosis and surgery  Outcome: Progressing Towards Goal  Goal: *Influenza immunization  Outcome: Progressing Towards Goal  Goal: *Pneumococcal immunization  Outcome: Progressing Towards Goal

## 2021-08-12 NOTE — PROGRESS NOTES
ARU/IPR REFERRAL CONTACT NOTE  [address] [place] [address] for Physical Rehabilitation    RE: Viktor Davenport     Thank you for the opportunity to review this patient's case for admission to [address] [place] [address] for Physical Rehabilitation. Based on our pre-admission screening:     [x ] This patient does not meet criteria for admission to [facility] for Physical        Rehabilitation due to:    [x ] Too functional, per documentation, patient does not require both Physical and Occupational Therapy Services at an acute rehabilitation level of intensity. Again, Thank you for this referral. Should you have any questions please do not hesitate to call. Sincerely,  Kristina Canas. Jaun Lassiter, [id number]   Jaun Lassiter, RN  Admissions [facility] for Physical Rehabilitation  [phone]

## 2021-08-12 NOTE — DISCHARGE INSTRUCTIONS
Please CALL Cardiac Surgery office with any questions or concerns [phone]. - Take all medications as directed. - Wear Heart Hugger daily.   - Shower daily. Wash incisions with soap and water and pat dry. Keep lower leg incision dressed while draining.   - No heavy lifting.   - No driving until cleared by MD and while taking pain medications. - Follow up in Cardiac Surgery clinic in 1 week and 3 weeks. - Follow up with PCP in one week and Cardiology in 3 weeks. Patient Education        Coronary Artery Bypass Graft: What to Expect at Home  Your Recovery     Coronary artery bypass graft (CABG) is surgery to treat coronary artery disease. The surgery helps blood make a detour, or bypass, around one or more narrowed or blocked coronary arteries. Coronary arteries are the blood vessels that bring blood to the heart. Your doctor did the surgery through a cut, called an incision, in your chest.  You will feel tired and sore for the first few weeks after surgery. You may have some brief, sharp pains on either side of your chest. Your chest, shoulders, and upper back may ache. The incision in your chest and the area where the healthy vein was taken may be sore or swollen. These symptoms usually get better after 4 to 6 weeks. You will probably be able to do many of your usual activities after 4 to 6 weeks. But for 2 to 3 months you will not be able to lift heavy objects or do activities that strain your chest or upper arm muscles. At first you may notice that you get tired easily and need to rest often. It may take 1 to 2 months to get your energy back. Some people find that they are more emotional after this surgery. You may cry easily or show emotion in ways that are unusual for you. This is common and may last for up to a year. Some people get depressed after the surgery. Talk with your doctor if you have sadness that continues or you are concerned about how you are feeling.  Treatment and other support can help you feel better. Even though the surgery may improve your symptoms, you will still need to make changes in your lifestyle to lower your risk of a heart attack or stroke. It will be important to eat a heart-healthy diet, get regular exercise, not smoke, take your heart medicines, and reduce stress. You will likely start a cardiac rehabilitation (rehab) program in the hospital. You will continue with this rehab program after you go home to help you recover and prevent problems with your heart. Talk to your doctor about whether rehab is right for you. This care sheet gives you a general idea about how long it will take for you to recover. But each person recovers at a different pace. Follow the steps below to get better as quickly as possible. How can you care for yourself at home? Activity    · Rest when you feel tired. Getting enough sleep will help you recover. Try to sleep on your back for 4 to 6 weeks while your breastbone (sternum) heals. This usually takes about 4 to 6 weeks.     · Try to walk each day. Start by walking a little more than you did the day before. Bit by bit, increase the amount you walk. Walking boosts blood flow and helps prevent pneumonia and constipation.     · Avoid strenuous activities, such as bicycle riding, jogging, weight lifting, or heavy aerobic exercise, until your doctor says it is okay.     · For 3 months, avoid activities that strain your chest or upper arm muscles. This includes pushing a  or vacuum, mopping floors, or swinging a golf club or tennis racquet.     · For 2 to 3 months, avoid lifting anything that would make you strain. This may include a child, heavy grocery bags and milk containers, a heavy briefcase or backpack, or cat litter or dog food bags.     · Hold a pillow firmly over your chest incision when you cough or take deep breaths.  This will support your chest and reduce your pain.     · Do breathing exercises at home as instructed by your doctor. This will help prevent pneumonia.     · Ask your doctor when you can drive again.     · You will probably need to take 4 to 12 weeks off from work. It depends on the type of work you do and how you feel.     · You may shower as usual. Pat the incision dry. Do not take a bath for the first 3 weeks, or until your doctor tells you it is okay.     · Do not swim or use a hot tub for at least 1 month, or until your doctor says it is okay.     · Ask your doctor when it is okay for you to have sex. Diet    · Eat a heart-healthy diet. If you have not been eating this way, talk to your doctor. You also may want to talk to a dietitian. A dietitian can help you learn about healthy foods.     · Drink plenty of fluids (unless your doctor tells you not to).     · You may notice that your bowel movements are not regular right after your surgery. This is common. Try to avoid constipation and straining with bowel movements. You may want to take a fiber supplement every day. If you have not had a bowel movement after a couple of days, ask your doctor about taking a mild laxative. Medicines    · Your doctor will tell you if and when you can restart your medicines. He or she will also give you instructions about taking any new medicines.     · If you take aspirin or some other blood thinner, ask your doctor if and when to start taking it again. Make sure that you understand exactly what your doctor wants you to do.     · Your doctor may give you medicines to prevent blood clots, keep your heartbeat steady, and lower your blood pressure and cholesterol. Take your medicines exactly as prescribed. Call your doctor if you think you are having a problem with your medicine.     · Be safe with medicines. Take pain medicines exactly as directed. ? If the doctor gave you a prescription medicine for pain, take it as prescribed.   ? If you are not taking a prescription pain medicine, ask your doctor if you can take an over-the-counter medicine. ? Do not take aspirin, ibuprofen (Advil, Motrin), naproxen (Aleve), or other nonsteroidal anti-inflammatory drugs (NSAIDs) unless your doctor says it is okay.     · If you think your pain medicine is making you sick to your stomach:  ? Take your medicine after meals (unless your doctor has told you not to). ? Ask your doctor for a different pain medicine.     · If your doctor prescribed antibiotics, take them as directed. Do not stop taking them just because you feel better. You need to take the full course of antibiotics. Incision care    · If you have strips of tape on the incisions the doctor made, leave the tape on for a week or until it falls off.     · Wash the area daily with warm, soapy water, and pat it dry. Don't use hydrogen peroxide or alcohol, which can slow healing. You may cover the area with a gauze bandage if it weeps or rubs against clothing. Change the bandage every day.     · Keep the area clean and dry.     · Do not use any creams, lotions, powders, ointments, or oils unless your doctor tells you it is okay.     · If you have an incision in your leg:  ? Wear support stockings on your legs during the day for the first 2 weeks. You can take the stockings off at night while you sleep. ? Raise your legs above the level of your heart whenever you lie down for the first 4 to 6 weeks. Other instructions    · Keep track of your weight. Weigh yourself every day at the same time of day, on the same scale, in the same amount of clothing. A sudden increase in weight can be a sign of a problem with your heart. Tell your doctor if you suddenly gain weight, such as 3 pounds or more in 2 to 3 days.     · Do not smoke. Smoking can make it harder for you to recover. And it will raise the chances of your arteries narrowing again. If you need help quitting, talk to your doctor about stop-smoking programs and medicines. These can increase your chances of quitting for good.    Follow-up care is a key part of your treatment and safety. Be sure to make and go to all appointments, and call your doctor if you are having problems. It's also a good idea to know your test results and keep a list of the medicines you take. When should you call for help? Call 911 anytime you think you may need emergency care. For example, call if:    · You passed out (lost consciousness).     · You have severe trouble breathing.     · You have sudden chest pain and shortness of breath, or you cough up blood.     · You have severe pain in your chest.     · You have symptoms of a heart attack. These may include:  ? Chest pain or pressure, or a strange feeling in the chest.  ? Sweating. ? Shortness of breath. ? Nausea or vomiting. ? Pain, pressure, or a strange feeling in the back, neck, jaw, or upper belly or in one or both shoulders or arms. ? Lightheadedness or sudden weakness. ? A fast or irregular heartbeat. After you call 911, the  may tell you to chew 1 adult-strength or 2 to 4 low-dose aspirin. Wait for an ambulance. Do not try to drive yourself.     · You have angina symptoms (such as chest pain or pressure) that do not go away with rest or are not getting better within 5 minutes after you take a dose of nitroglycerin. Call your doctor now or seek immediate medical care if:    · You have pain that does not get better after you take pain medicine.     · You have a fever over 100°F.     · You have loose stitches, or your incision comes open.     · Bright red blood has soaked through the bandage over your incision.     · You have signs of infection, such as:  ? Increased pain, swelling, warmth, or redness. ? Red streaks leading from the incision. ? Pus draining from the incision. ? Swollen lymph nodes in your neck, armpits, or groin. ? A fever.     · You have signs of a blood clot in a leg. If you had a vein removed from your leg, you may have tenderness and swelling while your leg heals.  But signs of a blood clot may be in a different part of your leg and may include:  ? Pain in your calf, back of the knee, thigh, or groin. ? Redness and swelling in your leg or groin.     · Your heartbeat feels very fast or slow, skips beats, or flutters.     · You are dizzy or lightheaded, or you feel like you may faint.     · You have new or increased shortness of breath. Watch closely for changes in your health, and be sure to contact your doctor if:    · You gain weight suddenly, such as 3 pounds or more in 2 to 3 days.     · You have increased swelling in your legs, ankles, or feet.     · You have any concerns about your incision.     · You feel very sad or have other signs of depression, such as trouble sleeping or eating.     · You have questions about diet, exercise, quitting smoking, or stress reduction after surgery. Where can you learn more? Go to http://www.gray.com/  Enter F759 in the search box to learn more about \"Coronary Artery Bypass Graft: What to Expect at Home. \"  Current as of: August 31, 2020               Content Version: 12.8  © 0862-4902 [employer]. Care instructions adapted under license by [employer] (which disclaims liability or warranty for this information). If you have questions about a medical condition or this instruction, always ask your healthcare professional. [address] any warranty or liability for your use of this information.

## 2021-08-12 NOTE — ROUTINE PROCESS
26 Received report from [facility]. Patient alert and oriented x 4. Patient up sitting in the recliner. 0017 Patient sitting I the recliner - complaining about the cables connected to him. 1100 Patient ambulated in the hallway. 1230 Patient and daughter watched the video regarding discharge instruction.

## 2021-08-12 NOTE — DISCHARGE SUMMARY
CARDIAC SURGERY DISCHARGE SUMMARY    8/12/2021    CHIEF COMPLAINT: CAD    HISTORY OF PRESENT ILLNESS:  Guy Brown is a 80 y.o. male patient of Dr. Leena Cobos who presented positive stress test followed cardiac cath which revealed 3 vessel CAD with 65% Left main stenosis. Echo revealed  Ejection fraction of 35% He was admitted for surgical coronary revascularization . 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 PAST SURGICAL HISTORY:   Past Surgical History:   Procedure Laterality Date    HX COLONOSCOPY  2011    f/u 2021    HX HERNIA REPAIR  2000    [id number]    HX SKIN BIOPSY  2013    [address], [place] and [place]   . HOSPITAL COURSE:  He was admitted to the hospital and underwent CABG x 3 with Dr. Charly May on 8/5/21  He was extubated on the first night following surgery and was eventually up and ambulating well and taking a diet well. Wires and tubes were removed. Notable postoperative events included: post operative delirium, resolved and repeat Echo with EF 45%. Procedures:   Procedure(s):  CORONARY ARTERY BYPASS GRAFT (CABG) TIMES THREE      He is to be discharged to  today. At the time of discharge, his lungs were clear to auscultation bilaterally and the heart had a regular rate and rhythm. The sternum was stable and all wounds were well healed with no evidence of infection. There was mild pedal edema. Room air oximetry was 97%.     LABS:  Lab Results   Component Value Date/Time    WBC 7.1 08/10/2021 05:27 AM    HCT 24.2 (L) 08/10/2021 05:27 AM     08/10/2021 05:27 AM      Lab Results   Component Value Date/Time     08/09/2021 06:24 AM    K 3.7 08/09/2021 06:24 AM     08/09/2021 06:24 AM    CO2 28 08/09/2021 06:24 AM     (H) 08/09/2021 06:24 AM    BUN 17 08/09/2021 06:24 AM    CREA 0.84 08/09/2021 06:24 AM    CREA 0.73 08/08/2021 04:30 AM    CREA 0.83 08/07/2021 11:00 AM       Imaging:  ECHO ADULT FOLLOW-UP OR LIMITED    Result Date: 8/9/2021  · Contrast used: DEFINITY. · Image quality for this study was technically difficult. · LV: Calculated LVEF is 45%. Visually measured ejection fraction. Normal cavity size. Mild concentric hypertrophy. DUPLEX CAROTID BILATERAL    Result Date: 7/29/2021  Carotid Duplex Bilateral:  1-49% Stenosis of the Internal Carotid Arteries. Antegrade flow of the Vertebral Arteries Normal flow of the Subclavian Arteries      Echo Results  (Last 48 hours)    None            DISCHARGE DIAGNOSES:    1. CAD s/p CABG  2. hypertension  3. hypercholesterolemia  4. overweight    DISCHARGE DISPOSITION:  1. Activities: normal, with strict sternal precautions including no heavy lifting and with constant wearing of the sternal harness. 2. Diet: Cardiac Diet  3. Condition: good  4. Prognosis:  good    DISCHARGE INSTRUCTIONS:  He is to follow up with the cardiac surgeon in 7 days and in 3 weeks, and will see the primary care physician and cardiologist preferably within one month. The visiting nurses will see him once home. Follow-up appointments:      Follow-up Information     Follow up With Specialties Details Why Contact Info    Franklin White MD Internal Medicine   Kandi Mcgregor [address] [place] [address] [phone]  17[facility]  [address] [place] [address]  [phone]      Diego Davison DO Cardiology In 3 weeks  [address]  [address] [id number] [place] [address] [address]  [place] [id number] [phone]          DISCHARGE MEDICATIONS: Current Discharge Medication List      START taking these medications    Details   furosemide (LASIX) 40 mg tablet Take 1 Tablet by mouth daily for 7 days. Then as directed for swelling (edema). Qty: 30 Tablet, Refills: 0  Start date: 8/12/2021, End date: 8/19/2021      potassium chloride (K-DUR, KLOR-CON) 20 mEq tablet Take 1 Tablet by mouth daily for 7 days. Then as directed for swelling/edema with furosemide (Lasix). Qty: 30 Tablet, Refills: 0  Start date: 8/13/2021, End date: 8/20/2021         CONTINUE these medications which have CHANGED    Details   metoprolol succinate (TOPROL-XL) 50 mg XL tablet Take 0.5 Tablets by mouth daily. Qty: 30 Tablet, Refills: 6  Start date: 8/12/2021         CONTINUE these medications which have NOT CHANGED    Details   atorvastatin (LIPITOR) 40 mg tablet Take 1 Tablet by mouth daily. Qty: 30 Tablet, Refills: 6      fluticasone propionate (FLONASE) 50 mcg/actuation nasal spray 2 Sprays by Both Nostrils route daily. Qty: 3 Bottle, Refills: 1    Associated Diagnoses: Chronic rhinitis      diclofenac (VOLTAREN) 1 % gel Apply 2 g to affected area every six (6) hours as needed for Pain (left thumb). Qty: 100 g, Refills: 5    Associated Diagnoses: Trigger thumb, left thumb      zolpidem (AMBIEN) 10 mg tablet Take  by mouth nightly as needed for Sleep. HYDROcodone-acetaminophen (VICODIN) 5-300 mg tablet Take  by mouth. tamsulosin (FLOMAX) 0.4 mg capsule Take 1 Capsule by mouth daily (after dinner). Qty: 90 Capsule, Refills: 3      hydrOXYzine HCL (ATARAX) 25 mg tablet Take 25 mg by mouth as needed.       PARoxetine (PAXIL) 20 mg tablet TAKE 1 TABLET BY MOUTH EVERY DAY  Qty: 90 Tab, Refills: 0    Associated Diagnoses: Adjustment disorder with mixed anxiety and depressed mood         STOP taking these medications       predniSONE (DELTASONE) 20 mg tablet Comments:   Reason for Stopping:               Monalisa Cox PA-C  [facility]  [phone]

## 2021-08-12 NOTE — PROGRESS NOTES
Bedside shift change report given to Bhakti Blackmon (oncoming nurse) [facility]  Report included the following information SBAR, Kardex, ED Summary, OR Summary, Intake/Output, MAR, Recent Results and Cardiac Rhythm SR.

## 2021-08-12 NOTE — ROUTINE PROCESS
36 Patient and Patient's wife and son watched the Discharge Video for heart surgery. Patient and his wife and son instructed on home medications and to  prescription from their Pharmacy. Instructed to follow up with PCP, Cardiovascular and Cardiology as scheduled. Home activities and restrictions discussed with them too. Patient and family verbalized understanding. Patient discharge to home.

## 2021-08-12 NOTE — PROGRESS NOTES
Bedside shift change report [facility] by Trena Wall (offgoing nurse).  Report included the following information SBAR, Kardex, ED Summary, OR Summary, Intake/Output, MAR, Recent Results and Cardiac Rhythm SR.

## 2021-08-12 NOTE — PROGRESS NOTES
Problem: Self Care Deficits Care Plan (Adult)  Goal: *Acute Goals and Plan of Care (Insert Text)  Description: Occupational Therapy Goals  Initiated 8/6/2021 within 7 day(s). 1.  Patient will perform grooming task standing for 5-8 minutes with supervision/set-up. 2.  Patient will perform upper body dressing with supervision/set-up including management of heart hugger. 3.  Patient will perform lower body dressing with supervision/set-up. 4.  Patient will perform toilet transfers with supervision/set-up. 5.  Patient will perform all aspects of toileting with supervision/set-up. 6.  Patient will recall all sternal precautions during selfcare tasks with minimal verbal cues. 7.  Patient will utilize energy conservation techniques during functional activities with verbal cues. Prior Level of Function: Patient was independent with self-care and functional mobility PTA. 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 Outcome: Progressing Towards Goal   OCCUPATIONAL THERAPY TREATMENT    Patient: Jeanine Ugalde (45 y.o. male)  Date: 8/12/2021  Diagnosis: CAD (coronary artery disease) [I25.10] <principal problem not specified>  Procedure(s) (LRB):  CORONARY ARTERY BYPASS GRAFT (CABG) TIMES THREE (N/A) 7 Days Post-Op  Precautions: Fall, Skin, Sternal  PLOF: Patient was independent with self-care and functional mobility PTA. 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 Chart, occupational therapy assessment, plan of care, and goals were reviewed. ASSESSMENT:  Per chart review pt with d/c orders this date. Pt presented supine in bed upon therapist arrival requesting to sit up in chair. Pt transitioned to EOB from supine with CGA requiring mod vc's to utilize heart hugger and not push with UE's to adhere to sternal precautions. STS transfer Supervision with RW and maneuvered to reclining chair ~ 5 ft with SBA/Supervision.  Pt requesting to rest and was left in chair with B LE's elevated, chair alarm active, and all needs left within reach. Progression toward goals:  [x]          Improving appropriately and progressing toward goals  []          Improving slowly and progressing toward goals  []          Not making progress toward goals and plan of care will be adjusted     PLAN:  Patient continues to benefit from skilled intervention to address the above impairments. Continue treatment per established plan of care. Discharge Recommendations:  [facility]  Further Equipment Recommendations for Discharge:  RW     SUBJECTIVE:   Patient stated  I am going to have people come to my house. \"     OBJECTIVE DATA SUMMARY:   Cognitive/Behavioral Status:  Neurologic State: Alert  Orientation Level: Oriented X4  Cognition: Follows commands  Safety/Judgement: Fall prevention    Functional Mobility and Transfers for ADLs:   Bed Mobility:     Supine to Sit: Contact guard assistance (vc's to utilize heart hugger)         Transfers:  Sit to Stand: Supervision  Stand to Sit: Supervision       Balance:  Sitting: Intact  Standing: Intact; With support  Standing - Static: Fair  Standing - Dynamic : Fair    ADL Intervention:       Cognitive Retraining  Safety/Judgement: Fall prevention      Pain:  Pain level pre-treatment: 0/10   Pain level post-treatment: 0/10    Activity Tolerance:    Fair   Please refer to the flowsheet for vital signs taken during this treatment. After treatment:   [x]  Patient left in no apparent distress sitting up in chair  []  Patient left in no apparent distress in bed  [x]  Call bell left within reach  [x]  Nursing notified  []  Caregiver present  [x]  Chair alarm activated    COMMUNICATION/EDUCATION:   [x] Role of Occupational Therapy in the acute care setting  [x] Home safety education was provided and the patient/caregiver indicated understanding. [x] Patient/family have participated as able in working towards goals and plan of care.   [x] Patient/family agree to work toward stated goals and plan of care. [] Patient understands intent and goals of therapy, but is neutral about his/her participation. [] Patient is unable to participate in goal setting and plan of care.       Thank you for this referral.  ELEAZAR Marmolejo  Time Calculation: 9 mins

## 2021-08-12 NOTE — PROGRESS NOTES
Discharge orders noted with orders for home health. Call placed to [facility] and confirmed that they are able to accept the pt.            Fabi Bridges, MSN, RN, ACM-RN     [phone]- pager  [phone]- main office  [phone]- desk

## 2021-08-12 NOTE — PROGRESS NOTES
Important Message from Medicare\" reviewed and explained with the patient at bedside and signature was obtained. A signed copy provided to patient/representative. Original signed document placed in patient's chart.          Adan Roberts MSN, RN, ACM-RN     [phone]- pager  [phone]- main office  [phone]- desk

## 2021-08-12 NOTE — HOME CARE
Received home health referral for [facility] for (SN, PT, OT). Spoke with patient via phone;  patient identifiers verified. Explained home care services and routines. Demographics verified including insurance, phone and address confirmed. Patient has the following DME: none at home for use. Caregivers available spouse and daughter. Orders noted and arranged to be processed to central intake.

## 2021-08-12 NOTE — PROGRESS NOTES
Problem: Falls - Risk of  Goal: *Absence of Falls  Description: Document Avril Mckeon Fall Risk and appropriate interventions in the flowsheet. Outcome: Resolved/Met     Problem: Patient Education: Go to Patient Education Activity  Goal: Patient/Family Education  Outcome: Resolved/Met     Problem: Pressure Injury - Risk of  Goal: *Prevention of pressure injury  Description: Document Jamar Scale and appropriate interventions in the flowsheet.   Outcome: Resolved/Met     Problem: Patient Education: Go to Patient Education Activity  Goal: Patient/Family Education  Outcome: Resolved/Met     Problem: Patient Education: Go to Patient Education Activity  Goal: Patient/Family Education  Outcome: Resolved/Met     Problem: Nutrition Deficit  Goal: *Optimize nutritional status  Outcome: Resolved/Met     Problem: Patient Education: Go to Patient Education Activity  Goal: Patient/Family Education  Outcome: Resolved/Met     Problem: Delirium Treatment  Goal: *Functional assessment restored to baseline  Outcome: Resolved/Met     Problem: Patient Education: Go to Patient Education Activity  Goal: Patient/Family Education  Outcome: Resolved/Met     Problem: Pain  Goal: *Control of Pain  Outcome: Resolved/Met     Problem: CABG: Discharge Outcomes  Goal: *Hemodynamically stable  Outcome: Resolved/Met  Goal: *Stable cardiac rhythm  Outcome: Resolved/Met  Goal: *Lungs clear or at baseline  Outcome: Resolved/Met  Goal: *Demonstrates ability to perform prescribed activity without shortness of breath or discomfort  Outcome: Resolved/Met  Goal: *Verbalizes home exercise program, activity guidelines, cardiac precautions  Outcome: Resolved/Met  Goal: *Optimal pain control at patient's stated goal  Outcome: Resolved/Met  Goal: *Verbalizes understanding and describes prescribed diet  Outcome: Resolved/Met  Goal: *Verbalizes name, dosage, time, side effects, and number of days to continue medications  Outcome: Resolved/Met  Goal: *Weight  is stable  Outcome: Resolved/Met  Goal: *No signs and symptoms of infection or wound complications  Outcome: Resolved/Met  Goal: *Anxiety reduced or absent  Outcome: Resolved/Met  Goal: *Verbalizes and demonstrates incision care  Outcome: Resolved/Met  Goal: *Understands and describes signs and symptoms to report to providers(Stroke Metric)  Outcome: Resolved/Met  Goal: *Describes follow-up/return visits to physicians  Outcome: Resolved/Met  Goal: *Describes available resources and support systems  Outcome: Resolved/Met  Goal: *Expresses feelings about diagnosis and surgery  Outcome: Resolved/Met  Goal: *Influenza immunization  Outcome: Resolved/Met  Goal: *Pneumococcal immunization  Outcome: Resolved/Met

## 2021-08-13 NOTE — Clinical Note
Therapy Functional Score Assessment  Question   Score   Grooming  2   Upper Dressing 2   Lower Dressing 3   Bathing  5   Toilet Transfer  1   Transfer  1         Ambulation  3   Dyspnea               2         Pain Interfering with activity 4  Est number therapy visits      7

## 2021-08-13 NOTE — PROGRESS NOTES
Message left introducing myself, the purpose of the call and giving my contact information. Requested that patient call back at his earliest convenience. Son in law called right back. He stated the patient is doing really well. The family has worked out a plan for someone to be staying with the patient and his wife for the next 2 weeks or so. He verified that home health nurse and PT were in to see the patient today and that he has all of his medications and is taking them as prescribed. CTN asked if there was a number I could call to talk to the paitent to introduce my self and review sternal precausions and discharge instructions . He gave me the patients cell that is the number listed as \"work\"number\". Care Transitions Initial Call    Call within 2 business days of discharge: Yes     Patient: Savage Dasilva Patient : 1940 MRN: [id number]    Last Discharge [address]       Complaint Diagnosis Description Type Department Provider    21  S/P CABG x 3 Admission (Discharged) Violeta Hauser MD          Was this an external facility discharge? No Discharge Facility: [facility]    Challenges to be reviewed by the provider   Additional needs identified to be addressed with provider: no  none  Pain - incisional pain off and on. Walking aroun      Method of communication with provider : chart routing    Discussed COVID-19 related testing which was available at this time. Test results were negative. Patient informed of results, if available? yes     Advance Care Planning:   Does patient have an Advance Directive: not on file. Inpatient Readmission Risk score: Unplanned Readmit Risk Score: 16    Was this a readmission?  no   Patient stated reason for the admission: chest pain    Patients top risk factors for readmission: medical condition-CAD with CABG x    Interventions to address risk factors: Obtained and reviewed discharge summary and/or continuity of care documents and Education of patient/family/caregiver/guardian to support self-management-sternal precausions and cardiac diet    Care Transition Nurse (CTN) contacted the patient by telephone to perform post hospital discharge assessment. Verified name and  with patient as identifiers. Provided introduction to self, and explanation of the CTN role. CTN reviewed discharge instructions, medical action plan and red flags with patient who verbalized understanding. Were discharge instructions available to patient? yes. Reviewed appropriate site of care based on symptoms and resources available to patient including: Specialist, When to call 911 and CTN. Patient given an opportunity to ask questions and does not have any further questions or concerns at this time. The patient agrees to contact the PCP office for questions related to their healthcare. Medication reconciliation was performed with patient, who verbalizes understanding of administration of home medications. Advised obtaining a 90-day supply of all daily and as-needed medications. Referral to Pharm D needed: no     Home Health/Outpatient orders at discharge: home health care, PT and [address]: [facility]  Date of initial visit: 2021      Durable Medical Equipment ordered at discharge: sternal Support  Durable Medical Equipment received: day of discharge    Covid Risk Education    Educated patient about risk for severe COVID-19 due to risk factors according to CDC guidelines. CTN reviewed discharge instructions, medical action plan and red flag symptoms with the patient who verbalized understanding. Discussed COVID vaccination status: no. Education provided on COVID-19 vaccination as appropriate. Discussed exposure protocols and quarantine with CDC Guidelines. Patient was given an opportunity to verbalize any questions and concerns and agrees to contact CTN or health care provider for questions related to their healthcare.     Was patient discharged with a pulse oximeter? no. Discussed and confirmed pulse oximeter discharge instructions and when to notify provider or seek emergency care. Discussed follow-up appointments. If no appointment was previously scheduled, appointment scheduling offered: yes. Is follow up appointment scheduled within 7 days of discharge? yes. [facility] follow up appointment(s):   Future Appointments   Date Time Provider Kim Peñaloza   8/19/2021 10:00 AM Olvin Sullivan PA-C [facility] BS AMB   9/2/2021 10:00 AM Olvin Sullivan PA-C [facility]  AMB   9/8/2021  8:30 AM Tram Sharma, NP [facility] BS AMB   11/11/2021  8:00 AM Lisa Boykin DO [facility] BS AMB   6/2/2022  9:00 AM Marina Mccall PA-C [facility]     [facility] follow up appointment(s): pcp 8/17/21    Plan for follow-up call in 5-7 days based on severity of symptoms and risk factors. Plan for next call: symptom management-ask about pain and incision and self management-discuss weights  CTN provided contact information for future needs. Goals Addressed                 This Visit's Progress     Prevent complications post hospitalization. 1. CTN will monitor X 4 weeks    2. Ensure provider appt is scheduled within 7 days post-discharge  8/13/2021 PCP follow up 8/17/21  3. Confirm patient attended post-discharge provider apt    4. Complete post-visit call to confirm attendance and update care needs      5. Review/educate common or potential \"red flags\" of condition worsening  8/13/2021     · tempature greater than 100 or less than 97 degrees  · Swelling   · reddness  · Foul smelling drainage from incision site  · confusion   ·  weight gain of 2 pounds in 24 hours 3 pounds in 3 days or 4- 5 pounds in one week. · Severe shortness of breath  · Swelling of lower extremeties  · Swelling in abdomen  · Loss of appetite  · Lightheaded or dizziness     6. Evaluate adherence to medications and priority barriers to resolve        7.  Instruct on adherence to medications as ordered and assess for therapeutic response and side-effects         8. Discuss and evaluate ADL performance. Provide recommendations on energy conservation, particularly related to transition home from an inpatient admission.

## 2021-08-13 NOTE — HOME HEALTH
PMHx: H+P per hospital CHIEF COMPLAINT: CAD    HISTORY OF PRESENT ILLNESS:  Jai Florez is a 80 y.o. male patient of Dr. Windy Major who presented positive stress test followed cardiac cath which revealed 3 vessel CAD with 65% Left main stenosis. Echo revealed  Ejection fraction of 35% He was admitted for surgical coronary revascularization . PAST MEDICAL HISTORY:   Anxiety and depression   Asthma   Benign prostatic hyperplasia with lower urinary tract symptoms   On flomax   Cerebral microvascular disease   9/25/2019   Elevated PSA   Gout   Hypertension   Insomnia   Kidney stone   Malaria   Prediabetes   S/P CABG x 3   8/5/2021   Skin cancer         PAST SURGICAL HISTORY:   HX COLONOSCOPY   2011   f/u 2021   HX HERNIA REPAIR   2000   [facility]   HX SKIN BIOPSY   2013   [facility], [address]:  He was admitted to the hospital and underwent CABG x 3 with Dr. Natan Mccarthy on 8/5/21  He was extubated on the first night following surgery and was eventually up and ambulating well and taking a diet well. Wires and tubes were removed. Notable postoperative events included: post operative delirium, resolved and repeat Echo with EF 45%. Procedures:  CORONARY ARTERY BYPASS GRAFT (CABG) TIMES THREE      SUBJECTIVE:Pt reports that it feels good to be home and that he slept well last night. Pt reports that is wasn'g \"bad \" for him to come up the stairs yesteday. Pt reports that he fatiques quickly. Pt noted that he is having a flare up of gout in R knee   LIVING SITUATION:  Pt lives wife in a single level home with 4 steps to enter with B railings  REQUIRES CAREGIVER ASSISTANCE FOR: transportation, medication, ADLS ,IADLS  PLOF: Pt was amb without A DEV.  Pt was I with dressing and bathing   MEDICATIONS REVIEWED AND UPDATED: no changes   NEXT MD APPT: , 8/19/21  ROM:B LE WFL   STRENGTH: B hip flexors, quads hamstrings +4/5 B DF/PF 5/5   WOUNDS:sternal incsion and L LE incsion near L medial knee dry and intact, well approximated no drainage noted. no sings or symptoms of infection noted   BED MOBILITY:supine to sit and sit to supine with SBA using stenal harness. educated pt in proper technqiue and sequencing   TRANSFERS:sit to stand from low couch x 3 from bed and commode using steranl harness with SBA pt has a wide CHELA on all transfers and increased time needed to complete task. Pt demonstated understanding of stenal precautions  GAIT: Pt amb 25 ft x2 without A DEV with reciprocal gait pattern wearing sternal harness with SBA pt has slow kingsley with decreased B step length with decreased HS noted at intial contact. O2 sat was 97% after amb with HR of 107. Pt reports he was fatigued after amb   STAIRS:NA  BALANCE: Pt scored 22/28 on Tinetti Balance Assessment placing pt at med risk for falls. PATIENT EDUCATION PROVIDED THIS VISIT: safety, HEP, walking, deep breathing, Temp greater  than 101.4 F or less than 95F. Pulse greater than 100BPM or less than 50 BPM    Resp greater than 28 per min or less than 12 per min. BP Systolic greater than 523 or less than 90. BP Diastolic greater than 90 or less than 50. Weight gain of 2 lbs within 24 hrs or 5 lbs per week. Pulse ox of less than 90%. Educated to chagne postion every HR for pressure relief. Educated pt to elevate B LE throughout the day. Reviewed sternal precautions. Discussed proper footwear when amb. Discussed engery conservation techniques by spacing out diffictly tasks. Discussed the importance of eating 3 meals a day and drinking lot of water  HEP consisting of:  1. standing  every hour during the day   2. B LE  seated hip flexion, LAQS,AP 3 daily x 10  Written HEP issued, patient/caregiver verbalized understanding.    CONTINUED NEED FOR THE FOLLOWING SKILLS: HH PT is medically necessary to  address pain, decreased strength,  impaired bed mobility, decreased independence with functional transfers, impaired gait, impaired stair negotiation, and impaired balance in order to improve functional independence, quality of life, return to PLOF, and reduce the risk for falls. Sonia Rued PLAN: Pt will be seen to establish and upgrade HEP. Gait training with  the least restrictive A DEV on flat and uneven surfaces. Bed mobility training, transfer training. Stair training. Dynamic standing balance re -education. Reinforece general safety precautions. DISCHARGE PLANNING DISCUSSED: Discharge to self and family under MD supervision once all goals have been met or patient has reached max potential. Patient/caregiver verbalized understanding.

## 2021-08-13 NOTE — Clinical Note
Patient was admitted to [facility] services on 8/13/21  Upon reviewing medications, the following moderate interactions were noted: xodol and ambien; xodol and paxil; toprol and paxil. Also, pt needing to get paxil filled. pt taking vicodin 7.5-300mg, not 5-300mg. Thank you!

## 2021-08-16 NOTE — HOME HEALTH
Skilled services/Home bound verification:     Skilled Reason for admission/summary of clinical condition:  CAD, s/p CABG requiring disease process teaching and medication management, incisional care. This patient is homebound for the following reasons Requires considerable and taxing effort to leave the home  and Requires the assistance of 1 or more persons to leave the home . Caregiver: spouse. Caregiver assists with iadls, adls, meal prep, med management, taking to md appointments. Medications reconciled and all medications are not available in the home this visit. pt needing to get paxil filled. pt taking vicodin 7.5-300mg, not 5-300mg. The following education was provided regarding medications, medication interactions, and look alike medications (specify): reviewed side effects, purposes, dosage, frequencies. Medications  are effective at this time. High risk medication teaching regarding anticoagulants, hyperglycemic agents or opiod narcotics performed (specify) xodol-reviewed side effects, purposes, dosage, frequencies. MD notified of any discrepancies/medication interactions-xodol and ambien; xodol and paxil; toprol and paxil. Home health supplies by type and quantity ordered/delivered this visit include: na    Patient education provided this visit to include: patient/cg instructed to monitor for edema/increase in edema, to elevate extremity when edema occurs and to notify md if edema exceeds normal limits for patient, none noted at this time. pedal pulse measurements are equal bilaterally, regular. pt aware to keep incision clean and dry as ordered, to report any new drainage to staff. sternal and calf incisions healing well with no s/s of infection present. pt aware to leave incisions open to air and to avoid rubbing incisions.  patient made aware to monitor for s/s of infection [increased swelling, increased redness around site, increased pain, foul smelling drainage, fever] aware who to report to/when. Instruct pt. to follow physician's orders regarding restrictions: no pushing or pulling motions with upper extremities until cleared by MD, no driving for until cleared by MD, do not extend arms above head except for performance of ADLs cleared by MD, do not lift anything greater than 5 lbs until cleared by MD, and splint chest with a pillow when deep breathing or coughing. notified patient of heart healthy diet- instructed patient to reduce intake of saturated and fatty acids (butter, creams, red meats, fried foods, margarines, high fat baked goods, shortening, cheeses, etc) and to increase daily fresh fruits and vegetables and whole grains, encouraged to avoid canned and processed foods as much as possible. reviewed low sodium diet- patient aware to limit sodium, no added sodium to diet. reviewed foods to avoid, how to order foods when eating out, how to read nutrition labels and measure sodium intake. discussed importance of monitoring blood pressure daily and recording for review, discussed hypertension, causes/long term effects of uncontrolled hypertension. patient instructed to monitor daily weight and to report weight gain of 2lbs overnight/5lbs in 1 week to MD. pt/cg encouraged to perform weight check first thing in the morning after first urination, around the same time each day for accuracy- telehealth to be set up asap. Educated pt/cg that anxiety is a condition that causes you to feel extremely worried or nervous. The feelings are so strong that they can affect your daily activities or sleep. Anxiety may be triggered by fear, or it may happen without a cause. Family or work stress, smoking, caffeine, and alcohol can increase your risk for anxiety. Certain medicines or health conditions can also increase risk.  Educated pt/cg on s/s of anxiety: verbalized nervousness, uneasiness, shakiness, pacing, apprehension, fear of failure, withdrawn behavior, irritability, isolation, trouble sleeping. Educated pt/cg on s/s of severe anxiety: feeling jumpy, easily startled, dizzy, rapid heartbeat, shortness of breath, chest tightness, stomachaches, muscle tension. Educated pt/cg to notify staff/ their psychiatrist office if they have one if symptoms worsen or do not get better with treatments, anxiety keeps them from doing regular daily activities, or if they have any thoughts of hurting themselves or others. Educated patient on methods to manage depression: maintaing compliance with medication regimen, maintaining adequate sleep schedule, adequate nutrition, participating in social activities. SN instructed to monitor for s/s of depression (changes in appetite, changes in weight, changes in sleep patterns, decreased energy, decreased concentration, feelings of guilt/worthlessness, suicide/homicide ideation). discussed fall precautions in detail- having lighted hallways, removing throw rugs, monitoring medication that may alter mental status. patient made aware to turn every 2 hours and to keep pressure off of bony prominences, to monitor for any pressure ulcer development/worsening. pt denies any questions or concerns at this time. Patient/caregiver degree of understanding:good    Home exercise program/Homework provided: patient instructed to perform sob hep 4-5 x daily and prn for sob, to promote lung expansion. pt also encouraged to use ICS q 2 hours and to perform sob hep during therapy. Pt/Caregiver instructed on plan of care and are agreeable to plan of care at this time. Physician Dr. Fernando Dee notified of patient admission to home health and plan of care including anticipated frequency of 1d5, 1w3, 2 prn and treatments/interventions/modalities of disease process teaching and medication management. Discharge planning discussed with patient and caregiver.   Discharge planning as follows: Patient will be discharged once education has completed, patient is medically stable and pt/cg are able to independently manage medications and disease process. Pt/Caregiver did verbalize understanding of discharge planning. Next MD appointment 8/19 (date) with Dr. Coles Press office, 8/17 with PCP. Patient/caregiver encouraged/instructed to keep appointment as lack of follow through with physician appointment could result in discontinuation of home care services for non-compliance.

## 2021-08-16 NOTE — HOME HEALTH
Subjective:  I am feeling good today. Yesterday, I had pain and fatigue. Pain level 7/10 R hip region and no sx in the chest region. 7/10 at worst.   Pain treated by Vicodin. I recommended cold packs for decreasing inflammation and numbing the tissue. Caregiver involvement/assistance needed for: Patient's daughter is assisting him for meals and transportation. His wife is present, but has her own medical issues. Home health supplies by type and quantity ordered/delivered this visit include:  none  Objective:  See interventions. Assessment and progress towards goals:  Fair return demonstration of exercises today. Plan to progress exercises to his tolerance. Pt currently unable to perform correct log roll. He will require continued interventions for this, to decrease risk of re injury. Pt reports using the urinal at night, but has night lights on in case he has to get up in the evening hours. Pt forgets to use the heart hugger 100% of the time with his transfers. He also bent over x 3 for different reasons, breaking his precautions. Home Health PT is medically necessary to address the following:  pain, decreased strength, impaired bed mobility, decreased Ind with functional transfers, impaired gait, impaired stair negotiation, and impaired stability, decreased safety awareness with use of heart hugger 100% of the time as well as sternal precautions, to decrease sx, improve functional Ind, quality of life, reduce the fall risk or injury.   Plan for next visit: Cont with log roll practice, add additional LE strengthening exercises   The following discharge planning discussed with the patient/caregiver: Estimated DC 9/2

## 2021-08-16 NOTE — HOME HEALTH
Skilled reason for visit: cad, cabg requiring disease process teaching and medication management    Caregiver involvement: Patient's cg is his wife. cg lives with patient and is available around the clock for assistance with iadls, adls, meal prep, medication management, taking to md appointments. Medications reviewed and all medications are not available in the home this visit. pt needing to get paxil. The following education was provided regarding medications, medication interactions, and look alike medications (specify): reviewed side effects, purposes, dosage, frequencies. Medications  are effective at this time. Home health supplies by type and quantity ordered/delivered this visit include: na    Patient education provided this visit: patient/cg instructed to monitor for edema/increase in edema, to elevate extremity when edema occurs and to notify md if edema exceeds normal limits for patient, none noted at this time. pedal pulse measurements are equal bilaterally, regular. pt aware to keep incision clean and dry as ordered, to report any new drainage to staff. sternal and calf incisions healing well with no s/s of infection present. pt aware to leave incisions open to air and to avoid rubbing incisions. patient made aware to monitor for s/s of infection [increased swelling, increased redness around site, increased pain, foul smelling drainage, fever] aware who to report to/when. Instruct pt. to follow physician's orders regarding restrictions: no pushing or pulling motions with upper extremities until cleared by MD, no driving for until cleared by MD, do not extend arms above head except for performance of ADLs cleared by MD, do not lift anything greater than 5 lbs until cleared by MD, and splint chest with a pillow when deep breathing or coughing.   notified patient of heart healthy diet- instructed patient to reduce intake of saturated and fatty acids (butter, creams, red meats, fried foods, margarines, high fat baked goods, shortening, cheeses, etc) and to increase daily fresh fruits and vegetables and whole grains, encouraged to avoid canned and processed foods as much as possible. reviewed low sodium diet- patient aware to limit sodium, no added sodium to diet. reviewed foods to avoid, how to order foods when eating out, how to read nutrition labels and measure sodium intake. discussed importance of monitoring blood pressure daily and recording for review, discussed hypertension, causes/long term effects of uncontrolled hypertension. patient instructed to monitor daily weight and to report weight gain of 2lbs overnight/5lbs in 1 week to MD. pt/cg encouraged to perform weight check first thing in the morning after first urination, around the same time each day for accuracy- telehealth to be set up asap. Educated pt/cg that anxiety is a condition that causes you to feel extremely worried or nervous. The feelings are so strong that they can affect your daily activities or sleep. Anxiety may be triggered by fear, or it may happen without a cause. Family or work stress, smoking, caffeine, and alcohol can increase your risk for anxiety. Certain medicines or health conditions can also increase risk. Educated pt/cg on s/s of anxiety: verbalized nervousness, uneasiness, shakiness, pacing, apprehension, fear of failure, withdrawn behavior, irritability, isolation, trouble sleeping. Educated pt/cg on s/s of severe anxiety: feeling jumpy, easily startled, dizzy, rapid heartbeat, shortness of breath, chest tightness, stomachaches, muscle tension. Educated pt/cg to notify staff/ their psychiatrist office if they have one if symptoms worsen or do not get better with treatments, anxiety keeps them from doing regular daily activities, or if they have any thoughts of hurting themselves or others.  Educated patient on methods to manage depression: maintaing compliance with medication regimen, maintaining adequate sleep schedule, adequate nutrition, participating in social activities. SN instructed to monitor for s/s of depression (changes in appetite, changes in weight, changes in sleep patterns, decreased energy, decreased concentration, feelings of guilt/worthlessness, suicide/homicide ideation). discussed fall precautions in detail- having lighted hallways, removing throw rugs, monitoring medication that may alter mental status. patient made aware to turn every 2 hours and to keep pressure off of bony prominences, to monitor for any pressure ulcer development/worsening. pt denies any questions or concerns at this time. Skilled Care Performed this visit: teaching, assessment as above. Agency Progress toward goals: goals/teaching reviewed. patient is progressing towards goals at this time, skilled need to continue monitoring status, disease process teaching and medication management. Patient's Progress towards personal goals: pt reporting he is feeling better each day. Home exercise program: patient instructed to perform sob hep 4-5 x daily and prn for sob, to promote lung expansion. pt also encouraged to use ICS q 2 hours and to perform sob hep during therapy. Continued need for the following skills: Nursing    Plan for next visit: continue teaching disease process teaching and medication management, monitoring incisions    Patient and/or caregiver notified and agrees to changes in the Plan of Care N/A      The following discharge planning was discussed with the pt/caregiver: Patient will be discharged once education has completed, patient is medically stable and pt/cg are able to independently manage medications and disease process.

## 2021-08-19 NOTE — HOME HEALTH
Subjective: I am not feeling good today. Pt does not know why. .  Pain currently: R rib region 7/10, R shoulder 8/10    Best in 24 hours: R rib region 3-4/10, R shoulder 0/10  Worst in 24 hours:   R rib region 7/10, R shoulder 8/10   cold therapy recommended to decrease sx further, Vicodin  . Caregiver involvement/assistance needed for: meals, transportation and reminders for use of heart hugger. .  Home health supplies by type and quantity ordered/delivered this visit include:  none  . Objective:  See interventions. .  Assessment and progress towards goals:   Pt conts with difficulty with correct log roll and not performing a partial sit up. It was harder for him today to follow instructions. He was very tired which may have been contributing to it. His son reports that he was up very early today for an MD Apt. No falls to date, per pt report. The patient is able to perform sit/stand from EOB and couch, but continued to forget to use the heart hugger. He continues to re require reminders. He was feeling tired today and demonstrated overall decrease in abilities today. .  Continued need for the following skills: Increasing his safety awareness with transfers and the use of his heart hugger. Improving bed mobility, transfers and strengthening. Increasing activity tolerance to prepare for cardiac rehab post [facility].     Plan for next visit: 3 minute walk test, community amb and car transfer weather permitting    The following discharge planning discussed with the patient/caregiver: Estimated DC 9/2

## 2021-08-19 NOTE — PROGRESS NOTES
[facility] Progress Note          Today's date: 8/19/2021    Interval History:     Patient states that he is doing well status post CABG. Had some fatigue yesterday but generally has been feeling well and walking reasonably well. Somewhat winded from having walked from the parking lot up to the exam room. No complaints of undue pain or sternal clicking etc.    Assessment:     Patient is doing very well status post CABG he feels that he is getting stronger and appears to be following a normal postoperative course. Plan:     Continue to follow sternal precautions. Increase ambulation as tolerated. Continue to take medications as directed. This pulse rate is somewhat high but he notes that this is related to the fact that he just walked the longest distance that he has since surgery. The patient is to call with any concerns      Subjective:     Patient feeling well. Gaining strength. Had a visit with his cardiologist yesterday who was reportedly pleased with his progress. Objective:     Admission Weight: Last Weight   Weight: 77.6 kg (171 lb) Weight: 77.6 kg (171 lb)     No flowsheet data found. Visit Vitals  /82 (BP 1 Location: Left upper arm, BP Patient Position: Sitting, BP Cuff Size: Adult)   Pulse 95   Temp 97.3 °F (36.3 °C) (Temporal)   Resp 20   Wt 77.6 kg (171 lb)   SpO2 95%   BMI 25.62 kg/m²       BP Readings from Last 3 Encounters:   08/19/21 126/82   08/18/21 120/80   08/16/21 (!) 140/75     Wt Readings from Last 3 Encounters:   08/19/21 77.6 kg (171 lb)   08/15/21 80.7 kg (178 lb)   08/13/21 81.2 kg (179 lb)       Physical Exam:  General: No acute distress, looks well  Neck: Within normal limits  Lungs: Bilaterally clear to auscultation  Chest: Incision healing well. No sternal click. Heart: Regular rate and rhythm. Abdomen: Unremarkable. Normal active bowel sounds. No tenderness to palpation. Extremities: No edema.   SVG sites healing very well. Neuro: Intact grossly. No signs of delirium      Feroz Velasquez MD FACS  Chief, Cardiothoracic Surgery   [facility]  [phone]    PLEASE NOTE:  This document has been produced using voice recognition software. Unrecognized errors in transcription may be present.

## 2021-08-19 NOTE — PROGRESS NOTES
Care Transitions Follow Up Call    Challenges to be reviewed by the provider   Additional needs identified to be addressed with provider: no  none           Patient reports that he is doing very well. Yesterday he generally did not feel good but could not elaberate. Patient followed up with surgeon today and no new orders noted on chart. CTN reminded patient about sternal precautions and patient voiced understanding stating I have lots of visiters all I am exercising is my lips. Method of communication with provider : none    Care Transition Nurse (CTN) contacted the patient by telephone to follow up after admission on 21. Verified name and  with patient as identifiers. Addressed changes since last contact: none  Follow up appointment completed? yes. Was follow up appointment scheduled within 7 days of discharge? yes. Advance Care Planning:   Does patient have an Advance Directive:  currently not on file; education provided     CTN reviewed discharge instructions, medical action plan and red flags with patient and discussed any barriers to care and/or understanding of plan of care after discharge. Discussed appropriate site of care based on symptoms and resources available to patient including: Specialist and CTN. The patient agrees to contact the PCP office for questions related to their healthcare.      Patients top risk factors for readmission: medical condition-CAD   Interventions to address risk factors: Obtained and reviewed discharge summary and/or continuity of care documents    [facility] follow up appointment(s):   Future Appointments   Date Time Provider Kim Peñaloza   2021 To Be Determined TRACIE Alfaro   2021 To Be Determined VINAY Vaughan   2021 To Be Determined VINAY Vaughan   2021 To Be Determined TRACIE Alfaro   2021 To Be Determined Aracelis COMBS PTA [address] 59   9/2/2021 To Be Determined Cheikh England, PT [facility] HR [facility]   9/2/2021 10:00 AM Dominick, Karyle Flood, PA-C [facility] BS AMB   9/7/2021 To Be Determined Lewis Tobar LPN [facility] HR [facility]   9/8/2021  8:30 AM Asael Sharma, JEFFREY [facility] BS AMB   11/11/2021  8:00 AM Pal Mckeon DO [facility] BS AMB   6/2/2022  9:00 AM Marina Mccall PA-C [facility]     Non-[facility] follow up appointment(s): tbd    CTN provided contact information for future needs. Plan for follow-up call in 7-10 days based on severity of symptoms and risk factors.   Plan for next call: self management-ask BOUT PAIN AND STERNAL PRECAUTIONS     Goals Addressed    None

## 2021-08-23 NOTE — HOME HEALTH
TEACHING VISIT CAD s/p for77 yo male who lives with spouse   SN reason for visit:education/teaching related to medication mgt ,disease mgt and assessment -see abovereviewed monitoring incision for infection/drainage, pain assessment and medication    patient made aware to monitor for s/s of infection (not observed) [increased swelling, increased redness around site, increased pain, foul smelling drainage, fever] aware who to report to/when. Caregiver involvement: Patient's cg is available at all times for assistance with iadls, adls, meal prep, medication management, taking to md appointments. Medications reconciled . The following education was provided regarding medications, medication interactions, and look a like medications. Home health supplies by type and quantity ordered/delivered this visit include: n/a  Patient education provided this visit: assessment, teaching  Reviewed medications and care plan for changes. patient/cg to continue to take medications as prescribed. patient aware to monitor for effectiveness and to notify staff of any adverse reactions to medications/any changes to medication regimen. reviewed side effects, purposes, dosage, frequencies-ambien  patient encouraged to monitor for increase in pain and to continue with current pain management and to notify staff/md if pain becomes excrutiating/intolerable. Patient is aware of fall risk. Reviewed safet precautions with patient. Educated on ambulation and or bedbound safety if applicable. INSTRUCTED PATIENT AND CG THAT SHOULD ANY NEEDS OR CONCERNS ARISE TO FIRST CALL OUR OFFICE, OR THE DR'S OFFICE  OR GO TO AN URGENT CARE CENTER AND NOT TO THE ED FOR NON-LIFE THREATENING EVENTS. IF IT IS LIFE THREATENING THEN CALL 911 OR GO TO THE CLOSEST ER.   Progress toward goals- continuing to work towards goals as reviewed in [address] with patient on each visit  Home exercise program:    activity as tolerated, trying to get physical activity 4-5 x weekly. stopping activity if causing shortness of breath or chest pain, dizziness or weakness Continued need for the following skills: Nursing, The following discharge planning was discussed with the pt/caregiver: Patient will be discharged once education has completed, and patient is medically stable.

## 2021-08-23 NOTE — HOME HEALTH
TEACHING VISIT 80 yr old male  SN reason for visit: education/teaching related to medication mgt ,disease mgt and assessment - CAD s/p  patient made aware to monitor for s/s of infection (not observed) [increased swelling, increased redness around site, increased pain, foul smelling drainage, fever] aware who to report to/when. Caregiver involvement: Patient's cg is available at all times for assistance with iadls, adls, meal prep, medication management, taking to md appointments. Medications reconciled . The following education was provided regarding medications, medication interactions, and look a like medications. Home health supplies by type and quantity ordered/delivered this visit include: n/a  Patient education provided this visit: assessment, teaching  Reviewed medications and care plan for changes. patient/cg to continue to take medications as prescribed. patient aware to monitor for effectiveness and to notify staff of any adverse reactions to medications/any changes to medication regimen. reviewed side effects, purposes, dosage, frequencies -xodol  patient encouraged to monitor for increase in pain and to continue with current pain management and to notify staff/md if pain becomes excrutiating/intolerable. Patient is aware of fall risk. Reviewed safet precautions with patient. Educated on ambulation and or bedbound safety if applicable. INSTRUCTED PATIENT AND CG THAT SHOULD ANY NEEDS OR CONCERNS ARISE TO FIRST CALL OUR OFFICE, OR THE DR'S OFFICE  OR GO TO AN URGENT CARE CENTER AND NOT TO THE ED FOR NON-LIFE THREATENING EVENTS. IF IT IS LIFE THREATENING THEN CALL 911 OR GO TO THE CLOSEST ER. Progress toward goals- continuing to work towards goals as reviewed in [address] with patient on each visit  Home exercise program:    activity as tolerated, trying to get physical activity 4-5 x weekly.  stopping activity if causing shortness of breath or chest pain, dizziness or weakness Continued need for the following skills: Nursing, The following discharge planning was discussed with the pt/caregiver: Patient will be discharged once education has completed, and patient is medically stable.

## 2021-08-24 NOTE — Clinical Note
Therapy Functional Score Assessment  Question   Score   Grooming 1    Upper Dressing  2     Lower Dressing    1   Bathing    2   Toilet Transfer  0   Transfer           0   Ambulation  2   Dyspnea                     0       Pain Interfering with activity 2  Est number therapy visits      1

## 2021-08-25 NOTE — HOME HEALTH
Subjective: I am almost good. Pt had f/u with the doctor. He said what you said, \"don't use the arms\". He was pleased with the healing and my motion. Patient reports after his doctor apt today, he went to get something to eat. He stood up to go to the bathroom and got dizzy and fell. He has a small skin tear on his elbow. Changed bandage due to it falling off. Minimal skin tear folded over on itself. No s/sx of infection. .  Pain currently: R side of the body that is being treated by pain management. 5/10 Constant throb  Best in 24 hours: R side of the body that is being treated by pain management. 5/10 Constant throb  Worst in 24 hours: R side of the body that is being treated by pain management. 5/10 Constant throb   . Caregiver involvement/assistance needed for: meals, meds and transportation. .  Home health supplies by type and quantity ordered/delivered this visit include:   none  . Objective:  See interventions. .  Assessment and progress towards goals:   Patient was able to safely amb out the door and transfer in/out of his vehicle. He reports a fall yesterday due to dizziness. He will require a post fall assessment. Plan for next visit: Post fall assessment. Information given to evaluating PT. Faustino Esteban The following discharge planning discussed with the patient/caregiver: Estimated DC 9/2.

## 2021-08-26 NOTE — HOME HEALTH
POST FALL:   Date and Time of Fall:  8/24/21   SOC/ARCENIO Date: 8/13/21  Fall observed by [facility] Staff?  no  Describe Event and Document any re-training or treatment plan modifications indicated:  Patient reports after his doctor apt today, he went to get something to eat. He stood up to go to the bathroom and got dizzy and fell. He has a small skin tear on his elbow  Response to re-training or treatment plan modifications: Pt was educated not to stand up quickly to  get t bearings prior to moving   Assisted Devices used by patient prior to fall: none  pt was not wearing his sternal harness as he did not feel he needs it anymore   Was equipment in use at time of fall? no   Injury (Yes / No), If yes, describe: skin tear elbow   Emergent Care Received: (Yes / No), if yes, describe:  no   Was patient identified as High Risk for falls?  no   List Tests Performed, Scores of Tests, and Patient Risk Factors: pt was a med fall risk   MD Notified (name and time):, office was notifed of fall without serious injury     SUBJECTIVE:Pt reports that he feels that it maybe his heart medications that had an effect on his fall, Pt reports that they called the PCP office yesterday and notifed them that heart medications may have made pt dizzy and PCP deferred making any changes and recommeded that pt call Dr Jose De Jesus Connelly office. Pt reported that he did not do that yet. I asked to see pt bottles of medications and pt reproted that his son in saw accidentally through medication bottles out. I called Dr Rc Connelly office and spoke wt ALLISON Guevara and notifed her of pt fall with only a skin tear of R elbow. Discussed that he vitial have remained stable this past week and that he did not have any further episode of dizziness. Made her aware that pt did not have medication bottles anymore as they had been accidentally thrown aware.  She let me know that no medicaton changnes had been recently made and notified me that on pt appointment on 9/2/21 they would look at it again. I notifed pt to the above converastion with David Tello CAREGIVER ASSISTANCE FOR:  medications, transportation, IADLS   MEDICATIONS REVIEWED AND UPDATED: no changes. med bottles are not available    NEXT MD APPT: , 9/2/21  ROM: B LE WFL   STRENGTH:  B hip flexors, quads and hamstrings 5/5  WOUNDS::sternal incsion and L LE incsion near L medial knee dry and intact, well approximated no drainage noted. no sings or symptoms of infection noted . Bandage on R elbow that is dry and intact no drainage noted   BED MOBILITY: supine to sit and sit to supine MOD I. Pt demonstated proper technique and sequencing  TRANSFERS: sit to stand from low couch and bed MOD I without UE suppot. Per PTA visit 8/24/21 Car transfer with MOD I. GAIT: Deferred amb outside seconday to extreme heat. Pt amb household distances without A DEV with reciprocal gait pattern with decreased B step length and HS at inital contact B feet occassional caught durnig swing phase of gait. MOD vc needed to increased HS to prevent this. Pt amb with distant S . Per PTA visit 8/24/21  Pt amb x 4 min 45 seconds without rest on grass, pavement and up/dn stairs. He did not demonstrate any instability. He amb ~ 300 feet total, 2 turns, reciprocal stepping with slightly decreased step length, SBA. PRE 4/10. STAIRS: pt went up and down 4 front steps with U UE support with reciprocal gait pattern with S HR was 92 with O2 sat of 96  BALANCE: Pt scored 24/28 on Tinetti Balance Assessment placing pt at med  risk for falls. PATIENT EDUCATION PROVIDED THIS VISIT: safety, HEP, walking, deep breathing, Educated pt to carry cellphone when amb and not to take off quickly to stand for a second to get bearings prior to amb HEP consisting of:  LAQ 10 x 5 sec with re education several times to rest the thigh on the chair not lift it during the LAQ.  AP x20, Marching in place 15, no holds  Added : GS 10 x 5 sec, pillow squeezes 10 x 5 sec holds   Pt instructed to perform 2-3x/day to tolerance  Written HEP issued, patient/caregiver verbalized understanding. CONTINUED NEED FOR THE FOLLOWING SKILLS: HH PT is medically necessary to  address pain, impaired gait, impaired stair negotiation, and impaired balance in order to improve functional independence, quality of life, return to PLOF, and reduce the risk for falls. ASSESSMENT:  Pt has made progress in therapy  On [facility] 8/13/21  strength  was B hip flexors, quads hamstrings +4/5 B DF/PF 5/5. Today Re Assessment strength  is B hip flexors, quads and hamstrings 5/5. Pt goal has been met. . On SOC bed mobility is :supine to sit and sit to supine with SBA using stenal harness. educated pt in proper technqiue and sequencing. Today at Re Assessment bed mobility is  supine to sit and sit to supine MOD I. Pt demonstated proper technique and sequencing. Pt goal has been met  On SOC transfers were sit to stand from low couch x 3 from bed and commode using steranl harness with SBA pt has a wide CHELA on all transfers and increased time needed to complete task. Pt demonstated understanding of stenal precautions. Today at Re Assessment transfers are  sit to stand from low couch and bed MOD I without UE suppot. Per PTA visit 8/24/21 Car transfer with MOD I. Pt goal has been met. On SOC gait was Pt amb 25 ft x2 without A DEV with reciprocal gait pattern wearing sternal harness with SBA pt has slow kingsley with decreased B step length with decreased HS noted at intial contact. O2 sat was 97% after amb with HR of 107. Pt reports he was fatigued after amb . Today at Re Assessment gait is eferred amb outside seconday to extreme heat. Pt amb household distances without A DEV with reciprocal gait pattern with decreased B step length and HS at inital contact B feet occassional caught durnig swing phase of gait. MOD vc needed to increased HS to prevent this. Pt amb with distant S .   Per PTA visit 8/24/21  Pt amb x 4 min 45 seconds without rest on grass, pavement and up/dn stairs. He did not demonstrate any instability. He amb ~ 300 feet total, 2 turns, reciprocal stepping with slightly decreased step length, SBA. PRE 4/10. . On SOC stairs are not assessed. Today on Re Assessment stairs are  pt went up and down 4 front steps with U UE support with reciprocal gait pattern with S HR was 92 with O2 sat of 96. On [facility] Pt scored 22/28 on Tinetti Balance Assessment placing pt at med risk for falls. Today at Re Assessment  pt scored a 24/28 on Tinetti Balance Assessment placing pt as a med fall risk. Pt goals have been updated. Pt woud benefit from cont HHPT services to cont to work on gait training working on endurance on flat and uenven surfaces, stair training and dynamic standing balance re education. All to increased pt functional mobility and return pt to PLOF  PLAN: gait training without A DEV, stair training and dynamic standing balance re edication  DISCHARGE PLANNING DISCUSSED: Discharge to self and family under MD supervision once all goals have been met or patient has reached max potential. Patient/caregiver verbalized understanding.

## 2021-08-26 NOTE — Clinical Note
I called Dr Natan Mccarthy office and spoke wtih ALLISON Ryder and notifed her of pt fall with only a skin tear of R elbow. Discussed that he vitial have remained stable this past week and that he did not have any further episode of dizziness. Made her aware that pt did not have medication bottles anymore as they had been accidentally thrown aware. She let me know that no medicaton changnes had been recently made and notified me that on pt appointment on 9/2/21 they would look at it again.  I notifed pt to the above converastion with Yulisa Ryder

## 2021-08-26 NOTE — PROGRESS NOTES
Care Transitions Follow Up Call    Challenges to be reviewed by the provider   Additional needs identified to be addressed with provider: no  none           Patient 's wife stated that the patient was laying down but that he is doing well and has no complaints of chest pain or other concerns. Home health is still seeing patient regularly    Method of communication with provider : none    Care Transition Nurse (CTN) contacted the patient by telephone to follow up after admission on 21. Verified name and  with patient as identifiers. Addressed changes since last contact: none  Follow up appointment completed? yes. Was follow up appointment scheduled within 7 days of discharge? yes. Advance Care Planning:   Does patient have an Advance Directive:  currently not on file; education provided     CTN reviewed discharge instructions, medical action plan and red flags with patient and discussed any barriers to care and/or understanding of plan of care after discharge. Discussed appropriate site of care based on symptoms and resources available to patient including: Specialist and CTN. The patient agrees to contact the PCP office for questions related to their healthcare.      Patients top risk factors for readmission: medical condition-CAD   Interventions to address risk factors: Obtained and reviewed discharge summary and/or continuity of care documents    [facility] follow up appointment(s):   Future Appointments   Date Time Provider Kim Peñaloza   2021 To Be Determined TRACIE [address] 59   2021 To Be Determined VINAY [address]   2021 To Be Determined Stacey Yepez PT [address]   2021 10:00 AM Vimal Rojas PA-C [facility] BS AMB   2021 To Be Determined Raphael Shaffer LPN [facility]   2021  8:30 AM Arnulfo Sharma NP [facility] BS AMB   2021  8:00 AM Marilyn Pearson DO [facility] BS AMB   2022  9:00 AM Paulette Fitzgerald PA-C [address] [facility] follow up appointment(s): tbd    CTN provided contact information for future needs. Plan for follow-up call in 7-10 days based on severity of symptoms and risk factors. Plan for next call: Self management - ask about sternal precautions     Goals Addressed                 This Visit's Progress     Prevent complications post hospitalization. On track     1. CTN will monitor X 4 weeks    2. Ensure provider appt is scheduled within 7 days post-discharge  8/13/2021 PCP follow up 8/17/21  3. Confirm patient attended post-discharge provider apt  8/19/2021 Patient reports he attended PCP appointment. Patient attended postoperative appointment today. No new orders  4. Complete post-visit call to confirm attendance and update care needs      5. Review/educate common or potential \"red flags\" of condition worsening  8/13/2021     · tempature greater than 100 or less than 97 degrees  · Swelling   · reddness  · Foul smelling drainage from incision site  · confusion   ·  weight gain of 2 pounds in 24 hours 3 pounds in 3 days or 4- 5 pounds in one week. · Severe shortness of breath  · Swelling of lower extremeties  · Swelling in abdomen  · Loss of appetite  · Lightheaded or dizziness     6. Evaluate adherence to medications and priority barriers to resolve        7. Instruct on adherence to medications as ordered and assess for therapeutic response and side-effects         8. Discuss and evaluate ADL performance. Provide recommendations on energy conservation, particularly related to transition home from an inpatient admission.

## 2021-08-30 NOTE — HOME HEALTH
TEACHING VISIT CAD 81 yo male  reviewed diet and incision are healing well  SN reason for visit: education/teaching related to medication mgt ,disease mgt and assessment -   patient made aware to monitor for s/s of infection (not observed) [increased swelling, increased redness around site, increased pain, foul smelling drainage, fever] aware who to report to/when. Caregiver involvement: Patient's cg is available at all times for assistance with iadls, adls, meal prep, medication management, taking to md appointments. Medications reconciled . The following education was provided regarding medications, medication interactions, and look a like medications. Home health supplies by type and quantity ordered/delivered this visit include: n/a  Patient education provided this visit: assessment, teaching  Reviewed medications and care plan for changes. patient/cg to continue to take medications as prescribed. patient aware to monitor for effectiveness and to notify staff of any adverse reactions to medications/any changes to medication regimen. reviewed side effects, purposes, dosage, frequencies  patient encouraged to monitor for increase in pain and to continue with current pain management and to notify staff/md if pain becomes excrutiating/intolerable. Patient is aware of fall risk. Reviewed safet precautions with patient. Educated on ambulation and or bedbound safety if applicable. INSTRUCTED PATIENT AND CG THAT SHOULD ANY NEEDS OR CONCERNS ARISE TO FIRST CALL OUR OFFICE, OR THE DR'S OFFICE  OR GO TO AN URGENT CARE CENTER AND NOT TO THE ED FOR NON-LIFE THREATENING EVENTS. IF IT IS LIFE THREATENING THEN CALL 911 OR GO TO THE CLOSEST ER. Progress toward goals- continuing to work towards goals as reviewed in [address] with patient on each visit  Home exercise program:    activity as tolerated, trying to get physical activity 4-5 x weekly.  stopping activity if causing shortness of breath or chest pain, dizziness or weakness Continued need for the following skills: Nursing, The following discharge planning was discussed with the pt/caregiver: Patient will be discharged once education has completed, and patient is medically stable.

## 2021-08-30 NOTE — HOME HEALTH
Caregiver involvement: Pt has 3 adult children who take turns providing IADL assistance and medication management. Medications reconciled and all medications are available in the home this visit. The following education was provided regarding medications, medication interactions, and look a like medications: Continue as directed by MD.  Medications are somewhat effective at this time, as credits balance deficits to dizziness as a medication side effect. Recommended to monitor symptoms and to consult MD about medication concerns. Home health supplies by type and quantity ordered/delivered this visit include: N/A    Patient education provided this visit: Educated pt on benefits of [facility] OT services with pt requesting no more OT. Ediucated pt in approaching ADL tasks with sternal precautions. Pt educated in [address] one UE at a time for UB dressing and bathing, and to perform toileting with one UE to prevent reaching with BUE. Pt demonstrated good return on education and verbalized understanding of precautions. Recommended seated showers in order to increase safety with bathing to reduce falls from medication side effects. Progress toward goals: Pt requested no further OT services. Pt demonstrates good rehab potential for [facility] PT services due to balance issues from medication side effects. Home exercise program: Pt was given BUE HEP in order to increase/maintain strengh for improved functional mobility. CONTINUED NEED FOR THE FOLLOWING SKILLS: Due to pt requesting no further OT services, pt is no longer recieving OT visits. With pt complaints of dizziness as a side effect, pt may benefot from [facility] PT services to address balance in home environment. The following discharge planning was discussed with the pt/caregiver: 1w1 with plan to DC to home with adult children and wife for emotional support.     The patient is a pleasant 82-year-old male who has had significantly decreased energy and stamina for the past 6 months. His work-up included a stress test which was positive which was followed by a cardiac catheterization, revealing severe three-vessel coronary disease. He also has 65% left main stenosis. He denies anginal symptoms, but both he and his son in law note his significant dyspnea on exertion and decreased stamina over the past 6 months which has not been subtle.

## 2021-08-31 NOTE — HOME HEALTH
Subjective: I'm great   . Pain currently:  0/10   Best in 24 hours: 0/10  Worst in 24 hours: 0/10     F/U with  on Thursday   . Caregiver involvement/assistance needed for: meals, meds, transportation  . Home health supplies by type and quantity ordered/delivered this visit include: none  . Objective:  See interventions. .  Assessment and progress towards goals:     Step negotiation : 4 steps with step to pattern, SBA. .  Community amb tolerance 9.5 minutes, > 500 feet. Consistently reminding and demonstrating heel strike. He was scraping the balls of his feet L>R. Due to this, he is at increased risk of falls. Educated in amb in the halls and holding the walls while practicing taking bigger steps and heel strike to normalize gait pattern.    .  The following discharge planning discussed with the patient/caregiver: MARCIAL next session

## 2021-09-02 NOTE — PROGRESS NOTES
[facility] Progress Note          Today's date: 9/2/2021    Interval History: This is the 3-week follow-up visit following CABG x3 on 8/5/2021. He is progressing very well at home. His endurance is excellent and he has only minimal dyspnea. He denies any excessive pain. He denies any ankle edema. He denies any problems with his wound. He has a good appetite and is moving his bowels. Assessment:     Satisfactory progress after CABG x3    Plan:     1. Increasing activity and sternal precautions discussed. 2.  Medications discussed. 3.  Patient is agreeable to attend cardiac rehab. Referral placed. 4.  Follow-up with PCP and cardiology as scheduled. No regular scheduled visits with our service needed. Subjective:     No complaints. Objective:     Admission Weight: Last Weight   Weight: 80.3 kg (177 lb) Weight: 80.3 kg (177 lb)     No flowsheet data found. Visit Vitals  BP (!) 143/85   Pulse 80   Temp 97.8 °F (36.6 °C)   Resp 14   Wt 80.3 kg (177 lb)   SpO2 98%   BMI 26.52 kg/m²       BP Readings from Last 3 Encounters:   09/02/21 (!) 143/85   08/31/21 (!) 150/80   08/26/21 120/66     Wt Readings from Last 3 Encounters:   09/02/21 80.3 kg (177 lb)   08/19/21 77.6 kg (171 lb)   08/15/21 80.7 kg (178 lb)       Physical Exam:  General: Well-appearing. No apparent distress. Neck: No JVD or tracheal deviation. Lungs: Clear to auscultation without wheezes, rales or rhonchi. Chest: Incisions healing well. Sternum stable. Heart: Regular rate and rhythm without murmur. Abdomen: Not distended. Soft and nontender. Extremities: No edema. Left leg incision healing well. Neuro: Moves all extremities x4 strong and equal.       Robson Alfaro PA-C    PLEASE NOTE:  This document has been produced using voice recognition software. Unrecognized errors in transcription may be present.

## 2021-09-03 NOTE — Clinical Note
Patient is s/p s/p CABG x 3 and has been treated for, strengthening, gait training, stair training, HEP training, safety training, and balance training. On [facility] 8/13/21  strength  was B hip flexors, quads hamstrings +4/5 B DF/PF 5/5. On Re Assessment 8/26/21 strength  is B hip flexors, quads and hamstrings 5/5. Pt goal has been met. On [facility] bed mobility is :supine to sit and sit to supine with SBA using stenal harness. educated pt in proper technqiue and sequencing. OnRe Assessment bed mobility is  supine to sit and sit to supine MOD I. Pt demonstated proper technique and sequencing. Pt goal has been met  On SOC transfers were sit to stand from low couch x 3 from bed and commode using steranl harness with SBA pt has a wide CHELA on all transfers and increased time needed to complete task. Pt demonstated understanding of stenal precautions. On Re Assessment transfers are  sit to stand from low couch and bed MOD I without UE suppot. Per PTA visit 8/24/21 Car transfer with MOD I. Pt goal has been met. On SOC gait was Pt amb 25 ft x2 without A DEV with reciprocal gait pattern wearing sternal harness with SBA pt has slow kingsley with decreased B step length with decreased HS noted at intial contact. O2 sat was 97% after amb with HR of 107. Pt reports he was fatigued after amb . On Re Assessment gait is deferred amb outside seconday to extreme heat. Pt amb household distances without A DEV with reciprocal gait pattern with decreased B step length and HS at inital contact B feet occassional caught durnig swing phase of gait. MOD vc needed to increased HS to prevent this. Pt amb with distant S . Per PTA visit 8/24/21  Pt amb x 4 min 45 seconds without rest on grass, pavement and up/dn stairs. He did not demonstrate any instability. He amb ~ 300 feet total, 2 turns, reciprocal stepping with slightly decreased step length, SBA. PRE 4/10.  Today at DC gait is  Pt amb on uneven grass and flat level surfaces without A DEV with reciprocal gait pattern MOD I no balance checks noted with amb. Increased B HS noted on inital contact pt amb for 2 min . Per PTA visit 8/31/21 Community amb 9.5 minutes, > 500 feet. He amb unlevel concrete, pavement and grassy terrain, 2+ turns, scraping the feet with decreased step length and arm swing throughout. Consistently reminding and demonstrating heel strike. Pt did not correct it after the first step after each re education. He does report he hears the scraping of the feet. Educated in the risk of falls with scraping the balls of his feet. Due to this he is at increased risk of falls. .On SOC stairs are not assessed. On Re Assessment stairs are  pt went up and down 4 front steps with U UE support with reciprocal gait pattern with S HR was 92 with O2 sat of 96. Today at [place] stair were :Pt went up and down 4 stesp with 1 HR with reciprocal gait pattern MOD I . On [facility] Pt scored 22/28 on Tinetti Balance Assessment placing pt at med risk for falls. On Re Assessment  pt scored a 24/28 on Tinetti Balance Assessment placing pt as a med fall risk. Today at [place] pt scored a 2828 on Tinetti Balance Assessment placing pt as a low  fall risk. Pt did have more than a 4 point statistical imrprovement. Pt has made progress and has met all goals. Pt was seen by SN for medication management, signs and symptoms of infections, to educated in diseased processes, and pain management. All goals Met. Discharge plan: Discharge from [address] [place] [address] services as all goals have been met. Dr Katarina Zepeda office was notifed of DC from [facility] AT [facility] with all goals met.

## 2021-09-03 NOTE — HOME HEALTH
SUBJECTIVE: I am doing well today. I have been working on my walking picking my feet up so they do not catch. I saw the doctor yesterday and he did not change any of my medication   REQUIRES CAREGIVER ASSISTANCE FOR: transportation, IADLS   MEDICATIONS REVIEWED AND UPDATED: none  NEXT MD APPT:  TBD  ROM:B LE WFL   WOUNDS:sternal incision open to air. dry and intact well approximated no signs or symptoms of infection noted . L leg insicion intact well approximated no signs or symptoms of infection noted. TRANSFERS: sit to stand form couch x 3 MOD I  GAIT: Pt amb on uneven grass and flat level surfaces without A DEV with reciprocal gait pattern MOD I no balance checks noted with amb. Increased B HS noted on inital contact pt amb for 2 min . Per PTA visit 8/31/21 Community amb 9.5 minutes, > 500 feet. He amb unlevel concrete, pavement and grassy terrain, 2+ turns, scraping the feet with decreased step length and arm swing throughout. Consistently reminding and demonstrating heel strike. Pt did not correct it after the first step after each re education. He does report he hears the scraping of the feet. Educated in the risk of falls with scraping the balls of his feet. Due to this he is at increased risk of falls. STAIRS:Pt went up and down 4 stesp with 1 HR with reciprocal gait pattern MOD I  BALANCE: Pt scored 28/28 on Tinetti Balance Assessment placing pt at  low risk for falls. PATIENT EDUCATION PROVIDED THIS VISIT: safety, HEP, walking, deep breathing, Educated pt to carry cellphone when amb in public for safety       HEP consisting of:  LAQ 10 x 5 sec with re education several times to rest the thigh on the chair not lift it during the LAQ. AP x20, Marching in place 15, no holds  Added : GS 10 x 5 sec, pillow squeezes 10 x 5 sec holds   Pt instructed to perform 2-3x/day to tolerance  Written HEP issued, patient/caregiver verbalized understanding.    Patient is s/p s/p CABG x 3 and has been treated for, strengthening, gait training, stair training, HEP training, safety training, and balance training. On [facility] 8/13/21  strength  was B hip flexors, quads hamstrings +4/5 B DF/PF 5/5. On Re Assessment 8/26/21 strength  is B hip flexors, quads and hamstrings 5/5. Pt goal has been met. On [facility] bed mobility is :supine to sit and sit to supine with SBA using stenal harness. educated pt in proper technqiue and sequencing. OnRe Assessment bed mobility is  supine to sit and sit to supine MOD I. Pt demonstated proper technique and sequencing. Pt goal has been met  On SOC transfers were sit to stand from low couch x 3 from bed and commode using steranl harness with SBA pt has a wide CHELA on all transfers and increased time needed to complete task. Pt demonstated understanding of stenal precautions. On Re Assessment transfers are  sit to stand from low couch and bed MOD I without UE suppot. Per PTA visit 8/24/21 Car transfer with MOD I. Pt goal has been met. On SOC gait was Pt amb 25 ft x2 without A DEV with reciprocal gait pattern wearing sternal harness with SBA pt has slow kingsley with decreased B step length with decreased HS noted at intial contact. O2 sat was 97% after amb with HR of 107. Pt reports he was fatigued after amb . On Re Assessment gait is deferred amb outside seconday to extreme heat. Pt amb household distances without A DEV with reciprocal gait pattern with decreased B step length and HS at inital contact B feet occassional caught durnig swing phase of gait. MOD vc needed to increased HS to prevent this. Pt amb with distant S . Per PTA visit 8/24/21  Pt amb x 4 min 45 seconds without rest on grass, pavement and up/dn stairs. He did not demonstrate any instability. He amb ~ 300 feet total, 2 turns, reciprocal stepping with slightly decreased step length, SBA. PRE 4/10.  Today at DC gait is  Pt amb on uneven grass and flat level surfaces without A DEV with reciprocal gait pattern MOD I no balance checks noted with amb. Increased B HS noted on inital contact pt amb for 2 min . Per PTA visit 8/31/21 Community amb 9.5 minutes, > 500 feet. He amb unlevel concrete, pavement and grassy terrain, 2+ turns, scraping the feet with decreased step length and arm swing throughout. Consistently reminding and demonstrating heel strike. Pt did not correct it after the first step after each re education. He does report he hears the scraping of the feet. Educated in the risk of falls with scraping the balls of his feet. Due to this he is at increased risk of falls. .On SOC stairs are not assessed. On Re Assessment stairs are  pt went up and down 4 front steps with U UE support with reciprocal gait pattern with S HR was 92 with O2 sat of 96. Today at [place] stair were :Pt went up and down 4 stesp with 1 HR with reciprocal gait pattern MOD I . On [facility] Pt scored 22/28 on Tinetti Balance Assessment placing pt at med risk for falls. On Re Assessment  pt scored a 24/28 on Tinetti Balance Assessment placing pt as a med fall risk. Today at [place] pt scored a 2828 on Tinetti Balance Assessment placing pt as a low  fall risk. Pt did have more than a 4 point statistical imrprovement. Pt has made progress and has met all goals. Pt was seen by SN for medication management, signs and symptoms of infections, to educated in diseased processes, and pain management. All goals Met. Discharge plan: Discharge from [facility] services as all goals have been met. Dr Gloria Phelan office was notifed of DC from [facility] AT [facility] with all goals met.

## 2021-09-06 NOTE — HOME HEALTH
TEACHING VISIT cad s/p -discharge from nursing goals met  SN reason for visit: education/teaching related to medication mgt ,disease mgt and assessment - monitoring incision - wounds closed   patient made aware to monitor for s/s of infection (not observed) [increased swelling, increased redness around site, increased pain, foul smelling drainage, fever] aware who to report to/when. Caregiver involvement: Patient's cg is available at all times for assistance with iadls, adls, meal prep, medication management, taking to md appointments. Medications reconciled . The following education was provided regarding medications, medication interactions, and look a like medications. Home health supplies by type and quantity ordered/delivered this visit include: n/a  Patient education provided this visit: assessment, teaching  Reviewed medications and care plan for changes. patient/cg to continue to take medications as prescribed. patient aware to monitor for effectiveness and to notify staff of any adverse reactions to medications/any changes to medication regimen. reviewed side effects, purposes, dosage, frequencies  patient encouraged to monitor for increase in pain and to continue with current pain management and to notify staff/md if pain becomes excrutiating/intolerable. Patient is aware of fall risk. Reviewed safet precautions with patient. Educated on ambulation and or bedbound safety if applicable. INSTRUCTED PATIENT AND CG THAT SHOULD ANY NEEDS OR CONCERNS ARISE TO FIRST CALL OUR OFFICE, OR THE DR'S OFFICE  OR GO TO AN URGENT CARE CENTER AND NOT TO THE ED FOR NON-LIFE THREATENING EVENTS. IF IT IS LIFE THREATENING THEN CALL 911 OR GO TO THE CLOSEST ER. Progress toward goals- continuing to work towards goals as reviewed in [address] with patient on each visit  Home exercise program:    activity as tolerated, trying to get physical activity 4-5 x weekly.  stopping activity if causing shortness of breath or chest pain, dizziness or weakness Continued need for the following skills: Nursing, The following discharge planning was discussed with the pt/caregiver: Patient will be discharged

## 2021-09-09 NOTE — PROGRESS NOTES
Racheal Cohen presents today for a post-hospital follow-up. He was hospitalized from 8/5/21 through 8/12/21 after undergoing CABG x 3 on 8/5/21 by Dr. Klarissa Mcguire. His bypasses include a LIMA to the mid LAD, SVG to RPLV, and SVG to OM. He had a limited echo done on 8/9/21 and it was noted that his EF had improved to 45% when compared to the echo done on 6/24/21 (EF at that time was 35%). He is a pleasant, 80year old male with history of cardiomyopathy (EF 35% in June 2021), PVCs, hypertension, hyperlipidemia. He was initially seen by Dr. Luil Plummer in June 2021 for further recommendations after abnormal test results (abnormal stress and echo done on 6/24/21). He was then referred for cardiac catheterization which was performed on 7/12/21 which showed:  · Right dominant system. Dominant RCA is large. 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 6 Leopold Cinthya Amando catheter ventricularized. · LAD with mid segment 70-75% stenosis, distal 70-75% stenosis. · Circumflex with mid AV segment 70% and small vessel. He returned to the office on 7/21/21 to discuss further recommendations as he continued to complain of fatigue, dyspnea with any exertion, increased daytime somnolence. Changes were made to his medications (switched from pravastatin to atorvastatin, switched from Bystolic to Toprol XL) and he was referred to Dr. Klarissa Mcguire with cardiothoracic surgery for consideration for CABG. He states that he has been feeling well since being discharged home. His appetite is still \"not good. \"  He states that this was occurring before he had surgery and his appetite has been diminished for some time. He has no difficulty tasting his food but states that he is just not hungry. He is sleeping well (uses Ambien). He has some minor aches in his chest with position changes and his incision is well healed.   He is compliant with his medications and denies side effects. 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  metoprolol succinate (TOPROL-XL) 50 mg XL tablet Take 0.5 Tablets by mouth daily. Or as directed    allopurinoL (ZYLOPRIM) 300 mg tablet Take 300 mg by mouth daily.  HYDROcodone-acetaminophen (XODOL) 7.5-300 mg tablet Take 1 Tablet by mouth three (3) times daily as needed for Pain.  tamsulosin (FLOMAX) 0.4 mg capsule Take 1 Capsule by mouth daily (after dinner).  hydrOXYzine HCL (ATARAX) 25 mg tablet Take 25 mg by mouth three (3) times daily as needed for Itching.  fluticasone propionate (FLONASE) 50 mcg/actuation nasal spray 2 Sprays by Both Nostrils route daily.  PARoxetine (PAXIL) 20 mg tablet TAKE 1 TABLET BY MOUTH EVERY DAY    zolpidem (AMBIEN) 10 mg tablet Take 10 mg by mouth nightly as needed for Sleep.  diphenhydrAMINE hcl (BENADRYL) 2 % topical gel Apply 1 mL to affected area every six (6) hours as needed for Itching. (Patient not taking: Reported on 8/19/2021)     No current facility-administered medications for this visit. Allergies and Sensitivities:  Allergies   Allergen Reactions    Fluconazole Hives and Itching    Penicillin G Hives    Iodine Unknown (comments)     Patient Stated Gout Flares up       Family History:  No family history on file.     Social History:  He  reports that he has never smoked. He has never used smokeless tobacco.  He  reports no history of alcohol use. Physical:  Visit Vitals  /82 (BP 1 Location: Left upper arm, BP Patient Position: Sitting, BP Cuff Size: Adult)   Pulse 79   Ht 5' 8.5\" (1.74 m)   Wt 78.5 kg (173 lb)   SpO2 95%   BMI 25.92 kg/m²         Exam:  Neck:  Supple, no JVD, no carotid bruits  CV:  Normal S1 and  S2, no murmurs, rubs, or gallops noted  Chest:  Sternal incision well healed  Lungs:  Clear to ausculation throughout, no wheezes or rales  Abd:  Soft, non-tender, non-distended with good bowel sounds. No hepatosplenomegaly  Extremities:  No edema      Data:  EKG:      LABS:  Lab Results   Component Value Date/Time    Sodium 140 08/09/2021 06:24 AM    Potassium 3.7 08/09/2021 06:24 AM    Chloride 108 08/09/2021 06:24 AM    CO2 28 08/09/2021 06:24 AM    Glucose 153 (H) 08/09/2021 06:24 AM    BUN 17 08/09/2021 06:24 AM    Creatinine 0.84 08/09/2021 06:24 AM     Lab Results   Component Value Date/Time    Cholesterol, total 215 (H) 06/19/2020 04:34 PM    HDL Cholesterol 43 06/19/2020 04:34 PM    LDL, calculated 121.8 (H) 06/19/2020 04:34 PM    Triglyceride 251 (H) 06/19/2020 04:34 PM    CHOL/HDL Ratio 5.0 06/19/2020 04:34 PM     Lab Results   Component Value Date/Time    ALT (SGPT) 19 07/29/2021 08:33 AM         Impression/Plan:  1.  CAD, s/p CABG x 3 on 8/5/21  2. Essential hypertension, blood pressure stable  3. Hyperlipidemia, on atorvastatin 40mg  4. Ischemic cardiomyopathy, EF up to 45% post CABG (previously 35% on 6/24/21)      Mr. Mimi Franz was seen today for a post-hospital follow-up. He is s/p CABG x 3 on 8/5/21. He states that he has been feeling well since coming home from the hospital.  He has occasional aches in his chest with certain position changes but no chest pain per se. His incision line is well healed. He offers no complaints of palpitations or shortness of breath and denies lower extremity edema.  He is compliant with his medications and inquired about restarting his ASA 81mg daily. He states that he was taking before surgery. He denies any bleeding and was instructed to restart the ASA 81mg daily. He states that he is sleeping well but his appetite is still diminished but this has been occurring since before surgery. He reports that he is just \"not hungry. \"  His BMI is stable at 25. Post-op echocardiogram results reviewed with him and his questions were answered. Medication teaching done with him today and made aware that his beta blocker and statin will likely be taken life-long. I asked that he call the office if he has any cardiac problems or concerns prior to his next scheduled appointment with our office. He verbalized his understanding. He will follow-up with Dr. Shaista Solorzano as scheduled and as needed. Sarah Chowdary MSN, FNP-BC    Please note:  Portions of this chart were created with Dragon medical speech to text program.  Unrecognized errors may be present.

## 2021-09-13 NOTE — PATIENT INSTRUCTIONS
Continue present medication regimen  Restart Aspirin 81mg once a day  Follow-up with Dr. Nandini Navarrete as scheduled and as needed

## 2021-09-13 NOTE — PROGRESS NOTES
Zenaida Williamson presents today for   Chief Complaint   Patient presents with    Follow-up     1 month post hosp     Chest Pain     radiates throughout chest when laying on his side @night     Palpitations     varies        Zenaida Williamson preferred language for health care discussion is english/other. Is someone accompanying this pt? no    Is the patient using any DME equipment during [address]? no    Depression Screening:  3 most recent PHQ Screens 9/13/2021   PHQ Not Done -   Little interest or pleasure in doing things Not at all   Feeling down, depressed, irritable, or hopeless Not at all   Total Score PHQ 2 0   Trouble falling or staying asleep, or sleeping too much -   Feeling tired or having little energy -   Poor appetite, weight loss, or overeating -   Feeling bad about yourself - or that you are a failure or have let yourself or your family down -   Trouble concentrating on things such as school, work, reading, or watching TV -   Moving or speaking so slowly that other people could have noticed; or the opposite being so fidgety that others notice -   Thoughts of being better off dead, or hurting yourself in some way -   PHQ 9 Score -   How difficult have these problems made it for you to do your work, take care of your home and get along with others -       Learning Assessment:  Learning Assessment 6/30/2021   PRIMARY LEARNER Patient   HIGHEST LEVEL OF EDUCATION - PRIMARY LEARNER  -   BARRIERS PRIMARY LEARNER -   CO-LEARNER CAREGIVER -   PRIMARY LANGUAGE ENGLISH   LEARNER PREFERENCE PRIMARY DEMONSTRATION   ANSWERED BY patient   RELATIONSHIP SELF       Abuse Screening:  Abuse Screening Questionnaire 9/13/2021   Do you ever feel afraid of your partner? N   Are you in a relationship with someone who physically or mentally threatens you? N   Is it safe for you to go home? Y       Fall Risk  Fall Risk Assessment, last 12 mths 9/13/2021   Able to walk? Yes   Fall in past 12 months? 0   Do you feel unsteady?  0   Are you worried about falling 0   Number of falls in past 12 months -   Fall with injury? -       Pt currently taking Anticoagulant therapy? no    Coordination of Care:  1. Have you been to the ER, urgent care clinic since your last visit? Hospitalized since your last visit? no    2. Have you seen or consulted any other health care providers outside of the [address] [place] [address] since your last visit? Include any pap smears or colon screening.  no

## 2021-09-14 NOTE — PROGRESS NOTES
Patient has graduated from the Transitions of Care Coordination  program on 9/14/2021. Patient/family has the ability to self-manage at this time Care management goals have been completed. Patient was not referred to the [facility] team for further management. Goals Addressed                 This Visit's Progress     COMPLETED: Prevent complications post hospitalization. On track     1. CTN will monitor X 4 weeks    2. Ensure provider appt is scheduled within 7 days post-discharge  8/13/2021 PCP follow up 8/17/21  3. Confirm patient attended post-discharge provider apt  8/19/2021 Patient reports he attended PCP appointment. Patient attended postoperative appointment today. No new orders  4. Complete post-visit call to confirm attendance and update care needs      5. Review/educate common or potential \"red flags\" of condition worsening  8/13/2021     · tempature greater than 100 or less than 97 degrees  · Swelling   · reddness  · Foul smelling drainage from incision site  · confusion   ·  weight gain of 2 pounds in 24 hours 3 pounds in 3 days or 4- 5 pounds in one week. · Severe shortness of breath  · Swelling of lower extremeties  · Swelling in abdomen  · Loss of appetite  · Lightheaded or dizziness     6. Evaluate adherence to medications and priority barriers to resolve        7. Instruct on adherence to medications as ordered and assess for therapeutic response and side-effects         8. Discuss and evaluate ADL performance. Provide recommendations on energy conservation, particularly related to transition home from an inpatient admission. Patient has Care Transition Nurse's contact information for any further questions, concerns, or needs.   Patients upcoming visits:    Future Appointments   Date Time Provider Kim Peñaloza   11/11/2021  8:00 AM DO CORBY [facility] BS AMB   6/2/2022  9:00 AM Clay Mccall PA-C [facility] [facility]

## 2021-12-26 NOTE — ED PROVIDER NOTES
EMERGENCY DEPARTMENT HISTORY AND PHYSICAL EXAM    10:05 PM patient seen at this time in room 15      Date: 12/25/2021  Patient Name: Jensen Johnson    History of Presenting Illness     Chief Complaint   Patient presents with    Memory Loss    Urinary Frequency         History Provided By: patient/daughter    Additional History (Context): Jensen Johnson is a 80 y.o. male presents with confusion for 3 days, bizarre statements, referring to getting his papers and enlisting into the [employer], referring to grandchildren twins, this was from many many years ago, saying he had to go up on the roof to get the children. Describes a little bit of hematuria as well and frequency. No fevers. He did have a fall around Thanksgiving time striking his chest.  Status post CABG from August without complication. No pain at the time of my exam.  He has been spending most of his days in bed. PCP: Moni Frankel MD    Chief Complaint:   Duration:    Timing:    Location:   Quality:   Severity:   Modifying Factors:   Associated Symptoms:       Current Outpatient Medications   Medication Sig Dispense Refill    tamsulosin (FLOMAX) 0.4 mg capsule Take 2 Capsules by mouth daily (after dinner). 180 Capsule 3    atorvastatin (LIPITOR) 40 mg tablet Take 1 Tablet by mouth daily. 90 Tablet 3    metoprolol succinate (TOPROL-XL) 50 mg XL tablet Take 0.5 Tablets by mouth daily. Or as directed 90 Tablet 3    allopurinoL (ZYLOPRIM) 300 mg tablet Take 300 mg by mouth daily.  HYDROcodone-acetaminophen (XODOL) 7.5-300 mg tablet Take 1 Tablet by mouth three (3) times daily as needed for Pain.  diphenhydrAMINE hcl (BENADRYL) 2 % topical gel Apply 1 mL to affected area every six (6) hours as needed for Itching.  hydrOXYzine HCL (ATARAX) 25 mg tablet Take 25 mg by mouth three (3) times daily as needed for Itching.  fluticasone propionate (FLONASE) 50 mcg/actuation nasal spray 2 Sprays by Both Nostrils route daily.  3 Bottle 1  PARoxetine (PAXIL) 20 mg tablet TAKE 1 TABLET BY MOUTH EVERY DAY 90 Tab 0    zolpidem (AMBIEN) 10 mg tablet Take 10 mg by mouth nightly as needed for Sleep. 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 Social History:  Social History     Tobacco Use    Smoking status: Never Smoker    Smokeless tobacco: Never Used   Vaping Use    Vaping Use: Never used   Substance Use Topics    Alcohol use: No    Drug use: No       Allergies: Allergies   Allergen Reactions    Fluconazole Hives and Itching    Penicillin G Hives    Iodine Unknown (comments)     Patient Stated Gout Flares up         Review of Systems     Review of Systems   Constitutional: Negative for diaphoresis and fever. HENT: Negative for congestion and sore throat. Eyes: Negative for pain and itching. Respiratory: Negative for cough and shortness of breath. Cardiovascular: Negative for chest pain and palpitations. Gastrointestinal: Negative for abdominal pain and diarrhea. Endocrine: Negative for polydipsia and polyuria. Genitourinary: Negative for dysuria and hematuria. Musculoskeletal: Negative for arthralgias and myalgias. Skin: Negative for rash and wound. Neurological: Negative for seizures and syncope. Hematological: Does not bruise/bleed easily. Psychiatric/Behavioral: Positive for confusion and hallucinations. Negative for agitation. Physical Exam       Patient Vitals for the past 12 hrs:   Temp Pulse Resp BP SpO2   12/25/21 2059 97.7 °F (36.5 °C) (!) 122 16 118/84 96 %       IPVITALS  Patient Vitals for the past 24 hrs:   BP Temp Pulse Resp SpO2 Height Weight   12/25/21 2059 118/84 97.7 °F (36.5 °C) (!) 122 16 96 % 5' 8.5\" (1.74 m) 78.5 kg (173 lb)       Physical Exam  Vitals and nursing note reviewed. Constitutional:       General: He is not in acute distress. Appearance: He is well-developed. HENT:      Head: Normocephalic and atraumatic. Eyes:      General: No scleral icterus. Conjunctiva/sclera: Conjunctivae normal.   Neck:      Vascular: No JVD. Cardiovascular:      Rate and Rhythm: Normal rate and regular rhythm. Heart sounds: Normal heart sounds. Comments: 4 intact extremity pulses  Pulmonary:      Effort: Pulmonary effort is normal.      Breath sounds: Normal breath sounds. Abdominal:      Palpations: Abdomen is soft. There is no mass. Tenderness: There is no abdominal tenderness. Musculoskeletal:         General: Normal range of motion. Cervical back: Normal range of motion and neck supple. Lymphadenopathy:      Cervical: No cervical adenopathy. Skin:     General: Skin is warm and dry. Neurological:      Mental Status: He is alert.    Psychiatric:      Comments: Confusion and bizaar statments           Diagnostic Study Results   Labs -  Recent Results (from the past 12 hour(s))   URINALYSIS W/ RFLX MICROSCOPIC    Collection Time: 12/25/21  9:08 PM   Result Value Ref Range    Color DARK YELLOW      Appearance CLEAR      Specific gravity >1.030 (H) 1.005 - 1.030    pH (UA) 5.0 5.0 - 8.0      Protein 100 (A) NEG mg/dL    Glucose 100 (A) NEG mg/dL    Ketone TRACE (A) NEG mg/dL    Bilirubin LARGE (A) NEG      Blood MODERATE (A) NEG      Urobilinogen 2.0 (H) 0.2 - 1.0 EU/dL    Nitrites Negative NEG      Leukocyte Esterase Negative NEG     URINE MICROSCOPIC ONLY Collection Time: 12/25/21  9:08 PM   Result Value Ref Range    WBC 0 to 3 0 - 5 /hpf    RBC 3 to 5 0 - 5 /hpf    Epithelial cells 1+ 0 - 5 /lpf    Bacteria Negative NEG /hpf    Mucus 1+ (A) NEG /lpf    Other: SHORT SAMPLE, 1 ML URINE TESTED        Radiologic Studies -   CT HEAD WO CONT    (Results Pending)     No results found. Medications ordered:   Medications - No data to display      Medical Decision Making   Initial Medical Decision Making and DDx:       ED Course: Progress Notes, Reevaluation, and Consults:     Twelve-lead EKG sinus tachycardia at 112, A. fib has resolved    No findings of intracranial lesion renal failure uremia blood sugar derangement other electrolyte derangements anemia MI urine infection. He does have transaminitis. Unclear cause of this reviewed medications with family he is allowed 3 Vicodin per day will send Tylenol level. Recommend close follow-up with primary care for his confusion. I am the first provider for this patient. I reviewed the vital signs, available nursing notes, past medical history, past surgical history, family history and social history. Patient Vitals for the past 12 hrs:   Temp Pulse Resp BP SpO2   12/25/21 2059 97.7 °F (36.5 °C) (!) 122 16 118/84 96 %       Vital Signs-Reviewed the patient's vital signs. Pulse Oximetry Analysis, Cardiac Monitor, 12 lead ekg:      Interpreted by the EP. Records Reviewed: Nursing notes reviewed (Time of Review: 10:05 PM)    Procedures:   Critical Care Time:   Aspirin: (was aspirin given for stroke?)    Diagnosis     Clinical Impression: No diagnosis found. Disposition:       Follow-up Information    None          Patient's Medications   Start Taking    No medications on file   Continue Taking    ALLOPURINOL (ZYLOPRIM) 300 MG TABLET    Take 300 mg by mouth daily. ATORVASTATIN (LIPITOR) 40 MG TABLET    Take 1 Tablet by mouth daily.     DIPHENHYDRAMINE HCL (BENADRYL) 2 % TOPICAL GEL    Apply 1 mL to affected area every six (6) hours as needed for Itching. FLUTICASONE PROPIONATE (FLONASE) 50 MCG/ACTUATION NASAL SPRAY    2 Sprays by Both Nostrils route daily. HYDROCODONE-ACETAMINOPHEN (XODOL) 7.5-300 MG TABLET    Take 1 Tablet by mouth three (3) times daily as needed for Pain. HYDROXYZINE HCL (ATARAX) 25 MG TABLET    Take 25 mg by mouth three (3) times daily as needed for Itching. METOPROLOL SUCCINATE (TOPROL-XL) 50 MG XL TABLET    Take 0.5 Tablets by mouth daily. Or as directed    PAROXETINE (PAXIL) 20 MG TABLET    TAKE 1 TABLET BY MOUTH EVERY DAY    TAMSULOSIN (FLOMAX) 0.4 MG CAPSULE    Take 2 Capsules by mouth daily (after dinner). ZOLPIDEM (AMBIEN) 10 MG TABLET    Take 10 mg by mouth nightly as needed for Sleep.    These Medications have changed    No medications on file   Stop Taking    No medications on file     _______________________________    Notes:    Francis Johnson MD using Dragon dictation      _______________________________

## 2021-12-26 NOTE — ED TRIAGE NOTES
Patient comes in stating that he is having trouble remembering things which started a couple of days ago. Patient also states he is urinating frequently but saw the urologist last week. . Family at bedside states that last night he was going to go up on the roof to get there children. Patient does take Ambien but has been on Ambien for 30 years. Patient states that he has a kidney stone that he has been trying to pass.

## 2021-12-31 PROBLEM — J12.82 PNEUMONIA DUE TO COVID-19 VIRUS: Status: ACTIVE | Noted: 2021-01-01

## 2021-12-31 PROBLEM — R06.02 SOB (SHORTNESS OF BREATH): Status: ACTIVE | Noted: 2021-01-01

## 2021-12-31 PROBLEM — R74.01 TRANSAMINITIS: Status: ACTIVE | Noted: 2021-01-01

## 2021-12-31 PROBLEM — I50.9 CHF (CONGESTIVE HEART FAILURE) (HCC): Status: ACTIVE | Noted: 2021-01-01

## 2021-12-31 PROBLEM — U07.1 PNEUMONIA DUE TO COVID-19 VIRUS: Status: ACTIVE | Noted: 2021-01-01

## 2021-12-31 NOTE — Clinical Note
Status[de-identified] INPATIENT [101]   Type of Bed: Telemetry [19]   Cardiac Monitoring Required?: No   Inpatient Hospitalization Certified Necessary for the Following Reasons: 3.  Patient receiving treatment that can only be provided in an inpatient setting (further clarification in H&P documentation)   Admitting Diagnosis: Transaminitis [627534]   Admitting Diagnosis: CHF (congestive heart failure) ([facility].) [984185]   Admitting Diagnosis: SOB (shortness of breath) [514222]   Admitting Physician: Voileta Mike   Attending Physician: Jeana Gold [[id number]]   Estimated Length of Stay: 2 Midnights   Discharge Plan[de-identified] Home with Office Follow-up

## 2021-12-31 NOTE — ED TRIAGE NOTES
Patient c/o SOB that has been increasing x 2 weeks. Reports cough for same period of time. States when he gets up and walks around he becomes SOB and dizzy.

## 2021-12-31 NOTE — ED PROVIDER NOTES
EMERGENCY DEPARTMENT HISTORY AND PHYSICAL EXAM    2:56 AM  Date: 12/31/2021  Patient Name: Angelo Mejía    History of Presenting Illness       History Provided By:     HPI: Angelo Mejía is a 80 y.o. male with past medical history of CABG in August, hypertension, childhood asthma, ischemic cardiomyopathy (EF up to 45% sp CABG) presents with shortness of breath that started a week ago. Shortness of breath is moderate in severity, worse with exertion; patient states that sometimes shortness of breath is accompanied by chest pain. Denies any fever or chills. He has gotten Covid vaccine but not booster. Seen here by confusion was on 25 December as per daughter confusion has improved once his primary doctor removed him of some of his home medication. Patient denies any alcohol or acetaminophen use. Patient used to drink alcohol but he states that he has quit. 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 No pertinent family history. Social History:  Social History     Tobacco Use    Smoking status: Never Smoker    Smokeless tobacco: Never Used   Vaping Use    Vaping Use: Never used   Substance Use Topics    Alcohol use: No    Drug use: No       Allergies:   Allergies   Allergen Reactions  Fluconazole Hives and Itching    Penicillin G Hives       Review of Systems   Review of Systems   Constitutional: Negative for activity change, appetite change and chills. HENT: Negative for congestion, ear discharge, ear pain and sore throat. Eyes: Negative for photophobia and pain. Respiratory: Positive for shortness of breath. Negative for cough and choking. Cardiovascular: Negative for palpitations and leg swelling. Gastrointestinal: Negative for anal bleeding and rectal pain. Endocrine: Negative for polydipsia and polyuria. Genitourinary: Negative for genital sores and urgency. Musculoskeletal: Negative for arthralgias and myalgias. Neurological: Negative for dizziness, seizures and speech difficulty. Psychiatric/Behavioral: Negative for hallucinations, self-injury and suicidal ideas. Physical Exam     Patient Vitals for the past 12 hrs:   Temp Pulse Resp BP SpO2   12/31/21 0356  95 26 122/77 94 %   12/31/21 0326  99 24 116/83 96 %   12/31/21 0256 96.9 °F (36.1 °C) 96 18 126/84 92 %       Physical Exam  Vitals and nursing note reviewed. Constitutional:       Appearance: He is well-developed. HENT:      Head: Normocephalic and atraumatic. Eyes:      General:         Right eye: No discharge. Left eye: No discharge. Cardiovascular:      Rate and Rhythm: Normal rate and regular rhythm. Heart sounds: Normal heart sounds. No murmur heard. Pulmonary:      Effort: Pulmonary effort is normal. No respiratory distress. Breath sounds: Normal breath sounds. No stridor. No wheezing or rales. Chest:      Chest wall: No tenderness. Abdominal:      General: Bowel sounds are normal. There is no distension. Palpations: Abdomen is soft. Tenderness: There is no abdominal tenderness. There is no guarding or rebound. Musculoskeletal:         General: Normal range of motion. Cervical back: Normal range of motion and neck supple.    Skin: General: Skin is warm and dry. Neurological:      Mental Status: He is alert and oriented to person, place, and time. Diagnostic Study Results     Labs -  Recent Results (from the past 12 hour(s))   EKG, 12 LEAD, INITIAL    Collection Time: 12/31/21  2:48 AM   Result Value Ref Range    Ventricular Rate 97 BPM    Atrial Rate 97 BPM    P-R Interval 188 ms    QRS Duration 134 ms    Q-T Interval 408 ms    QTC Calculation (Bezet) 518 ms    Calculated P Axis 66 degrees    Calculated R Axis -39 degrees    Calculated T Axis 99 degrees    Diagnosis       Sinus rhythm with occasional premature ventricular complexes and fusion   complexes  Left axis deviation  Nonspecific intraventricular block  Cannot rule out Anterior infarct , age undetermined  T wave abnormality, consider lateral ischemia  Abnormal ECG  When compared with ECG of 25-DEC-2021 23:57,  premature ventricular complexes are now present     CBC WITH AUTOMATED DIFF    Collection Time: 12/31/21  3:01 AM   Result Value Ref Range    WBC 11.9 4.6 - 13.2 K/uL    RBC 3.79 (L) 4.35 - 5.65 M/uL    HGB 12.6 (L) 13.0 - 16.0 g/dL    HCT 36.4 36.0 - 48.0 %    MCV 96.0 78.0 - 100.0 FL    MCH 33.2 24.0 - 34.0 PG    MCHC 34.6 31.0 - 37.0 g/dL    RDW 16.2 (H) 11.6 - 14.5 %    PLATELET 244 (L) 671 - 420 K/uL    MPV 12.3 (H) 9.2 - 11.8 FL    NRBC 1.6 (H) 0  WBC    ABSOLUTE NRBC 0.19 (H) 0.00 - 0.01 K/uL    NEUTROPHILS 79 (H) 40 - 73 %    LYMPHOCYTES 8 (L) 21 - 52 %    MONOCYTES 12 (H) 3 - 10 %    EOSINOPHILS 0 0 - 5 %    BASOPHILS 0 0 - 2 %    IMMATURE GRANULOCYTES 1 (H) 0.0 - 0.5 %    ABS. NEUTROPHILS 9.4 (H) 1.8 - 8.0 K/UL    ABS. LYMPHOCYTES 0.9 0.9 - 3.6 K/UL    ABS. MONOCYTES 1.4 (H) 0.05 - 1.2 K/UL    ABS. EOSINOPHILS 0.0 0.0 - 0.4 K/UL    ABS. BASOPHILS 0.0 0.0 - 0.1 K/UL    ABS. IMM.  GRANS. 0.1 (H) 0.00 - 0.04 K/UL    DF AUTOMATED     METABOLIC PANEL, COMPREHENSIVE    Collection Time: 12/31/21  3:01 AM   Result Value Ref Range    Sodium 141 136 - 145 mmol/L Potassium 3.7 3.5 - 5.5 mmol/L    Chloride 106 100 - 111 mmol/L    CO2 22 21 - 32 mmol/L    Anion gap 13 3.0 - 18 mmol/L    Glucose 153 (H) 74 - 99 mg/dL    BUN 65 (H) 7.0 - 18 MG/DL    Creatinine 1.53 (H) 0.6 - 1.3 MG/DL    BUN/Creatinine ratio 42 (H) 12 - 20      GFR est AA 53 (L) >60 ml/min/1.73m2    GFR est non-AA 44 (L) >60 ml/min/1.73m2    Calcium 8.8 8.5 - 10.1 MG/DL    Bilirubin, total 6.5 (H) 0.2 - 1.0 MG/DL    ALT (SGPT) 1,873 (H) 16 - 61 U/L    AST (SGOT) 1,028 (H) 10 - 38 U/L    Alk. phosphatase 407 (H) 45 - 117 U/L    Protein, total 6.9 6.4 - 8.2 g/dL    Albumin 3.6 3.4 - 5.0 g/dL    Globulin 3.3 2.0 - 4.0 g/dL    A-G Ratio 1.1 0.8 - 1.7     MAGNESIUM    Collection Time: 12/31/21  3:01 AM   Result Value Ref Range    Magnesium 2.6 1.6 - 2.6 mg/dL   NT-PRO BNP    Collection Time: 12/31/21  3:01 AM   Result Value Ref Range    NT pro-BNP 8,747 (H) 0 - 1,800 PG/ML   TROPONIN-HIGH SENSITIVITY    Collection Time: 12/31/21  3:01 AM   Result Value Ref Range    Troponin-High Sensitivity 44 0 - 78 ng/L   ACETAMINOPHEN    Collection Time: 12/31/21  3:01 AM   Result Value Ref Range    Acetaminophen level <2 (L) 10.0 - 30.0 ug/mL   ETHYL ALCOHOL    Collection Time: 12/31/21  3:01 AM   Result Value Ref Range    ALCOHOL(ETHYL),SERUM 3 0 - 3 MG/DL   TROPONIN-HIGH SENSITIVITY    Collection Time: 12/31/21  5:20 AM   Result Value Ref Range    Troponin-High Sensitivity 37 0 - 78 ng/L   POC LACTIC ACID    Collection Time: 12/31/21  5:22 AM   Result Value Ref Range    Lactic Acid (POC) 3.14 (HH) 0.40 - 2.00 mmol/L       Radiologic Studies -   XR CHEST PA LAT    Result Date: 12/30/2021   1. Small right pleural effusion with overlying atelectasis. Superimposed infiltrate not excluded. 2. Mild streaky opacities at the left lung base favor atelectasis. 3. Mildly enlarged cardiac silhouette     CTA CHEST W OR W WO CONT    Result Date: 12/31/2021  1. No evidence of pulmonary embolism. 2. Cardiomegaly.  3. Right greater than left mild pleural effusions. Questionable mild interstitial edema at the lung base versus hypoinflation artifact. 4. Mild free fluid in the upper quadrants of the abdomen. 5. Diverticulosis. Medical Decision Making     ED Course: Progress Notes, Reevaluation, and Consults:    2:56 AM Initial assessment performed. The patients presenting problems have been discussed, and they/their family are in agreement with the care plan formulated and outlined with them. I have encouraged them to ask questions as they arise throughout their visit. Provider Notes (Medical Decision Making):   Patient presents with shortness of breath  Patient intermittently hypoxic to 88 to 90%. Not on oxygen at present. Not hypoxic but mildly tachypneic  Elevated proBNP noted 8747  CTA ordered to rule out PE  After patient went for CTA liver enzymes resulted  AST, ALT, bilirubin markedly elevated compared to visit on December 25th  Patient states that he has quit drinking alcohol for a long time but will check ethanol level  CTA chest: No evidence of pulmonary embolism, cardiomegaly noted, right greater than left mild pleural effusion. Mild free fluid  We will obtain ultrasound abdomen  Patient does not meet sepsis criteria, however noted elevated lactic  We will send blood cultures and start patient on empiric antibiotics  Patient to get ultrasound abdomen in the a.m. given the elevated liver enzymes. Noted that the unenhanced liver are within normal limits and there was no CBD dilation noted as per CTA  HS troponin 44 and repeat 37  Patient to be admitted for shortness of breath CHF, transaminitis  Patient discussed with Dr. Gavin Guerra who accepted admission, ordered hepatitis panel   Patient discussed with Dr. Emelia Coleman ED attending, if patient remains in [facility] in the AM to follow-up ultrasound and call GI   Patient will also need cardiology evaluation once in [address] [facility]  Signs-Reviewed the patient's vital signs.  Reviewed pt's pulse jitendra hodgson. Records Reviewed: old medical records  -I am the first provider for this patient.  -I reviewed the vital signs, available nursing notes, past medical history, past surgical history, family history and social history. Current Facility-Administered Medications   Medication Dose Route Frequency Provider Last Rate Last Admin    sodium chloride 0.9 % bolus infusion 500 mL  500 mL IntraVENous ONCE Hailey Murillo MD        azithromycin (ZITHROMAX) 500 mg in 0.9% sodium chloride 250 mL (VIAL-MATE)  500 mg IntraVENous NOW Hailey Murillo MD        vancomycin (VANCOCIN) 1,000 mg in 0.9% sodium chloride 250 mL (VIAL-MATE)  1,000 mg IntraVENous NOW Hailey Murillo MD        diphenhydrAMINE (BENADRYL) injection 25 mg  25 mg IntraVENous Nina Arevalo MD        thiamine (B-1) 100 mg in 0.9% sodium chloride 50 mL IVPB  100 mg IntraVENous ONCE Hailey Murillo MD        cefepime (MAXIPIME) 1 g in sterile water (preservative free) 10 mL IV syringe  1 g IntraVENous Q24H Hailey Murillo MD        albuterol (PROVENTIL VENTOLIN) nebulizer solution 2.5 mg  2.5 mg Nebulization Q6H PRN Hailey Murillo MD         Current Outpatient Medications   Medication Sig Dispense Refill    tamsulosin (FLOMAX) 0.4 mg capsule Take 2 Capsules by mouth daily (after dinner). 180 Capsule 3    atorvastatin (LIPITOR) 40 mg tablet Take 1 Tablet by mouth daily. 90 Tablet 3    metoprolol succinate (TOPROL-XL) 50 mg XL tablet Take 0.5 Tablets by mouth daily. Or as directed 90 Tablet 3    allopurinoL (ZYLOPRIM) 300 mg tablet Take 300 mg by mouth daily.  HYDROcodone-acetaminophen (XODOL) 7.5-300 mg tablet Take 1 Tablet by mouth three (3) times daily as needed for Pain.  diphenhydrAMINE hcl (BENADRYL) 2 % topical gel Apply 1 mL to affected area every six (6) hours as needed for Itching.  hydrOXYzine HCL (ATARAX) 25 mg tablet Take 25 mg by mouth three (3) times daily as needed for Itching.       fluticasone propionate (FLONASE) 50 mcg/actuation nasal spray 2 Sprays by Both Nostrils route daily. 3 Bottle 1    PARoxetine (PAXIL) 20 mg tablet TAKE 1 TABLET BY MOUTH EVERY DAY 90 Tab 0    zolpidem (AMBIEN) 10 mg tablet Take 10 mg by mouth nightly as needed for Sleep. Clinical Impression     Clinical Impression: No diagnosis found. Disposition: This note was dictated utilizing voice recognition software which may lead to typographical errors. I apologize in advance if the situation occurs. If questions arise please do not hesitate to contact me or call our department.     Milton Barnes MD  2:56 AM

## 2022-01-01 ENCOUNTER — APPOINTMENT (OUTPATIENT)
Dept: CT IMAGING | Age: 82
DRG: 177 | End: 2022-01-01
Attending: HOSPITALIST
Payer: MEDICARE

## 2022-01-01 ENCOUNTER — TELEPHONE (OUTPATIENT)
Dept: CARDIOLOGY CLINIC | Age: 82
End: 2022-01-01

## 2022-01-01 ENCOUNTER — TELEPHONE (OUTPATIENT)
Dept: CARDIOTHORACIC SURGERY | Age: 82
End: 2022-01-01

## 2022-01-01 ENCOUNTER — APPOINTMENT (OUTPATIENT)
Dept: GENERAL RADIOLOGY | Age: 82
DRG: 177 | End: 2022-01-01
Attending: HOSPITALIST
Payer: MEDICARE

## 2022-01-01 ENCOUNTER — APPOINTMENT (OUTPATIENT)
Dept: VASCULAR SURGERY | Age: 82
DRG: 177 | End: 2022-01-01
Attending: INTERNAL MEDICINE
Payer: MEDICARE

## 2022-01-01 ENCOUNTER — APPOINTMENT (OUTPATIENT)
Dept: VASCULAR SURGERY | Age: 82
DRG: 177 | End: 2022-01-01
Attending: PHYSICIAN ASSISTANT
Payer: MEDICARE

## 2022-01-01 ENCOUNTER — APPOINTMENT (OUTPATIENT)
Dept: NON INVASIVE DIAGNOSTICS | Age: 82
DRG: 177 | End: 2022-01-01
Attending: INTERNAL MEDICINE
Payer: MEDICARE

## 2022-01-01 ENCOUNTER — HOSPITAL ENCOUNTER (INPATIENT)
Dept: VASCULAR SURGERY | Age: 82
Discharge: HOME | DRG: 177 | End: 2022-01-04
Attending: INTERNAL MEDICINE
Payer: MEDICARE

## 2022-01-01 ENCOUNTER — APPOINTMENT (OUTPATIENT)
Dept: GENERAL RADIOLOGY | Age: 82
DRG: 177 | End: 2022-01-01
Attending: PHYSICIAN ASSISTANT
Payer: MEDICARE

## 2022-01-01 VITALS
WEIGHT: 160.3 LBS | SYSTOLIC BLOOD PRESSURE: 95 MMHG | HEART RATE: 98 BPM | DIASTOLIC BLOOD PRESSURE: 84 MMHG | TEMPERATURE: 95.4 F | HEIGHT: 68 IN | OXYGEN SATURATION: 88 % | RESPIRATION RATE: 31 BRPM | BODY MASS INDEX: 24.29 KG/M2

## 2022-01-01 LAB
ALBUMIN SERPL-MCNC: 2.3 G/DL (ref 3.4–5)
ALBUMIN SERPL-MCNC: 2.4 G/DL (ref 3.4–5)
ALBUMIN SERPL-MCNC: 2.8 G/DL (ref 3.4–5)
ALBUMIN SERPL-MCNC: 3 G/DL (ref 3.4–5)
ALBUMIN SERPL-MCNC: 3.1 G/DL (ref 3.4–5)
ALBUMIN SERPL-MCNC: 3.2 G/DL (ref 3.4–5)
ALBUMIN SERPL-MCNC: 3.6 G/DL (ref 3.4–5)
ALBUMIN/GLOB SERPL: 0.7 {RATIO} (ref 0.8–1.7)
ALBUMIN/GLOB SERPL: 0.9 {RATIO} (ref 0.8–1.7)
ALBUMIN/GLOB SERPL: 0.9 {RATIO} (ref 0.8–1.7)
ALBUMIN/GLOB SERPL: 1 {RATIO} (ref 0.8–1.7)
ALP SERPL-CCNC: 258 U/L (ref 45–117)
ALP SERPL-CCNC: 289 U/L (ref 45–117)
ALP SERPL-CCNC: 290 U/L (ref 45–117)
ALP SERPL-CCNC: 315 U/L (ref 45–117)
ALP SERPL-CCNC: 319 U/L (ref 45–117)
ALP SERPL-CCNC: 325 U/L (ref 45–117)
ALP SERPL-CCNC: 359 U/L (ref 45–117)
ALT SERPL-CCNC: 1203 U/L (ref 16–61)
ALT SERPL-CCNC: 1337 U/L (ref 16–61)
ALT SERPL-CCNC: 361 U/L (ref 16–61)
ALT SERPL-CCNC: 404 U/L (ref 16–61)
ALT SERPL-CCNC: 580 U/L (ref 16–61)
ALT SERPL-CCNC: 735 U/L (ref 16–61)
ALT SERPL-CCNC: 965 U/L (ref 16–61)
AMMONIA PLAS-SCNC: <10 UMOL/L (ref 11–32)
AMMONIA PLAS-SCNC: <10 UMOL/L (ref 11–32)
AMORPH CRY URNS QL MICRO: ABNORMAL
ANION GAP SERPL CALC-SCNC: 10 MMOL/L (ref 3–18)
ANION GAP SERPL CALC-SCNC: 11 MMOL/L (ref 3–18)
ANION GAP SERPL CALC-SCNC: 11 MMOL/L (ref 3–18)
ANION GAP SERPL CALC-SCNC: 12 MMOL/L (ref 3–18)
ANION GAP SERPL CALC-SCNC: 18 MMOL/L (ref 3–18)
ANION GAP SERPL CALC-SCNC: 6 MMOL/L (ref 3–18)
ANION GAP SERPL CALC-SCNC: 7 MMOL/L (ref 3–18)
ANION GAP SERPL CALC-SCNC: 8 MMOL/L (ref 3–18)
APPEARANCE UR: ABNORMAL
APPEARANCE UR: CLEAR
APTT PPP: 34.7 SEC (ref 23–36.4)
APTT PPP: 38.7 SEC (ref 23–36.4)
ARTERIAL PATENCY WRIST A: POSITIVE
ARTERIAL PATENCY WRIST A: POSITIVE
AST SERPL-CCNC: 119 U/L (ref 10–38)
AST SERPL-CCNC: 160 U/L (ref 10–38)
AST SERPL-CCNC: 173 U/L (ref 10–38)
AST SERPL-CCNC: 205 U/L (ref 10–38)
AST SERPL-CCNC: 266 U/L (ref 10–38)
AST SERPL-CCNC: 484 U/L (ref 10–38)
AST SERPL-CCNC: 528 U/L (ref 10–38)
ATRIAL RATE: 97 BPM
ATRIAL RATE: 97 BPM
BACTERIA SPEC CULT: NORMAL
BACTERIA URNS QL MICRO: NEGATIVE /HPF
BACTERIA URNS QL MICRO: NEGATIVE /HPF
BASE EXCESS BLD CALC-SCNC: 4.4 MMOL/L
BASE EXCESS BLD CALC-SCNC: 4.5 MMOL/L
BASOPHILS # BLD: 0 K/UL (ref 0–0.1)
BASOPHILS NFR BLD: 0 % (ref 0–2)
BDY SITE: ABNORMAL
BDY SITE: ABNORMAL
BILIRUB DIRECT SERPL-MCNC: 6.9 MG/DL (ref 0–0.2)
BILIRUB DIRECT SERPL-MCNC: 8 MG/DL (ref 0–0.2)
BILIRUB DIRECT SERPL-MCNC: 8.3 MG/DL (ref 0–0.2)
BILIRUB SERPL-MCNC: 10.3 MG/DL (ref 0.2–1)
BILIRUB SERPL-MCNC: 10.8 MG/DL (ref 0.2–1)
BILIRUB SERPL-MCNC: 11.4 MG/DL (ref 0.2–1)
BILIRUB SERPL-MCNC: 11.6 MG/DL (ref 0.2–1)
BILIRUB SERPL-MCNC: 13.2 MG/DL (ref 0.2–1)
BILIRUB SERPL-MCNC: 6 MG/DL (ref 0.2–1)
BILIRUB SERPL-MCNC: 7 MG/DL (ref 0.2–1)
BILIRUB UR QL: ABNORMAL
BILIRUB UR QL: NEGATIVE
BUN SERPL-MCNC: 111 MG/DL (ref 7–18)
BUN SERPL-MCNC: 49 MG/DL (ref 7–18)
BUN SERPL-MCNC: 64 MG/DL (ref 7–18)
BUN SERPL-MCNC: 71 MG/DL (ref 7–18)
BUN SERPL-MCNC: 73 MG/DL (ref 7–18)
BUN SERPL-MCNC: 77 MG/DL (ref 7–18)
BUN SERPL-MCNC: 84 MG/DL (ref 7–18)
BUN SERPL-MCNC: 87 MG/DL (ref 7–18)
BUN/CREAT SERPL: 42 (ref 12–20)
BUN/CREAT SERPL: 45 (ref 12–20)
BUN/CREAT SERPL: 51 (ref 12–20)
BUN/CREAT SERPL: 57 (ref 12–20)
BUN/CREAT SERPL: 57 (ref 12–20)
BUN/CREAT SERPL: 58 (ref 12–20)
BUN/CREAT SERPL: 60 (ref 12–20)
BUN/CREAT SERPL: 62 (ref 12–20)
CALCIUM SERPL-MCNC: 8.1 MG/DL (ref 8.5–10.1)
CALCIUM SERPL-MCNC: 8.1 MG/DL (ref 8.5–10.1)
CALCIUM SERPL-MCNC: 8.5 MG/DL (ref 8.5–10.1)
CALCIUM SERPL-MCNC: 8.5 MG/DL (ref 8.5–10.1)
CALCIUM SERPL-MCNC: 8.7 MG/DL (ref 8.5–10.1)
CALCIUM SERPL-MCNC: 8.7 MG/DL (ref 8.5–10.1)
CALCIUM SERPL-MCNC: 8.9 MG/DL (ref 8.5–10.1)
CALCIUM SERPL-MCNC: 8.9 MG/DL (ref 8.5–10.1)
CALCULATED P AXIS, ECG09: 54 DEGREES
CALCULATED P AXIS, ECG09: 66 DEGREES
CALCULATED R AXIS, ECG10: -39 DEGREES
CALCULATED R AXIS, ECG10: -46 DEGREES
CALCULATED T AXIS, ECG11: 88 DEGREES
CALCULATED T AXIS, ECG11: 99 DEGREES
CHLORIDE SERPL-SCNC: 114 MMOL/L (ref 100–111)
CHLORIDE SERPL-SCNC: 119 MMOL/L (ref 100–111)
CHLORIDE SERPL-SCNC: 121 MMOL/L (ref 100–111)
CHLORIDE SERPL-SCNC: 124 MMOL/L (ref 100–111)
CHLORIDE SERPL-SCNC: 125 MMOL/L (ref 100–111)
CHLORIDE SERPL-SCNC: 126 MMOL/L (ref 100–111)
CO2 SERPL-SCNC: 12 MMOL/L (ref 21–32)
CO2 SERPL-SCNC: 18 MMOL/L (ref 21–32)
CO2 SERPL-SCNC: 19 MMOL/L (ref 21–32)
CO2 SERPL-SCNC: 20 MMOL/L (ref 21–32)
CO2 SERPL-SCNC: 22 MMOL/L (ref 21–32)
CO2 SERPL-SCNC: 23 MMOL/L (ref 21–32)
CO2 SERPL-SCNC: 26 MMOL/L (ref 21–32)
CO2 SERPL-SCNC: 27 MMOL/L (ref 21–32)
COLOR UR: ABNORMAL
COLOR UR: YELLOW
CREAT SERPL-MCNC: 1.17 MG/DL (ref 0.6–1.3)
CREAT SERPL-MCNC: 1.24 MG/DL (ref 0.6–1.3)
CREAT SERPL-MCNC: 1.29 MG/DL (ref 0.6–1.3)
CREAT SERPL-MCNC: 1.32 MG/DL (ref 0.6–1.3)
CREAT SERPL-MCNC: 1.36 MG/DL (ref 0.6–1.3)
CREAT SERPL-MCNC: 1.42 MG/DL (ref 0.6–1.3)
CREAT SERPL-MCNC: 1.69 MG/DL (ref 0.6–1.3)
CREAT SERPL-MCNC: 1.86 MG/DL (ref 0.6–1.3)
CREAT UR-MCNC: 62 MG/DL (ref 30–125)
CRP SERPL-MCNC: 4.4 MG/DL (ref 0–0.3)
D DIMER PPP FEU-MCNC: 10.66 UG/ML(FEU)
D DIMER PPP FEU-MCNC: 7.05 UG/ML(FEU)
D DIMER PPP FEU-MCNC: 7.69 UG/ML(FEU)
DIAGNOSIS, 93000: NORMAL
DIAGNOSIS, 93000: NORMAL
DIFFERENTIAL METHOD BLD: ABNORMAL
ECHO EST RA PRESSURE: 15 MMHG
ECHO LV FRACTIONAL SHORTENING: 4 % (ref 28–44)
ECHO LV INTERNAL DIMENSION DIASTOLE INDEX: 2.92 CM/M2
ECHO LV INTERNAL DIMENSION DIASTOLIC: 5.6 CM (ref 4.2–5.9)
ECHO LV INTERNAL DIMENSION SYSTOLIC INDEX: 2.81 CM/M2
ECHO LV INTERNAL DIMENSION SYSTOLIC: 5.4 CM
ECHO LV IVSD: 1.1 CM (ref 0.6–1)
ECHO LV MASS 2D: 249.3 G (ref 88–224)
ECHO LV MASS INDEX 2D: 129.9 G/M2 (ref 49–115)
ECHO LV POSTERIOR WALL DIASTOLIC: 1.1 CM (ref 0.6–1)
ECHO LV RELATIVE WALL THICKNESS RATIO: 0.39
ECHO MV A VELOCITY: 0.44 M/S
ECHO MV E DECELERATION TIME (DT): 81.7 MS
ECHO MV E VELOCITY: 0.89 M/S
ECHO MV E/A RATIO: 2.02
ECHO RIGHT VENTRICULAR SYSTOLIC PRESSURE (RVSP): 42 MMHG
ECHO RV TAPSE: 0.6 CM (ref 1.5–2)
ECHO TV REGURGITANT MAX VELOCITY: 2.61 M/S
ECHO TV REGURGITANT PEAK GRADIENT: 27 MMHG
EOSINOPHIL # BLD: 0 K/UL (ref 0–0.4)
EOSINOPHIL # BLD: 0.2 K/UL (ref 0–0.4)
EOSINOPHIL NFR BLD: 0 % (ref 0–5)
EOSINOPHIL NFR BLD: 1 % (ref 0–5)
EPITH CASTS URNS QL MICRO: ABNORMAL /LPF (ref 0–5)
EPITH CASTS URNS QL MICRO: NEGATIVE /LPF (ref 0–5)
ERYTHROCYTE [DISTWIDTH] IN BLOOD BY AUTOMATED COUNT: 17.9 % (ref 11.6–14.5)
ERYTHROCYTE [DISTWIDTH] IN BLOOD BY AUTOMATED COUNT: 18.4 % (ref 11.6–14.5)
ERYTHROCYTE [DISTWIDTH] IN BLOOD BY AUTOMATED COUNT: 18.7 % (ref 11.6–14.5)
ERYTHROCYTE [DISTWIDTH] IN BLOOD BY AUTOMATED COUNT: 19.9 % (ref 11.6–14.5)
ERYTHROCYTE [DISTWIDTH] IN BLOOD BY AUTOMATED COUNT: 20.3 % (ref 11.6–14.5)
ERYTHROCYTE [DISTWIDTH] IN BLOOD BY AUTOMATED COUNT: 20.5 % (ref 11.6–14.5)
ERYTHROCYTE [DISTWIDTH] IN BLOOD BY AUTOMATED COUNT: 20.8 % (ref 11.6–14.5)
FOLATE SERPL-MCNC: 15.6 NG/ML (ref 3.1–17.5)
FOLATE SERPL-MCNC: 17.2 NG/ML (ref 3.1–17.5)
GAS FLOW.O2 O2 DELIVERY SYS: ABNORMAL L/MIN
GAS FLOW.O2 O2 DELIVERY SYS: ABNORMAL L/MIN
GLOBULIN SER CALC-MCNC: 2.2 G/DL (ref 2–4)
GLOBULIN SER CALC-MCNC: 2.7 G/DL (ref 2–4)
GLOBULIN SER CALC-MCNC: 3.2 G/DL (ref 2–4)
GLOBULIN SER CALC-MCNC: 3.2 G/DL (ref 2–4)
GLOBULIN SER CALC-MCNC: 3.5 G/DL (ref 2–4)
GLOBULIN SER CALC-MCNC: 3.5 G/DL (ref 2–4)
GLOBULIN SER CALC-MCNC: 3.8 G/DL (ref 2–4)
GLUCOSE BLD STRIP.AUTO-MCNC: 144 MG/DL (ref 70–110)
GLUCOSE BLD STRIP.AUTO-MCNC: 270 MG/DL (ref 70–110)
GLUCOSE SERPL-MCNC: 126 MG/DL (ref 74–99)
GLUCOSE SERPL-MCNC: 129 MG/DL (ref 74–99)
GLUCOSE SERPL-MCNC: 146 MG/DL (ref 74–99)
GLUCOSE SERPL-MCNC: 154 MG/DL (ref 74–99)
GLUCOSE SERPL-MCNC: 154 MG/DL (ref 74–99)
GLUCOSE SERPL-MCNC: 170 MG/DL (ref 74–99)
GLUCOSE SERPL-MCNC: 213 MG/DL (ref 74–99)
GLUCOSE SERPL-MCNC: 68 MG/DL (ref 74–99)
GLUCOSE UR STRIP.AUTO-MCNC: NEGATIVE MG/DL
GLUCOSE UR STRIP.AUTO-MCNC: NEGATIVE MG/DL
HCO3 BLD-SCNC: 26.5 MMOL/L (ref 22–26)
HCO3 BLD-SCNC: 26.6 MMOL/L (ref 22–26)
HCT VFR BLD AUTO: 32.9 % (ref 36–48)
HCT VFR BLD AUTO: 38.9 % (ref 36–48)
HCT VFR BLD AUTO: 40.1 % (ref 36–48)
HCT VFR BLD AUTO: 40.3 % (ref 36–48)
HCT VFR BLD AUTO: 42.5 % (ref 36–48)
HCT VFR BLD AUTO: 45.3 % (ref 36–48)
HCT VFR BLD AUTO: 48.5 % (ref 36–48)
HGB BLD-MCNC: 10.3 G/DL (ref 13–16)
HGB BLD-MCNC: 12.2 G/DL (ref 13–16)
HGB BLD-MCNC: 12.6 G/DL (ref 13–16)
HGB BLD-MCNC: 13.4 G/DL (ref 13–16)
HGB BLD-MCNC: 13.7 G/DL (ref 13–16)
HGB BLD-MCNC: 14.3 G/DL (ref 13–16)
HGB BLD-MCNC: 15.5 G/DL (ref 13–16)
HGB UR QL STRIP: ABNORMAL
HGB UR QL STRIP: ABNORMAL
HISTORY CHECKED?,CKHIST: NORMAL
HYALINE CASTS URNS QL MICRO: ABNORMAL /LPF (ref 0–2)
IMM GRANULOCYTES # BLD AUTO: 0 K/UL
IMM GRANULOCYTES # BLD AUTO: 0 K/UL
IMM GRANULOCYTES # BLD AUTO: 0 K/UL (ref 0–0.04)
IMM GRANULOCYTES # BLD AUTO: 0 K/UL (ref 0–0.04)
IMM GRANULOCYTES NFR BLD AUTO: 0 %
IMM GRANULOCYTES NFR BLD AUTO: 0 %
IMM GRANULOCYTES NFR BLD AUTO: 0 % (ref 0–0.5)
IMM GRANULOCYTES NFR BLD AUTO: 0 % (ref 0–0.5)
INR PPP: 1.9 (ref 0.8–1.2)
INR PPP: 2.1 (ref 0.8–1.2)
INR PPP: 2.4 (ref 0.8–1.2)
INR PPP: 2.4 (ref 0.8–1.2)
INR PPP: 2.7 (ref 0.8–1.2)
INR PPP: 2.9 (ref 0.8–1.2)
KETONES UR QL STRIP.AUTO: NEGATIVE MG/DL
KETONES UR QL STRIP.AUTO: NEGATIVE MG/DL
LACTATE SERPL-SCNC: 2.8 MMOL/L (ref 0.4–2)
LACTATE SERPL-SCNC: 2.9 MMOL/L (ref 0.4–2)
LEUKOCYTE ESTERASE UR QL STRIP.AUTO: NEGATIVE
LEUKOCYTE ESTERASE UR QL STRIP.AUTO: NEGATIVE
LYMPHOCYTES # BLD: 0.2 K/UL (ref 0.9–3.6)
LYMPHOCYTES # BLD: 0.4 K/UL (ref 0.9–3.6)
LYMPHOCYTES NFR BLD: 1 % (ref 21–52)
LYMPHOCYTES NFR BLD: 2 % (ref 21–52)
MAGNESIUM SERPL-MCNC: 1.3 MG/DL (ref 1.6–2.6)
MCH RBC QN AUTO: 31.6 PG (ref 24–34)
MCH RBC QN AUTO: 32.3 PG (ref 24–34)
MCH RBC QN AUTO: 32.5 PG (ref 24–34)
MCH RBC QN AUTO: 32.8 PG (ref 24–34)
MCH RBC QN AUTO: 33.1 PG (ref 24–34)
MCH RBC QN AUTO: 33.2 PG (ref 24–34)
MCH RBC QN AUTO: 34.7 PG (ref 24–34)
MCHC RBC AUTO-ENTMCNC: 31.3 G/DL (ref 31–37)
MCHC RBC AUTO-ENTMCNC: 31.3 G/DL (ref 31–37)
MCHC RBC AUTO-ENTMCNC: 31.4 G/DL (ref 31–37)
MCHC RBC AUTO-ENTMCNC: 31.6 G/DL (ref 31–37)
MCHC RBC AUTO-ENTMCNC: 32 G/DL (ref 31–37)
MCHC RBC AUTO-ENTMCNC: 32.2 G/DL (ref 31–37)
MCHC RBC AUTO-ENTMCNC: 33.4 G/DL (ref 31–37)
MCV RBC AUTO: 100.2 FL (ref 78–100)
MCV RBC AUTO: 100.7 FL (ref 78–100)
MCV RBC AUTO: 101 FL (ref 78–100)
MCV RBC AUTO: 105.7 FL (ref 78–100)
MCV RBC AUTO: 105.8 FL (ref 78–100)
MCV RBC AUTO: 110.8 FL (ref 78–100)
MCV RBC AUTO: 98 FL (ref 78–100)
MONOCYTES # BLD: 0.5 K/UL (ref 0.05–1.2)
MONOCYTES # BLD: 0.6 K/UL (ref 0.05–1.2)
MONOCYTES # BLD: 1 K/UL (ref 0.05–1.2)
MONOCYTES # BLD: 1.1 K/UL (ref 0.05–1.2)
MONOCYTES NFR BLD: 3 % (ref 3–10)
MONOCYTES NFR BLD: 3 % (ref 3–10)
MONOCYTES NFR BLD: 5 % (ref 3–10)
MONOCYTES NFR BLD: 6 % (ref 3–10)
NEUTS BAND NFR BLD MANUAL: 1 % (ref 0–5)
NEUTS SEG # BLD: 16.2 K/UL (ref 1.8–8)
NEUTS SEG # BLD: 17.3 K/UL (ref 1.8–8)
NEUTS SEG # BLD: 18.2 K/UL (ref 1.8–8)
NEUTS SEG # BLD: 20.7 K/UL (ref 1.8–8)
NEUTS SEG NFR BLD: 91 % (ref 40–73)
NEUTS SEG NFR BLD: 92 % (ref 40–73)
NEUTS SEG NFR BLD: 95 % (ref 40–73)
NEUTS SEG NFR BLD: 96 % (ref 40–73)
NITRITE UR QL STRIP.AUTO: NEGATIVE
NITRITE UR QL STRIP.AUTO: NEGATIVE
NRBC # BLD: 0.15 K/UL (ref 0–0.01)
NRBC # BLD: 0.32 K/UL (ref 0–0.01)
NRBC # BLD: 0.5 K/UL (ref 0–0.01)
NRBC # BLD: 0.61 K/UL (ref 0–0.01)
NRBC # BLD: 0.74 K/UL (ref 0–0.01)
NRBC # BLD: 0.84 K/UL (ref 0–0.01)
NRBC # BLD: 0.86 K/UL (ref 0–0.01)
NRBC BLD-RTO: 1.1 PER 100 WBC
NRBC BLD-RTO: 1.8 PER 100 WBC
NRBC BLD-RTO: 2.3 PER 100 WBC
NRBC BLD-RTO: 3.1 PER 100 WBC
NRBC BLD-RTO: 4.2 PER 100 WBC
NRBC BLD-RTO: 5.3 PER 100 WBC
NRBC BLD-RTO: 6.1 PER 100 WBC
O2/TOTAL GAS SETTING VFR VENT: 21 %
O2/TOTAL GAS SETTING VFR VENT: 5 %
OSMOLALITY SERPL: 354 MOSMOL/KG (ref 280–301)
P-R INTERVAL, ECG05: 150 MS
P-R INTERVAL, ECG05: 188 MS
PCO2 BLD: 31.5 MMHG (ref 35–45)
PCO2 BLD: 31.8 MMHG (ref 35–45)
PERIPHERAL SMEAR,PSM: NORMAL
PH BLD: 7.53 [PH] (ref 7.35–7.45)
PH BLD: 7.53 [PH] (ref 7.35–7.45)
PH UR STRIP: 5 [PH]
PH UR STRIP: 5 [PH] (ref 5–8)
PHOSPHATE SERPL-MCNC: 3.3 MG/DL (ref 2.5–4.9)
PLATELET # BLD AUTO: 101 K/UL (ref 135–420)
PLATELET # BLD AUTO: 103 K/UL (ref 135–420)
PLATELET # BLD AUTO: 105 K/UL (ref 135–420)
PLATELET # BLD AUTO: 108 K/UL (ref 135–420)
PLATELET # BLD AUTO: 113 K/UL (ref 135–420)
PLATELET # BLD AUTO: 81 K/UL (ref 135–420)
PLATELET # BLD AUTO: 85 K/UL (ref 135–420)
PLATELET COMMENTS,PCOM: ABNORMAL
PMV BLD AUTO: 12.1 FL (ref 9.2–11.8)
PMV BLD AUTO: 12.1 FL (ref 9.2–11.8)
PMV BLD AUTO: 12.4 FL (ref 9.2–11.8)
PMV BLD AUTO: 12.7 FL (ref 9.2–11.8)
PMV BLD AUTO: 12.7 FL (ref 9.2–11.8)
PMV BLD AUTO: 12.9 FL (ref 9.2–11.8)
PMV BLD AUTO: 13.3 FL (ref 9.2–11.8)
PO2 BLD: 69 MMHG (ref 80–100)
PO2 BLD: 75 MMHG (ref 80–100)
POTASSIUM SERPL-SCNC: 4.2 MMOL/L (ref 3.5–5.5)
POTASSIUM SERPL-SCNC: 4.5 MMOL/L (ref 3.5–5.5)
POTASSIUM SERPL-SCNC: 4.7 MMOL/L (ref 3.5–5.5)
POTASSIUM SERPL-SCNC: 4.8 MMOL/L (ref 3.5–5.5)
POTASSIUM SERPL-SCNC: 5.1 MMOL/L (ref 3.5–5.5)
POTASSIUM SERPL-SCNC: 5.1 MMOL/L (ref 3.5–5.5)
POTASSIUM SERPL-SCNC: 5.4 MMOL/L (ref 3.5–5.5)
POTASSIUM SERPL-SCNC: 6.2 MMOL/L (ref 3.5–5.5)
PROCALCITONIN SERPL-MCNC: 0.15 NG/ML
PROCALCITONIN SERPL-MCNC: 0.3 NG/ML
PROT SERPL-MCNC: 4.5 G/DL (ref 6.4–8.2)
PROT SERPL-MCNC: 5.1 G/DL (ref 6.4–8.2)
PROT SERPL-MCNC: 6.3 G/DL (ref 6.4–8.2)
PROT SERPL-MCNC: 6.4 G/DL (ref 6.4–8.2)
PROT SERPL-MCNC: 6.5 G/DL (ref 6.4–8.2)
PROT SERPL-MCNC: 6.6 G/DL (ref 6.4–8.2)
PROT SERPL-MCNC: 7.1 G/DL (ref 6.4–8.2)
PROT UR STRIP-MCNC: 30 MG/DL
PROT UR STRIP-MCNC: ABNORMAL MG/DL
PROT UR-MCNC: 53 MG/DL
PROTHROMBIN TIME: 21.3 SEC (ref 11.5–15.2)
PROTHROMBIN TIME: 23.3 SEC (ref 11.5–15.2)
PROTHROMBIN TIME: 26 SEC (ref 11.5–15.2)
PROTHROMBIN TIME: 26.2 SEC (ref 11.5–15.2)
PROTHROMBIN TIME: 28.1 SEC (ref 11.5–15.2)
PROTHROMBIN TIME: 30.1 SEC (ref 11.5–15.2)
Q-T INTERVAL, ECG07: 400 MS
Q-T INTERVAL, ECG07: 408 MS
QRS DURATION, ECG06: 120 MS
QRS DURATION, ECG06: 134 MS
QTC CALCULATION (BEZET), ECG08: 508 MS
QTC CALCULATION (BEZET), ECG08: 518 MS
RBC # BLD AUTO: 2.97 M/UL (ref 4.35–5.65)
RBC # BLD AUTO: 3.68 M/UL (ref 4.35–5.65)
RBC # BLD AUTO: 3.81 M/UL (ref 4.35–5.65)
RBC # BLD AUTO: 4.09 M/UL (ref 4.35–5.65)
RBC # BLD AUTO: 4.22 M/UL (ref 4.35–5.65)
RBC # BLD AUTO: 4.52 M/UL (ref 4.35–5.65)
RBC # BLD AUTO: 4.8 M/UL (ref 4.35–5.65)
RBC #/AREA URNS HPF: NEGATIVE /HPF (ref 0–5)
RBC #/AREA URNS HPF: NEGATIVE /HPF (ref 0–5)
RBC MORPH BLD: ABNORMAL
SAO2 % BLD: 95.6 % (ref 92–97)
SAO2 % BLD: 96.6 % (ref 92–97)
SERVICE CMNT-IMP: ABNORMAL
SERVICE CMNT-IMP: ABNORMAL
SERVICE CMNT-IMP: NORMAL
SODIUM SERPL-SCNC: 143 MMOL/L (ref 136–145)
SODIUM SERPL-SCNC: 144 MMOL/L (ref 136–145)
SODIUM SERPL-SCNC: 148 MMOL/L (ref 136–145)
SODIUM SERPL-SCNC: 150 MMOL/L (ref 136–145)
SODIUM SERPL-SCNC: 151 MMOL/L (ref 136–145)
SODIUM SERPL-SCNC: 156 MMOL/L (ref 136–145)
SODIUM SERPL-SCNC: 157 MMOL/L (ref 136–145)
SODIUM SERPL-SCNC: 158 MMOL/L (ref 136–145)
SODIUM UR-SCNC: 54 MMOL/L (ref 20–110)
SP GR UR REFRACTOMETRY: 1.02 (ref 1–1.03)
SP GR UR REFRACTOMETRY: 1.02 (ref 1–1.04)
SPECIMEN TYPE: ABNORMAL
SPECIMEN TYPE: ABNORMAL
UROBILINOGEN UR QL STRIP.AUTO: 1 EU/DL
UROBILINOGEN UR QL STRIP.AUTO: 2 EU/DL (ref 0.2–1)
VENTRICULAR RATE, ECG03: 97 BPM
VENTRICULAR RATE, ECG03: 97 BPM
VIT B12 SERPL-MCNC: >2000 PG/ML (ref 211–911)
VIT B12 SERPL-MCNC: >2000 PG/ML (ref 211–911)
WBC # BLD AUTO: 13.2 K/UL (ref 4.6–13.2)
WBC # BLD AUTO: 14 K/UL (ref 4.6–13.2)
WBC # BLD AUTO: 15.8 K/UL (ref 4.6–13.2)
WBC # BLD AUTO: 17.7 K/UL (ref 4.6–13.2)
WBC # BLD AUTO: 18.2 K/UL (ref 4.6–13.2)
WBC # BLD AUTO: 19.8 K/UL (ref 4.6–13.2)
WBC # BLD AUTO: 21.5 K/UL (ref 4.6–13.2)
WBC URNS QL MICRO: NEGATIVE /HPF (ref 0–4)
WBC URNS QL MICRO: NEGATIVE /HPF (ref 0–5)

## 2022-01-01 PROCEDURE — 36415 COLL VENOUS BLD VENIPUNCTURE: CPT

## 2022-01-01 PROCEDURE — 80053 COMPREHEN METABOLIC PANEL: CPT

## 2022-01-01 PROCEDURE — 74011250637 HC RX REV CODE- 250/637: Performed by: INTERNAL MEDICINE

## 2022-01-01 PROCEDURE — 36600 WITHDRAWAL OF ARTERIAL BLOOD: CPT

## 2022-01-01 PROCEDURE — 99233 SBSQ HOSP IP/OBS HIGH 50: CPT | Performed by: INTERNAL MEDICINE

## 2022-01-01 PROCEDURE — 65270000029 HC RM PRIVATE

## 2022-01-01 PROCEDURE — 84100 ASSAY OF PHOSPHORUS: CPT

## 2022-01-01 PROCEDURE — 86923 COMPATIBILITY TEST ELECTRIC: CPT

## 2022-01-01 PROCEDURE — 85610 PROTHROMBIN TIME: CPT

## 2022-01-01 PROCEDURE — 77030008771 HC TU NG SALEM SUMP -A

## 2022-01-01 PROCEDURE — 85025 COMPLETE CBC W/AUTO DIFF WBC: CPT

## 2022-01-01 PROCEDURE — 74011250636 HC RX REV CODE- 250/636: Performed by: INTERNAL MEDICINE

## 2022-01-01 PROCEDURE — 74011250636 HC RX REV CODE- 250/636: Performed by: HOSPITALIST

## 2022-01-01 PROCEDURE — 80048 BASIC METABOLIC PNL TOTAL CA: CPT

## 2022-01-01 PROCEDURE — 99232 SBSQ HOSP IP/OBS MODERATE 35: CPT | Performed by: INTERNAL MEDICINE

## 2022-01-01 PROCEDURE — 77030040392 HC DRSG OPTIFOAM MDII -A

## 2022-01-01 PROCEDURE — 77030019905 HC CATH URETH INTMIT MDII -A

## 2022-01-01 PROCEDURE — 74011250637 HC RX REV CODE- 250/637: Performed by: NURSE PRACTITIONER

## 2022-01-01 PROCEDURE — 74011250637 HC RX REV CODE- 250/637: Performed by: HOSPITALIST

## 2022-01-01 PROCEDURE — 74011000250 HC RX REV CODE- 250: Performed by: STUDENT IN AN ORGANIZED HEALTH CARE EDUCATION/TRAINING PROGRAM

## 2022-01-01 PROCEDURE — 74011250636 HC RX REV CODE- 250/636: Performed by: NURSE PRACTITIONER

## 2022-01-01 PROCEDURE — 87086 URINE CULTURE/COLONY COUNT: CPT

## 2022-01-01 PROCEDURE — 82803 BLOOD GASES ANY COMBINATION: CPT

## 2022-01-01 PROCEDURE — 80076 HEPATIC FUNCTION PANEL: CPT

## 2022-01-01 PROCEDURE — 77010033711 HC HIGH FLOW OXYGEN

## 2022-01-01 PROCEDURE — 74011250636 HC RX REV CODE- 250/636: Performed by: STUDENT IN AN ORGANIZED HEALTH CARE EDUCATION/TRAINING PROGRAM

## 2022-01-01 PROCEDURE — 74011000258 HC RX REV CODE- 258: Performed by: INTERNAL MEDICINE

## 2022-01-01 PROCEDURE — 82962 GLUCOSE BLOOD TEST: CPT

## 2022-01-01 PROCEDURE — 85730 THROMBOPLASTIN TIME PARTIAL: CPT

## 2022-01-01 PROCEDURE — 85027 COMPLETE CBC AUTOMATED: CPT

## 2022-01-01 PROCEDURE — 93308 TTE F-UP OR LMTD: CPT

## 2022-01-01 PROCEDURE — 74011000250 HC RX REV CODE- 250: Performed by: INTERNAL MEDICINE

## 2022-01-01 PROCEDURE — 83605 ASSAY OF LACTIC ACID: CPT

## 2022-01-01 PROCEDURE — 30233N1 TRANSFUSION OF NONAUTOLOGOUS RED BLOOD CELLS INTO PERIPHERAL VEIN, PERCUTANEOUS APPROACH: ICD-10-PCS | Performed by: INTERNAL MEDICINE

## 2022-01-01 PROCEDURE — 77010033678 HC OXYGEN DAILY

## 2022-01-01 PROCEDURE — 74176 CT ABD & PELVIS W/O CONTRAST: CPT

## 2022-01-01 PROCEDURE — 2709999900 HC NON-CHARGEABLE SUPPLY

## 2022-01-01 PROCEDURE — 99233 SBSQ HOSP IP/OBS HIGH 50: CPT | Performed by: STUDENT IN AN ORGANIZED HEALTH CARE EDUCATION/TRAINING PROGRAM

## 2022-01-01 PROCEDURE — 74011250637 HC RX REV CODE- 250/637: Performed by: STUDENT IN AN ORGANIZED HEALTH CARE EDUCATION/TRAINING PROGRAM

## 2022-01-01 PROCEDURE — 99233 SBSQ HOSP IP/OBS HIGH 50: CPT | Performed by: HOSPITALIST

## 2022-01-01 PROCEDURE — 94762 N-INVAS EAR/PLS OXIMTRY CONT: CPT

## 2022-01-01 PROCEDURE — 85379 FIBRIN DEGRADATION QUANT: CPT

## 2022-01-01 PROCEDURE — 99232 SBSQ HOSP IP/OBS MODERATE 35: CPT | Performed by: HOSPITALIST

## 2022-01-01 PROCEDURE — 81001 URINALYSIS AUTO W/SCOPE: CPT

## 2022-01-01 PROCEDURE — 36430 TRANSFUSION BLD/BLD COMPNT: CPT

## 2022-01-01 PROCEDURE — 93970 EXTREMITY STUDY: CPT

## 2022-01-01 PROCEDURE — 83935 ASSAY OF URINE OSMOLALITY: CPT

## 2022-01-01 PROCEDURE — 87040 BLOOD CULTURE FOR BACTERIA: CPT

## 2022-01-01 PROCEDURE — 86140 C-REACTIVE PROTEIN: CPT

## 2022-01-01 PROCEDURE — 82570 ASSAY OF URINE CREATININE: CPT

## 2022-01-01 PROCEDURE — 74018 RADEX ABDOMEN 1 VIEW: CPT

## 2022-01-01 PROCEDURE — 74011000258 HC RX REV CODE- 258

## 2022-01-01 PROCEDURE — 99223 1ST HOSP IP/OBS HIGH 75: CPT | Performed by: NURSE PRACTITIONER

## 2022-01-01 PROCEDURE — 74011000250 HC RX REV CODE- 250

## 2022-01-01 PROCEDURE — 93005 ELECTROCARDIOGRAM TRACING: CPT

## 2022-01-01 PROCEDURE — 82140 ASSAY OF AMMONIA: CPT

## 2022-01-01 PROCEDURE — 71045 X-RAY EXAM CHEST 1 VIEW: CPT

## 2022-01-01 PROCEDURE — 84156 ASSAY OF PROTEIN URINE: CPT

## 2022-01-01 PROCEDURE — 83930 ASSAY OF BLOOD OSMOLALITY: CPT

## 2022-01-01 PROCEDURE — 86900 BLOOD TYPING SEROLOGIC ABO: CPT

## 2022-01-01 PROCEDURE — 77030040831 HC BAG URINE DRNG MDII -A

## 2022-01-01 PROCEDURE — 99223 1ST HOSP IP/OBS HIGH 75: CPT | Performed by: INTERNAL MEDICINE

## 2022-01-01 PROCEDURE — 83735 ASSAY OF MAGNESIUM: CPT

## 2022-01-01 PROCEDURE — 99232 SBSQ HOSP IP/OBS MODERATE 35: CPT | Performed by: STUDENT IN AN ORGANIZED HEALTH CARE EDUCATION/TRAINING PROGRAM

## 2022-01-01 PROCEDURE — 82607 VITAMIN B-12: CPT

## 2022-01-01 PROCEDURE — 84300 ASSAY OF URINE SODIUM: CPT

## 2022-01-01 PROCEDURE — C9113 INJ PANTOPRAZOLE SODIUM, VIA: HCPCS | Performed by: INTERNAL MEDICINE

## 2022-01-01 PROCEDURE — P9059 PLASMA, FRZ BETWEEN 8-24HOUR: HCPCS

## 2022-01-01 PROCEDURE — 99291 CRITICAL CARE FIRST HOUR: CPT | Performed by: INTERNAL MEDICINE

## 2022-01-01 PROCEDURE — 84145 PROCALCITONIN (PCT): CPT

## 2022-01-01 PROCEDURE — P9016 RBC LEUKOCYTES REDUCED: HCPCS

## 2022-01-01 PROCEDURE — 93976 VASCULAR STUDY: CPT

## 2022-01-01 PROCEDURE — 70450 CT HEAD/BRAIN W/O DYE: CPT

## 2022-01-01 PROCEDURE — 30233K1 TRANSFUSION OF NONAUTOLOGOUS FROZEN PLASMA INTO PERIPHERAL VEIN, PERCUTANEOUS APPROACH: ICD-10-PCS | Performed by: INTERNAL MEDICINE

## 2022-01-01 RX ORDER — LORAZEPAM 2 MG/ML
0.5 INJECTION INTRAMUSCULAR ONCE
Status: COMPLETED | OUTPATIENT
Start: 2022-01-01 | End: 2022-01-01

## 2022-01-01 RX ORDER — MORPHINE SULFATE 2 MG/ML
2 INJECTION, SOLUTION INTRAMUSCULAR; INTRAVENOUS
Status: DISCONTINUED | OUTPATIENT
Start: 2022-01-01 | End: 2022-01-01 | Stop reason: HOSPADM

## 2022-01-01 RX ORDER — CARVEDILOL 3.12 MG/1
3.12 TABLET ORAL EVERY 12 HOURS
Status: DISCONTINUED | OUTPATIENT
Start: 2022-01-01 | End: 2022-01-01

## 2022-01-01 RX ORDER — SODIUM CHLORIDE 0.9 % (FLUSH) 0.9 %
5-40 SYRINGE (ML) INJECTION EVERY 8 HOURS
Status: DISCONTINUED | OUTPATIENT
Start: 2022-01-01 | End: 2022-01-01

## 2022-01-01 RX ORDER — HYDROXYZINE HYDROCHLORIDE 10 MG/1
25 TABLET, FILM COATED ORAL
Status: DISCONTINUED | OUTPATIENT
Start: 2022-01-01 | End: 2022-01-01

## 2022-01-01 RX ORDER — ONDANSETRON 2 MG/ML
4 INJECTION INTRAMUSCULAR; INTRAVENOUS
Status: DISCONTINUED | OUTPATIENT
Start: 2022-01-01 | End: 2022-01-01

## 2022-01-01 RX ORDER — CARVEDILOL 6.25 MG/1
6.25 TABLET ORAL EVERY 12 HOURS
Status: DISCONTINUED | OUTPATIENT
Start: 2022-01-01 | End: 2022-01-01

## 2022-01-01 RX ORDER — FUROSEMIDE 20 MG/1
20 TABLET ORAL DAILY
Status: DISCONTINUED | OUTPATIENT
Start: 2022-01-01 | End: 2022-01-01

## 2022-01-01 RX ORDER — LORAZEPAM 2 MG/ML
1 INJECTION INTRAMUSCULAR ONCE
Status: ACTIVE | OUTPATIENT
Start: 2022-01-01 | End: 2022-01-01

## 2022-01-01 RX ORDER — INSULIN LISPRO 100 [IU]/ML
INJECTION, SOLUTION INTRAVENOUS; SUBCUTANEOUS EVERY 6 HOURS
Status: DISCONTINUED | OUTPATIENT
Start: 2022-01-01 | End: 2022-01-01

## 2022-01-01 RX ORDER — LORAZEPAM 2 MG/ML
2 INJECTION INTRAMUSCULAR ONCE
Status: COMPLETED | OUTPATIENT
Start: 2022-01-01 | End: 2022-01-01

## 2022-01-01 RX ORDER — GUAIFENESIN 100 MG/5ML
81 LIQUID (ML) ORAL DAILY
Status: DISCONTINUED | OUTPATIENT
Start: 2022-01-01 | End: 2022-01-01

## 2022-01-01 RX ORDER — DEXTROSE MONOHYDRATE 50 MG/ML
100 INJECTION, SOLUTION INTRAVENOUS CONTINUOUS
Status: DISCONTINUED | OUTPATIENT
Start: 2022-01-01 | End: 2022-01-01

## 2022-01-01 RX ORDER — SODIUM CHLORIDE 9 MG/ML
250 INJECTION, SOLUTION INTRAVENOUS AS NEEDED
Status: DISCONTINUED | OUTPATIENT
Start: 2022-01-01 | End: 2022-01-01

## 2022-01-01 RX ORDER — SCOLOPAMINE TRANSDERMAL SYSTEM 1 MG/1
1 PATCH, EXTENDED RELEASE TRANSDERMAL
Status: DISCONTINUED | OUTPATIENT
Start: 2022-01-01 | End: 2022-01-01 | Stop reason: HOSPADM

## 2022-01-01 RX ORDER — MAGNESIUM SULFATE 100 %
4 CRYSTALS MISCELLANEOUS AS NEEDED
Status: DISCONTINUED | OUTPATIENT
Start: 2022-01-01 | End: 2022-01-01

## 2022-01-01 RX ORDER — ACETAMINOPHEN 325 MG/1
650 TABLET ORAL
Status: DISCONTINUED | OUTPATIENT
Start: 2022-01-01 | End: 2022-01-01

## 2022-01-01 RX ORDER — DEXMEDETOMIDINE HYDROCHLORIDE 4 UG/ML
.1-1.5 INJECTION, SOLUTION INTRAVENOUS
Status: DISCONTINUED | OUTPATIENT
Start: 2022-01-01 | End: 2022-01-01

## 2022-01-01 RX ORDER — DIPHENHYDRAMINE HYDROCHLORIDE 50 MG/ML
25 INJECTION, SOLUTION INTRAMUSCULAR; INTRAVENOUS
Status: DISCONTINUED | OUTPATIENT
Start: 2022-01-01 | End: 2022-01-01

## 2022-01-01 RX ORDER — HYDROMORPHONE HYDROCHLORIDE 1 MG/ML
1 INJECTION, SOLUTION INTRAMUSCULAR; INTRAVENOUS; SUBCUTANEOUS ONCE
Status: COMPLETED | OUTPATIENT
Start: 2022-01-01 | End: 2022-01-01

## 2022-01-01 RX ORDER — ONDANSETRON 4 MG/1
4 TABLET, ORALLY DISINTEGRATING ORAL
Status: DISCONTINUED | OUTPATIENT
Start: 2022-01-01 | End: 2022-01-01

## 2022-01-01 RX ORDER — ACETAMINOPHEN 650 MG/1
650 SUPPOSITORY RECTAL
Status: DISCONTINUED | OUTPATIENT
Start: 2022-01-01 | End: 2022-01-01

## 2022-01-01 RX ORDER — ONDANSETRON 2 MG/ML
4 INJECTION INTRAMUSCULAR; INTRAVENOUS
Status: DISCONTINUED | OUTPATIENT
Start: 2022-01-01 | End: 2022-01-01 | Stop reason: HOSPADM

## 2022-01-01 RX ORDER — HALOPERIDOL 5 MG/ML
2 INJECTION INTRAMUSCULAR
Status: DISCONTINUED | OUTPATIENT
Start: 2022-01-01 | End: 2022-01-01 | Stop reason: HOSPADM

## 2022-01-01 RX ORDER — CHOLECALCIFEROL (VITAMIN D3) 125 MCG
5 CAPSULE ORAL
Status: DISCONTINUED | OUTPATIENT
Start: 2022-01-01 | End: 2022-01-01

## 2022-01-01 RX ORDER — IPRATROPIUM BROMIDE AND ALBUTEROL SULFATE 2.5; .5 MG/3ML; MG/3ML
3 SOLUTION RESPIRATORY (INHALATION)
Status: DISCONTINUED | OUTPATIENT
Start: 2022-01-01 | End: 2022-01-01

## 2022-01-01 RX ORDER — HYDROMORPHONE HYDROCHLORIDE 1 MG/ML
0.5 INJECTION, SOLUTION INTRAMUSCULAR; INTRAVENOUS; SUBCUTANEOUS
Status: DISCONTINUED | OUTPATIENT
Start: 2022-01-01 | End: 2022-01-01

## 2022-01-01 RX ORDER — DEXTROSE 50 % IN WATER (D50W) INTRAVENOUS SYRINGE
25-50 AS NEEDED
Status: DISCONTINUED | OUTPATIENT
Start: 2022-01-01 | End: 2022-01-01

## 2022-01-01 RX ORDER — DEXTROSE MONOHYDRATE 50 MG/ML
1000 INJECTION, SOLUTION INTRAVENOUS ONCE
Status: COMPLETED | OUTPATIENT
Start: 2022-01-01 | End: 2022-01-01

## 2022-01-01 RX ORDER — FUROSEMIDE 10 MG/ML
20 INJECTION INTRAMUSCULAR; INTRAVENOUS 2 TIMES DAILY
Status: COMPLETED | OUTPATIENT
Start: 2022-01-01 | End: 2022-01-01

## 2022-01-01 RX ORDER — DEXTROSE MONOHYDRATE AND SODIUM CHLORIDE 5; .45 G/100ML; G/100ML
150 INJECTION, SOLUTION INTRAVENOUS CONTINUOUS
Status: DISCONTINUED | OUTPATIENT
Start: 2022-01-01 | End: 2022-01-01

## 2022-01-01 RX ORDER — NOREPINEPHRINE BITARTRATE/D5W 8 MG/250ML
.5-5 PLASTIC BAG, INJECTION (ML) INTRAVENOUS
Status: DISCONTINUED | OUTPATIENT
Start: 2022-01-01 | End: 2022-01-01

## 2022-01-01 RX ORDER — NOREPINEPHRINE BITARTRATE/D5W 8 MG/250ML
.5-3 PLASTIC BAG, INJECTION (ML) INTRAVENOUS
Status: DISCONTINUED | OUTPATIENT
Start: 2022-01-01 | End: 2022-01-01

## 2022-01-01 RX ORDER — SODIUM CHLORIDE 9 MG/ML
1000 INJECTION, SOLUTION INTRAVENOUS ONCE
Status: DISCONTINUED | OUTPATIENT
Start: 2022-01-01 | End: 2022-01-01

## 2022-01-01 RX ORDER — ENOXAPARIN SODIUM 100 MG/ML
70 INJECTION SUBCUTANEOUS EVERY 12 HOURS
Status: DISCONTINUED | OUTPATIENT
Start: 2022-01-01 | End: 2022-01-01

## 2022-01-01 RX ORDER — HALOPERIDOL 2 MG/ML
2 SOLUTION ORAL
Status: DISCONTINUED | OUTPATIENT
Start: 2022-01-01 | End: 2022-01-01 | Stop reason: HOSPADM

## 2022-01-01 RX ORDER — SODIUM CHLORIDE 0.9 % (FLUSH) 0.9 %
5-40 SYRINGE (ML) INJECTION AS NEEDED
Status: DISCONTINUED | OUTPATIENT
Start: 2022-01-01 | End: 2022-01-01

## 2022-01-01 RX ORDER — LEVOFLOXACIN 5 MG/ML
500 INJECTION, SOLUTION INTRAVENOUS EVERY 24 HOURS
Status: DISCONTINUED | OUTPATIENT
Start: 2022-01-01 | End: 2022-01-01

## 2022-01-01 RX ORDER — HALOPERIDOL 5 MG/ML
2 INJECTION INTRAMUSCULAR ONCE
Status: COMPLETED | OUTPATIENT
Start: 2022-01-01 | End: 2022-01-01

## 2022-01-01 RX ORDER — HALOPERIDOL 5 MG/ML
2 INJECTION INTRAMUSCULAR ONCE
Status: DISCONTINUED | OUTPATIENT
Start: 2022-01-01 | End: 2022-01-01

## 2022-01-01 RX ORDER — LORAZEPAM 2 MG/ML
1 INJECTION INTRAMUSCULAR
Status: DISCONTINUED | OUTPATIENT
Start: 2022-01-01 | End: 2022-01-01 | Stop reason: HOSPADM

## 2022-01-01 RX ORDER — MAGNESIUM SULFATE HEPTAHYDRATE 40 MG/ML
2 INJECTION, SOLUTION INTRAVENOUS ONCE
Status: COMPLETED | OUTPATIENT
Start: 2022-01-01 | End: 2022-01-01

## 2022-01-01 RX ORDER — MAGNESIUM SULFATE HEPTAHYDRATE 40 MG/ML
2 INJECTION, SOLUTION INTRAVENOUS ONCE
Status: DISCONTINUED | OUTPATIENT
Start: 2022-01-01 | End: 2022-01-01

## 2022-01-01 RX ORDER — MAGNESIUM SULFATE 1 G/100ML
1 INJECTION INTRAVENOUS ONCE
Status: COMPLETED | OUTPATIENT
Start: 2022-01-01 | End: 2022-01-01

## 2022-01-01 RX ORDER — NOREPINEPHRINE BITARTRATE/D5W 8 MG/250ML
PLASTIC BAG, INJECTION (ML) INTRAVENOUS
Status: COMPLETED
Start: 2022-01-01 | End: 2022-01-01

## 2022-01-01 RX ORDER — FUROSEMIDE 10 MG/ML
20 INJECTION INTRAMUSCULAR; INTRAVENOUS 2 TIMES DAILY
Status: DISCONTINUED | OUTPATIENT
Start: 2022-01-01 | End: 2022-01-01

## 2022-01-01 RX ADMIN — DEXAMETHASONE SODIUM PHOSPHATE 6 MG: 4 INJECTION, SOLUTION INTRA-ARTICULAR; INTRALESIONAL; INTRAMUSCULAR; INTRAVENOUS; SOFT TISSUE at 21:06

## 2022-01-01 RX ADMIN — LORAZEPAM 2 MG: 2 INJECTION INTRAMUSCULAR; INTRAVENOUS at 10:00

## 2022-01-01 RX ADMIN — LEVOFLOXACIN 500 MG: 500 INJECTION, SOLUTION INTRAVENOUS at 00:07

## 2022-01-01 RX ADMIN — METOPROLOL SUCCINATE 25 MG: 25 TABLET, EXTENDED RELEASE ORAL at 10:36

## 2022-01-01 RX ADMIN — SODIUM CHLORIDE, PRESERVATIVE FREE 10 ML: 5 INJECTION INTRAVENOUS at 00:31

## 2022-01-01 RX ADMIN — DEXTROSE 8 MCG/MIN: 5 SOLUTION INTRAVENOUS at 12:36

## 2022-01-01 RX ADMIN — ASPIRIN 81 MG CHEWABLE TABLET 81 MG: 81 TABLET CHEWABLE at 08:59

## 2022-01-01 RX ADMIN — SODIUM CHLORIDE, PRESERVATIVE FREE 10 ML: 5 INJECTION INTRAVENOUS at 05:05

## 2022-01-01 RX ADMIN — PAROXETINE HYDROCHLORIDE 20 MG: 20 TABLET, FILM COATED ORAL at 10:36

## 2022-01-01 RX ADMIN — CARVEDILOL 3.12 MG: 3.12 TABLET, FILM COATED ORAL at 23:56

## 2022-01-01 RX ADMIN — LORAZEPAM 2 MG: 2 INJECTION INTRAMUSCULAR; INTRAVENOUS at 21:36

## 2022-01-01 RX ADMIN — DEXMEDETOMIDINE HYDROCHLORIDE 0.4 MCG/KG/HR: 4 INJECTION, SOLUTION INTRAVENOUS at 11:43

## 2022-01-01 RX ADMIN — FUROSEMIDE 20 MG: 20 INJECTION, SOLUTION INTRAMUSCULAR; INTRAVENOUS at 10:41

## 2022-01-01 RX ADMIN — DEXTROSE MONOHYDRATE 100 ML/HR: 5 INJECTION, SOLUTION INTRAVENOUS at 00:09

## 2022-01-01 RX ADMIN — CEFEPIME HYDROCHLORIDE 2 G: 2 INJECTION, POWDER, FOR SOLUTION INTRAVENOUS at 18:51

## 2022-01-01 RX ADMIN — ENOXAPARIN SODIUM 30 MG: 100 INJECTION SUBCUTANEOUS at 10:36

## 2022-01-01 RX ADMIN — ENOXAPARIN SODIUM 30 MG: 100 INJECTION SUBCUTANEOUS at 23:47

## 2022-01-01 RX ADMIN — PIPERACILLIN AND TAZOBACTAM 3.38 G: 3; .375 INJECTION, POWDER, LYOPHILIZED, FOR SOLUTION INTRAVENOUS at 12:11

## 2022-01-01 RX ADMIN — LORAZEPAM 2 MG: 2 INJECTION INTRAMUSCULAR; INTRAVENOUS at 04:40

## 2022-01-01 RX ADMIN — METOPROLOL SUCCINATE 25 MG: 25 TABLET, EXTENDED RELEASE ORAL at 10:22

## 2022-01-01 RX ADMIN — SODIUM CHLORIDE, PRESERVATIVE FREE 10 ML: 5 INJECTION INTRAVENOUS at 07:05

## 2022-01-01 RX ADMIN — METOPROLOL SUCCINATE 25 MG: 25 TABLET, EXTENDED RELEASE ORAL at 10:40

## 2022-01-01 RX ADMIN — DEXAMETHASONE SODIUM PHOSPHATE 6 MG: 4 INJECTION, SOLUTION INTRA-ARTICULAR; INTRALESIONAL; INTRAMUSCULAR; INTRAVENOUS; SOFT TISSUE at 00:06

## 2022-01-01 RX ADMIN — DEXAMETHASONE SODIUM PHOSPHATE 6 MG: 4 INJECTION, SOLUTION INTRA-ARTICULAR; INTRALESIONAL; INTRAMUSCULAR; INTRAVENOUS; SOFT TISSUE at 21:36

## 2022-01-01 RX ADMIN — MAGNESIUM SULFATE 2 G: 2 INJECTION INTRAVENOUS at 10:28

## 2022-01-01 RX ADMIN — FUROSEMIDE 20 MG: 20 INJECTION, SOLUTION INTRAMUSCULAR; INTRAVENOUS at 10:22

## 2022-01-01 RX ADMIN — FLUTICASONE PROPIONATE 2 SPRAY: 50 SPRAY, METERED NASAL at 10:42

## 2022-01-01 RX ADMIN — PROTAMINE SULFATE 50 MG: 10 INJECTION, SOLUTION INTRAVENOUS at 01:51

## 2022-01-01 RX ADMIN — PAROXETINE HYDROCHLORIDE 20 MG: 20 TABLET, FILM COATED ORAL at 08:59

## 2022-01-01 RX ADMIN — SODIUM CHLORIDE, PRESERVATIVE FREE 10 ML: 5 INJECTION INTRAVENOUS at 00:28

## 2022-01-01 RX ADMIN — SODIUM CHLORIDE, PRESERVATIVE FREE 10 ML: 5 INJECTION INTRAVENOUS at 01:16

## 2022-01-01 RX ADMIN — ENOXAPARIN SODIUM 30 MG: 100 INJECTION SUBCUTANEOUS at 09:00

## 2022-01-01 RX ADMIN — FLUTICASONE PROPIONATE 2 SPRAY: 50 SPRAY, METERED NASAL at 09:05

## 2022-01-01 RX ADMIN — SODIUM CHLORIDE, PRESERVATIVE FREE 10 ML: 5 INJECTION INTRAVENOUS at 15:00

## 2022-01-01 RX ADMIN — SODIUM CHLORIDE 40 MG: 9 INJECTION INTRAMUSCULAR; INTRAVENOUS; SUBCUTANEOUS at 12:11

## 2022-01-01 RX ADMIN — PAROXETINE HYDROCHLORIDE 20 MG: 20 TABLET, FILM COATED ORAL at 10:22

## 2022-01-01 RX ADMIN — Medication 5 MG: at 21:07

## 2022-01-01 RX ADMIN — SODIUM CHLORIDE, PRESERVATIVE FREE 10 ML: 5 INJECTION INTRAVENOUS at 00:08

## 2022-01-01 RX ADMIN — DEXTROSE MONOHYDRATE 50 ML/HR: 5 INJECTION, SOLUTION INTRAVENOUS at 21:52

## 2022-01-01 RX ADMIN — ENOXAPARIN SODIUM 30 MG: 100 INJECTION SUBCUTANEOUS at 21:37

## 2022-01-01 RX ADMIN — SODIUM CHLORIDE, PRESERVATIVE FREE 10 ML: 5 INJECTION INTRAVENOUS at 15:18

## 2022-01-01 RX ADMIN — CEFEPIME HYDROCHLORIDE 2 G: 2 INJECTION, POWDER, FOR SOLUTION INTRAVENOUS at 07:04

## 2022-01-01 RX ADMIN — HYDROXYZINE HYDROCHLORIDE 25 MG: 10 TABLET ORAL at 05:48

## 2022-01-01 RX ADMIN — HYDROXYZINE HYDROCHLORIDE 25 MG: 10 TABLET ORAL at 11:58

## 2022-01-01 RX ADMIN — TAMSULOSIN HYDROCHLORIDE 0.8 MG: 0.4 CAPSULE ORAL at 18:15

## 2022-01-01 RX ADMIN — SODIUM CHLORIDE, PRESERVATIVE FREE 10 ML: 5 INJECTION INTRAVENOUS at 21:20

## 2022-01-01 RX ADMIN — FLUTICASONE PROPIONATE 2 SPRAY: 50 SPRAY, METERED NASAL at 13:55

## 2022-01-01 RX ADMIN — SODIUM CHLORIDE, PRESERVATIVE FREE 10 ML: 5 INJECTION INTRAVENOUS at 07:51

## 2022-01-01 RX ADMIN — FLUTICASONE PROPIONATE 2 SPRAY: 50 SPRAY, METERED NASAL at 11:18

## 2022-01-01 RX ADMIN — CEFEPIME HYDROCHLORIDE 1 G: 1 INJECTION, POWDER, FOR SOLUTION INTRAMUSCULAR; INTRAVENOUS at 05:00

## 2022-01-01 RX ADMIN — PAROXETINE HYDROCHLORIDE 20 MG: 20 TABLET, FILM COATED ORAL at 10:41

## 2022-01-01 RX ADMIN — DEXTROSE MONOHYDRATE 1000 ML: 5 INJECTION, SOLUTION INTRAVENOUS at 11:00

## 2022-01-01 RX ADMIN — SODIUM CHLORIDE, PRESERVATIVE FREE 10 ML: 5 INJECTION INTRAVENOUS at 06:19

## 2022-01-01 RX ADMIN — DEXTROSE MONOHYDRATE AND SODIUM CHLORIDE 150 ML/HR: 5; .45 INJECTION, SOLUTION INTRAVENOUS at 00:52

## 2022-01-01 RX ADMIN — FUROSEMIDE 20 MG: 20 INJECTION, SOLUTION INTRAMUSCULAR; INTRAVENOUS at 18:51

## 2022-01-01 RX ADMIN — Medication 8 MCG/MIN: at 12:36

## 2022-01-01 RX ADMIN — ENOXAPARIN SODIUM 70 MG: 100 INJECTION SUBCUTANEOUS at 14:28

## 2022-01-01 RX ADMIN — ENOXAPARIN SODIUM 70 MG: 100 INJECTION SUBCUTANEOUS at 00:04

## 2022-01-01 RX ADMIN — CARVEDILOL 3.12 MG: 3.12 TABLET, FILM COATED ORAL at 08:47

## 2022-01-01 RX ADMIN — HALOPERIDOL LACTATE 2 MG: 5 INJECTION, SOLUTION INTRAMUSCULAR at 02:44

## 2022-01-01 RX ADMIN — SODIUM CHLORIDE, PRESERVATIVE FREE 10 ML: 5 INJECTION INTRAVENOUS at 15:19

## 2022-01-01 RX ADMIN — PIPERACILLIN AND TAZOBACTAM 3.38 G: 3; .375 INJECTION, POWDER, LYOPHILIZED, FOR SOLUTION INTRAVENOUS at 00:04

## 2022-01-01 RX ADMIN — SODIUM CHLORIDE, PRESERVATIVE FREE 10 ML: 5 INJECTION INTRAVENOUS at 05:01

## 2022-01-01 RX ADMIN — HYDROXYZINE HYDROCHLORIDE 25 MG: 10 TABLET, FILM COATED ORAL at 10:36

## 2022-01-01 RX ADMIN — FLUTICASONE PROPIONATE 2 SPRAY: 50 SPRAY, METERED NASAL at 08:48

## 2022-01-01 RX ADMIN — Medication 5 MG: at 23:55

## 2022-01-01 RX ADMIN — FUROSEMIDE 20 MG: 20 INJECTION, SOLUTION INTRAMUSCULAR; INTRAVENOUS at 18:15

## 2022-01-01 RX ADMIN — SODIUM CHLORIDE, PRESERVATIVE FREE 10 ML: 5 INJECTION INTRAVENOUS at 13:26

## 2022-01-01 RX ADMIN — HYDROMORPHONE HYDROCHLORIDE 1 MG: 1 INJECTION, SOLUTION INTRAMUSCULAR; INTRAVENOUS; SUBCUTANEOUS at 18:19

## 2022-01-01 RX ADMIN — ENOXAPARIN SODIUM 30 MG: 100 INJECTION SUBCUTANEOUS at 10:22

## 2022-01-01 RX ADMIN — CEFEPIME HYDROCHLORIDE 2 G: 2 INJECTION, POWDER, FOR SOLUTION INTRAVENOUS at 18:15

## 2022-01-01 RX ADMIN — ZOLPIDEM TARTRATE 5 MG: 5 TABLET ORAL at 21:25

## 2022-01-01 RX ADMIN — CARVEDILOL 3.12 MG: 3.12 TABLET, FILM COATED ORAL at 14:00

## 2022-01-01 RX ADMIN — SODIUM CHLORIDE, PRESERVATIVE FREE 10 ML: 5 INJECTION INTRAVENOUS at 21:25

## 2022-01-01 RX ADMIN — SODIUM CHLORIDE, PRESERVATIVE FREE 10 ML: 5 INJECTION INTRAVENOUS at 21:37

## 2022-01-01 RX ADMIN — SODIUM CHLORIDE 40 MG: 9 INJECTION INTRAMUSCULAR; INTRAVENOUS; SUBCUTANEOUS at 01:51

## 2022-01-01 RX ADMIN — LORAZEPAM 0.5 MG: 2 INJECTION INTRAMUSCULAR; INTRAVENOUS at 10:22

## 2022-01-01 RX ADMIN — ENOXAPARIN SODIUM 30 MG: 100 INJECTION SUBCUTANEOUS at 21:25

## 2022-01-01 RX ADMIN — DEXTROSE MONOHYDRATE 100 ML/HR: 5 INJECTION, SOLUTION INTRAVENOUS at 17:17

## 2022-01-01 RX ADMIN — CARVEDILOL 3.12 MG: 3.12 TABLET, FILM COATED ORAL at 21:37

## 2022-01-01 RX ADMIN — HYDROXYZINE HYDROCHLORIDE 25 MG: 10 TABLET ORAL at 21:05

## 2022-01-01 RX ADMIN — PAROXETINE HYDROCHLORIDE 20 MG: 20 TABLET, FILM COATED ORAL at 08:46

## 2022-01-01 RX ADMIN — SODIUM CHLORIDE, PRESERVATIVE FREE 10 ML: 5 INJECTION INTRAVENOUS at 05:08

## 2022-01-01 RX ADMIN — DEXAMETHASONE SODIUM PHOSPHATE 6 MG: 4 INJECTION, SOLUTION INTRA-ARTICULAR; INTRALESIONAL; INTRAMUSCULAR; INTRAVENOUS; SOFT TISSUE at 21:25

## 2022-01-01 RX ADMIN — SODIUM CHLORIDE, SODIUM LACTATE, POTASSIUM CHLORIDE, AND CALCIUM CHLORIDE 500 ML: 600; 310; 30; 20 INJECTION, SOLUTION INTRAVENOUS at 05:59

## 2022-01-01 RX ADMIN — FUROSEMIDE 20 MG: 20 TABLET ORAL at 08:47

## 2022-01-01 RX ADMIN — MAGNESIUM SULFATE 1 G: 1 INJECTION INTRAVENOUS at 19:30

## 2022-01-01 RX ADMIN — FUROSEMIDE 20 MG: 10 INJECTION, SOLUTION INTRAMUSCULAR; INTRAVENOUS at 17:53

## 2022-01-01 RX ADMIN — FLUTICASONE PROPIONATE 2 SPRAY: 50 SPRAY, METERED NASAL at 10:27

## 2022-01-01 RX ADMIN — DEXAMETHASONE SODIUM PHOSPHATE 6 MG: 4 INJECTION, SOLUTION INTRA-ARTICULAR; INTRALESIONAL; INTRAMUSCULAR; INTRAVENOUS; SOFT TISSUE at 23:44

## 2022-01-01 RX ADMIN — ENOXAPARIN SODIUM 70 MG: 100 INJECTION SUBCUTANEOUS at 21:06

## 2022-01-01 RX ADMIN — CEFEPIME HYDROCHLORIDE 2 G: 2 INJECTION, POWDER, FOR SOLUTION INTRAVENOUS at 07:51

## 2022-01-01 RX ADMIN — ENOXAPARIN SODIUM 30 MG: 100 INJECTION SUBCUTANEOUS at 09:20

## 2022-01-01 RX ADMIN — SODIUM CHLORIDE, PRESERVATIVE FREE 10 ML: 5 INJECTION INTRAVENOUS at 18:52

## 2022-01-01 RX ADMIN — CARVEDILOL 3.12 MG: 3.12 TABLET, FILM COATED ORAL at 08:59

## 2022-01-01 RX ADMIN — CARVEDILOL 3.12 MG: 3.12 TABLET, FILM COATED ORAL at 21:06

## 2022-01-01 RX ADMIN — DEXAMETHASONE SODIUM PHOSPHATE 6 MG: 4 INJECTION, SOLUTION INTRA-ARTICULAR; INTRALESIONAL; INTRAMUSCULAR; INTRAVENOUS; SOFT TISSUE at 21:19

## 2022-01-01 RX ADMIN — SODIUM CHLORIDE, PRESERVATIVE FREE 10 ML: 5 INJECTION INTRAVENOUS at 05:38

## 2022-01-01 RX ADMIN — TAMSULOSIN HYDROCHLORIDE 0.8 MG: 0.4 CAPSULE ORAL at 18:51

## 2022-01-01 RX ADMIN — PIPERACILLIN AND TAZOBACTAM 3.38 G: 3; .375 INJECTION, POWDER, LYOPHILIZED, FOR SOLUTION INTRAVENOUS at 05:30

## 2022-01-01 RX ADMIN — FUROSEMIDE 20 MG: 20 INJECTION, SOLUTION INTRAMUSCULAR; INTRAVENOUS at 10:36

## 2022-01-01 NOTE — PROGRESS NOTES
Admit Date: 12/31/2021  Date of Service: 1/1/2022    Reason for follow-up: COVID      Assessment:         1. Acute on chronic systolic heart failure: last TTE 8/2021 with Ef 45 %, BNP 8747, troponins 37, 44  2. Pneumonia due to COVID-19 virus (vaccintaed x 2): procalcitonin 0.15  3. Acute hypoxic respiratory failure secondary to pneumonia and fluid overload  4. Elevated d-dimer to 10.66:  CTA negative for PE. Suspect related to COVID/underlying sepsis process  5. EDWIGE: Cr. 1.53 upon admission  6. Mild bilateral pleural effusions  7. Transaminitis: ?due to COVID vs alternate etiology; US pending, acute hepatitis panel negative. CTA chest with \"mild free fluid\" in upper abdomen  8. CT with evidence of mild free fluid in upper quadrants of the abdomen   7. History of insomnia  8. Macrocytic anemia  9. Coagulopathy- ? Due to hepatic process; INR 2.9. NOT on coumadin or other anticoagulants. 10. Hx of gross hematuria: seen by urology on 12/7 for eval. CTU was ordered. Possible cysto planned in f/u  11. CAD s/p CABG x 3 on 8/5/2021    Plan:   Decadron (started 12/31)  Continue lasix, toprol, paxil, flomax  I/o's, daily weight  Trenc CMP, CBC, d-dimer, procalciton  GI has been consulted by my colleague  F/u abd US  TTE to assess cardiac function  B12, folate  Cardiology to evaluate as well. Current Antibtiocs:   Cefepime    Lines:   Peripheral    Case discussed with:  [x]Patient  []Family  [x]Nursing  []Case Management  DVT Prophylaxis:  [x]Lovenox  []Hep SQ  []SCDs  []Coumadin   []On Heparin gtt    I have independently examined the patient and reviewed all lab studies and imgaing as well as review of nursing notes and physican notes from the past 24 hours. Marysol Bravo D.O. Pager [phone]      Allergies   Allergen Reactions    Fluconazole Hives and Itching    Penicillin G Hives           Subjective:      Pt seen and examined. Tired. Remains SOB with ALLEN. Mild nauseam. No CP.      Objective: Visit Vitals  /78 (BP 1 Location: Left upper arm, BP Patient Position: At rest)   Pulse 89   Temp 97.9 °F (36.6 °C)   Resp 20   Ht 5' 8\" (1.727 m)   Wt 78.8 kg (173 lb 12.8 oz)   SpO2 98%   BMI 26.43 kg/m²     Temp (24hrs), Av.4 °F (36.3 °C), Min:97.1 °F (36.2 °C), Max:97.9 °F (36.6 °C)        General:   awake alert and oriented, non-toxic   Skin:   no rashes or skin lesions noted on limited exam, dry and warm   HEENT:  No scleral icterus or pallor; oral mucosa moist, lips moist   Lymph Nodes:   not assessed today   Lungs:   non, labored; bilaterally clear to aspiration- no crackles wheezes rales or rhonchi   Heart:  RRR, occasional ecotpy s1 and s2; no murmurs rubs or gallops; 1+ edema, + pedal pulses   Abdomen:  soft, non-distended, active bowel sounds, non-tender   Genitourinary:  deferred   Extremities:   average muscle tone; no contractures, no joint effusions   Neurologic:  No gross focal motor or sensory abnormalities; CN 2-12 intact; Follows commands. Psychiatric:   appropriate and interactive.        Labs: Results:   Chemistry Recent Labs     22  0446 21  1830 21  0301   * 152* 153*    142 141   K 4.2 4.5 3.7   * 109 106   CO2 18* 25 22   BUN 49* 56* 65*   CREA 1.17 1.50* 1.53*   CA 8.5 8.4* 8.8   AGAP 11 8 13   BUCR 42* 37* 42*   * 372* 407*   TP 6.4 6.6 6.9   ALB 3.2* 3.3* 3.6   GLOB 3.2 3.3 3.3   AGRAT 1.0 1.0 1.1      CBC w/Diff Recent Labs     21  1830 21  0301   WBC 8.7 11.9   RBC 3.73* 3.79*   HGB 12.4* 12.6*   HCT 37.3 36.4   PLT 90* 100*   GRANS 88* 79*   LYMPH 5* 8*   EOS 0 0        No results found for: SDES Lab Results   Component Value Date/Time    Culture result: No growth (<1,000 CFU/ML) 2021 08:33 AM        Results     Procedure Component Value Units Date/Time    COVID-19 WITH INFLUENZA A/B [[id number]]  (Abnormal) Collected: 21 0320    Order Status: Completed Specimen: Nasopharyngeal Updated: 21 1969 SARS-CoV-2 Detected        Comment: CALLED TO AND CORRECTLY REPEATED BY:  YAMINI GARCÍA RN, [facility] ED, 12/31/21 AT 0754 TO DRM  Positive results are indicative of the presence of SARS-CoV-2. Clinical correlation with patient history and other diagnostic information is necessary to determine patient infection status. Positive results do not rule out bacterial infection or co-infection with other pathogens. Influenza A by PCR Not detected        Influenza B by PCR Not detected        Comment: NOTE: Influenza A and Influenza B negative results should be considered presumptive in samples that have a positive SARS-CoV-2 result. Consider re-testing with an alternate FDA-approved test if clinically indicated. Testing was performed using marifer Maile SARS-CoV-2 and Influenza A/B nucleic acid assay. This test is a multiplex Real-Time Reverse Transcriptase Polymerase Chain Reaction (RT-PCR) based in [employer] diagnostic test intended for the qualitative detection of nucleic acids from SARS-CoV-2, Influenza A, and Influenza B in nasopharyngeal for use under the FDA's Emergency Use Authorization (EAU) only. Fact sheet for Patients: FindDrives.pl  Fact sheet for Healthcare Providers: FindD[employer]pl         COVID-19 RAPID TEST [[id number]] Collected: 12/31/21 0300    Order Status: Canceled     INFLUENZA A & B AG (RAPID TEST) [[id number]] Collected: 12/31/21 0300    Order Status: Canceled Specimen: Nasopharyngeal           Imaging:     CTA CHEST W OR W WO CONT    Result Date: 12/31/2021  1. No evidence of pulmonary embolism. 2. Cardiomegaly. 3. Right greater than left mild pleural effusions. Questionable mild interstitial edema at the lung base versus hypoinflation artifact. 4. Mild free fluid in the upper quadrants of the abdomen. 5. Diverticulosis.

## 2022-01-01 NOTE — PROGRESS NOTES
OT orders received and medical chart review completed. Per nursing: hold OT evaluation d/t confusion with agitation. OT to follow.

## 2022-01-01 NOTE — PROGRESS NOTES
0932: PT orders received and chart reviewed. Staff at bedside assisting with breakfast. Per RN, patient restless at times and attempting to climb over bed rails. Will follow up as able.

## 2022-01-01 NOTE — ED NOTES
Assumed care from [facility]. U/S at bedside, exam in progress.
Assumed care on arrival to [facility]
Charley has not come to  Rapid swab. [facility] charley called by lab.
Dtr updated in [place]. Will call her on phone number provided when results are back.
Large urine puddle noted on floor in front of trash can. Floor cleaned and urinal provided.
Lilian Newby notified of patients room number and transfer in progress to [facility]
Unsuccessful attempts x 4 by Simona Barrientos RN to obtain blood. MD moore, RAGHU Medina RN at bedside attempting blood draw
Updated family member daughter Jayson Guzman.
Verbal consent for admission obtained by phone from daughter Jenny Burkett
No

## 2022-01-02 NOTE — PROGRESS NOTES
Second day attempting for PT evaluation. Spoke with nursing that patient is not cooperative and remains confused. Will try again tomorrow.  Thank you for this referral.     Swetha Mack PT, DPT

## 2022-01-02 NOTE — CONSULTS
Cardiology Initial Patient Referral Note    Cardiology referral request from Dr. Logan Lennon for evaluation and management/treatment of Acute on chronic systolic CHF    Date of  Admission: 12/31/2021  2:55 AM   Primary Care Physician:  Galdino Aly MD    Attending Cardiologist: Dr. Evette Dance    Pt seen and examined, agree with below. Pt known to me with CABG 8/5/21, here for COVID PNA. His EF was improved to 45% now severely dropped to 10-15% range. Will need to optimize GDMT as tolerated pending his clinical course (complicated by coagulopathy, LFTs etc). Further recs to follow, thanks Dr. Rae Crisostomo:     Hospital Problems  Date Reviewed: 12/7/2021          Codes Class Noted POA    CHF (congestive heart failure) ([facility] [address] ICD-10-CM: I50.9  ICD-9-CM: 428.0  12/31/2021 Unknown        SOB (shortness of breath) ICD-10-CM: R06.02  ICD-9-CM: 786.05  12/31/2021 Unknown        Transaminitis ICD-10-CM: R74.01  ICD-9-CM: 790.4  12/31/2021 Unknown        Pneumonia due to COVID-19 virus ICD-10-CM: U07.1, J12.82  ICD-9-CM: 480.8, 079.89  12/31/2021 Unknown            -Acute on chronic systolic CHF with bilateral pleural effusions  -Pneumonia due to COVID-19 virus  -Acute hypoxic respiratory failure  -Transaminitis - improving  -CAD s/p CABG x 3 8/5/2021 -LIMA to the mid LAD, SVG to RPLV, and SVG to OM   - limited echo done on 8/9/21 and it was noted that his EF had improved to 45% when compared to the echo done on 6/24/21 (EF at that time was 35%). -Elevated D-Dimer, CTA negative for PE  -CT with evidence of mild free fluid in upper quadrants of the abdomen   -Coagulopathy - INR 2.9 - not on anticoagulants  -Thrombocytopenia - 85  -Hypertension  -Insomnia  -EDWIGE    Primary cardiologist Dr. Angie Blakely:     Continue oxygen, antibiotics, supportive care per primary team.  Echo today - results pending.   GI consulted regarding CT with free air in abdomen and elevated LFT  -Continue lasix IV 20 mg BID, monitor I/O, daily weights, renal indices. Consider changing Toprol XL to Coreg due to cardiomyopathy. History of Present Illness: This is a 80 y.o. male admitted for Transaminitis [R74.01]  CHF (congestive heart failure) (HCC) [I50.9]  SOB (shortness of breath) [R06.02]  Pneumonia due to COVID-19 virus [U07.1, J12.82]. Mr. Claire Brown is admitted with COVID,  HPI is obtained from EMR in effort to limit exposure.- Mr. Claire Brown has PMH of CAD s/p CABG x 3 - 8/2021, HTN, cardiomyopathy, BPH, asthma, anxiety, alcohol abuse (has not drank in many years0 and insomnia. He presented to ER with c/o dyspnea worsening over the past week worse with exertion. In ER, he was found to be hypoxic with 88-90%, elevated Nt pro BNP, 8747, elevated LFT. He was admitted with COVID - 19 pneumonia. Cardiac risk factors: male gender, hypertension      Review of Symptoms:  Except as stated above include:  Constitutional:  negative  Respiratory:  negative  Cardiovascular:  negative  Gastrointestinal: negative  Genitourinary:  negative  Musculoskeletal:  Negative  Neurological:  Negative  Dermatological:  Negative  Endocrinological: Negative  Psychological:  Negative    Pertinent items are noted in HPI. 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 No pertinent family history. Medications:      Allergies   Allergen Reactions    Fluconazole Hives and Itching    Penicillin G Hives        Current Facility-Administered Medications   Medication Dose Route Frequency    furosemide (LASIX) injection 20 mg  20 mg IntraVENous BID    cefepime (MAXIPIME) 2 g in sterile water (preservative free) 10 mL IV syringe  2 g IntraVENous Q12H    albuterol (PROVENTIL VENTOLIN) nebulizer solution 2.5 mg  2.5 mg Nebulization Q6H PRN    sodium chloride (NS) flush 5-40 mL  5-40 mL IntraVENous Q8H    sodium chloride (NS) flush 5-40 mL  5-40 mL IntraVENous PRN    acetaminophen (TYLENOL) tablet 650 mg  650 mg Oral Q6H PRN    Or    acetaminophen (TYLENOL) suppository 650 mg  650 mg Rectal Q6H PRN    polyethylene glycol (MIRALAX) packet 17 g  17 g Oral DAILY PRN    ondansetron (ZOFRAN ODT) tablet 4 mg  4 mg Oral Q8H PRN    Or    ondansetron (ZOFRAN) injection 4 mg  4 mg IntraVENous Q6H PRN    enoxaparin (LOVENOX) injection 30 mg  30 mg SubCUTAneous Q12H    dexamethasone (DECADRON) 4 mg/mL injection 6 mg  6 mg IntraVENous Q24H    fluticasone propionate (FLONASE) 50 mcg/actuation nasal spray 2 Spray  2 Spray Both Nostrils DAILY    hydrOXYzine HCL (ATARAX) tablet 25 mg  25 mg Oral TID PRN    metoprolol succinate (TOPROL-XL) XL tablet 25 mg  25 mg Oral DAILY    PARoxetine (PAXIL) tablet 20 mg  20 mg Oral DAILY    tamsulosin (FLOMAX) capsule 0.8 mg  0.8 mg Oral PCD    zolpidem (AMBIEN) tablet 5 mg  5 mg Oral QHS PRN         Physical Exam:     Visit Vitals  /77   Pulse 74   Temp 98.1 °F (36.7 °C)   Resp 22   Ht 5' 8\" (1.727 m)   Wt 76.5 kg (168 lb 11.2 oz)   SpO2 95%   BMI 25.65 kg/m²       TELE: normal sinus rhythm    BP Readings from Last 3 Encounters:   01/02/22 109/77   12/25/21 118/84   09/13/21 134/82     Pulse Readings from Last 3 Encounters:   01/02/22 74   12/25/21 (!) 122   09/13/21 79     Wt Readings from Last 3 Encounters:   01/01/22 76.5 kg (168 lb 11.2 oz)   12/25/21 78.5 kg (173 lb) 09/13/21 78.5 kg (173 lb)     Physical exam deferred in effort to limit exposure and conserve PPE. Patient visualized through window. Limited Physical Exam     General: Lying flat in NAD supine   HEENT: NC/AT  CV: NSR visible on telemetry monitoring   Pulm: NC, equal chest rise b/l, 20RR  Abdomen: normal appearing,   Skin: appears pink, no obvious wounds or lesions. Extremities: normal appearing x 4       Data Review:     Recent Labs     01/02/22 0227 12/31/21 1830 12/31/21  0301   WBC 13.2 8.7 11.9   HGB 12.2* 12.4* 12.6*   HCT 38.9 37.3 36.4   PLT 85* 90* 100*     Recent Labs     01/02/22 0227 01/01/22 0446 12/31/21 1830 12/31/21  0301 12/31/21  0301    143 142   < > 141   K 4.8 4.2 4.5   < > 3.7   * 114* 109   < > 106   CO2 19* 18* 25   < > 22   * 154* 152*   < > 153*   BUN 64* 49* 56*   < > 65*   CREA 1.42* 1.17 1.50*   < > 1.53*   CA 8.7 8.5 8.4*   < > 8.8   MG  --   --   --   --  2.6   ALB 3.1* 3.2* 3.3*   < > 3.6   ALT 1,203* 1,337* 1,576*   < > 1,873*   INR  --  2.9*  --   --   --     < > = values in this interval not displayed.        Results for orders placed or performed during the hospital encounter of 12/31/21   EKG, 12 LEAD, INITIAL   Result Value Ref Range    Ventricular Rate 97 BPM    Atrial Rate 97 BPM    P-R Interval 188 ms    QRS Duration 134 ms    Q-T Interval 408 ms    QTC Calculation (Bezet) 518 ms    Calculated P Axis 66 degrees    Calculated R Axis -39 degrees    Calculated T Axis 99 degrees    Diagnosis       Sinus rhythm with occasional premature ventricular complexes and fusion   complexes  Left axis deviation  Nonspecific intraventricular block  Cannot rule out Anterior infarct , age undetermined  T wave abnormality, consider lateral ischemia  Abnormal ECG  When compared with ECG of 25-DEC-2021 23:57,  premature ventricular complexes are now present  Confirmed by Naga Walters M.D., Marcelino Latus ([id number]) on 1/1/2022 8:20:21 PM     Results for orders placed or performed in visit on 09/13/21   [facility] POC EKG ROUTINE W/ 12 LEADS, INTER & REP    Narrative    Read by Milana Kent MD. Sinus rhythm. IVCO. Old inferior MI. All Cardiac Markers in the last 24 hours:  No results found for: CPK, CK, CKMMB, CKMB, RCK3, CKMBT, CKNDX, CKND1, DEON, TROPT, TROIQ, KATHY, TROPT, TNIPOC, BNP, BNPP    Last Lipid:    Lab Results   Component Value Date/Time    Cholesterol, total 215 (H) 06/19/2020 04:34 PM    HDL Cholesterol 43 06/19/2020 04:34 PM    LDL, calculated 121.8 (H) 06/19/2020 04:34 PM    Triglyceride 251 (H) 06/19/2020 04:34 PM    CHOL/HDL Ratio 5.0 06/19/2020 04:34 PM       Cardiographics:     EKG Results     Procedure 720 Value Units Date/Time    EKG, 12 LEAD, INITIAL [[id number]] Collected: 12/31/21 0248    Order Status: Completed Updated: 01/01/22 2020     Ventricular Rate 97 BPM      Atrial Rate 97 BPM      P-R Interval 188 ms      QRS Duration 134 ms      Q-T Interval 408 ms      QTC Calculation (Bezet) 518 ms      Calculated P Axis 66 degrees      Calculated R Axis -39 degrees      Calculated T Axis 99 degrees      Diagnosis --     Sinus rhythm with occasional premature ventricular complexes and fusion   complexes  Left axis deviation  Nonspecific intraventricular block  Cannot rule out Anterior infarct , age undetermined  T wave abnormality, consider lateral ischemia  Abnormal ECG  When compared with ECG of 25-DEC-2021 23:57,  premature ventricular complexes are now present  Confirmed by Rebecca Morillo M.D., Gris Crocker ([id number]) on 1/1/2022 8:20:21 PM          08/05/21    ECHO ADULT FOLLOW-UP OR LIMITED 08/09/2021 8/9/2021    Interpretation Summary  · Contrast used: DEFINITY. · Image quality for this study was technically difficult. · LV: Calculated LVEF is 45%. Visually measured ejection fraction. Normal cavity size. Mild concentric hypertrophy.     Signed byGunjan Adkins MD on 8/9/2021  4:22 PM      04/02/21    NUCLEAR CARDIAC STRESS TEST 06/24/2021 6/24/2021    Interpretation Summary  · Baseline ECG: Sinus rhythm, occasional PVCs. Left axis deviation; cannot r/0 anterior infarct age undetermined  · SPECT: Left ventricular function post-stress was abnormal. Calculated ejection fraction is 30%. There is no evidence of transient ischemic dilation (TID). The TID ratio is 1.1.  · SPECT: Myocardial perfusion imaging defect 1: There is a defect that is small in size present in the apical location(s) that is partially reversible. There is normal wall motion in the defect area. The possibility of ischemia cannot be excluded. Perfusion defect was visually and quantitatively present. Myocardial perfusion imaging defect 2: There is a defect that is small to medium in size affecting the inferolateral location(s) that is partially reversible. There is normal wall motion in the defect area. The possibility of ischemia cannot be excluded. Perfusion defect was visually and quantitatively present. · SPECT: Left ventricular perfusion is abnormal. Myocardial perfusion imaging supports a high risk stress test due to EF <40%. Signed by: Rad Amaya DO on 6/24/2021  3:53 PM    07/12/21    CARDIAC PROCEDURE 07/12/2021 7/12/2021    Conclusion  · Right dominant system. Dominant RCA is large. 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 6 [facility] catheter ventricularized. · LAD with mid segment 70-75% stenosis, distal 70-75% stenosis. · Circumflex with mid AV segment 70% and small vessel. · Would recommend continued medical therapy rather than proceeding to open heart surgery. This can be discussed with his primary cardiologist as outpatient. Signed by: Bethanie Ames MD on 7/12/2021 11:27 AM      XR Results (most recent):  Results from [place] encounter on 12/30/21    XR CHEST PA LAT    Narrative  Chest  PA and lateral views    INDICATION:  Chest pain.     COMPARISON:  Shortness of breath    FINDINGS:  Median sternotomy wires and mediastinal clips are noted. The cardiac  silhouette is mildly enlarged. The pulmonary vasculature is unremarkable. Small right pleural effusion present, with overlying opacity at the right lung  base. Mild streaky opacities at the left lung base. No pneumothorax. No acute  osseous abnormalities are identified. Thoracic spondylosis noted. Impression  1. Small right pleural effusion with overlying atelectasis. Superimposed  infiltrate not excluded. 2. Mild streaky opacities at the left lung base favor atelectasis.   3. Mildly enlarged cardiac silhouette        Signed By: Graciela Henriquez NP     January 2, 2022

## 2022-01-02 NOTE — ROUTINE PROCESS
Report received from [facility]. Patient is alert, in bed. No distress noted. Continue to monitor.

## 2022-01-02 NOTE — PROGRESS NOTES
Received report on pt.from Alberta Cartagena, off going RN. Resting quietly in bed on rounds. Denies c/o pain or SOB at this time. Remains oriented to self only. Bed alarm on. Side rails up x 3. Call bell at side. No acute distress noted. Will cont to monitor fir any changes in status. 1000 Remains comfused, frequently removes clothes, constantly scooting sideways and down into bed. Repositioned. Bed alarm on. Bed in low position' bed alarm on.       1230  assisted pt with eating. Takes only a few bites. 1430 sleeping in bed. No distress noted,     1900 turned and positioned in bed. Voided 200 cc in urinal. Some incontinence noted. Bed linends and pad changed. 2020 Bedside and Verbal shift change report given to Keyonna COX (oncoming nurse) by Pamela Colin RN (offgoing nurse). Report given with SBAR, Tariq and MAR.

## 2022-01-02 NOTE — PROGRESS NOTES
Admit Date: 12/31/2021  Date of Service: 1/2/2022    Reason for follow-up: COVID      Assessment:         1. Acute on chronic systolic heart failure: last TTE 8/2021 with Ef 45 %, BNP 8747, troponins 37, 44   - 1/1 TTE with new EF at 46-10%, grade 3 diastolic function, PAP 42  2. Pneumonia due to COVID-19 virus (vaccintaed x 2): procalcitonin 0.15  3. Acute hypoxic respiratory failure secondary to pneumonia and fluid overload  4. Elevated d-dimer to 10.66:  CTA negative for PE. Suspect related to COVID/underlying sepsis process; trending down  5. EDWIGE: Cr. 1.53 upon admission  6. Mild bilateral pleural effusions  7. Transaminitis: ?due to COVID vs alternate etiology; US pending, acute hepatitis panel negative. CTA chest with \"mild free fluid\" in upper abdomen  8. CT with evidence of mild free fluid in upper quadrants of the abdomen   7. History of insomnia  8. Macrocytic anemia: B12 and folate levels normal  9. Coagulopathy- ? Due to hepatic process; INR 2.9. NOT on coumadin or other anticoagulants. 10. Hx of gross hematuria: seen by urology on 12/7 for eval. CTU was ordered. Possible cysto planned in f/u  11. CAD s/p CABG x 3 on 8/5/2021    Plan:   Decadron (started 12/31)  Continue toprol, paxil, flomax  Continue IV Lasix as per cardiology  I/o's, daily weight  Trenc CMP, CBC, d-dimer, procalciton  GI has been consulted by my colleague  F/u abd US- ordered, done,pending read    Droplet + isolation  Titrate O2 to maintain sats >92%    Appreciate Cardiology input. Current Antibtiocs:   Cefepime    Lines:   Peripheral    Case discussed with:  [x]Patient  []Family  [x]Nursing  []Case Management  DVT Prophylaxis:  [x]Lovenox  []Hep SQ  []SCDs  []Coumadin   []On Heparin gtt    I have independently examined the patient and reviewed all lab studies and imgaing as well as review of nursing notes and physican notes from the past 24 hours. Latha Kelly D.O.   Pager [phone]      Allergies   Allergen Reactions  Fluconazole Hives and Itching    Penicillin G Hives           Subjective:      Pt seen and examined. Tired. Resting comfortably. Remains SOB with ALLEN. Mild nauseam. No CP. Objective:        Visit Vitals  /72 (BP 1 Location: Left upper arm, BP Patient Position: At rest)   Pulse 74   Temp 97.5 °F (36.4 °C)   Resp 20   Ht 5' 8\" (1.727 m)   Wt 76.5 kg (168 lb 11.2 oz)   SpO2 97%   BMI 25.65 kg/m²     Temp (24hrs), Av.7 °F (36.5 °C), Min:97.3 °F (36.3 °C), Max:98.1 °F (36.7 °C)        General:   awake alert and oriented, non-toxic   Skin:   no rashes or skin lesions noted on limited exam, dry and warm   HEENT:  No scleral icterus or pallor; oral mucosa moist, lips moist   Lymph Nodes:   not assessed today   Lungs:   non, labored; bilaterally clear to aspiration- no crackles wheezes rales or rhonchi   Heart:  RRR, occasional ecotpy s1 and s2; no murmurs rubs or gallops; 1+ edema, + pedal pulses   Abdomen:  soft, non-distended, active bowel sounds, non-tender   Genitourinary:  deferred   Extremities:   average muscle tone; no contractures, no joint effusions   Neurologic:  No gross focal motor or sensory abnormalities; CN 2-12 intact; Follows commands. Psychiatric:   appropriate and interactive.        Labs: Results:   Chemistry Recent Labs     22  183   * 154* 152*    143 142   K 4.8 4.2 4.5   * 114* 109   CO2 19* 18* 25   BUN 64* 49* 56*   CREA 1.42* 1.17 1.50*   CA 8.7 8.5 8.4*   AGAP 11 11 8   BUCR 45* 42* 37*   * 359* 372*   TP 6.3* 6.4 6.6   ALB 3.1* 3.2* 3.3*   GLOB 3.2 3.2 3.3   AGRAT 1.0 1.0 1.0      CBC w/Diff Recent Labs     22  1830 21  0301   WBC 13.2 8.7 11.9   RBC 3.68* 3.73* 3.79*   HGB 12.2* 12.4* 12.6*   HCT 38.9 37.3 36.4   PLT 85* 90* 100*   GRANS  --  88* 79*   LYMPH  --  5* 8*   EOS  --  0 0        No results found for: SDES Lab Results   Component Value Date/Time    Culture result: No growth (<1,000 CFU/ML) 07/29/2021 08:33 AM        Results     Procedure Component Value Units Date/Time    COVID-19 WITH INFLUENZA A/B [[id number]]  (Abnormal) Collected: 12/31/21 0320    Order Status: Completed Specimen: Nasopharyngeal Updated: 12/31/21 0754     SARS-CoV-2 Detected        Comment: CALLED TO AND CORRECTLY REPEATED BY:  YAMINI GARCÍA RN, [facility] ED, 12/31/21 AT 0754 TO DRM  Positive results are indicative of the presence of SARS-CoV-2. Clinical correlation with patient history and other diagnostic information is necessary to determine patient infection status. Positive results do not rule out bacterial infection or co-infection with other pathogens. Influenza A by PCR Not detected        Influenza B by PCR Not detected        Comment: NOTE: Influenza A and Influenza B negative results should be considered presumptive in samples that have a positive SARS-CoV-2 result. Consider re-testing with an alternate FDA-approved test if clinically indicated. Testing was performed using marifer Maile SARS-CoV-2 and Influenza A/B nucleic acid assay. This test is a multiplex Real-Time Reverse Transcriptase Polymerase Chain Reaction (RT-PCR) based in [employer] diagnostic test intended for the qualitative detection of nucleic acids from SARS-CoV-2, Influenza A, and Influenza B in nasopharyngeal for use under the FDA's Emergency Use Authorization (EAU) only. Fact sheet for Patients: FindDrives.pl  Fact sheet for Healthcare Providers: FindDrives.pl         COVID-19 RAPID TEST [[id number]] Collected: 12/31/21 0300    Order Status: Canceled     INFLUENZA A & B AG (RAPID TEST) [[id number]] Collected: 12/31/21 0300    Order Status: Canceled Specimen: Nasopharyngeal           Imaging:     CTA CHEST W OR W WO CONT    Result Date: 12/31/2021  1. No evidence of pulmonary embolism. 2. Cardiomegaly. 3. Right greater than left mild pleural effusions.  Questionable mild interstitial edema at the lung base versus hypoinflation artifact. 4. Mild free fluid in the upper quadrants of the abdomen. 5. Diverticulosis.

## 2022-01-03 NOTE — PROGRESS NOTES
conducted an initial consultation and Spiritual Assessment for Necia Cheadle, who is a 80 y.o.,male. Patients Primary Language is: [place]. According to the patients EMR Pentecostalism Affiliation is: Hindu. The reason the Patient came to the hospital is:   Patient Active Problem List    Diagnosis Date Noted    CHF (congestive heart failure) ([facility] [address] 12/31/2021    SOB (shortness of breath) 12/31/2021    Transaminitis 12/31/2021    Pneumonia due to COVID-19 virus 12/31/2021    CAD (coronary artery disease) 08/05/2021    S/P CABG x 3 08/05/2021    Benign prostatic hyperplasia with lower urinary tract symptoms     Cerebral microvascular disease 09/25/2019    [facility] of [facility]    Pure hypercholesterolemia 05/07/2018    Sleeping difficulty 05/07/2018    Essential hypertension 04/23/2018    Anxiety and depression     Kidney stone     Malaria     Skin cancer     Gout     Anxiety 04/24/2017    Elevated PSA 11/18/2013    Prediabetes 11/18/2013    Insomnia 11/18/2013    Joint pain 11/18/2013    Gross hematuria 05/31/2013        The  provided the following Interventions:  Initiated a relationship of care and support. Chart reviewed. Assessment:  Not able to assess the patient due to medical condition. No family at bedside. Plan:  Chaplains will continue to follow and will provide pastoral care on an as needed/requested basis.  recommends bedside caregivers page  on duty if patient shows signs of acute spiritual or emotional distress.     [address]  [phone])

## 2022-01-03 NOTE — PROGRESS NOTES
Received report on pt.from off going RN. Resting quietly in bed on rounds. remains confused. Follows occ simple commands. Oriented to self only. Bed alarm on. Call light at side bu pt shows no evidence of understanding to use it. No acute distress noted. Side rails up x 3. Will cont to monitor. 0900 refused to eat breakfast. Took small sips of gingerale and water. Gets agitated easily and pushes food away. 1100 sleeping at this time. No distress noted. Bed alarm on.     1200 still very confused. Gets agitated easily. Tries to climb out of bed frequently. Redirected and re oriented frequently but does not retain info. Bed alarm on. Side rails up x 3.     1230 refused lunch except for pudding. Still gets agitated easily when care being given. Bed alarms on. inct of urine frequently and has to be cleaned. 1400 pt found climbing OOB trying to go to BR. Already standing so pt helped into BR. Gait fairly steady but pt impulsive, forgetful and difficult to redirect back to bed.     1600 again attempted to crawl OOB. Bed alarm on. Put back to bed with assist of 2 staff because pt was resistant to being put back to bed. 1850 bed alarm ringing and pt again found OOB standing. Had urinated on bed and floor. Cleaned and bed linens and gown changed. Assisted back into bed. Bed alarms on. HOB elevated. 2015 Bedside and Verbal shift change report given to Angelina COX (oncoming nurse) by Dimitri Bui RN (offgoing nurse). Report given with ROBERT, Tariq and MAR.

## 2022-01-03 NOTE — PROGRESS NOTES
Cardiology Progress Note    Admit Date: 12/31/2021  Attending Cardiologist: Dr. Patrick Infante:     Hospital Problems  Date Reviewed: 12/7/2021          Codes Class Noted POA    CHF (congestive heart failure) ([facility] [address] ICD-10-CM: I50.9  ICD-9-CM: 428.0  12/31/2021 Unknown        SOB (shortness of breath) ICD-10-CM: R06.02  ICD-9-CM: 786.05  12/31/2021 Unknown        Transaminitis ICD-10-CM: R74.01  ICD-9-CM: 790.4  12/31/2021 Unknown        Pneumonia due to COVID-19 virus ICD-10-CM: U07.1, J12.82  ICD-9-CM: 480.8, 079.89  12/31/2021 Unknown              1. Acute on chronic systolic CHF with bilateral pleural effusions- volume status stable s/p lasix iv 20 mg bid  2. Pneumonia due to COVID-19 virus (patient vaccintaed x 2)  3. Acute hypoxic respiratory failure- better on O2 by NC  4. Transaminitis - improving- statin on hold   5. CAD s/p CABG x 3 8/5/2021 -LIMA to the mid LAD, SVG to RPLV, and SVG to OM   6. ECHO done on 8/9/21 and it was noted that his EF had improved to 45% when compared to the echo done on 6/24/21 (EF at that time was 35%). recent (ECHO 1/2/22) LEV 10-15%  7. Elevated D-Dimer, CTA negative for PE  8. CT with evidence of mild free fluid in upper quadrants of the abdomen   9. Coagulopathy - INR 2.9 - not on anticoagulants  10. Thrombocytopenia - 85- follow morning labs. 11. Hypertension- stable   12. Insomnia  13. EDWIGE  14. Acute metabolic encephalopathy  - in thesetting of above            Echo 1/2/22    Left Ventricle: Left ventricle size is normal. Mildly increased wall thickness. Findings consistent with mild concentric hypertrophy. Severe global hypokinesis present. Severely reduced left ventricular systolic function with a visually estimated EF of 10 -15%. Grade III diastolic dysfunction with increased LAP.   Pericardium: Trivial pericardial effusion present. No indication of cardiac tamponade.   PAP of 42 mmHg. Plan:     Addendum: Independently seen and evaluated.   Agree with below.  Reasonable to titrate off of IV diuretic regimen. Increase activity as tolerated. With severely diminished LV function, if ACE inhibitor or ARB as tolerated, would initiate. His EF has declined severely since August 2021. He may require further evaluation for his decline in EF as well as for consideration of AICD implantation. This can be addressed as he fully recovers from Covid pneumonia. Volume status stable appears euvolemic  Will transition lasix iv bid to po   will change metoprol to Coreg for CMO  Would consider to start low dose ACEi if hemodynamics allows it  Continue treatment  for COV-19 per primary team and ID  Follow CBC and BMP today     Subjective:     Confused unable to answer my questions patient resting in bed no distress noted    Objective:      No data found.       Patient Vitals for the past 96 hrs:   Weight   01/02/22 2304 76.1 kg (167 lb 12.8 oz)   01/01/22 2157 76.5 kg (168 lb 11.2 oz)   01/01/22 1419 78.5 kg (173 lb)   12/31/21 0801 78.8 kg (173 lb 12.8 oz)   12/31/21 0256 78.5 kg (173 lb)       TELE: normal sinus rhythm/ with PVC's                Current Facility-Administered Medications   Medication Dose Route Frequency Last Admin    LORazepam (ATIVAN) injection 0.5 mg  0.5 mg IntraVENous ONCE      furosemide (LASIX) injection 20 mg  20 mg IntraVENous BID 20 mg at 01/02/22 1851    cefepime (MAXIPIME) 2 g in sterile water (preservative free) 10 mL IV syringe  2 g IntraVENous Q12H 2 g at 01/03/22 0751    albuterol (PROVENTIL VENTOLIN) nebulizer solution 2.5 mg  2.5 mg Nebulization Q6H PRN 2.5 mg at 12/31/21 1451    sodium chloride (NS) flush 5-40 mL  5-40 mL IntraVENous Q8H 10 mL at 01/03/22 0751    sodium chloride (NS) flush 5-40 mL  5-40 mL IntraVENous PRN      polyethylene glycol (MIRALAX) packet 17 g  17 g Oral DAILY PRN      ondansetron (ZOFRAN ODT) tablet 4 mg  4 mg Oral Q8H PRN      Or    ondansetron (ZOFRAN) injection 4 mg  4 mg IntraVENous Q6H PRN      enoxaparin (LOVENOX) injection 30 mg  30 mg SubCUTAneous Q12H 30 mg at 01/02/22 2125    dexamethasone (DECADRON) 4 mg/mL injection 6 mg  6 mg IntraVENous Q24H 6 mg at 01/02/22 2125    fluticasone propionate (FLONASE) 50 mcg/actuation nasal spray 2 Spray  2 Spray Both Nostrils DAILY 2 [place] at 01/02/22 1355    hydrOXYzine HCL (ATARAX) tablet 25 mg  25 mg Oral TID PRN 25 mg at 01/01/22 1036    metoprolol succinate (TOPROL-XL) XL tablet 25 mg  25 mg Oral DAILY 25 mg at 01/02/22 1040    PARoxetine (PAXIL) tablet 20 mg  20 mg Oral DAILY 20 mg at 01/02/22 1041    tamsulosin (FLOMAX) capsule 0.8 mg  0.8 mg Oral PCD 0.8 mg at 01/02/22 1851    zolpidem (AMBIEN) tablet 5 mg  5 mg Oral QHS PRN 5 mg at 01/02/22 2125       No intake or output data in the 24 hours ending 01/03/22 3658    Physical Exam:  General:  alert, confused, disoriented  Neck:  no stridor, no carotid bruit, no JVD  Lungs:  clear to auscultation bilaterally  Heart:  regular rate and rhythm, S1, S2 normal, no murmur, click, rub or gallop  Abdomen:  abdomen is soft without significant tenderness, masses, organomegaly or guarding  Extremities:  extremities normal, atraumatic, no cyanosis or edema    Visit Vitals  /78 (BP 1 Location: Left upper arm, BP Patient Position: Lying; At rest)   Pulse 69   Temp 97.4 °F (36.3 °C)   Resp 17   Ht 5' 8\" (1.727 m)   Wt 76.1 kg (167 lb 12.8 oz)   SpO2 95%   BMI 25.51 kg/m²       Data Review:     Labs: Results:       Chemistry Recent Labs     01/02/22 0227 01/01/22  0446 12/31/21  1830   * 154* 152*    143 142   K 4.8 4.2 4.5   * 114* 109   CO2 19* 18* 25   BUN 64* 49* 56*   CREA 1.42* 1.17 1.50*   CA 8.7 8.5 8.4*   AGAP 11 11 8   BUCR 45* 42* 37*   * 359* 372*   TP 6.3* 6.4 6.6   ALB 3.1* 3.2* 3.3*   GLOB 3.2 3.2 3.3   AGRAT 1.0 1.0 1.0      CBC w/Diff Recent Labs     01/02/22 0227 12/31/21  1830   WBC 13.2 8.7   RBC 3.68* 3.73*   HGB 12.2* 12.4*   HCT 38.9 37.3   PLT 85* 90*   GRANS  -- 88*   LYMPH  --  5*   EOS  --  0      Cardiac Enzymes No results found for: CPK, CK, CKMMB, CKMB, RCK3, CKMBT, CKNDX, CKND1, DEON, TROPT, TROIQ, KATHY, TROPT, TNIPOC, BNP, BNPP   Coagulation Recent Labs     01/01/22  0446   PTP 30.1*   INR 2.9*   APTT 34.7       Lipid Panel Lab Results   Component Value Date/Time    Cholesterol, total 215 (H) 06/19/2020 04:34 PM    HDL Cholesterol 43 06/19/2020 04:34 PM    LDL, calculated 121.8 (H) 06/19/2020 04:34 PM    VLDL, calculated 50.2 06/19/2020 04:34 PM    Triglyceride 251 (H) 06/19/2020 04:34 PM    CHOL/HDL Ratio 5.0 06/19/2020 04:34 PM      BNP No results found for: BNP, BNPP, XBNPT   Liver Enzymes Recent Labs     01/02/22  0227   TP 6.3*   ALB 3.1*   *      Thyroid Studies Lab Results   Component Value Date/Time    TSH 3.53 12/25/2021 10:05 PM          Signed By: ELIZABETH Malcolm     January 3, 2022

## 2022-01-03 NOTE — PROGRESS NOTES
PT orders received and chart reviewed. Droplet plus isolation. Attempting PT evaluation for 3 days; unable d/t patient uncooperative. Per chart review, mobilizing with staff in room. Formal PT evaluation not indicated. Defer to nursing staff for mobility in efforts of PPE conservation. Discontinuing PT orders.

## 2022-01-03 NOTE — PROGRESS NOTES
Admit Date: 12/31/2021  Date of Service: 1/3/2022    Reason for follow-up: COVID      Assessment:         1. Acute on chronic systolic heart failure: last TTE 8/2021 with Ef 45 %, BNP 8747, troponins 37, 44   - 1/1 TTE with new EF at 26-14%, grade 3 diastolic function, PAP 42  2. Pneumonia due to COVID-19 virus (vaccintaed x 2): procalcitonin 0.15  3. Acute hypoxic respiratory failure secondary to pneumonia and fluid overload from CHF  4. Thrombocytopenia:  suspect consumptive, COVID related as present upon admission on 12/31 but not on 12/25. No bleeding at lresent  5. Elevated d-dimer to 10.66:  CTA negative for PE. Suspect related to COVID/underlying sepsis process; trending down  6. EDWIGE: Cr. 1.53 upon admission  7. Mild bilateral pleural effusions  8. Transaminitis: ?due to COVID vs alternate etiology; US pending, acute hepatitis panel negative. CTA chest with \"mild free fluid\" in upper abdomen   - 12/31 abd US: Gallbladder mild wall thickening, but without additional findings to suggest  cholecystitis, possibly due to volume overload or hepatocellular disease  9. CT with evidence of mild free fluid in upper quadrants of the abdomen   7. History of insomnia  8. Macrocytic anemia: B12 and folate levels normal  9. Coagulopathy- ? Due to hepatic process; INR 2.9. NOT on coumadin or other anticoagulants. 10. Hx of gross hematuria: seen by urology on 12/7 for eval. CTU was ordered. Possible cysto planned in f/u  11. CAD s/p CABG x 3 on 8/5/2021    Plan:   Decadron (started 12/31)  No indication for Remdesivir  Continue toprol, paxil, flomax  Continue IV Lasix as per cardiology  I/o's, daily weight  Trenc CMP, CBC, d-dimer, procalciton  D/c cefepime  Droplet + isolation  Titrate O2 to maintain sats >92%  May give Ativan 0.5 prn for agitation. Continue with Paxil    Appreciate Cardiology input. Appreciate GI input    Waiting for today's CBC, CMP (ordered daily labs, not yet done)    Called patient's wife.  Updated on condition, decline in cardiac function from August until now and plan moving forward. All questions answered. Wife also at home with COVID, not feeling well. Current Antibtiocs:   Cefepime    Lines:   Peripheral    Case discussed with:  [x]Patient  []Family  [x]Nursing  []Case Management  DVT Prophylaxis:  [x]Lovenox  []Hep SQ  []SCDs  []Coumadin   []On Heparin gtt    I have independently examined the patient and reviewed all lab studies and imgaing as well as review of nursing notes and physican notes from the past 24 hours. Robson Wellington D.O. Pager [phone]      Allergies   Allergen Reactions    Fluconazole Hives and Itching    Penicillin G Hives           Subjective:      Pt seen and examined. Irritable. Not participating in PT/OT or allowing for nursing care. Pulling gown and sheets over his head. Nursing reports he has been trying to get out of bed. Remain on 3L NC with sats 95-98%    Objective:        Visit Vitals  /64 (BP 1 Location: Left upper arm, BP Patient Position: At rest;Lying)   Pulse 78   Temp 97 °F (36.1 °C)   Resp 20   Ht 5' 8\" (1.727 m)   Wt 76.1 kg (167 lb 12.8 oz)   SpO2 95%   BMI 25.51 kg/m²     Temp (24hrs), Av.5 °F (36.4 °C), Min:97 °F (36.1 °C), Max:97.9 °F (36.6 °C)        General:   awake alert and oriented to person ,chronically ill appearing   Skin:   no rashes or skin lesions noted on limited exam, dry and warm   HEENT:  No scleral icterus or pallor; oral mucosa moist, lips moist   Lymph Nodes:   not assessed today   Lungs:   non, labored; diminished at bases;no wheezes   Heart:  RRR, occasional ecotpy s1 and s2; no murmurs rubs or gallops; 1+ edema, + pedal pulses   Abdomen:  soft, non-distended, active bowel sounds, non-tender   Genitourinary:  deferred   Extremities:   average muscle tone; no contractures, no joint effusions   Neurologic:  No gross focal motor or sensory abnormalities; CN 2-12 intact; Follows commands.     Psychiatric:   confused, uncooperative       Labs: Results:   Chemistry Recent Labs     01/02/22 0227 01/01/22  0446 12/31/21  1830   * 154* 152*    143 142   K 4.8 4.2 4.5   * 114* 109   CO2 19* 18* 25   BUN 64* 49* 56*   CREA 1.42* 1.17 1.50*   CA 8.7 8.5 8.4*   AGAP 11 11 8   BUCR 45* 42* 37*   * 359* 372*   TP 6.3* 6.4 6.6   ALB 3.1* 3.2* 3.3*   GLOB 3.2 3.2 3.3   AGRAT 1.0 1.0 1.0      CBC w/Diff Recent Labs     01/02/22 0227 12/31/21  1830   WBC 13.2 8.7   RBC 3.68* 3.73*   HGB 12.2* 12.4*   HCT 38.9 37.3   PLT 85* 90*   GRANS  --  88*   LYMPH  --  5*   EOS  --  0        No results found for: SDES Lab Results   Component Value Date/Time    Culture result: No growth (<1,000 CFU/ML) 07/29/2021 08:33 AM        Results     Procedure Component Value Units Date/Time    COVID-19 WITH INFLUENZA A/B [[id number]]  (Abnormal) Collected: 12/31/21 0320    Order Status: Completed Specimen: Nasopharyngeal Updated: 12/31/21 0754     SARS-CoV-2 Detected        Comment: CALLED TO AND CORRECTLY REPEATED BY:  YAMINI GARCÍA RN, [facility], 12/31/21 AT 0754 TO [facility]  Positive results are indicative of the presence of SARS-CoV-2. Clinical correlation with patient history and other diagnostic information is necessary to determine patient infection status. Positive results do not rule out bacterial infection or co-infection with other pathogens. Influenza A by PCR Not detected        Influenza B by PCR Not detected        Comment: NOTE: Influenza A and Influenza B negative results should be considered presumptive in samples that have a positive SARS-CoV-2 result. Consider re-testing with an alternate FDA-approved test if clinically indicated. Testing was performed using marifer Maile SARS-CoV-2 and Influenza A/B nucleic acid assay.   This test is a multiplex Real-Time Reverse Transcriptase Polymerase Chain Reaction (RT-PCR) based in [employer] diagnostic test intended for the qualitative detection of nucleic acids from SARS-CoV-2, Influenza A, and Influenza B in nasopharyngeal for use under the FDA's Emergency Use Authorization (EAU) only. Fact sheet for Patients: FindDrives.pl  Fact sheet for Healthcare Providers: FindDrives.pl         COVID-19 RAPID TEST [[id number]] Collected: 12/31/21 0300    Order Status: Canceled     INFLUENZA A & B AG (RAPID TEST) [[id number]] Collected: 12/31/21 0300    Order Status: Canceled Specimen: Nasopharyngeal           Imaging:     CTA CHEST W OR W WO CONT    Result Date: 12/31/2021  1. No evidence of pulmonary embolism. 2. Cardiomegaly. 3. Right greater than left mild pleural effusions. Questionable mild interstitial edema at the lung base versus hypoinflation artifact. 4. Mild free fluid in the upper quadrants of the abdomen. 5. Diverticulosis. US ABD LTD    Result Date: 1/3/2022  Gallbladder mild wall thickening, but without additional findings to suggest cholecystitis, possibly due to volume overload or hepatocellular disease. Increased hepatic echotexture suggest nonspecific hepatocellular disease as steatosis. Portal vein flow not well assessed, possibly abnormal as described above.

## 2022-01-03 NOTE — ROUTINE PROCESS
Received patient in bed, awake, alert and oriented to self only. Reoriented and redirected. Condom cath pulled out. No complaints made, will continue to monitor. Report  Given by Anny Grady. 10:50 PM  Ativan IV given for restlessness as ordered. Update given to daughter. 6:49 AM  Patient is incontinence of urine. frequent Incontinence care rendered. 8:07 AM  Bedside and Verbal shift change report given to Louis Tamayo (oncoming nurse) by Dayanna Romano (offgoing nurse). Report included the following information Kardex, ED Summary, Intake/Output and MAR.

## 2022-01-03 NOTE — CONSULTS
[email or web address]  [phone]    GASTROENTEROLOGY CONSULT      Impression:   1. Elevated liver biochemistries: unclear whether inflammatory response in setting of COVID-19 infection v ischemic/low perfusion   - ALT 1203; ; Alk phos 325   - negative viral hep panel 12/31/21; unenhanced CTA chest without hepatobiliary pathology  2. Hyperbilirubinemia   - Tbili 7.0  3. Coagulopathy   - INR 2.9  4. Abnormal chest CT finding of free air in upper abdomen  5. Pneumonia due to COVID-19 virus    - confirmed 12/31/21  6. Acute hypoxic respiratory failure  7. Acute on chronic systolic heart failure  8. Mild bilateral pleural effusions   - BNP 8747  9. CAD s/p CABG x 3 on 8/5/2021  10. Macrocytosis  11. Elevated d-Dimer @ 7.05  12. Thrombocytopenia - plt 85k      Plan:     1. Await final report from abdominal US done 12/31/21  2. Best supportive care as established  3. Please continue to trend LFTs  4. Additional input pending patient's progression otherwise medical medical deferred to primary team.        Chief Complaint: elevated LFTs      HPI:  Abigail Coleman is a 80 y.o. male who I am being asked to see in consultation for an opinion regarding elevated LFTs. Patient is in isolation due to COVID-19 infection and HPI from chart review. He was admitted to hospital 12/31/21 for management of shortness of breath being hypoxia between 88-90%. During initial ED work-up was noted to have markedly elevated LFTs (ALT 1873, AST 1028, Alk phos 407, Tbili 6.5). Patient's liver biochemistries on 12/25/21 were , , Alk phos 217, Tbili 2.0. Chest CTA was unenhanced and liver appeared without abnormalities nor CBD dilation. RUQ US was ordered and done 12/31/21 and final report pending. Viral hepatitis panel was negative.      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    Elevated PSA     Gout     Hypertension     Insomnia     Kidney stone     Malaria     Prediabetes     S/P CABG x 3 8/5/2021    Skin cancer        PSH:   Past Surgical History:   Procedure Laterality Date    HX COLONOSCOPY  2011 f/u 2021    HX HERNIA REPAIR  2000    [address]    HX SKIN BIOPSY  2013    [address], [place]       Social HX:   Social History     Socioeconomic History    Marital status:      Spouse name: Not on file    Number of children: Not on file    Years of education: Not on file    Highest education level: Not on file   Occupational History    Not on file   Tobacco Use    Smoking status: Never Smoker    Smokeless tobacco: Never Used   Vaping Use    Vaping Use: Never used   Substance and Sexual Activity    Alcohol use: No    Drug use: No    Sexual activity: Not on file   Other Topics Concern    Not on file   Social History Narrative    Not on file     Social Determinants of Health     Financial Resource Strain:     Difficulty of Paying Living Expenses: Not on file   Food Insecurity:     Worried About Running Out of Food in the Last Year: Not on file    Clara of Food in the Last Year: Not on file   Transportation Needs:     Lack of Transportation (Medical): Not on file    Lack of Transportation (Non-Medical):  Not on file   Physical Activity:     Days of Exercise per Week: Not on file    Minutes of Exercise per Session: Not on file   Stress:     Feeling of Stress : Not on file   Social Connections:     Frequency of Communication with Friends and Family: Not on file    Frequency of Social Gatherings with Friends and Family: Not on file    Attends [employer] Services: Not on file    Active Member of Clubs or Organizations: Not on file    Attends Club or Organization Meetings: Not on file    Marital Status: Not on file   Intimate Partner Violence:     Fear of Current or Ex-Partner: Not on file    Emotionally Abused: Not on file   [place] Physically Abused: Not on file    Sexually Abused: Not on file   Housing Stability:     Unable to Pay for Housing in the Last Year: Not on file    Number of Places Lived in the Last Year: Not on file    Unstable Housing in the Last Year: Not on file       FHX:   History reviewed. No pertinent family history. Allergy:   Allergies   Allergen Reactions    Fluconazole Hives and Itching    Penicillin G Hives       Patient Active Problem List   Diagnosis Code    Gross hematuria R31.0    Elevated PSA R97.20    Prediabetes R73.03    Insomnia G47.00    Joint pain M25.50    Anxiety F41.9    Anxiety and depression F41.9, F32. 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  atorvastatin (LIPITOR) 40 mg tablet Take 1 Tablet by mouth daily.  metoprolol succinate (TOPROL-XL) 50 mg XL tablet Take 0.5 Tablets by mouth daily. Or as directed    diphenhydrAMINE hcl (BENADRYL) 2 % topical gel Apply 1 mL to affected area every six (6) hours as needed for Itching.  hydrOXYzine HCL (ATARAX) 25 mg tablet Take 25 mg by mouth three (3) times daily as needed for Itching.  fluticasone propionate (FLONASE) 50 mcg/actuation nasal spray 2 Sprays by Both Nostrils route daily.     PARoxetine (PAXIL) 20 mg tablet TAKE 1 TABLET BY MOUTH EVERY DAY    zolpidem (AMBIEN) 10 mg tablet Take 10 mg by mouth nightly as needed for Sleep. Review of Systems: patient in isolation due to COVID-19 infection          Visit Vitals  /78 (BP 1 Location: Left upper arm, BP Patient Position: Lying; At rest)   Pulse 69   Temp 97.4 °F (36.3 °C)   Resp 17   Ht 5' 8\" (1.727 m)   Wt 76.1 kg (167 lb 12.8 oz)   SpO2 95%   BMI 25.51 kg/m²       Physical Assessment: patient observed from through room glass. Patient observed standing in room with 2 nurses at bedside     constitutional: appearance: well developed, no deformities, in no acute distress. skin: inspection: no rashes and appears of normal color  eyes: inspection: normal conjunctivae and lids  ENMT: mouth: normal oral mucosa,lips  neck: supple  respiratory: unlabored effort; no intercostal retraction or accessory muscle use. cardiovascular: normal rate  abdominal: appears non-distended  rectal: hemoccult/guaiac: not performed. musculoskeletal: normal range, no deformity or contracture. neurologic: grossly normal by observation  psychiatric: interactive       Basic Metabolic Profile   Recent Labs     01/02/22 0227      K 4.8   *   CO2 19*   BUN 64*   *   CA 8.7         CBC w/Diff    Recent Labs     01/02/22 0227   WBC 13.2   RBC 3.68*   HGB 12.2*   HCT 38.9   .7*   MCH 33.2   MCHC 31.4   RDW 18.4*   PLT 85*    Recent Labs     12/31/21  1830   GRANS 88*   LYMPH 5*   EOS 0        Hepatic Function   Recent Labs     01/02/22 0227   ALB 3.1*   TP 6.3*   TBILI 7.0*   *        Coags   Recent Labs     01/01/22  0446   PTP 30.1*   INR 2.9*   APTT 34.7         Radiology    (1) Abominal US 12/31/21: pending    (2) CTA CHEST W OR W WO CONT  Result Date: 12/31/2021  1. No evidence of pulmonary embolism. 2. Cardiomegaly. 3. Right greater than left mild pleural effusions. Questionable mild interstitial edema at the lung base versus hypoinflation artifact.  4. Mild free fluid in the upper quadrants of the abdomen. 5. Diverticulosis. ALLISON Javed. [facility], [address] [place] [address]  Cell: [phone]  [email or web address]

## 2022-01-03 NOTE — PROGRESS NOTES
Discharge/Transition Planning    Left message for wife for initial assessment.  Patient too agitated for interview

## 2022-01-03 NOTE — ROUTINE PROCESS
OT order received and chart reviewed. Per nursing, pt continues to being restless and agitated requiring Ativan this am. RN requesting OT to hold. Will attempt one more day as this is second attempted eval with pt not appropriate due to agitation.  Thank you, Nathalia Espinoza, OTRL

## 2022-01-04 NOTE — PROGRESS NOTES
[address] [facility], [address]  [phone]  [email or web address]    Gastroenterology follow up-Progress note    Impression:  1. Elevated liver biochemistries: unclear whether inflammatory response in setting of COVID-19 infection v ischemic/low perfusion/thrombosis              - ; ; Alk phos 315 (trending down)              - negative viral hep panel 12/31/21; unenhanced CTA chest without hepatobiliary pathology   - Abd US 12/31/21 noted that portal vein not well assessed otherwise no findings suggestive of cholecystitis or acute liver disease. 2. Hyperbilirubinemia              - Tbili 10.8 (increased)  3. Coagulopathy              - INR 2.9  4. Abnormal chest CT finding of free air in upper abdomen  5. Pneumonia due to COVID-19 virus               - confirmed 12/31/21  6. Acute hypoxic respiratory failure  7. Acute on chronic systolic heart failure  8. Mild bilateral pleural effusions  9. CAD s/p CABG x 3 on 8/5/2021  10. Elevated d-Dimer @ 7.05  11. Thrombocytopenia - plt 108k (improved)  12. Encephalopathy      Plan:  1. Hepatic doppler ordered to evaluate portal vein flow  2. Continue to monitor  3. Medical management otherwise per primary team        Subjective:  No acute GI events overnight. Chart review notes agitation last night. Abnormal finding of RUQ US as below. RUQ US 12/31/21:  IMPRESSION     Gallbladder mild wall thickening, but without additional findings to suggest  cholecystitis, possibly due to volume overload or hepatocellular disease.     Increased hepatic echotexture suggest nonspecific hepatocellular disease as  steatosis. Portal vein flow not well assessed, possibly abnormal as described  above.     ROS: as above    General: calm, resting, no distress   Neck: supple    Cardiovascular: normal rate    Pulmonary/Chest Wall: unlabored respiratory effort   Abdominal: benign     Patient Active Problem List   Diagnosis Code    Gross hematuria R31.0    Elevated PSA R97.20    Prediabetes R73.03    Insomnia G47.00    Joint pain M25.50    Anxiety F41.9    Anxiety and depression F41.9, F32. 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    Benign prostatic hyperplasia with lower urinary tract symptoms N40.1    CAD (coronary artery disease) I25.10    S/P CABG x 3 Z95.1    CHF (congestive heart failure) (HCC) I50.9    SOB (shortness of breath) R06.02    Transaminitis R74.01    Pneumonia due to COVID-19 virus U07.1, J12.82         Visit Vitals  BP (!) 133/90 (BP 1 Location: Right upper arm, BP Patient Position: At rest)   Pulse 82   Temp 97.4 °F (36.3 °C)   Resp 20   Ht 5' 8\" (1.727 m)   Wt 73.6 kg (162 lb 3.2 oz)   SpO2 95%   BMI 24.66 kg/m²         No intake or output data in the 24 hours ending 01/04/22 0939    CBC w/Diff    Lab Results   Component Value Date/Time    WBC 14.0 (H) 01/04/2022 03:55 AM    RBC 4.09 (L) 01/04/2022 03:55 AM    HGB 13.4 01/04/2022 03:55 AM    HCT 40.1 01/04/2022 03:55 AM    MCV 98.0 01/04/2022 03:55 AM    MCH 32.8 01/04/2022 03:55 AM    MCHC 33.4 01/04/2022 03:55 AM    RDW 18.7 (H) 01/04/2022 03:55 AM     (L) 01/04/2022 03:55 AM    Lab Results   Component Value Date/Time    GRANS 88 (H) 12/31/2021 06:30 PM    LYMPH 5 (L) 12/31/2021 06:30 PM    EOS 0 12/31/2021 06:30 PM    BASOS 0 12/31/2021 06:30 PM      Coagulation/Chemistry   Lab Results   Component Value Date/Time    INR 2.9 (H) 01/01/2022 04:46 AM    INR 1.3 (H) 12/25/2021 10:05 PM    INR 1.5 (H) 08/05/2021 03:10 PM    Prothrombin time 30.1 (H) 01/01/2022 04:46 AM    Prothrombin time 16.1 (H) 12/25/2021 10:05 PM    Prothrombin time 18.0 (H) 08/05/2021 03:10 PM      Lab Results   Component Value Date/Time    Sodium 148 (H) 01/04/2022 03:55 AM    Potassium 4.7 01/04/2022 03:55 AM    Chloride 114 (H) 01/04/2022 03:55 AM    CO2 22 01/04/2022 03:55 AM    Anion gap 12 01/04/2022 03:55 AM    Glucose 129 (H) 01/04/2022 03:55 AM     (H) 01/04/2022 03:55 AM    Creatinine 1.86 (H) 01/04/2022 03:55 AM    BUN/Creatinine ratio 60 (H) 01/04/2022 03:55 AM    GFR est AA 43 (L) 01/04/2022 03:55 AM    GFR est non-AA 35 (L) 01/04/2022 03:55 AM    Calcium 8.9 01/04/2022 03:55 AM    Bilirubin, total 10.8 (H) 01/04/2022 03:55 AM    ALT (SGPT) 965 (H) 01/04/2022 03:55 AM    Alk. phosphatase 315 (H) 01/04/2022 03:55 AM    Protein, total 7.1 01/04/2022 03:55 AM    Albumin 3.6 01/04/2022 03:55 AM    Globulin 3.5 01/04/2022 03:55 AM    A-G Ratio 1.0 01/04/2022 03:55 AM         Radiology  US Results (most recent):  Results from [place] encounter on 12/31/21    US ABD LTD    Narrative  EXAMINATION: Ultrasound abdomen limited, right upper quadrant    INDICATION: Elevated liver enzymes    COMPARISON: None    TECHNIQUE: Grayscale, color, spectral sonographic images of the right upper  quadrant performed. FINDINGS:    Pancreas: Unremarkable. Tail not well visualized due to bowel gas and location. IVC: Unremarkable. Liver: 16.0 cm length. Increased parenchymal echotexture. Portal vein normal  caliber with flow not well assessed, but possibly bidirectional although  predominantly antegrade. No obvious focal parenchymal abnormality. Gallbladder: Nondistended. Wall measures up to 0.44 cm thick. No definite  calculi. Negative Cloud sign. Biliary tree: Common bile duct roughly 0.48 cm caliber. No hepatic duct  dilatation. Right kidney: 9.6 cm length. Normal echotexture. No hydronephrosis. No focal  abnormality. Impression  Gallbladder mild wall thickening, but without additional findings to suggest  cholecystitis, possibly due to volume overload or hepatocellular disease.     Increased hepatic echotexture suggest nonspecific hepatocellular disease as  steatosis. Portal vein flow not well assessed, possibly abnormal as described  above. ALLISON Hamlin    [facility].   [email or web address]  Phone: [phone]  Pager: [phone]

## 2022-01-04 NOTE — TELEPHONE ENCOUNTER
Patient's family called with concerns regarding patient's cardiovascular status. [facility] PSR directed family to speak with inpatient nursing staff, but family felt that inpatient staff was not being informative. Please contact Vincent Cai () at [phone].

## 2022-01-04 NOTE — PROGRESS NOTES
Hospitalist Progress Note    Patient: Germain Ponce Age: 80 y.o. : 1940 MR#: [id number] SSN: [id number]  Date/Time: 2022 9:09 AM    DOA: 2021  PCP: Elle Meadows MD    Subjective:     Nursing reported that patient was agitated overnight and this AM.   He receive sedative medications last night with little help. He continues to be agitated this AM and pulling out all his lines   No fever overnight  His oxygen saturation has been stable. Lab noted for Leukocytosis, slightly elevated. Improved Platelet   Na and chloride are elevated, creatinine elevated  AST, ALT, decreased. Bilirubin elevated,     Cardiology has switched him to oral lasix yesterday  He remains on decadron for covid-19. Note that he has been vaccinated      Interval Hospital Course:        ROS: unable due to persistent encephalopathy      Assessment/Plan:   1. Acute respiratory failure with hypoxia due to multifactorial   2. COVID-19 pneumonia   3. Acute on chronic systolic CHF, Echo with EF 15%  4. Bilateral pleural effusion with CHF  5. Elevated D-dimer due to covid-19 infection, CTA chest was negative for clot   6. H/o CAD with CABGx3  7. Thrombocytopenia, improved   8. Leukocytosis   9. EDWIGE   10. Transaminitis due to covid-19, GI has follow up with workup, unclear other pathology       -thickened gallbladder, no evidence of acute cholecystitis          Hyperbilirubinemia         CT with free air in abd  11. Elevated INR, due to liver pathology   12. Gross hematuria   13. Acute encephalopathy, multifactorial, metabolic/toxic, QBAXC-11, steroid use  14. Hyperglycemia     Given IM ativan today. Hold off his lasix. Give him 500-1000mL D5w for today. Check ammonia   Check INR today. He needs mesenteric duplex of abdomen due rule out obstruction   Check leg for dvt  Cont decadron (can wean if not hypoxic), droplet precaution. Add melatonin if tolerated.    ICS if tolerated   Cont Corge, tamsulosin, paxil   Spoke with GI for care plan   Spoke with cardiology team for further care. Can scan his head if persistent encephalopathy     Full code   Disposition planning:   Pending clinical status   Family update (.Called to his daughter phone, no answer, machine is full, unable to leave VM)  Additional Notes:    Time spent >35  minutes    Case discussed with:  [x]Patient  []Family  [x]Nursing  [x]Case Management  DVT Prophylaxis:  [x]Lovenox  []Hep SQ  []SCDs  []Coumadin   []On Heparin gtt    Signed By: Malena Auguste MD     2022 9:09 AM              Objective:   VS:   Visit Vitals  /89 (BP 1 Location: Right upper arm, BP Patient Position: At rest)   Pulse 83   Temp 97.4 °F (36.3 °C)   Resp 20   Ht 5' 8\" (1.727 m)   Wt 73.6 kg (162 lb 3.2 oz)   SpO2 95%   BMI 24.66 kg/m²      Tmax/24hrs: Temp (24hrs), Av.3 °F (36.3 °C), Min:97 °F (36.1 °C), Max:97.8 °F (36.6 °C)  No intake or output data in the 24 hours ending 22 0909    Tele: sinus  General:  Cooperative, Not in acute distress  HEENT: PERRL, EOMI, supple neck, no JVD, dry oral mucosa  Cardiovascular: S1S2 regular, no rub/gallop   Pulmonary: air entry bilaterally, no wheezing, +crackle  GI:  Soft, +tender, non distended, +bs, no guarding   Extremities:  trace pedal edema, +distal pulses appreciated   Neuro: confused, awake.  Able to move extremities with commands   Additional:       Current Facility-Administered Medications   Medication Dose Route Frequency    LORazepam (ATIVAN) injection 2 mg  2 mg IntraMUSCular ONCE    furosemide (LASIX) tablet 20 mg  20 mg Oral DAILY    carvediloL (COREG) tablet 3.125 mg  3.125 mg Oral Q12H    albuterol (PROVENTIL VENTOLIN) nebulizer solution 2.5 mg  2.5 mg Nebulization Q6H PRN    sodium chloride (NS) flush 5-40 mL  5-40 mL IntraVENous Q8H    sodium chloride (NS) flush 5-40 mL  5-40 mL IntraVENous PRN    polyethylene glycol (MIRALAX) packet 17 g  17 g Oral DAILY PRN    ondansetron (ZOFRAN ODT) tablet 4 mg  4 mg Oral Q8H PRN    Or    ondansetron (ZOFRAN) injection 4 mg  4 mg IntraVENous Q6H PRN    enoxaparin (LOVENOX) injection 30 mg  30 mg SubCUTAneous Q12H    dexamethasone (DECADRON) 4 mg/mL injection 6 mg  6 mg IntraVENous Q24H    fluticasone propionate (FLONASE) 50 mcg/actuation nasal spray 2 Spray  2 Spray Both Nostrils DAILY    hydrOXYzine HCL (ATARAX) tablet 25 mg  25 mg Oral TID PRN    PARoxetine (PAXIL) tablet 20 mg  20 mg Oral DAILY    tamsulosin (FLOMAX) capsule 0.8 mg  0.8 mg Oral PCD    zolpidem (AMBIEN) tablet 5 mg  5 mg Oral QHS PRN            Lab/Data Review:  Labs: Results:       Chemistry Recent Labs     01/04/22 0355 01/02/22 0227   * 126*   * 144   K 4.7 4.8   * 114*   CO2 22 19*   * 64*   CREA 1.86* 1.42*   BUCR 60* 45*   AGAP 12 11   CA 8.9 8.7     Recent Labs     01/04/22 0355 01/02/22 0227   * 1,203*   TP 7.1 6.3*   ALB 3.6 3.1*   GLOB 3.5 3.2   AGRAT 1.0 1.0      CBC w/Diff Recent Labs     01/04/22 0355 01/02/22 0227 01/02/22 0227   WBC 14.0*  --  13.2   RBC 4.09*  --  3.68*   HGB 13.4  --  12.2*   HCT 40.1  --  38.9   MCV 98.0   < > 105.7*   MCH 32.8   < > 33.2   MCHC 33.4   < > 31.4   RDW 18.7*   < > 18.4*   *  --  85*    < > = values in this interval not displayed. Coagulation No results for input(s): PTP, INR, APTT, INREXT in the last 72 hours.     Iron/Ferritin No results found for: IRON, FE, TIBC, IBCT, PSAT, FERR    BNP    Cardiac Enzymes Lab Results   Component Value Date/Time     01/27/2019 08:17 PM    CK - MB 1.8 01/27/2019 08:17 PM    CK-MB Index 1.4 01/27/2019 08:17 PM    Troponin-I, QT <0.02 01/27/2019 08:17 PM        Lactic Acid    Thyroid Studies          All Micro Results     Procedure Component Value Units Date/Time    COVID-19 WITH INFLUENZA A/B [[id number]]  (Abnormal) Collected: 12/31/21 0320    Order Status: Completed Specimen: Nasopharyngeal Updated: 12/31/21 0754     SARS-CoV-2 Detected        Comment: CALLED TO AND CORRECTLY REPEATED BY:  YAMINI GARCÍA RN, [facility] ED, 12/31/21 AT 0754 TO DRM  Positive results are indicative of the presence of SARS-CoV-2. Clinical correlation with patient history and other diagnostic information is necessary to determine patient infection status. Positive results do not rule out bacterial infection or co-infection with other pathogens. Influenza A by PCR Not detected        Influenza B by PCR Not detected        Comment: NOTE: Influenza A and Influenza B negative results should be considered presumptive in samples that have a positive SARS-CoV-2 result. Consider re-testing with an alternate FDA-approved test if clinically indicated. Testing was performed using marifer Maile SARS-CoV-2 and Influenza A/B nucleic acid assay. This test is a multiplex Real-Time Reverse Transcriptase Polymerase Chain Reaction (RT-PCR) based in [employer] diagnostic test intended for the qualitative detection of nucleic acids from SARS-CoV-2, Influenza A, and Influenza B in nasopharyngeal for use under the FDA's Emergency Use Authorization (EAU) only. Fact sheet for Patients: FindDrives.pl  Fact sheet for Healthcare Providers: FindDrives.pl         COVID-19 RAPID TEST [[id number]] Collected: 12/31/21 0300    Order Status: [facility] (RAPID TEST) [[id number]] Collected: 12/31/21 0300    Order Status: Canceled Specimen: Nasopharyngeal             Images:    CT (Most Recent). CT Results (most recent):  Results from Hospital Encounter encounter on 12/31/21    CTA CHEST W OR W WO CONT    Narrative  CTA CHEST PULMONARY EMBOLISM PROTOCOL    INDICATION: Shortness of breath increasing for 2 weeks. Cough. Shortness of  breath and dizziness. Question pulmonary embolism.     TECHNIQUE: Thin collimation axial images obtained through the level of the  pulmonary arteries with additional imaging through the chest following the  uneventful administration of intravenous contrast.  Images reconstructed into  MIP coronal and sagittal projections for complete evaluation of the tortuous and  overlapping pulmonary vascular structures and to reduce patient radiation dose. All CT scans are performed using dose optimization techniques as appropriate to  the performed exam including the following: Automated exposure control,  adjustment of mA and/or kV according to patient size, and use of iterative  reconstructive technique. COMPARISON: None. FINDINGS:    No filling defects are appreciated within the main, left, right, lobar or  visualized segmental pulmonary arteries to suggest embolism. Subsegmental  branches are limited by motion artifact in many regions. The thoracic aorta is  not aneurysmal. There is no contrast in the aorta and dissection cannot be  assessed. Injected contrast refluxes into IVC/hepatic veins. No significant pericardial effusion. Cardiomegaly. Coronary artery  calcifications. CABG. Sternotomy without union but no separation. Mild right  and tiny left pleural effusions. No enlarged axillary, hilar, or mediastinal  lymphadenopathy. Mild vertebral osteophytes. Mild hypoinflation. Minimal interstitial thickening at lung base. Mild dependent  atelectasis adjacent to effusion. Small volume free fluid in the bilateral upper quadrants. The unenhanced liver,  pancreas, spleen, adrenal glands, and partially imaged kidneys are within normal  limits. The gallbladder appears contracted. No CBD dilation. Diverticulosis. No  dilated bowel. Impression  1. No evidence of pulmonary embolism. 2. Cardiomegaly. 3. Right greater than left mild pleural effusions. Questionable mild  interstitial edema at the lung base versus hypoinflation artifact. 4. Mild free fluid in the upper quadrants of the abdomen. 5. Diverticulosis.          XRAY (Most Recent)      EKG Results for orders placed or performed in visit on 09/13/21   AMB POC EKG ROUTINE W/ 12 LEADS, INTER & REP     Status: None    Narrative    Read by Jase Enriquez MD. Sinus rhythm. IVCO. Old inferior MI.        2D ECHO 12/31/21    ECHO ADULT FOLLOW-UP OR LIMITED 01/02/2022 1/2/2022    Interpretation Summary    Left Ventricle: Left ventricle size is normal. Mildly increased wall thickness. Findings consistent with mild concentric hypertrophy. Severe global hypokinesis present. Severely reduced left ventricular systolic function with a visually estimated EF of 10 -15%. Grade III diastolic dysfunction with increased LAP.   Pericardium: Trivial pericardial effusion present. No indication of cardiac tamponade.   PAP of 42 mmHg.     Signed by: Radha Noe DO on 1/2/2022 10:29 AM

## 2022-01-04 NOTE — PROGRESS NOTES
OT order received and chart reviewed. Patient not appropriate for skilled OT evaluation x 2 attempts this date. 0810,  pt restless in bed, pulling off clothing and trying to get OOB. 1250, nursing reporting to try back later. Will continue to follow and see patient when available/as appropriate.             Thank you for this referral,   Yobani Starks MS, OTR/L

## 2022-01-04 NOTE — PROGRESS NOTES
Cardiology Progress Note    Admit Date: 12/31/2021  Attending Cardiologist: Dr. Fuentes Hairston:     Hospital Problems  Date Reviewed: 12/7/2021          Codes Class Noted POA    CHF (congestive heart failure) ([facility] [address] ICD-10-CM: I50.9  ICD-9-CM: 428.0  12/31/2021 Unknown        SOB (shortness of breath) ICD-10-CM: R06.02  ICD-9-CM: 786.05  12/31/2021 Unknown        Transaminitis ICD-10-CM: R74.01  ICD-9-CM: 790.4  12/31/2021 Unknown        Pneumonia due to COVID-19 virus ICD-10-CM: U07.1, J12.82  ICD-9-CM: 480.8, 079.89  12/31/2021 Unknown              1. Acute on chronic systolic CHF with bilateral pleural effusions- volume status stable s/p lasix iv 20 mg bid  2. Pneumonia due to COVID-19 virus (patient vaccintaed x 2)  3. Acute hypoxic respiratory failure- better on O2 by NC  4. EDWIGE on CKD- in the setting diuretic use vs COV-19 infection   5. Transaminitis - improving- statin on hold   6. CAD s/p CABG x 3 8/5/2021 -LIMA to the mid LAD, SVG to RPLV, and SVG to OM   7. ECHO done on 8/9/21 and it was noted that his EF had improved to 45% when compared to the echo done on 6/24/21 (EF at that time was 35%). recent (ECHO 1/2/22) LEV 10-15%  8. Elevated D-Dimer, CTA negative for PE  9. CT with evidence of mild free fluid in upper quadrants of the abdomen   10. Coagulopathy - INR 2.9 - not on anticoagulants  11. Thrombocytopenia - 85- follow morning labs. 12. Hypertension- stable   13. Acute metabolic encephalopathy - in thesetting of above  14. Hypernatremia mild in the setting poor po intake and diuretics             Echo 1/2/22    Left Ventricle: Left ventricle size is normal. Mildly increased wall thickness. Findings consistent with mild concentric hypertrophy. Severe global hypokinesis present. Severely reduced left ventricular systolic function with a visually estimated EF of 10 -15%. Grade III diastolic dysfunction with increased LAP.   Pericardium: Trivial pericardial effusion present.  No indication of cardiac tamponade.   PAP of 42 mmHg. Plan:       Volume status stable. Continues to confused and disoriented   Renal indices elevated. Lasix on hold   Tolerating low dose Coreg will increase to 6.25 mg bid  Not on ACEi or ARB given EDWIGE on CKD  Continue treatment  for COV-19 per primary team and ID  Consideration for ischemic w/u when COV-19 resolves.        Subjective:     Confused unable to answer my questions patient resting in bed no distress noted    Objective:      Patient Vitals for the past 8 hrs:   Temp Pulse Resp BP SpO2   01/04/22 1310 97.4 °F (36.3 °C) 94 20 (!) 137/95 92 %   01/04/22 0914 97.4 °F (36.3 °C) 82 20 (!) 133/90 95 %         Patient Vitals for the past 96 hrs:   Weight   01/04/22 0344 73.6 kg (162 lb 3.2 oz)   01/04/22 0034 147.6 kg (325 lb 8 oz)   01/02/22 2304 76.1 kg (167 lb 12.8 oz)   01/01/22 2157 76.5 kg (168 lb 11.2 oz)   01/01/22 1419 78.5 kg (173 lb)       TELE: normal sinus rhythm/ with PVC's                Current Facility-Administered Medications   Medication Dose Route Frequency Last Admin    melatonin tablet 5 mg  5 mg Oral QHS      [Held by provider] furosemide (LASIX) tablet 20 mg  20 mg Oral DAILY 20 mg at 01/04/22 0847    carvediloL (COREG) tablet 3.125 mg  3.125 mg Oral Q12H 3.125 mg at 01/04/22 0847    albuterol (PROVENTIL VENTOLIN) nebulizer solution 2.5 mg  2.5 mg Nebulization Q6H PRN 2.5 mg at 12/31/21 1451    sodium chloride (NS) flush 5-40 mL  5-40 mL IntraVENous Q8H 10 mL at 01/04/22 0505    sodium chloride (NS) flush 5-40 mL  5-40 mL IntraVENous PRN      polyethylene glycol (MIRALAX) packet 17 g  17 g Oral DAILY PRN      ondansetron (ZOFRAN ODT) tablet 4 mg  4 mg Oral Q8H PRN      Or    ondansetron (ZOFRAN) injection 4 mg  4 mg IntraVENous Q6H PRN      enoxaparin (LOVENOX) injection 30 mg  30 mg SubCUTAneous Q12H 30 mg at 01/04/22 0920    dexamethasone (DECADRON) 4 mg/mL injection 6 mg  6 mg IntraVENous Q24H 6 mg at 01/03/22 0731    fluticasone propionate (FLONASE) 50 mcg/actuation nasal spray 2 Spray  2 Spray Both Nostrils DAILY 2 [place] at 01/04/22 0848    PARoxetine (PAXIL) tablet 20 mg  20 mg Oral DAILY 20 mg at 01/04/22 0846    tamsulosin (FLOMAX) capsule 0.8 mg  0.8 mg Oral PCD 0.8 mg at 01/02/22 1851    zolpidem (AMBIEN) tablet 5 mg  5 mg Oral QHS PRN 5 mg at 01/02/22 2125       No intake or output data in the 24 hours ending 01/04/22 1312    Physical Exam: limited physical exam due to COV-19 pneumonia   General:  Confused, resting in bed   Heart:  regular rate and rhythm by telemetry   Extremities:  extremities normal, atraumatic, no cyanosis or edema    Visit Vitals  BP (!) 137/95 (BP 1 Location: Right upper arm, BP Patient Position: At rest)   Pulse 94   Temp 97.4 °F (36.3 °C)   Resp 20   Ht 5' 8\" (1.727 m)   Wt 73.6 kg (162 lb 3.2 oz)   SpO2 92%   BMI 24.66 kg/m²       Data Review:     Labs: Results:       Chemistry Recent Labs     01/04/22  0355 01/02/22 0227   * 126*   * 144   K 4.7 4.8   * 114*   CO2 22 19*   * 64*   CREA 1.86* 1.42*   CA 8.9 8.7   AGAP 12 11   BUCR 60* 45*   * 325*   TP 7.1 6.3*   ALB 3.6 3.1*   GLOB 3.5 3.2   AGRAT 1.0 1.0      CBC w/Diff Recent Labs     01/04/22 0355 01/02/22 0227   WBC 14.0* 13.2   RBC 4.09* 3.68*   HGB 13.4 12.2*   HCT 40.1 38.9   * 85*      Cardiac Enzymes No results found for: CPK, CK, CKMMB, CKMB, RCK3, CKMBT, CKNDX, CKND1, DEON, TROPT, TROIQ, KATHY, TROPT, TNIPOC, BNP, BNPP   Coagulation No results for input(s): PTP, INR, APTT, INREXT, INREXT in the last 72 hours.     Lipid Panel Lab Results   Component Value Date/Time    Cholesterol, total 215 (H) 06/19/2020 04:34 PM    HDL Cholesterol 43 06/19/2020 04:34 PM    LDL, calculated 121.8 (H) 06/19/2020 04:34 PM    VLDL, calculated 50.2 06/19/2020 04:34 PM    Triglyceride 251 (H) 06/19/2020 04:34 PM    CHOL/HDL Ratio 5.0 06/19/2020 04:34 PM      BNP No results found for: BNP, BNPP, XBNPT   Liver Enzymes Recent Labs 01/04/22  0355   TP 7.1   ALB 3.6   *      Thyroid Studies Lab Results   Component Value Date/Time    TSH 3.53 12/25/2021 10:05 PM          Signed By: ELIZABETH Marcum     January 4, 2022

## 2022-01-04 NOTE — PROGRESS NOTES
Pt very restless and impulsive, jumping out of bed. MD notified, 2mg of ativan IVP ordered. Will continue to monitor pt accordingly.

## 2022-01-05 NOTE — TELEPHONE ENCOUNTER
James Arnold daughter called an stated that no one will give her an update on her fathers condition. Patient is in the hospital and would like for Dr. Sandrita Reese to look at patient heart due to his heart % is at 15% after being in the hospital. Provider was made aware.

## 2022-01-05 NOTE — PROGRESS NOTES
OT order received and chart reviewed. Unable to see patient for skilled OTevaluation x 2 attempts this date as [phone] nursing reporting to let patient sleep and 1155 nursing and other staff member in room getting gown back onto patient/ repositioning patient. Patient unable to be seen for skilled OT evaluation/treatment for 4th consecutive day as pt given ativan, confused and not appropriate, will sign off at this time. RN notified that order will be d/c'd.             Thank you for this referral,  Deena Oneal MS, OTR/L

## 2022-01-05 NOTE — ROUTINE PROCESS
Patient resting with periods of restlessness, pulling gown off, incontinent of urine. Not verbally responding, appears drowsy, arouses easily to touch, keeps eyes close. Respirations even, skin warm and dry, bed alarm on. VS recorded, will continue to monitor patient.

## 2022-01-05 NOTE — PROGRESS NOTES
Cardiology Progress Note    Admit Date: 12/31/2021  Attending Cardiologist: Dr. Garay Jania:     Hospital Problems  Date Reviewed: 12/7/2021          Codes Class Noted POA    CHF (congestive heart failure) ([facility] [address] ICD-10-CM: I50.9  ICD-9-CM: 428.0  12/31/2021 Unknown        SOB (shortness of breath) ICD-10-CM: R06.02  ICD-9-CM: 786.05  12/31/2021 Unknown        Transaminitis ICD-10-CM: R74.01  ICD-9-CM: 790.4  12/31/2021 Unknown        Pneumonia due to COVID-19 virus ICD-10-CM: U07.1, J12.82  ICD-9-CM: 480.8, 079.89  12/31/2021 Unknown                1. Acute on chronic systolic CHF with bilateral pleural effusions-diuresed with IV lasix. 2. Pneumonia due to COVID-19 virus (patient vaccintaed x 2)  2. Acute hypoxic respiratory failure- better on O2 by NC  3. EDWIGE on CKD- in the setting diuretic use vs COV-19 infection   4. Transaminitis - improving- statin on hold   5. CAD s/p CABG x 3 8/5/2021 -LIMA to the mid LAD, SVG to RPLV, and SVG to OM   6. ECHO done on 8/9/21 and it was noted that his EF had improved to 45% when compared to the echo done on 6/24/21 (EF at that time was 35%). recent (ECHO 1/2/22) LEV 10-15%  7. Elevated D-Dimer, CTA negative for PE  8. CT with evidence of mild free fluid in upper quadrants of the abdomen   9. Coagulopathy - INR 2.9 - not on anticoagulants  10. Thrombocytopenia - 85- follow morning labs. 11. Hypertension- stable   12. Acute metabolic encephalopathy - in thesetting of above  13. Hypernatremia mild in the setting poor po intake and diuretics      Primary cardiologist Dr. Rosa Isela Quan      Echo 1/2/22    Left Ventricle: Left ventricle size is normal. Mildly increased wall thickness. Findings consistent with mild concentric hypertrophy. Severe global hypokinesis present. Severely reduced left ventricular systolic function with a visually estimated EF of 10 -15%. Grade III diastolic dysfunction with increased LAP.   Pericardium: Trivial pericardial effusion present.  No indication of cardiac tamponade.   PAP of 42 mmHg.       Plan:     Renal function improving with holding lasix. Would continue diuretics only on PRN basis at this time. Continue coreg. No ace at this time given renal function. Continued on ASA. No statin given elevated LFTs. Would continue work up and treatment of underlying covid illness. Once recovered from acute illness can consider ischemic evaluation for completeness, this can be considered as outpatient. Subjective:     Noted intermittent confusion and agitation. Objective:      No data found.       Patient Vitals for the past 96 hrs:   Weight   01/04/22 2330 160 lb 4.8 oz (72.7 kg)   01/04/22 0344 162 lb 3.2 oz (73.6 kg)   01/04/22 0034 325 lb 8 oz (147.6 kg)   01/02/22 2304 167 lb 12.8 oz (76.1 kg)   01/01/22 2157 168 lb 11.2 oz (76.5 kg)   01/01/22 1419 173 lb (78.5 kg)       TELE: normal sinus rhythm               Current Facility-Administered Medications   Medication Dose Route Frequency Last Admin    melatonin tablet 5 mg  5 mg Oral QHS 5 mg at 01/04/22 2355    aspirin chewable tablet 81 mg  81 mg Oral DAILY 81 mg at 01/05/22 0859    hydrOXYzine HCL (ATARAX) tablet 25 mg  25 mg Oral Q6H PRN 25 mg at 01/05/22 0548    [Held by provider] furosemide (LASIX) tablet 20 mg  20 mg Oral DAILY 20 mg at 01/04/22 0847    carvediloL (COREG) tablet 3.125 mg  3.125 mg Oral Q12H 3.125 mg at 01/05/22 0859    albuterol (PROVENTIL VENTOLIN) nebulizer solution 2.5 mg  2.5 mg Nebulization Q6H PRN 2.5 mg at 12/31/21 1451    sodium chloride (NS) flush 5-40 mL  5-40 mL IntraVENous Q8H 10 mL at 01/05/22 0538    sodium chloride (NS) flush 5-40 mL  5-40 mL IntraVENous PRN      polyethylene glycol (MIRALAX) packet 17 g  17 g Oral DAILY PRN      ondansetron (ZOFRAN ODT) tablet 4 mg  4 mg Oral Q8H PRN      Or    ondansetron (ZOFRAN) injection 4 mg  4 mg IntraVENous Q6H PRN      enoxaparin (LOVENOX) injection 30 mg  30 mg SubCUTAneous Q12H 30 mg at 01/05/22 0900    dexamethasone (DECADRON) 4 mg/mL injection 6 mg  6 mg IntraVENous Q24H 6 mg at 01/04/22 2344    fluticasone propionate (FLONASE) 50 mcg/actuation nasal spray 2 Spray  2 Spray Both Nostrils DAILY 2 [place] at 01/05/22 0905    PARoxetine (PAXIL) tablet 20 mg  20 mg Oral DAILY 20 mg at 01/05/22 0859    tamsulosin (FLOMAX) capsule 0.8 mg  0.8 mg Oral PCD 0.8 mg at 01/02/22 1851    zolpidem (AMBIEN) tablet 5 mg  5 mg Oral QHS PRN 5 mg at 01/02/22 2125         Intake/Output Summary (Last 24 hours) at 1/5/2022 1100  Last data filed at 1/5/2022 0540  Gross per 24 hour   Intake 160 ml   Output    Net 160 ml       Physical Exam:  Limited in setting of covid illness. Breathing stable on 3-4 liters. BP stable. Tele sinus.       Visit Vitals  BP (!) 136/93 (BP 1 Location: Right upper arm)   Pulse 83   Temp 97.7 °F (36.5 °C)   Resp 20   Ht 5' 8\" (1.727 m)   Wt 160 lb 4.8 oz (72.7 kg)   SpO2 92%   BMI 24.37 kg/m²       Data Review:     Labs: Results:       Chemistry Recent Labs     01/05/22  0542 01/04/22  0355   * 129*   * 148*   K 5.1 4.7   * 114*   CO2 23 22   BUN 84* 111*   CREA 1.36* 1.86*   CA 8.5 8.9   AGAP 8 12   BUCR 62* 60*   * 315*   TP 6.5 7.1   ALB 3.0* 3.6   GLOB 3.5 3.5   AGRAT 0.9 1.0      CBC w/Diff Recent Labs     01/05/22  0542 01/04/22  0355   WBC 15.8* 14.0*   RBC 4.22* 4.09*   HGB 13.7 13.4   HCT 42.5 40.1   * 108*      Cardiac Enzymes No results found for: CPK, CK, CKMMB, CKMB, RCK3, CKMBT, CKNDX, CKND1, DEON, TROPT, TROIQ, KATHY, TROPT, TNIPOC, BNP, BNPP   Coagulation Recent Labs     01/05/22  0542 01/04/22  1315   PTP 23.3* 26.2*   INR 2.1* 2.4*       Lipid Panel Lab Results   Component Value Date/Time    Cholesterol, total 215 (H) 06/19/2020 04:34 PM    HDL Cholesterol 43 06/19/2020 04:34 PM    LDL, calculated 121.8 (H) 06/19/2020 04:34 PM    VLDL, calculated 50.2 06/19/2020 04:34 PM    Triglyceride 251 (H) 06/19/2020 04:34 PM    CHOL/HDL Ratio 5.0 06/19/2020 04:34 PM      BNP No results found for: BNP, BNPP, XBNPT   Liver Enzymes Recent Labs     01/05/22  0542   TP 6.5   ALB 3.0*   *      Thyroid Studies Lab Results   Component Value Date/Time    TSH 3.53 12/25/2021 10:05 PM          Signed By: ALLISON Pinedo     January 5, 2022

## 2022-01-05 NOTE — ROUTINE PROCESS
Bedside and Verbal shift change report given to [address] [place] [address] (oncoming nurse) by Shawna Jack (offgoing nurse). Report included the following information SBAR, Kardex, MAR, Recent Results and Cardiac Rhythm SR. Patient quietly resting with periods of restlessness, bed alarm on.

## 2022-01-05 NOTE — TELEPHONE ENCOUNTER
Talked to provider DANK Sullivan and was advised to have the cardiologist call the patient since the patient has been signed off from the office.

## 2022-01-05 NOTE — ROUTINE PROCESS
Return patient's daughter Keyonna[employer] called, checking on patient's condition. Ms Jesusita Rodriguez is requesting to speak with Cardiologist concerning her father's condition tomorrow.

## 2022-01-05 NOTE — PROGRESS NOTES
Hospitalist Progress Note    Patient: Roma Dow Age: 80 y.o. : 1940 MR#: [id number] SSN: [id number]  Date/Time: 2022 9:09 AM    DOA: 2021  PCP: Pratik Cha MD    Subjective:     Got Ativan yesterday for altered mental status. Cardiology changed him to p.o. Lasix. LFTs trending down. mes duplex negative for clot. Le dupex negative for clot. Upper extremity duplex revealed occlusive thrombus right cephalic forearm vein, and 2 nonocclusive thrombi. Patient seen and examined at bedside, he is minimally arousable, not answering questions or following commands. Spoke w/ daughter over phone, she reports poor po intake over at least a couple of weeks, he was drinking some Boost.     stepmom is very sick w/ covid. ROS: unable due to persistent encephalopathy      Assessment/Plan:     1. Acute respiratory failure with hypoxia due to multifactorial   2. COVID-19 pneumonia in vaccinated patient. Continue Decadron. ID consulted. 3.   Acute on chronic systolic CHF, Echo with EF 15%. Continue Lasix, beta-blocker. Cardiology follows. 4.   Bilateral pleural effusion with CHF  5. Elevated D-dimer due to covid-19 infection, CTA chest was negative for clot   6.   hx CAD with CABGx3  7. Thrombocytopenia, improved   8. Leukocytosis, trending up. Urinalysis, urine culture. Chest x-ray. CT abdomen pelvis. ID to consult. 9.    EDWIGE   10. Transaminitis in the setting of COVID-19. Mesenteric duplex no evidence of thrombus. GI follows.       -thickened gallbladder, no evidence of acute cholecystitis          Hyperbilirubinemia, increasing. CT with free fluid in abdomen. 11.  Elevated INR, due to liver pathology   12. Gross hematuria. Saw Dr. Joceline Duque urology of [address] [place] [address], , urine culture, urine pathology negative at that time. History of kidney stone. CT abdomen and pelvis noncontrast, renal stone protocol.   13.   Acute encephalopathy, multifactorial, metabolic/toxic, AOESF-08, steroid use, electrolyte abnormalities. Urine drug screen at admission positive for opiates, benzos. Correct sodium. Look for other reversible causes. CT head stat. Avoid benzos. Monitor for constipation. If no improvement by morning, Neurology consult. 14.  Steroid-induced hyperglycemia. Sliding scale insulin. 15. hypertension. Monitor on current medications. 12. Full code      daughter Qasim Vidales updated over phone, all questions answered to the best of my ability. Disposition planning:   Pending clinical status     Additional Notes:    Time spent 45  minutes    Case discussed with:  [x]Patient  [x]Family  [x]Nursing  [x]Case Management  DVT Prophylaxis:  [x]Lovenox  []Hep SQ  []SCDs  []Coumadin   []On Heparin gtt    Signed By: King Grace MD     2022 9:09 AM              Objective:   VS:   Visit Vitals  BP (!) 136/93 (BP 1 Location: Right upper arm)   Pulse 83   Temp 97.7 °F (36.5 °C)   Resp 20   Ht 5' 8\" (1.727 m)   Wt 72.7 kg (160 lb 4.8 oz)   SpO2 92%   BMI 24.37 kg/m²      Tmax/24hrs: Temp (24hrs), Av.6 °F (36.4 °C), Min:97.4 °F (36.3 °C), Max:97.7 °F (36.5 °C)      Intake/Output Summary (Last 24 hours) at 2022 0943  Last data filed at 2022 0540  Gross per 24 hour   Intake 160 ml   Output    Net 160 ml       Tele: sinus  General: Awakens briefly, not answering questions, not following commands. HEENT: PERRL, EOMI, supple neck, no JVD, dry oral mucosa  Cardiovascular: S1S2 regular, no rub/gallop   Pulmonary: air entry bilaterally, no wheezing  GI:  Soft, non tender, non distended, nabs, no guarding   Extremities:  trace pedal edema, +distal pulses appreciated   Neuro: not answering questions, not following commands. Additional: no rash to visible skin.        Current Facility-Administered Medications   Medication Dose Route Frequency    melatonin tablet 5 mg  5 mg Oral QHS    aspirin chewable tablet 81 mg  81 mg Oral DAILY    hydrOXYzine HCL (ATARAX) tablet 25 mg  25 mg Oral Q6H PRN    [Held by provider] furosemide (LASIX) tablet 20 mg  20 mg Oral DAILY    carvediloL (COREG) tablet 3.125 mg  3.125 mg Oral Q12H    albuterol (PROVENTIL VENTOLIN) nebulizer solution 2.5 mg  2.5 mg Nebulization Q6H PRN    sodium chloride (NS) flush 5-40 mL  5-40 mL IntraVENous Q8H    sodium chloride (NS) flush 5-40 mL  5-40 mL IntraVENous PRN    polyethylene glycol (MIRALAX) packet 17 g  17 g Oral DAILY PRN    ondansetron (ZOFRAN ODT) tablet 4 mg  4 mg Oral Q8H PRN    Or    ondansetron (ZOFRAN) injection 4 mg  4 mg IntraVENous Q6H PRN    enoxaparin (LOVENOX) injection 30 mg  30 mg SubCUTAneous Q12H    dexamethasone (DECADRON) 4 mg/mL injection 6 mg  6 mg IntraVENous Q24H    fluticasone propionate (FLONASE) 50 mcg/actuation nasal spray 2 Spray  2 Spray Both Nostrils DAILY    PARoxetine (PAXIL) tablet 20 mg  20 mg Oral DAILY    tamsulosin (FLOMAX) capsule 0.8 mg  0.8 mg Oral PCD    zolpidem (AMBIEN) tablet 5 mg  5 mg Oral QHS PRN            Lab/Data Review:  Labs: Results:       Chemistry Recent Labs     01/05/22 0542 01/04/22  0355   * 129*   * 148*   K 5.1 4.7   * 114*   CO2 23 22   BUN 84* 111*   CREA 1.36* 1.86*   BUCR 62* 60*   AGAP 8 12   CA 8.5 8.9     Recent Labs     01/05/22 0542 01/04/22  0355   * 965*   TP 6.5 7.1   ALB 3.0* 3.6   GLOB 3.5 3.5   AGRAT 0.9 1.0      CBC w/Diff Recent Labs     01/05/22 0542 01/04/22 0355 01/04/22  0355   WBC 15.8*  --  14.0*   RBC 4.22*  --  4.09*   HGB 13.7  --  13.4   HCT 42.5  --  40.1   .7*   < > 98.0   MCH 32.5   < > 32.8   MCHC 32.2   < > 33.4   RDW 19.9*   < > 18.7*   *  --  108*    < > = values in this interval not displayed.       Coagulation Recent Labs     01/05/22  0542 01/04/22  1315   PTP 23.3* 26.2*   INR 2.1* 2.4*       Iron/Ferritin No results found for: IRON, FE, TIBC, IBCT, PSAT, FERR    BNP    Cardiac Enzymes Lab Results   Component Value Date/Time     01/27/2019 08:17 PM    CK - MB 1.8 01/27/2019 08:17 PM    CK-MB Index 1.4 01/27/2019 08:17 PM    Troponin-I, QT <0.02 01/27/2019 08:17 PM        Lactic Acid    Thyroid Studies          All Micro Results     Procedure Component Value Units Date/Time    COVID-19 WITH INFLUENZA A/B [[id number]]  (Abnormal) Collected: 12/31/21 0320    Order Status: Completed Specimen: Nasopharyngeal Updated: 12/31/21 0754     SARS-CoV-2 Detected        Comment: CALLED TO AND CORRECTLY REPEATED BY:  YAMINI GARCÍA RN, [facility] ED, 12/31/21 AT 0754 TO DRM  Positive results are indicative of the presence of SARS-CoV-2. Clinical correlation with patient history and other diagnostic information is necessary to determine patient infection status. Positive results do not rule out bacterial infection or co-infection with other pathogens. Influenza A by PCR Not detected        Influenza B by PCR Not detected        Comment: NOTE: Influenza A and Influenza B negative results should be considered presumptive in samples that have a positive SARS-CoV-2 result. Consider re-testing with an alternate FDA-approved test if clinically indicated. Testing was performed using marifer Maile SARS-CoV-2 and Influenza A/B nucleic acid assay. This test is a multiplex Real-Time Reverse Transcriptase Polymerase Chain Reaction (RT-PCR) based in virto diagnostic test intended for the qualitative detection of nucleic acids from SARS-CoV-2, Influenza A, and Influenza B in nasopharyngeal for use under the FDA's Emergency Use Authorization (EAU) only.        Fact sheet for Patients: FindDrives.pl  Fact sheet for Healthcare Providers: FindDrives.pl         COVID-19 RAPID TEST [[id number]] Collected: 12/31/21 0300    Order Status: Canceled     INFLUENZA A & B AG (RAPID TEST) [[id number]] Collected: 12/31/21 0300    Order Status: Canceled Specimen: Nasopharyngeal             Images:    CT (Most Recent). CT Results (most recent):  Results from Hospital Encounter encounter on 12/31/21    CTA CHEST W OR W WO CONT    Narrative  CTA CHEST PULMONARY EMBOLISM PROTOCOL    INDICATION: Shortness of breath increasing for 2 weeks. Cough. Shortness of  breath and dizziness. Question pulmonary embolism. TECHNIQUE: Thin collimation axial images obtained through the level of the  pulmonary arteries with additional imaging through the chest following the  uneventful administration of intravenous contrast.  Images reconstructed into  MIP coronal and sagittal projections for complete evaluation of the tortuous and  overlapping pulmonary vascular structures and to reduce patient radiation dose. All CT scans are performed using dose optimization techniques as appropriate to  the performed exam including the following: Automated exposure control,  adjustment of mA and/or kV according to patient size, and use of iterative  reconstructive technique. COMPARISON: None. FINDINGS:    No filling defects are appreciated within the main, left, right, lobar or  visualized segmental pulmonary arteries to suggest embolism. Subsegmental  branches are limited by motion artifact in many regions. The thoracic aorta is  not aneurysmal. There is no contrast in the aorta and dissection cannot be  assessed. Injected contrast refluxes into IVC/hepatic veins. No significant pericardial effusion. Cardiomegaly. Coronary artery  calcifications. CABG. Sternotomy without union but no separation. Mild right  and tiny left pleural effusions. No enlarged axillary, hilar, or mediastinal  lymphadenopathy. Mild vertebral osteophytes. Mild hypoinflation. Minimal interstitial thickening at lung base. Mild dependent  atelectasis adjacent to effusion. Small volume free fluid in the bilateral upper quadrants. The unenhanced liver,  pancreas, spleen, adrenal glands, and partially imaged kidneys are within normal  limits.  The gallbladder appears contracted. No CBD dilation. Diverticulosis. No  dilated bowel. Impression  1. No evidence of pulmonary embolism. 2. Cardiomegaly. 3. Right greater than left mild pleural effusions. Questionable mild  interstitial edema at the lung base versus hypoinflation artifact. 4. Mild free fluid in the upper quadrants of the abdomen. 5. Diverticulosis. XRAY (Most Recent)      EKG Results for orders placed or performed in visit on 09/13/21   AMB POC EKG ROUTINE W/ 12 LEADS, INTER & REP     Status: None    Narrative    Read by Vanessa Echeverria MD. Sinus rhythm. IVCO. Old inferior MI.        2D ECHO 12/31/21    ECHO ADULT FOLLOW-UP OR LIMITED 01/02/2022 1/2/2022    Interpretation Summary    Left Ventricle: Left ventricle size is normal. Mildly increased wall thickness. Findings consistent with mild concentric hypertrophy. Severe global hypokinesis present. Severely reduced left ventricular systolic function with a visually estimated EF of 10 -15%. Grade III diastolic dysfunction with increased LAP.   Pericardium: Trivial pericardial effusion present. No indication of cardiac tamponade.   PAP of 42 mmHg.     Signed by: Lorene Fink DO on 1/2/2022 10:29 AM

## 2022-01-05 NOTE — PROGRESS NOTES
Unclear on disposition currently at discharge. Pt confused and agitated . Cannot participate with therapy. Would not be able to do SNF if needed if unable to work with therapy and with current behaviors . Plan will be TBD at this time  Reason for Admission:  Transaminitis [R74.01]  CHF (congestive heart failure) (HCC) [I50.9]  SOB (shortness of breath) [R06.02]  Pneumonia due to COVID-19 virus [U07.1, J12.82]                 RUR Score:    17            Plan for utilizing home health:    tbd                      Likelihood of Readmission:   Moderate                         Do you (patient/family) have any concerns for transition/discharge? Unknown on what safe planning option is    Transition of Care Plan:       Initial assessment completed with spouse/SO. Cognitive status of patient: disoriented. Face sheet information confirmed:  yes. The patient designates unable to participate in his discharge plan and to receive any needed information. This patient lives in a single family home with spouse. Patient is able to navigate steps as needed. Prior to hospitalization, patient was considered to be independent with ADLs/IADLS : yes . Patient has a current ACP document on file: yes      Healthcare Decision Maker:   Primary Decision Maker: Eunice Jacques - [phone]    Secondary Decision Maker: Sirisha Schaffer - Daughter - [phone]    Click here to [address] including selection of the Healthcare Decision Maker Relationship (ie \"Primary\")    The other:  tbd will be available to transport patient home upon discharge. The patient already has [facility], and  medical equipment available in the home. Patient is not currently active with home health. Patient has not stayed in a skilled nursing facility or rehab. Was  stay within last 60 days : no. This patient is on dialysis :no       Currently, the discharge plan is TBD.     The patient states that he can obtain his medications from the pharmacy, and take his medications as directed. Patient's current insurance [facility]       Care Management Interventions  PCP Verified by CM: Yes  Mode of Transport at Discharge:  Other (see comment) (tbd)  Transition of Care Consult (CM Consult): Discharge Planning  Support Systems: Spouse/Significant Other,Child(edda)  Confirm Follow Up Transport: Other (see comment)  The Plan for Transition of Care is Related to the Following Treatment Goals : tbd  Discharge Location  Discharge Placement: Unable to determine at this time

## 2022-01-06 NOTE — ACP (ADVANCE CARE PLANNING)
Advanced [facility] (Physician Orders for Scope of Treatment)       Date of conversation: 1/6/2022   Location: [facility]                               Length (minutes): 20    Participants:     [x] Healthcare agent (already designated in existing ACP document)      Name: Thomas Shen    Relationship to Patient: Wife    Phone number: [phone] and [phone]                 Other persons present:                  Name: Salome Dewitt, NP (Palliative)                           Name:  Aniya Ca. MSW (Palliative)        Advanced Lisa oJhnson ACP Facilitator: Aniya Ca MSW      Conversation Topics   (If Patient does not have decision making capacity, Agent/Surrogate responds based on understanding of how patient would respond if capable)      Understanding of Medical Condition(s) AND Potential Complications:    Healthcare Agent/Other Surrogate: Patient's wife has full understanding of patient's medical condition(s) and the potential complications. Wife was given detailed updates on patient's current condition and prognosis. Cardiopulmonary Resuscitation      \"What do you understand about CPR? \" Response: Patient's wife has full understanding of the risks and burdens of CPR. Patient's wife has elected to make patient a DNR/DNI and is okay with feeding tubes for a trial period of time. She would like to continue treatments. A POST form was completed, with patient's wife, and sent to wife via [employer]; for her electronic signature. The Palliative Care team is now waiting for the signed POST to be returned.      Order Elected for CPR:  []  Attempt Resuscitation [x]  Do Not Attempt Resuscitation      When NOT in Cardiopulmonary Arrest, Order Elected:      [] Comfort Measures  [x] Limited Additional Interventions  [] Full Interventions    Artificially Administered Nutrition, Order Elected:    [] No Feeding Tube   [x] Feeding Tube for a defined trial period  [] Feeding Tube long-term if indicated      The following was provided (check all that apply):      Healthcare Decision Information Cards:   [] Help with Breathing Facts   [] Tube Feeding Facts   [] CPR Facts               [] Review of existing Advance Directive              [] Assistance with Completion of New Advance Directive               [x] Review of [place] POST Form         Meeting Outcomes:     [] ACP discussion completed   [x] [facility] form completed              [x] [facility] prepared for Provider review and signature   [] Original placed on Chart, if in facility (form to be sent with patient at discharge)              [] Copy given to healthcare agent    [] Copy scanned to electronic medical record      If ACP discussion not completed, last interview topic discussed: POST form completed and now awaiting the electronic signature of patient's wife. Follow-up plan:       [] Schedule follow-up conversation to continue planning   [] Referred individual to Provider for additional questions/concerns               [x] Advised patient/agent/surrogate to review completed POST form and update if needed with changes in condition, patient preferences or care setting       Recommendations: The Palliative Care team will continue to offer support to patient and his family and will follow up regarding the signed POST form. [] This note routed to one or more involved healthcare providers        Selene [facility]  [place]   [address]: [phone] ([phone])

## 2022-01-06 NOTE — PROGRESS NOTES
Cardiology Progress Note    Admit Date: 12/31/2021  Attending Cardiologist: Dr. Rita Whalen:     Hospital Problems  Date Reviewed: 12/7/2021          Codes Class Noted POA    CHF (congestive heart failure) ([facility] [place] [address] ICD-10-CM: I50.9  ICD-9-CM: 428.0  12/31/2021 Unknown        SOB (shortness of breath) ICD-10-CM: R06.02  ICD-9-CM: 786.05  12/31/2021 Unknown        Transaminitis ICD-10-CM: R74.01  ICD-9-CM: 790.4  12/31/2021 Unknown        Pneumonia due to COVID-19 virus ICD-10-CM: U07.1, J12.82  ICD-9-CM: 480.8, 079.89  12/31/2021 Unknown                1. Acute on chronic systolic CHF with bilateral pleural effusions-compensated at present   2. Pneumonia due to COVID-19 virus (patient vaccintaed x 2)- on steroids and   2. Acute hypoxic respiratory failure- better on O2 by NC  3. EDWIGE on CKD- in the setting diuretic use vs COV-19 infection- improved   4. Transaminitis - improving- statin on hold   5. CAD s/p CABG x 3 8/5/2021 -LIMA to the mid LAD, SVG to RPLV, and SVG to OM   6. ECHO done on 8/9/21 and it was noted that his EF had improved to 45% when compared to the echo done on 6/24/21 (EF at that time was 35%). recent (ECHO 1/2/22) LEV 10-15%  7. Elevated D-Dimer, CTA negative for PE  8. CT with evidence of mild free fluid in upper quadrants of the abdomen   9. Coagulopathy - INR 2.9 - not on anticoagulants  10. Thrombocytopenia -improving   11. Hypertension- stable   12. Acute metabolic encephalopathy - in thesetting of above  13. Hypernatremia-in the setting poor po intake. worsening   14. Hypomagnesemia- Mag 1/6/22 1.3     Primary cardiologist Dr. Leatha Sun      Echo 1/2/22    Left Ventricle: Left ventricle size is normal. Mildly increased wall thickness. Findings consistent with mild concentric hypertrophy. Severe global hypokinesis present. Severely reduced left ventricular systolic function with a visually estimated EF of 10 -15%. Grade III diastolic dysfunction with increased LAP.     Pericardium: Trivial pericardial effusion present. No indication of cardiac tamponade.   PAP of 42 mmHg.       Plan:     Addendum: Independently seen and evaluated. Agree with below. Would make sure that his electrolytes are well replaced. Continue optimal medical therapy. Continue supportive measures. Renal function improved will continue with lasix PRN for leg swelling or SOB  Will continue to monitor for s/s fluid overload as patient now on D5% due to worsening hypernatremia  Continue to replace Mag level will give another gr Ma+   BP stable increased Coreg to 6.25 mg bid  Would consider low dose ACE/ARB if hemodynamics allows it   Continued on ASA. No statin given elevated LFTs. Would continue work up and treatment of underlying covid illness. Once recovered from acute illness can consider ischemic evaluation for completeness, this can be considered as outpatient.    discussed the above plan with patient's sister    Subjective:   Confused disoriented unable to answer any questions     Objective:      Patient Vitals for the past 8 hrs:   Temp Pulse Resp BP SpO2   01/06/22 0412 97 °F (36.1 °C) 82 22 115/80 96 %         Patient Vitals for the past 96 hrs:   Weight   01/04/22 2330 72.7 kg (160 lb 4.8 oz)   01/04/22 0344 73.6 kg (162 lb 3.2 oz)   01/04/22 0034 147.6 kg (325 lb 8 oz)   01/02/22 2304 76.1 kg (167 lb 12.8 oz)       TELE: normal sinus rhythm               Current Facility-Administered Medications   Medication Dose Route Frequency Last Admin    carvediloL (COREG) tablet 6.25 mg  6.25 mg Oral Q12H      magnesium sulfate 2 g/50 ml IVPB (premix or compounded)  2 g IntraVENous ONCE      enoxaparin (LOVENOX) injection 70 mg  70 mg SubCUTAneous Q12H 70 mg at 01/05/22 2106    dextrose 5% infusion  50 mL/hr IntraVENous CONTINUOUS 50 mL/hr at 01/05/22 2152    melatonin tablet 5 mg  5 mg Oral QHS 5 mg at 01/05/22 2107    aspirin chewable tablet 81 mg  81 mg Oral DAILY 81 mg at 01/05/22 0859    hydrOXYzine HCL (ATARAX) tablet 25 mg  25 mg Oral Q6H PRN 25 mg at 01/05/22 2105    [Held by provider] furosemide (LASIX) tablet 20 mg  20 mg Oral DAILY 20 mg at 01/04/22 0847    albuterol (PROVENTIL VENTOLIN) nebulizer solution 2.5 mg  2.5 mg Nebulization Q6H PRN 2.5 mg at 12/31/21 1451    sodium chloride (NS) flush 5-40 mL  5-40 mL IntraVENous Q8H 10 mL at 01/06/22 0508    sodium chloride (NS) flush 5-40 mL  5-40 mL IntraVENous PRN      polyethylene glycol (MIRALAX) packet 17 g  17 g Oral DAILY PRN      ondansetron (ZOFRAN ODT) tablet 4 mg  4 mg Oral Q8H PRN      Or    ondansetron (ZOFRAN) injection 4 mg  4 mg IntraVENous Q6H PRN      dexamethasone (DECADRON) 4 mg/mL injection 6 mg  6 mg IntraVENous Q24H 6 mg at 01/05/22 2106    fluticasone propionate (FLONASE) 50 mcg/actuation nasal spray 2 Spray  2 Spray Both Nostrils DAILY 2 [place] at 01/05/22 0905    PARoxetine (PAXIL) tablet 20 mg  20 mg Oral DAILY 20 mg at 01/05/22 0859    tamsulosin (FLOMAX) capsule 0.8 mg  0.8 mg Oral PCD 0.8 mg at 01/02/22 1851    zolpidem (AMBIEN) tablet 5 mg  5 mg Oral QHS PRN 5 mg at 01/02/22 2125         Intake/Output Summary (Last 24 hours) at 1/6/2022 0851  Last data filed at 1/6/2022 0362  Gross per 24 hour   Intake 550 ml   Output    Net 550 ml       Physical Exam:  Limited in setting of covid illness. Breathing stable on 3-4 liters. BP stable. Tele sinus.   No edema noted BLE      Visit Vitals  /80 (BP 1 Location: Right upper arm, BP Patient Position: At rest)   Pulse 82   Temp 97 °F (36.1 °C)   Resp 22   Ht 5' 8\" (1.727 m)   Wt 72.7 kg (160 lb 4.8 oz)   SpO2 96%   BMI 24.37 kg/m²       Data Review:     Labs: Results:       Chemistry Recent Labs     01/06/22  0450 01/05/22  0542 01/04/22  0355   * 154* 129*   * 150* 148*   K 5.1 5.1 4.7   * 119* 114*   CO2 12* 23 22   BUN 77* 84* 111*   CREA 1.32* 1.36* 1.86*   CA 8.7 8.5 8.9   MG 1.3*  --   --    PHOS 3.3  --   --    AGAP 18 8 12   BUCR 58* 62* 60*   AP 319* 290* 315*   TP 6.6 6.5 7.1   ALB 2.8* 3.0* 3.6   GLOB 3.8 3.5 3.5   AGRAT 0.7* 0.9 1.0      CBC w/Diff Recent Labs     01/06/22  0450 01/05/22  0542 01/04/22  0355   WBC 17.7* 15.8* 14.0*   RBC 4.80 4.22* 4.09*   HGB 15.5 13.7 13.4   HCT 48.5* 42.5 40.1   * 101* 108*   GRANS 91*  --   --    LYMPH 2*  --   --    EOS 0  --   --       Cardiac Enzymes No results found for: CPK, CK, CKMMB, CKMB, RCK3, CKMBT, CKNDX, CKND1, DEON, TROPT, TROIQ, KATHY, TROPT, TNIPOC, BNP, BNPP   Coagulation Recent Labs     01/05/22  0542 01/04/22  1315   PTP 23.3* 26.2*   INR 2.1* 2.4*       Lipid Panel Lab Results   Component Value Date/Time    Cholesterol, total 215 (H) 06/19/2020 04:34 PM    HDL Cholesterol 43 06/19/2020 04:34 PM    LDL, calculated 121.8 (H) 06/19/2020 04:34 PM    VLDL, calculated 50.2 06/19/2020 04:34 PM    Triglyceride 251 (H) 06/19/2020 04:34 PM    CHOL/HDL Ratio 5.0 06/19/2020 04:34 PM      BNP No results found for: BNP, BNPP, XBNPT   Liver Enzymes Recent Labs     01/06/22  0450   TP 6.6   ALB 2.8*   *      Thyroid Studies Lab Results   Component Value Date/Time    TSH 3.53 12/25/2021 10:05 PM          Signed By: ELIZABETH Moe     January 6, 2022

## 2022-01-06 NOTE — PALLIATIVE CARE
Met with patient's daughter, Susan Gomez, and patient's spouse, Luca Zafar (via telephone as she is home ill). Introduced and discussed goals of care including benefits and burdens of resuscitation, intubation and ventilation. Wife and daughter are in agreement for DNR/DNI in the event of cardiac arrest or worsening respiratory status. Goals of care are DNR/DNI. Thank you for the opportunity to participate in the care of Mr. Jose Alberto Joe.     Viri Love, [facility]  Palliative Medicine

## 2022-01-06 NOTE — CONSULTS
Comprehensive Nutrition Assessment    Type and Reason for Visit: Initial,Consult    Nutrition Recommendations/Plan:   - Once NGT is placed and placement verified, iInitiate tube feeding of Jevity 1.5 at 20 mL/hr and advance as tolerated by 10 mL q 12 hours to goal rate of 50 mL/hr with 150 mL q 4 hour water flushes (goal regimen to provide 1800 kcal, 77 gm protein, 912 mL free water, 100% RDIs). Nutrition Assessment:  PT with AMS. Palliative care following. Poor meal intake now and PTA. NGT placement ordered by MD.  Nursing so far unable to place but will reattempt once IV access established and medication to assist with placement can be given. Consulted for management of tube feeding. Hypernatremia noted and climbing. IVF ordered, but pt currently without access. Significant weight loss noted. Concerned for refeeding syndrome. Malnutrition Assessment:  Malnutrition Status:  Severe malnutrition    Context:  Acute illness     Findings of the 6 clinical characteristics of malnutrition:   Energy Intake:  7 - 50% or less of est energy requirements for 5 or more days  Weight Loss:  7 - Greater than 2% over 1 week     Body Fat Loss:  Unable to assess,     Muscle Mass Loss:  Unable to assess,    Fluid Accumulation:  No significant fluid accumulation,     Strength:  Not performed     Nutrition History and Allergies: PMH: anxiety and depression, gout, HTN, prediabetes, CAD, CABG, hernia repair. Presented with chest pain, found to have acute on chronic heart failure and acute respiratory failure related to covid pneumonia. NKFA. Daughter reported poor po intake x last couple weeks PTA, drinks Boost at home. Last wt PTA was 173 lb 12/25/21. Estimated Daily Nutrient Needs:  Energy (kcal): 3547-0658; Weight Used for Energy Requirements: Current  Protein (g): 58-88;  Weight Used for Protein Requirements: Current (0.8-1.2)  Fluid (ml/day): 2174-5390; Method Used for Fluid Requirements: 1 ml/kcal      Nutrition Related Findings:  BM 1/2. Meds: steroid, magnesium sulfate. Na 151. CRP 4.4      Wounds:    Moisture associate skin damage       Current Nutrition Therapies:  ADULT DIET Regular; No Salt Added (3-4 gm)    Anthropometric Measures:  · Height:  5' 8\" (172.7 cm)  · Current Body Wt:  72.7 kg (160 lb 4.4 oz)   · Admission Body Wt:  173 lb 1 oz    · Usual Body Wt:  78.5 kg (173 lb)     · Ideal Body Wt:  154 lbs:  104.1 %   · BMI Category:  Normal weight (BMI 18.5-24. 9)       Nutrition Diagnosis:   · Inadequate oral intake related to cognitive or neurological impairment,acute injury/trauma as evidenced by intake 0-25%      Nutrition Interventions:   Food and/or Nutrient Delivery: Continue current diet,Start tube feeding,IV fluid delivery  Nutrition Education and Counseling: Education not indicated,No recommendations at this time  Coordination of Nutrition Care: Continue to monitor while inpatient    Goals:  Nutritional needs will be met through adequate oral intake or nutrition support within the next 7 days. Nutrition Monitoring and Evaluation:   Behavioral-Environmental Outcomes: None identified  Food/Nutrient Intake Outcomes: Food and nutrient intake,Diet advancement/tolerance,Enteral nutrition intake/tolerance,IVF intake  Physical Signs/Symptoms Outcomes: Biochemical data,Meal time behavior,Hemodynamic status,Nutrition focused physical findings,Fluid status or edema,GI status    Discharge Planning:     Too soon to determine     Electronically signed by Hernando Wren RD on 1/6/2022 at 3:20 PM    Contact: [phone]

## 2022-01-06 NOTE — PROGRESS NOTES
Hospitalist Progress Note    Patient: Aster Retana Age: 80 y.o. : 1940 MR#: [id number] SSN: [id number]  Date/Time: 2022 9:09 AM    DOA: 2021  PCP: Yumiko Fulton MD    Subjective:     Discussed with daughter Sina Laureano this morning, she agrees to NG tube. Patient seen and examined at bedside, he is nonverbal, moaning, not answering questions not following commands. Patient is difficult stick, he has pulled out IV twice today. Has not received IV medications. CT head and CT abdomen pelvis are pending at this time. Patient is more acidotic today, CO2 down to 12 on BMP.    DNR/DNI per palliative care conversation with family. Started on Zosyn and Levaquin given concern for aspiration. Assessment/Plan:     1. Acute respiratory failure with hypoxia due to multifactorial   2. Recent COVID-19 pneumonia in vaccinated patient. Tested + 2021  3. Acute on chronic systolic CHF, Echo with EF 15%. Continue Lasix, beta-blocker. Cardiology follows. 4.   Bilateral pleural effusion with CHF  5. Difficult IV access. Cardiology to place line treatment. Requested for tomorrow  6. hx CAD with CABGx3  7. Thrombocytopenia, improved   8. Leukocytosis, trending up. Urinalysis negative. Follow culture. CT abdomen pelvis. ID follows  9. [facility] nephrology follows  10. Transaminitis in the setting of COVID-19. Mesenteric duplex no evidence of thrombus. GI follows.       -thickened gallbladder, no evidence of acute cholecystitis          Hyperbilirubinemia, increasing. CT with free fluid in abdomen. 11.  Elevated INR, due to liver pathology   12. Gross hematuria. Saw Dr. Fermin Rodriguez urology of [address] [place] [address], , urine culture, urine pathology negative at that time. History of kidney stone. CT abdomen and pelvis noncontrast, renal stone protocol. 13.   Acute encephalopathy, multifactorial, metabolic/toxic, [id number], steroid use, electrolyte abnormalities.   Urine drug screen at admission positive for opiates, benzos. Correct sodium. Recent TSH WNL. B12 folate WNL. CT head stat. Avoid benzos. Monitor for constipation. Consider neurology consult. 14.  Steroid-induced hyperglycemia. Sliding scale insulin. 15. hypertension. 16. Hypomagnesemia. Replete as needed. 17.  Difficult IV access. Midline requested. 18.  Hyperchloremic acidosis, lactic acidosis. Continue IV fluids. 19.  Likely aspiration pneumonia. ArthurnCynthia. ID follows. 20.  DNR/DNI. Input appreciated. I discussed the case with Dr. Little Huitron, anesthesiology. Ginger  cousin updated this morning over phone per daughter's request.    6:30 PM daughter Jesus Alberto Venegas updated over phone, all questions answered to the best my ability. I have reevaluated the patient after initiation of intervention. The patient is comfortable, uninjured, but continues to pose an imminent risk of injury to self and or serious disruption of treatment. The patient's current medical and behavioral conditions that warrant the use intervention include danger to self and Interference with medical equipment or treatment. Restraint or seclusion will be discontinued at the earliest possible time, regardless of the scheduled expiration of the order.     Based on my evaluation, restraints will be continued: YES    11:23 AM    Nayla Mahan MD      Additional Notes:    Time spent 45  minutes    Case discussed with:  [x]Patient  [x]Family  [x]Nursing  [x]Case Management  DVT Prophylaxis:  [x]Lovenox  []Hep SQ  []SCDs  []Coumadin   []On Heparin gtt    Signed By: Brenda Campos MD     2022 9:09 AM              Objective:   VS:   Visit Vitals  /80 (BP 1 Location: Right upper arm, BP Patient Position: At rest)   Pulse 82   Temp 97 °F (36.1 °C)   Resp 22   Ht 5' 8\" (1.727 m)   Wt 72.7 kg (160 lb 4.8 oz)   SpO2 96%   BMI 24.37 kg/m²      Tmax/24hrs: Temp (24hrs), Av.4 °F (36.3 °C), Min:97 °F (36.1 °C), Max:98 °F (36.7 °C)      Intake/Output Summary (Last 24 hours) at 1/6/2022 1023  Last data filed at 1/6/2022 0539  Gross per 24 hour   Intake 550 ml   Output    Net 550 ml       Tele: sinus  General: Awakens briefly, not answering questions, not following commands. Moans intermittently. HEENT: PERRL, EOMI, supple neck, no JVD, dry oral mucosa  Cardiovascular: S1S2 regular, no rub/gallop   Pulmonary: air entry bilaterally, no wheezing  GI:  Soft, non tender, non distended, nabs, no guarding   Extremities:  trace pedal edema, +distal pulses appreciated   Neuro: not answering questions, not following commands. Additional: no rash to visible skin.        Current Facility-Administered Medications   Medication Dose Route Frequency    carvediloL (COREG) tablet 6.25 mg  6.25 mg Oral Q12H    magnesium sulfate 2 g/50 ml IVPB (premix or compounded)  2 g IntraVENous ONCE    enoxaparin (LOVENOX) injection 70 mg  70 mg SubCUTAneous Q12H    dextrose 5% infusion  100 mL/hr IntraVENous CONTINUOUS    melatonin tablet 5 mg  5 mg Oral QHS    aspirin chewable tablet 81 mg  81 mg Oral DAILY    hydrOXYzine HCL (ATARAX) tablet 25 mg  25 mg Oral Q6H PRN    albuterol (PROVENTIL VENTOLIN) nebulizer solution 2.5 mg  2.5 mg Nebulization Q6H PRN    sodium chloride (NS) flush 5-40 mL  5-40 mL IntraVENous Q8H    sodium chloride (NS) flush 5-40 mL  5-40 mL IntraVENous PRN    polyethylene glycol (MIRALAX) packet 17 g  17 g Oral DAILY PRN    ondansetron (ZOFRAN ODT) tablet 4 mg  4 mg Oral Q8H PRN    Or    ondansetron (ZOFRAN) injection 4 mg  4 mg IntraVENous Q6H PRN    dexamethasone (DECADRON) 4 mg/mL injection 6 mg  6 mg IntraVENous Q24H    fluticasone propionate (FLONASE) 50 mcg/actuation nasal spray 2 Spray  2 Spray Both Nostrils DAILY    PARoxetine (PAXIL) tablet 20 mg  20 mg Oral DAILY    tamsulosin (FLOMAX) capsule 0.8 mg  0.8 mg Oral PCD    zolpidem (AMBIEN) tablet 5 mg  5 mg Oral QHS PRN            Lab/Data Review:  Labs: Results:       Chemistry Recent Labs     01/06/22 0450 01/05/22 0542 01/04/22  0355   * 154* 129*   * 150* 148*   K 5.1 5.1 4.7   * 119* 114*   CO2 12* 23 22   BUN 77* 84* 111*   CREA 1.32* 1.36* 1.86*   BUCR 58* 62* 60*   AGAP 18 8 12   CA 8.7 8.5 8.9   PHOS 3.3  --   --      Recent Labs     01/06/22 0450 01/05/22 0542 01/04/22  0355   * 735* 965*   TP 6.6 6.5 7.1   ALB 2.8* 3.0* 3.6   GLOB 3.8 3.5 3.5   AGRAT 0.7* 0.9 1.0      CBC w/Diff Recent Labs     01/06/22 0450 01/05/22 0542 01/05/22 0542 01/04/22 0355 01/04/22  0355   WBC 17.7*  --  15.8*  --  14.0*   RBC 4.80  --  4.22*  --  4.09*   HGB 15.5  --  13.7  --  13.4   HCT 48.5*  --  42.5  --  40.1   .0*   < > 100.7*   < > 98.0   MCH 32.3   < > 32.5   < > 32.8   MCHC 32.0   < > 32.2   < > 33.4   RDW 20.8*   < > 19.9*   < > 18.7*   *  --  101*  --  108*   BANDS 1  --   --   --   --    GRANS 91*  --   --   --   --    LYMPH 2*  --   --   --   --    EOS 0  --   --   --   --     < > = values in this interval not displayed. Coagulation Recent Labs     01/05/22 0542 01/04/22  1315   PTP 23.3* 26.2*   INR 2.1* 2.4*       Iron/Ferritin No results found for: IRON, FE, TIBC, IBCT, PSAT, FERR    BNP    Cardiac Enzymes Lab Results   Component Value Date/Time     01/27/2019 08:17 PM    CK - MB 1.8 01/27/2019 08:17 PM    CK-MB Index 1.4 01/27/2019 08:17 PM    Troponin-I, QT <0.02 01/27/2019 08:17 PM        Lactic Acid    Thyroid Studies          All Micro Results     Procedure Component Value Units Date/Time    CULTURE, URINE [[id number]] Collected: 01/06/22 0330    Order Status: Completed Specimen: Cath Urine Updated: 01/06/22 0558    COVID-19 WITH INFLUENZA A/B [[id number]]  (Abnormal) Collected: 12/31/21 0320    Order Status: Completed Specimen: Nasopharyngeal Updated: 12/31/21 0754     SARS-CoV-2 Detected        Comment: CALLED TO AND CORRECTLY REPEATED BY:  YAMINI Vasquez RN, [id number] [facility], 12/31/21 AT 0754 TO DRM  Positive results are indicative of the presence of SARS-CoV-2. Clinical correlation with patient history and other diagnostic information is necessary to determine patient infection status. Positive results do not rule out bacterial infection or co-infection with other pathogens. Influenza A by PCR Not detected        Influenza B by PCR Not detected        Comment: NOTE: Influenza A and Influenza B negative results should be considered presumptive in samples that have a positive SARS-CoV-2 result. Consider re-testing with an alternate FDA-approved test if clinically indicated. Testing was performed using marifer Maile SARS-CoV-2 and Influenza A/B nucleic acid assay. This test is a multiplex Real-Time Reverse Transcriptase Polymerase Chain Reaction (RT-PCR) based in [employer] diagnostic test intended for the qualitative detection of nucleic acids from SARS-CoV-2, Influenza A, and Influenza B in nasopharyngeal for use under the FDA's Emergency Use Authorization (EAU) only. Fact sheet for Patients: FindDrives.pl  Fact sheet for Healthcare Providers: FindDrives[employer]pl         COVID-19 RAPID TEST [[id number]] Collected: 12/31/21 0300    Order Status: [facility] (RAPID TEST) [[id number]] Collected: 12/31/21 0300    Order Status: Canceled Specimen: Nasopharyngeal             Images:    CT (Most Recent). CT Results (most recent):  Results from Hospital Encounter encounter on 12/31/21    CTA CHEST W OR W WO CONT    Narrative  CTA CHEST PULMONARY EMBOLISM PROTOCOL    INDICATION: Shortness of breath increasing for 2 weeks. Cough. Shortness of  breath and dizziness. Question pulmonary embolism.     TECHNIQUE: Thin collimation axial images obtained through the level of the  pulmonary arteries with additional imaging through the chest following the  uneventful administration of intravenous contrast.  Images reconstructed into  MIP coronal and sagittal projections for complete evaluation of the tortuous and  overlapping pulmonary vascular structures and to reduce patient radiation dose. All CT scans are performed using dose optimization techniques as appropriate to  the performed exam including the following: Automated exposure control,  adjustment of mA and/or kV according to patient size, and use of iterative  reconstructive technique. COMPARISON: None. FINDINGS:    No filling defects are appreciated within the main, left, right, lobar or  visualized segmental pulmonary arteries to suggest embolism. Subsegmental  branches are limited by motion artifact in many regions. The thoracic aorta is  not aneurysmal. There is no contrast in the aorta and dissection cannot be  assessed. Injected contrast refluxes into IVC/hepatic veins. No significant pericardial effusion. Cardiomegaly. Coronary artery  calcifications. CABG. Sternotomy without union but no separation. Mild right  and tiny left pleural effusions. No enlarged axillary, hilar, or mediastinal  lymphadenopathy. Mild vertebral osteophytes. Mild hypoinflation. Minimal interstitial thickening at lung base. Mild dependent  atelectasis adjacent to effusion. Small volume free fluid in the bilateral upper quadrants. The unenhanced liver,  pancreas, spleen, adrenal glands, and partially imaged kidneys are within normal  limits. The gallbladder appears contracted. No CBD dilation. Diverticulosis. No  dilated bowel. Impression  1. No evidence of pulmonary embolism. 2. Cardiomegaly. 3. Right greater than left mild pleural effusions. Questionable mild  interstitial edema at the lung base versus hypoinflation artifact. 4. Mild free fluid in the upper quadrants of the abdomen. 5. Diverticulosis.          XRAY (Most Recent)      EKG Results for orders placed or performed in visit on 09/13/21   AMB POC EKG ROUTINE W/ 12 LEADS, INTER & REP     Status: None    Narrative    Read by Elias Avalos Chitra Mojica MD. Sinus rhythm. IVCO. Old inferior MI.        2D ECHO 12/31/21    ECHO ADULT FOLLOW-UP OR LIMITED 01/02/2022 1/2/2022    Interpretation Summary    Left Ventricle: Left ventricle size is normal. Mildly increased wall thickness. Findings consistent with mild concentric hypertrophy. Severe global hypokinesis present. Severely reduced left ventricular systolic function with a visually estimated EF of 10 -15%. Grade III diastolic dysfunction with increased LAP.   Pericardium: Trivial pericardial effusion present. No indication of cardiac tamponade.   PAP of 42 mmHg.     Signed by: Rad Amaya DO on 1/2/2022 10:29 AM

## 2022-01-06 NOTE — CONSULTS
[facility]: [phone]  [facility]: [phone]     Patient Name: Dory Dawkins  YOB: 1940    Date of Initial Consult : 1/6/2022  Reason for Consult: Care decisions  Requesting Provider: Dr. Cami Bolaños  Primary Care Physician: Edel Suggs MD      SUMMARY:   Dory Dawkins is a 80 y.o. male with a past history of hypertension, status post CABG x3 in August 2021, BPH, asthma, anxiety and depression, who was admitted on 12/31/2021 from home with a diagnosis of acute on chronic systolic heart failure and pneumonia due to COVID-19 virus. Current medical issues leading to Palliative Medicine involvement include: Ill-appearing 21-year-old gentleman with multiple comorbid conditions including status post CABG x3 in August 2021 who presented to the ER with 1 week of dyspnea which is worse on exertion and occasional chest pain. Palliative medicine is consulted for care decisions. CHIEF COMPLAINT: Dyspnea    HPI/SUBJECTIVE:    Past history as above. Ill-appearing 21-year-old gentleman with multiple comorbid conditions including status post CABG x3 in August 2021 who presented to the ER with 1 week of dyspnea which is worse on exertion and occasional chest pain. The patient is:   [] Verbal and participatory  [x] Non-participatory due to:  confusion    GOALS OF CARE:  DNR/DNI  Patient/Health Care Proxy Stated Goals: Prolong life  TREATMENT PREFERENCES:   Code Status: DNR/DNI         PALLIATIVE DIAGNOSES:   1. Encounter for palliative care/goals of care  2. CHF  3. COVID-19 pneumonia  4. Debility       PLAN:   1. Encounter for palliative care/goals of care -seen at bedside in full PPE for droplet plus isolation and COVID-19. Mr. Jeniffer Gorman is lying in bed with his eyes closed, not alert, does not open his eyes in response to verbal or tactile stimuli, nonverbal and bilateral wrist restraints in place for patient's safety.   Met with patient's daughter, Jerson Edmonds, and patient's spouse, Stefan Benítez (via telephone as she is home ill). Keyonna shared some history about her father including his open heart surgery in August 2021 and that he never truly bounced back well from that surgery and that in the last couple of weeks he has been in bed a lot with decreased appetite and decreased oral intake. Introduced and discussed goals of care including benefits and burdens of resuscitation, intubation and ventilation. Wife and daughter are in agreement for DNR/DNI in the event of cardiac arrest or worsening respiratory status. Order placed. Attending and bedside RN notified. Introduced and discussed physician order for scope of treatment form and offered to complete with wife today and she is agreeable. Physician order for scope of treatment form completed with the following measures DNR/DNI, limited interventions and feeding tube for short-term trial if appropriate. Physician order for scope of treatment form sent to patient's wife, Stefan Benítez, via HIPAA compliant Docu sign for electronic signature. Awaiting return of signed POST form. Goals of care are DNR/DNI. 2. CHF - primary team managing, on 4 liters nasal oxygen at 96% saturation, IV diuretics, echo reveals EF 10-15%    3. COVID-19 pneumonia - primary team managing, ID consulted    4. Debility - PPS 40 which indicates an overall poor prognosis, lives at home with his wife, reportedly independent with ADLs prior to admission. Daughter reports patient has been in bed a lot over the last couple of weeks with decreased appetite and decreased oral intake. 5. Initial consult note routed to primary continuity provider    6.  Communicated plan of care with: Palliative IDT      Advance Care Planning:  [] [facility] Interdisciplinary Team has updated the ACP Navigator with [address] and Patient Capacity    Primary Decision [facility] ([address]):   Primary Decision Maker: Lesli [facility] Spouse - [phone]    Secondary Decision Maker: Barbara Velasquez - Daughter - [phone]              As far as possible, the palliative care team has discussed with patient / health care proxy about goals of care / treatment preferences for patient. 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 No pertinent family history. History reviewed, no pertinent family history.   Social History     Tobacco Use    Smoking status: Never Smoker    Smokeless tobacco: Never Used   Substance Use Topics    Alcohol use: No     Allergies   Allergen Reactions    Fluconazole Hives and Itching    Penicillin G Hives      Current Facility-Administered Medications   Medication Dose Route Frequency    ondansetron (ZOFRAN) injection 4 mg  4 mg IntraVENous Q4H PRN    LORazepam (ATIVAN) injection 1 mg  1 mg IntraVENous Q15MIN PRN    scopolamine (TRANSDERM-SCOP) 1 mg over 3 days 1 Patch  1 Patch TransDERmal Q72H PRN    morphine injection 2 mg  2 mg IntraVENous Q15MIN PRN    haloperidol (HALDOL) 2 mg/mL oral solution 2 mg  2 mg SubLINGual Q6H PRN    Or    haloperidol lactate (HALDOL) injection 2 mg  2 mg IntraVENous Q6H PRN          Clinical Pain Assessment (nonverbal scale for nonverbal patients): Activity (Movement): Seeking attention through movement or slow, cautious movement    Duration: for how long has pt been experiencing pain (e.g., 2 days, 1 month, years)  Frequency: how often pain is an issue (e.g., several times per day, once every few days, constant)     FUNCTIONAL ASSESSMENT:     Palliative Performance Scale (PPS):  PPS: 40    ECOG  ECOG Status : Completely disabled     PSYCHOSOCIAL/SPIRITUAL SCREENING:      Any spiritual / Amish concerns: unable to assess for patient  [] Yes /  [] No    Caregiver Burnout:  [] Yes /  [] No /  [x] No Caregiver Present      Anticipatory grief assessment:  Unable to assess for patient  [] Normal  / [] Maladaptive        REVIEW OF SYSTEMS:     Systems: constitutional, ears/nose/mouth/throat, respiratory, gastrointestinal, genitourinary, musculoskeletal, integumentary, neurologic, psychiatric, endocrine. Positive findings noted below. Modified ESAS Completed by: provider                       Dyspnea: 2           Stool Occurrence(s): 1 (blood)   Positive and pertinent negative findings in ROS are noted above in HPI. The following systems were [] reviewed / [x] unable to be reviewed as noted in HPI  Other findings are noted below. PHYSICAL EXAM:     Constitutional: lying in bed with clothes partially removed, not following commands, nonverbal, moaning at times, bilateral wrist restraints in place  Eyes: closed  ENMT: no nasal discharge, moist mucous membranes  Cardiovascular: regular rhythm, distal pulses intact  Respiratory: breathing not labored, symmetric  Gastrointestinal: soft   Last bowel movement: none recorded  Musculoskeletal: no deformity, no tenderness to palpation  Skin: warm, dry  Neurologic: not following commands, random nonpurposeful movement of all extremities  Psychiatric: unable to assess    Other:   Wt Readings from Last 3 Encounters: 01/04/22 72.7 kg (160 lb 4.8 oz)   12/25/21 78.5 kg (173 lb)   09/13/21 78.5 kg (173 lb)     Blood pressure 95/84, pulse 98, temperature (!) 95.4 °F (35.2 °C), resp. rate (!) 31, height 5' 8\" (1.727 m), weight 72.7 kg (160 lb 4.8 oz), SpO2 (!) 88 %. Pain:  Pain Scale 1: Adult Nonverbal Pain Scale  Pain Intensity 1: 0                      LAB AND IMAGING FINDINGS:     Lab Results   Component Value Date/Time    WBC 19.8 (H) 01/07/2022 12:00 PM    HGB 14.3 01/07/2022 12:00 PM    PLATELET 81 (L) 44/31/3255 12:00 PM     Lab Results   Component Value Date/Time    Sodium 156 (H) 01/07/2022 12:00 PM    Potassium 6.2 (HH) 01/07/2022 12:00 PM    Chloride 126 (H) 01/07/2022 12:00 PM    CO2 20 (L) 01/07/2022 12:00 PM    BUN 87 (H) 01/07/2022 12:00 PM    Creatinine 1.69 (H) 01/07/2022 12:00 PM    Calcium 8.1 (L) 01/07/2022 12:00 PM    Magnesium 1.3 (L) 01/06/2022 04:50 AM    Phosphorus 3.3 01/06/2022 04:50 AM      Lab Results   Component Value Date/Time    Alk. phosphatase 258 (H) 01/07/2022 12:00 PM    Protein, total 4.5 (L) 01/07/2022 12:00 PM    Albumin 2.3 (L) 01/07/2022 12:00 PM    Globulin 2.2 01/07/2022 12:00 PM     (H) 12/31/2021 03:00 AM     Lab Results   Component Value Date/Time    INR 2.7 (H) 01/07/2022 12:00 PM    Prothrombin time 28.1 (H) 01/07/2022 12:00 PM    aPTT 38.7 (H) 01/07/2022 12:00 PM      No results found for: IRON, FE, TIBC, IBCT, PSAT, FERR   No results found for: PH, PCO2, PO2  No components found for: [place]   Lab Results   Component Value Date/Time     01/27/2019 08:17 PM    CK - MB 1.8 01/27/2019 08:17 PM              Total time: 70 minutes    > 50% counseling / coordination:  Time spent in direct consultation with the patient, medical team, and family     Prolonged service was provided for  []30 min   []75 min in face to face time in the presence of the patient, spent as noted above.   Time Start:   Time End:     Disclaimer: Sections of this note are dictated using utilizing voice recognition software, which may have resulted in some phonetic based errors in grammar and contents. Even though attempts were made to correct all the mistakes, some may have been missed, and remained in the body of the document. If questions arise, please contact our department.

## 2022-01-06 NOTE — PROGRESS NOTES
RENAL DAILY PROGRESS NOTE    Patient: Radha Singleton               Sex: male          DOA: 12/31/2021  2:55 AM        YOB: 1940      Age:  80 y.o.        LOS:  LOS: 6 days     Subjective:     Radha Singleton is a 80 y.o.  who presents with Transaminitis [R74.01]  CHF (congestive heart failure) (HCC) [I50.9]  SOB (shortness of breath) [R06.02]  Pneumonia due to COVID-19 virus [U07.1, J12.82]. Asked to evaluate for renal failure and hypernatremia,pt was admitted with chf,covid,treated with diuretics. poor po intake ,keep pulling lines according to nurses ,not eating  Chief complains:   - Reviewed last 24 hrs events     Current Facility-Administered Medications   Medication Dose Route Frequency    carvediloL (COREG) tablet 6.25 mg  6.25 mg Oral Q12H    magnesium sulfate 1 g/100 ml IVPB (premix or compounded)  1 g IntraVENous ONCE    HYDROmorphone (DILAUDID) syringe 0.5 mg  0.5 mg IntraVENous NOW    enoxaparin (LOVENOX) injection 70 mg  70 mg SubCUTAneous Q12H    dextrose 5% infusion  100 mL/hr IntraVENous CONTINUOUS    melatonin tablet 5 mg  5 mg Oral QHS    aspirin chewable tablet 81 mg  81 mg Oral DAILY    hydrOXYzine HCL (ATARAX) tablet 25 mg  25 mg Oral Q6H PRN    albuterol (PROVENTIL VENTOLIN) nebulizer solution 2.5 mg  2.5 mg Nebulization Q6H PRN    sodium chloride (NS) flush 5-40 mL  5-40 mL IntraVENous Q8H    sodium chloride (NS) flush 5-40 mL  5-40 mL IntraVENous PRN    polyethylene glycol (MIRALAX) packet 17 g  17 g Oral DAILY PRN    ondansetron (ZOFRAN ODT) tablet 4 mg  4 mg Oral Q8H PRN    Or    ondansetron (ZOFRAN) injection 4 mg  4 mg IntraVENous Q6H PRN    dexamethasone (DECADRON) 4 mg/mL injection 6 mg  6 mg IntraVENous Q24H    fluticasone propionate (FLONASE) 50 mcg/actuation nasal spray 2 Spray  2 Spray Both Nostrils DAILY    PARoxetine (PAXIL) tablet 20 mg  20 mg Oral DAILY    tamsulosin (FLOMAX) capsule 0.8 mg  0.8 mg Oral PCD       Objective:     Visit Vitals  BP (!) 148/80 (BP 1 Location: Left upper arm, BP Patient Position: At rest)   Pulse 83   Temp 97.3 °F (36.3 °C)   Resp 20   Ht 5' 8\" (1.727 m)   Wt 72.7 kg (160 lb 4.8 oz)   SpO2 96%   BMI 24.37 kg/m²       Intake/Output Summary (Last 24 hours) at 1/6/2022 1615  Last data filed at 1/6/2022 5855  Gross per 24 hour   Intake 550 ml   Output    Net 550 ml       Physical Examination:     GEN: confused  RS: Chest is bilateral equal, no wheezing / rales / crackles  CVS: S1-S2 heard,  Abdomen: Soft,   Extremities: No edema,   HEENT: Head is atraumatic, PERRLA, conjunctiva pink & non icteric. No JVD or carotid bruit        Data Review:      Labs:     Hematology:   Recent Labs     01/06/22  0450 01/05/22  0542 01/04/22  0355   WBC 17.7* 15.8* 14.0*   HGB 15.5 13.7 13.4   HCT 48.5* 42.5 40.1     Chemistry:   Recent Labs     01/06/22  0450 01/05/22  0542 01/04/22  0355   BUN 77* 84* 111*   CREA 1.32* 1.36* 1.86*   CA 8.7 8.5 8.9   ALB 2.8* 3.0* 3.6   K 5.1 5.1 4.7   * 150* 148*   * 119* 114*   CO2 12* 23 22   PHOS 3.3  --   --    * 154* 129*        Images:    XR (Most Recent). CXR reviewed by me and compared with previous CXR Results from Hospital Encounter encounter on 12/31/21    XR CHEST PORT    Narrative  EXAMINATION: Chest single view    INDICATION: Leukocytosis, Covid positive    COMPARISON: CT 12/31/2021    FINDINGS: Single frontal view of the chest obtained. Rotation and low lung  volumes limits evaluation. Status post median sternotomy. Borderline prominent  cardiac silhouette. Patchy streaky/ground glass densities throughout the lungs. No definite pneumothorax identified. Impression  Patchy groundglass infiltrate/edema throughout the lungs. See additional details  above. CT (Most Recent) Results from Hospital Encounter encounter on 12/31/21    CTA CHEST W OR W WO CONT    Narrative  CTA CHEST PULMONARY EMBOLISM PROTOCOL    INDICATION: Shortness of breath increasing for 2 weeks. Cough. Shortness of  breath and dizziness. Question pulmonary embolism. TECHNIQUE: Thin collimation axial images obtained through the level of the  pulmonary arteries with additional imaging through the chest following the  uneventful administration of intravenous contrast.  Images reconstructed into  MIP coronal and sagittal projections for complete evaluation of the tortuous and  overlapping pulmonary vascular structures and to reduce patient radiation dose. All CT scans are performed using dose optimization techniques as appropriate to  the performed exam including the following: Automated exposure control,  adjustment of mA and/or kV according to patient size, and use of iterative  reconstructive technique. COMPARISON: None. FINDINGS:    No filling defects are appreciated within the main, left, right, lobar or  visualized segmental pulmonary arteries to suggest embolism. Subsegmental  branches are limited by motion artifact in many regions. The thoracic aorta is  not aneurysmal. There is no contrast in the aorta and dissection cannot be  assessed. Injected contrast refluxes into IVC/hepatic veins. No significant pericardial effusion. Cardiomegaly. Coronary artery  calcifications. CABG. Sternotomy without union but no separation. Mild right  and tiny left pleural effusions. No enlarged axillary, hilar, or mediastinal  lymphadenopathy. Mild vertebral osteophytes. Mild hypoinflation. Minimal interstitial thickening at lung base. Mild dependent  atelectasis adjacent to effusion. Small volume free fluid in the bilateral upper quadrants. The unenhanced liver,  pancreas, spleen, adrenal glands, and partially imaged kidneys are within normal  limits. The gallbladder appears contracted. No CBD dilation. Diverticulosis. No  dilated bowel. Impression  1. No evidence of pulmonary embolism. 2. Cardiomegaly. 3. Right greater than left mild pleural effusions.  Questionable mild  interstitial edema at the lung base versus hypoinflation artifact. 4. Mild free fluid in the upper quadrants of the abdomen. 5. Diverticulosis. EKG Results for orders placed or performed in visit on 09/13/21   AMB POC EKG ROUTINE W/ 12 LEADS, INTER & REP     Status: None    Narrative    Read by Jayson Amin MD. Sinus rhythm. IVCO. Old inferior MI. I have personally reviewed the old medical records and patient's labs    Plan / Recommendation:      1. Acute /crf related to dehydration from diuretics,poor po intake,check renal ultrasound ,urinalysis ,urine lytes. agree with hypotonic fluids  2.hyperchloremic acidosis,check lactic acid,abg.may need to add bic to ivf despite hypernatremia  3.covid,sepsis    D/w Dr. Karyna Romeo MD  Nephrology  1/6/2022

## 2022-01-06 NOTE — PALLIATIVE CARE
Palliative Medicine    Patient seen via window due to droplet plus isolation and COVID-19. Telephone call placed to patient's daughter, Carito Newby will be coming to the hospital this afternoon and will meet with palliative medicine to further discuss goals of care for Mr. Dorothy Ordoñez. Thank you for the consult and the opportunity to participate in the care of Mr. Dorothy Ordoñez.     Rosibel Rucker, STEVE-BC  Palliative Medicine

## 2022-01-06 NOTE — ROUTINE PROCESS
Received patient in bed, awake, alert and oriented to self. Restless and confused. . No complaints made, will continue to monitor. Report  Given by Desire Kearney RN. 10:46 PM  Patient for CT scan of the abdomen but patient is so restless and will not cooperate, unable to perform the procedure. and brought back patient to the unit. On call MD/hospitalist informed. 7:30 Am  Patient continued to.  be restless,pulled 1 IV and undressing self. Tossing and turning. Pulling IV. Bedside and Verbal shift change report given to Roberto Brannon (oncoming nurse) by Mack Lu (offgoing nurse). Report included the following information Kardex, ED Summary, Intake/Output and MAR.

## 2022-01-06 NOTE — PROGRESS NOTES
visited with the family of Arielle Yousif, who is a 80 y.o.,male. The  provided the following Interventions:  Initiated a relationship of care and support. Offered prayer and assurance of continued prayers on patient's behalf. Family is naturally upset over the patient's declining health and sandoval with COVID-19. They shared with me his strong kole in God. Prayed with them and encouraged their kole. Plan:  Chaplains will continue to follow and will provide pastoral care on an as needed/requested basis.  recommends bedside caregivers page  on duty if patient shows signs of acute spiritual or emotional distress.     [address]   [phone]

## 2022-01-06 NOTE — PROGRESS NOTES
1205- ABG obtained per MD, Patient found to be confused and agitated. patient found on RA, NC was out of his nose. RN came in room to help assist me while ABG was obtained. Results are Ph-7.53, CO2-32, PO2-75, Sat 97%. NC was placed for patient comfort. No tachypnea/increased WOB noted on assessment.

## 2022-01-06 NOTE — CONSULTS
Infectious Disease Consultation Note        Reason: [id number] pneumonia    Current abx Prior abx    Cefepime 12/31-1/3  Vancomycin 12/31  Azithromycin 12/31     Lines:       Assessment :    68-year-old male with PMH of CAD s/p CABG x 3 - 8/2021, HTN, cardiomyopathy, BPH, asthma, anxiety, alcohol abuse (has not drank in many years) and insomnia  presented to ER on 12/31/21 with c/o dyspnea worsening over the past week worse with exertion. Positive Covid test 12/31/2021    Now with worsening mentation, worsening leukocytosis, increasing procalcitonin, increasing bilirubin/transaminases    Patient presents with a highly complex clinical picture. Clinical presentation concerning for aspiration pneumonia in a patient with recent COVID-19 pneumonia     Worsening leukocytosis -likely due to steroids, partially treated bacterial infection     Increasing bilirubin/transaminases -could be secondary to hepatic congestion from CHF   Versus COVID-19 hepatitis. GI follow-up appreciated. Negative viral hepatitis panel 12/31/2021. Agree with imaging studies to rule out undiagnosed hepatobiliary pathology. CHF : severely reduced left ventricular systolic function with a visually estimated EF of 10 -15%. Grade III diastolic dysfunction with increased LAP. Cardiology follow-up appreciated    Altered mental status-likely metabolic/hypoxic encephalopathy    Elevated [id number] due to COVID-19 associated hypercoagulability. Acute thrombus noted in bilateral basilic vein, right cephalic vein on venous duplex 1/4/22. CTA negative for pulmonary embolism. Acute on chronic kidney injury-likely secondary to volume depletion, poor p.o. intake. Nephrology follow-up appreciated    Recommendations:    1. Start Zosyn, levofloxacin. D/c decadron after today's dose  2. Follow-up cardiology recommendations regarding diuresis  3. Follow-up GI, nephrology recommendations  4. Anticoagulation per primary team  5.   Monitor CRP, procalcitonin  6. Monitor oxygenation  7. Follow-up palliative care recommendations regarding goals of care    Prognosis: Guarded      Thank you for consultation request. Above plan was discussed in details with primary team. Please call me if any further questions or concerns. Will continue to participate in the care of this patient. HPI:    80-year-old male with PMH of CAD s/p CABG x 3 - 8/2021, HTN, cardiomyopathy, BPH, asthma, anxiety, alcohol abuse (has not drank in many years0 and insomnia  presented to ER on 12/31/21 with c/o dyspnea worsening over the past week worse with exertion. In ER, he was found to be hypoxic with 88-90%, elevated  pro BNP, 8747, elevated LFT. He was admitted with COVID - 19 pneumonia. Started on IV Decadron. It was felt that patient has decompensated CHF. Cardiology consulted. Patient was noted to have elevated LFTs. GI consulted. Patient continues to have altered mentation. Was noted to have worsening leukocytosis. I have been consulted for further recommendations. Of note patient has been started on IV Decadron since admission. Patient has been afebrile since admission. Patient was very confused at the time of my evaluation.   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 Qty: 180 Capsule, Refills: 3      atorvastatin (LIPITOR) 40 mg tablet Take 1 Tablet by mouth daily. Qty: 90 Tablet, Refills: 3      metoprolol succinate (TOPROL-XL) 50 mg XL tablet Take 0.5 Tablets by mouth daily. Or as directed  Qty: 90 Tablet, Refills: 3      diphenhydrAMINE hcl (BENADRYL) 2 % topical gel Apply 1 mL to affected area every six (6) hours as needed for Itching.      hydrOXYzine HCL (ATARAX) 25 mg tablet Take 25 mg by mouth three (3) times daily as needed for Itching. fluticasone propionate (FLONASE) 50 mcg/actuation nasal spray 2 Sprays by Both Nostrils route daily. Qty: 3 Bottle, Refills: 1    Associated Diagnoses: Chronic rhinitis      PARoxetine (PAXIL) 20 mg tablet TAKE 1 TABLET BY MOUTH EVERY DAY  Qty: 90 Tab, Refills: 0    Associated Diagnoses: Adjustment disorder with mixed anxiety and depressed mood      zolpidem (AMBIEN) 10 mg tablet Take 10 mg by mouth nightly as needed for Sleep.              Current Facility-Administered Medications   Medication Dose Route Frequency    carvediloL (COREG) tablet 6.25 mg  6.25 mg Oral Q12H    magnesium sulfate 2 g/50 ml IVPB (premix or compounded)  2 g IntraVENous ONCE    enoxaparin (LOVENOX) injection 70 mg  70 mg SubCUTAneous Q12H    dextrose 5% infusion  100 mL/hr IntraVENous CONTINUOUS    melatonin tablet 5 mg  5 mg Oral QHS    aspirin chewable tablet 81 mg  81 mg Oral DAILY    hydrOXYzine HCL (ATARAX) tablet 25 mg  25 mg Oral Q6H PRN    albuterol (PROVENTIL VENTOLIN) nebulizer solution 2.5 mg  2.5 mg Nebulization Q6H PRN    sodium chloride (NS) flush 5-40 mL  5-40 mL IntraVENous Q8H    sodium chloride (NS) flush 5-40 mL  5-40 mL IntraVENous PRN    polyethylene glycol (MIRALAX) packet 17 g  17 g Oral DAILY PRN    ondansetron (ZOFRAN ODT) tablet 4 mg  4 mg Oral Q8H PRN    Or    ondansetron (ZOFRAN) injection 4 mg  4 mg IntraVENous Q6H PRN    dexamethasone (DECADRON) 4 mg/mL injection 6 mg  6 mg IntraVENous Q24H    fluticasone propionate (FLONASE) 50 mcg/actuation nasal spray 2 Spray  2 Spray Both Nostrils DAILY    PARoxetine (PAXIL) tablet 20 mg  20 mg Oral DAILY    tamsulosin (FLOMAX) capsule 0.8 mg  0.8 mg Oral PCD    zolpidem (AMBIEN) tablet 5 mg  5 mg Oral QHS PRN       Allergies: Fluconazole and Penicillin g    History reviewed. No pertinent family history. Social History     Socioeconomic History    Marital status:      Spouse name: Not on file    Number of children: Not on file    Years of education: Not on file    Highest education level: Not on file   Occupational History    Not on file   Tobacco Use    Smoking status: Never Smoker    Smokeless tobacco: Never Used   Vaping Use    Vaping Use: Never used   Substance and Sexual Activity    Alcohol use: No    Drug use: No    Sexual activity: Not on file   Other Topics Concern    Not on file   Social History Narrative    Not on file     Social Determinants of Health     Financial Resource Strain:     Difficulty of Paying Living Expenses: Not on file   Food Insecurity:     Worried About Running Out of Food in the Last Year: Not on file    Clara of Food in the Last Year: Not on file   Transportation Needs:     Lack of Transportation (Medical): Not on file    Lack of Transportation (Non-Medical):  Not on file   Physical Activity:     Days of Exercise per Week: Not on file    Minutes of Exercise per Session: Not on file   Stress:     Feeling of Stress : Not on file   Social Connections:     Frequency of Communication with Friends and Family: Not on file    Frequency of Social Gatherings with Friends and Family: Not on file    Attends Episcopal Services: Not on file    Active Member of Clubs or Organizations: Not on file    Attends Club or Organization Meetings: Not on file    Marital Status: Not on file   Intimate Partner Violence:     Fear of Current or Ex-Partner: Not on file    Emotionally Abused: Not on file    Physically Abused: Not on file    Sexually Abused: Not on file   Housing Stability:     Unable to Pay for Housing in the Last Year: Not on file    Number of Places Lived in the Last Year: Not on file    Unstable Housing in the Last Year: Not on file     Social History     Tobacco Use   Smoking Status Never Smoker   Smokeless Tobacco Never Used        Temp (24hrs), Av.4 °F (36.3 °C), Min:97 °F (36.1 °C), Max:98 °F (36.7 °C)    Visit Vitals  /80 (BP 1 Location: Right upper arm, BP Patient Position: At rest)   Pulse 82   Temp 97 °F (36.1 °C)   Resp 22   Ht 5' 8\" (1.727 m)   Wt 72.7 kg (160 lb 4.8 oz)   SpO2 96%   BMI 24.37 kg/m²       ROS: Unable to obtain due to patient factors    Physical Exam:    Vitals signs and nursing note reviewed. Constitutional:       Laying on bed, confused  HENT:      Head: Normocephalic. Eyes:      Conjunctiva/sclera: Conjunctivae normal.      Neck:      Musculoskeletal: Normal range of motion and neck supple. Cardiovascular:      Rate and Rhythm: Normal rate and regular rhythm on monitor  Chest:      Bilateral chest movements equal.  Auscultation deferred due to Covid positive  Abdominal:      General: There is no distension. Palpations: Abdomen is soft. Tenderness: There is no abdominal tenderness. There is no rebound. Musculoskeletal: Normal range of motion. General: No tenderness. Skin:     General: Skin is warm and dry. Findings: No rash. Neurological:   Moves all 4 extremities. Does not follow commands.   Detailed neurologic evaluation not feasible  Psychiatric:          Confused    Labs: Results:   Chemistry Recent Labs     22  0450 22  0542 22  0355   * 154* 129*   * 150* 148*   K 5.1 5.1 4.7   * 119* 114*   CO2 12* 23 22   BUN 77* 84* 111*   CREA 1.32* 1.36* 1.86*   CA 8.7 8.5 8.9   AGAP 18 8 12   BUCR 58* 62* 60*   * 290* 315*   TP 6.6 6.5 7.1   ALB 2.8* 3.0* 3.6   GLOB 3.8 3.5 3.5   AGRAT 0.7* 0.9 1.0 CBC w/Diff Recent Labs     01/06/22  0450 01/05/22  0542 01/04/22  0355   WBC 17.7* 15.8* 14.0*   RBC 4.80 4.22* 4.09*   HGB 15.5 13.7 13.4   HCT 48.5* 42.5 40.1   * 101* 108*   GRANS 91*  --   --    LYMPH 2*  --   --    EOS 0  --   --       Microbiology No results for input(s): CULT in the last 72 hours. RADIOLOGY:    All available imaging studies/reports in [facility] for this admission were reviewed      Disclaimer: Sections of this note are dictated utilizing voice recognition software, which may have resulted in some phonetic based errors in grammar and contents. Even though attempts were made to correct all the mistakes, some may have been missed, and remained in the body of the document. If questions arise, please contact our department.     Dr. Yeimy Orourke, Infectious Disease Specialist  [phone]  January 6, 2022  9:56 AM No

## 2022-01-07 PROBLEM — K92.2 GI BLEED: Status: ACTIVE | Noted: 2022-01-01

## 2022-01-07 NOTE — PROGRESS NOTES
0800 received report from [address] pt pulled out IV site. Continuously pulling tele box and 02 off. 02 sats drop into the 80's when on RA. Attempts made to redirect pt unsuccessful. Bed alarm on. Call bell at side. 1245 pt continues to pull off tele box and oxygen. 02 sats cont to drop into the 80's when on RA. Attempted to insert an NGT unsuccessful. Pt grabbed at tube and yanked it out. MD's and pts daughter made aware. Order obtained to place bilateral wrist restraints on. Daughter notified. Wrist restraints applied. Daughter notified and in agreement with plan. 1400 numerous attempts made to insert an IV Lock. Pt fights and even with bilateral wrist restraints pulls away . Unable to obtain a site . DR Tonio Bermudez aware. 1819 dilaudid 1 mgm given IM for CT SCan prep. [phone] anesthesiologist in to insert an [address] guided PIV. #20 inserted in left FA.     1950 * 16 FR NGT inserted into rt nare w/o difficulty. Pt to go to CT and KUB to be done to verify placement on return to room. 2020 To CT per bed. Spoke wit pts wife in the pone and update given in regards to [facility] placement , NGT placement and pt going for CT.     2100 returned to room from CT bilateral wrist restrains intact and reassessed. NGT in place. Stat KUB ordered. 02 on at 5 lpm.Pt drowsy but arouses easily to stimuli. Remains confused. Will not follow commands. yells out when care given . Most words incomprehensible. Bed alarm on.  Side rils up x 3.     2100 report given to [facility]

## 2022-01-07 NOTE — DEATH NOTE
Death Pronouncement Note  [facility]      Patient: James Arnold MRN: [id number]   SSN: [id number]  YOB: 1940   Age: 80 y.o. Sex: male    Admission Date: 12/31/2021  2:55 AM Time of Death: 02:20PM        Comments:   Called to patient's bedside for apnea and pulselessness. Patient lying in bed, eyes closed, mouth open, unresponsive to voice or painful stimuli. No spontaneous respirations and chest rise absent. No palpable carotid pulse bilaterally. No heart sounds or breath sounds. Pupils fixed and dilated bilaterally. Corneal reflexes absent. Family aware, questions answered to their satisfaction. Death officially pronounced at 220pm, 1/7/2022. Attending physician, Dr. Toyin Toro, to be notified by nursing.     Sanket Gasca MD, PGY-1   [facility] Medicine   January 7, 2022, 2:22 PM

## 2022-01-07 NOTE — PROGRESS NOTES
4021 - Bedside and Verbal shift change report received Gerald Coto RN (offgoing nurse). Report included the following information SBAR, Kardex, Intake/Output, MAR and Recent Results. 5 - Received patient laying in bed pale and jaundice. Patient responsive to stimuli. Patient restless, throwing legs over siderails, thrashing in bed and continually moaning. Bilateral wrist restraints in use. BP taken 76/52 Unable to temp,  Unable to obtain pulse oximetry hands cold. Bright red blood with clots noted from rectum RRT called     0754 - Blood glucose-144, EKG done. NS bolus infusing. 0800 - Unable to obtain oxygen saturation or temperature. BP 87/49     0805 - 1 unit of PRBC started     0810 - BP taken in Left lower leg  - 91/65     0815 - Oxygen saturation 80's, Respiratory therapist paged. Vapotherm 40L via nasal cannula        0826 - HR 97 73/39    0832 - /34,  HR 82, PRBC still in progress. 3146 - Verbal Report called to Ela Ryan RN.  Patient transferred to ICU

## 2022-01-07 NOTE — PROGRESS NOTES
responded to Death of  Dory Dawkins, who was a 80 y.o.,male,     The  provided the following Interventions:  Provided crisis pastoral care, pastoral support and grief interventions. Offered prayers on behalf of the patient. Chart reviewed. Family will call back with  arrangements. Plan:  Chaplains will continue to follow and will provide pastoral care on an as needed/requested basis and grief support for the family.       [address] [facility]   Board Certified [address] [facility]   [phone]

## 2022-01-07 NOTE — PROGRESS NOTES
RENAL DAILY PROGRESS NOTE    Patient: Celsa Gibbons               Sex: male          DOA: 12/31/2021  2:55 AM        YOB: 1940      Age:  80 y.o.        LOS:  LOS: 7 days     Subjective:     Celsa Gibbons is a 80 y.o.  who presents with Transaminitis [R74.01]  CHF (congestive heart failure) (HCC) [I50.9]  SOB (shortness of breath) [R06.02]  Pneumonia due to COVID-19 virus [U07.1, J12.82]  GI bleed [K92.2]. Asked to evaluate for renal failure and hypernatremia,pt was admitted with chf,covid,treated with diuretics. poor po intake ,keep pulling lines according to nurses ,not eating  Chief complains:   - Reviewed last 24 hrs events . moved to icu due to gi bleed    Current Facility-Administered Medications   Medication Dose Route Frequency    pantoprazole (PROTONIX) 40 mg in 0.9% sodium chloride 10 mL injection  40 mg IntraVENous Q12H    0.9% sodium chloride infusion 250 mL  250 mL IntraVENous PRN    insulin lispro (HUMALOG) injection   SubCUTAneous Q6H    glucose chewable tablet 16 g  4 Tablet Oral PRN    glucagon (GLUCAGEN) injection 1 mg  1 mg IntraMUSCular PRN    dextrose (D50W) injection syrg 12.5-25 g  25-50 mL IntraVENous PRN    0.9% sodium chloride infusion 250 mL  250 mL IntraVENous PRN    dexmedeTOMidine in 0.9 % NaCl (PRECEDEX) 400 mcg/100 mL (4 mcg/mL) infusion soln  0.1-1.5 mcg/kg/hr IntraVENous TITRATE    NOREPINephrine (LEVOPHED) 8 mg in 5% dextrose 250mL (32 mcg/mL) infusion  0.5-30 mcg/min IntraVENous TITRATE    sodium chloride (NS) flush 5-40 mL  5-40 mL IntraVENous Q8H    sodium chloride (NS) flush 5-40 mL  5-40 mL IntraVENous PRN    acetaminophen (TYLENOL) tablet 650 mg  650 mg Oral Q6H PRN    Or    acetaminophen (TYLENOL) suppository 650 mg  650 mg Rectal Q6H PRN    ondansetron (ZOFRAN ODT) tablet 4 mg  4 mg Oral Q8H PRN    Or    ondansetron (ZOFRAN) injection 4 mg  4 mg IntraVENous Q6H PRN    albuterol-ipratropium (DUO-NEB) 2.5 MG-0.5 MG/3 ML  3 mL Nebulization Q4H RT    [Held by provider] carvediloL (COREG) tablet 6.25 mg  6.25 mg Oral Q12H    piperacillin-tazobactam (ZOSYN) 3.375 g in 0.9% sodium chloride (MBP/ADV) 100 mL MBP  3.375 g IntraVENous Q6H    diphenhydrAMINE (BENADRYL) injection 25 mg  25 mg IntraVENous Q4H PRN    levoFLOXacin (LEVAQUIN) 500 mg in D5W IVPB  500 mg IntraVENous Q24H    [Held by provider] enoxaparin (LOVENOX) injection 70 mg  70 mg SubCUTAneous Q12H    melatonin tablet 5 mg  5 mg Oral QHS    [Held by provider] aspirin chewable tablet 81 mg  81 mg Oral DAILY    albuterol (PROVENTIL VENTOLIN) nebulizer solution 2.5 mg  2.5 mg Nebulization Q6H PRN    polyethylene glycol (MIRALAX) packet 17 g  17 g Oral DAILY PRN    fluticasone propionate (FLONASE) 50 mcg/actuation nasal spray 2 Spray  2 Spray Both Nostrils DAILY    [Held by provider] PARoxetine (PAXIL) tablet 20 mg  20 mg Oral DAILY    tamsulosin (FLOMAX) capsule 0.8 mg  0.8 mg Oral PCD       Objective:     Visit Vitals  BP (!) 120/49   Pulse (!) 101   Temp (!) 95.7 °F (35.4 °C)   Resp 22   Ht 5' 8\" (1.727 m)   Wt 72.7 kg (160 lb 4.8 oz)   SpO2 90%   BMI 24.37 kg/m²       Intake/Output Summary (Last 24 hours) at 1/7/2022 1251  Last data filed at 1/7/2022 1023  Gross per 24 hour   Intake 2507 ml   Output 200 ml   Net 2307 ml       Physical Examination:     GEN: confused  RS: Chest is bilateral equal, no wheezing / rales / crackles  CVS: S1-S2 heard,  Abdomen: Soft,   Extremities: No edema,   HEENT: Head is atraumatic, PERRLA, conjunctiva pink & non icteric.  No JVD or carotid bruit        Data Review:      Labs:     Hematology:   Recent Labs     01/07/22  0600 01/07/22  0050 01/06/22  0450 01/05/22  0542   WBC 21.5* 18.2* 17.7* 15.8*   HGB 10.3* 12.6* 15.5 13.7   HCT 32.9* 40.3 48.5* 42.5     Chemistry:   Recent Labs     01/07/22  0050 01/06/22  1645 01/06/22  0450 01/05/22  0542   BUN 71* 73* 77* 84*   CREA 1.24 1.29 1.32* 1.36*   CA 8.1* 8.9 8.7 8.5   ALB 2.4*  --  2.8* 3.0* K 5.4 4.5 5.1 5.1   * 157* 151* 150*   * 124* 121* 119*   CO2 27 26 12* 23   PHOS  --   --  3.3  --    * 146* 170* 154*        Images:    XR (Most Recent). CXR reviewed by me and compared with previous CXR Results from [place] encounter on 12/31/21    XR CHEST PORT    Narrative  EXAM:  Chest Portable. INDICATION:  Cordis placement. COMPARISON:  01/06/22    TECHNIQUE:  Portable AP chest study    FINDINGS:    - Right IJ region with Cordis sheath. Distal tip observed at the level of the  thoracic inlet. - Midline NG/OG tube coursing below the inferior margin of the field of view. - Subtle peripheral groundglass densities are suggested in both lungs. - Streaky densities are observed in the left lower lung zone including the  retrocardiac region.  - No pleural effusion or pneumothorax is detected. - Cardiac silhouette without significant enlargement. Multiple intact midline  sternotomy wires and surgical clips suggestive of prior CBG. - Buckled appearance of the trachea, similar to prior study. Impression  1. Right IJ region Cordis sheath, distal tip at the level of the thoracic  inlet. No pneumothorax. 2.  Bilateral subtle questionable hazy groundglass densities in the lung  periphery. Superimposed streaky densities in the left lower lung zone,  suggestive of focal infiltrate vs. less likely atelectatic changes vs.  aspiration. 3.  Buckled appearance of the trachea perhaps related to artifact from patient  positioning vs. right thyroid enlargement-related change. CT (Most Recent) Results from Hospital Encounter encounter on 12/31/21    CT ABD PELV WO CONT    Narrative  EXAM: CT ABD PELV WO CONT    CLINICAL INDICATION/HISTORY: 80 years Male. Hematuria, hx kidney stone. ADDITIONAL HISTORY: None      TECHNIQUE: CT of the abdomen and pelvis without IV contrast. Sagittal and  coronal reformats were created.     All CT scans at this facility are performed using dose optimization technique as  appropriate to a performed exam, to include automated exposure control,  adjustment of the mA and/or kV according to patient size (including appropriate  matching for site specific examination) or use of iterative reconstruction  technique. IV CONTRAST: None    COMPARISON: Reference CT angiography chest 12/31/2021, CT pelvis 9/11/2019    FINDINGS:    Limitations: There is limited evaluation of solid organs and vasculature due to  lack of intravenous contrast. Patient is imaged with arms by his side, which  results in decreased signal-to-noise ratio. Lower Chest: Mild to moderate cardiomegaly. Small to moderate pericardial  effusion, unchanged from prior. Interval resolution of previously seen bilateral  pleural effusions. New diffuse groundglass opacities and interstitial thickening  within the bilateral lungs, most pronounced in the bilateral lower lobes but  also involving the visualized portions of the lingula and right middle lobe. Coronary artery calcifications. Pulmonary arterial enlargement to 3.2 cm  diameter, suggestive of pulmonary arterial hypertension. CABG. .    Mesentery/Peritoneum: No free intraperitoneal air. Trace dependent presacral  fluid, nonspecific. Trace perihepatic and perisplenic ascites. Fluid also tracks  along the bilateral paracolic gutters. Liver: Unremarkable    Gallbladder/biliary: Unremarkable    Pancreas: Mild pancreatic atrophy. No gross abnormality detected. Spleen: Unremarkable    Adrenal Glands: Unremarkable    Kidneys: Bilateral renal atrophy. 1.3 cm water attenuating cyst at the  interpolar region of the right kidney. Urinary Bladder: Mild dependent stones within the urinary bladder measuring 2.2  cm in diameter, increased in size from prior. No urinary bladder mural  thickening appreciated. Other Pelvic Organs: Coarse calcific lesions within the prostate. Mild prostate  enlargement.     Bowel: Mild diverticulosis without evidence of acute diverticulitis. Portions of  the colon are underdistended, limiting assessment of mural thickness. No  pericolic fat stranding appreciated. Normal caliber appendix (axial 48). The tip  of the appendix is difficult to separate from the trace fluid in the paracolic  gutter although no definite abnormality is appreciated. Malpositioned enteric tube which is looped at the level of the gastric cardia,  with tip extending cranially to the level of the distal third of the esophagus. No evidence of small bowel obstruction. Lymph Nodes: No enlarged lymph nodes identified by size criteria. Vessels: Moderate arterial mural calcifications. 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 Body wall: Small fat-containing umbilical hernia. Small amount of subcutaneous  is gas within the right ventral abdominal wall. Musculoskeletal: No acute fracture. No aggressive osseous lesion appreciated. Nonhealed median sternotomy. Multilevel degenerative changes of the spine. Trace  retrolisthesis of L2 on L3 and L3 on L4. Calcifications again noted at the  bilateral hamstring origins. Impression  1. Interval increased size of the conglomerate of dependent urinary bladder  calculi. 2.  Mild prostatomegaly. Recommend correlation with PSA. 3.  Malpositioned enteric tube, looped at the level of the gastric cardia, with  tip at the level the distal esophagus. Recommend repositioning to ensure  appropriate sump function. 4.  Interval resolved bilateral pleural effusions. New extensive groundglass  opacities within the bilateral lungs, may reflect poor edema or  infectious/inflammatory process. Recommend short-term imaging follow-up until  resolution. 5.  All amount of soft tissue gas in the right ventral abdominal wall, likely  iatrogenic; clinical correlation advised to exclude infection. 6.  Unhealed median sternotomy. Anasarca. Mild ascites. Right common iliac  artery aneurysm.     Additional incidental findings as above.       EKG Results for orders placed or performed in visit on 09/13/21   AMB POC EKG ROUTINE W/ 12 LEADS, INTER & REP     Status: None    Narrative    Read by Joyce Roca MD. Sinus rhythm. IVCO. Old inferior MI. I have personally reviewed the old medical records and patient's labs    Plan / Recommendation:      1.  Acute /crf related to dehydration from diuretics,poor intake, improved  2.hypernatremia,ng tube inserted,give free water by ng tube  3.gi bleed ,s/p transfusion  4.covid,sepsis    D/w Dr. Grecia Duron MD  Nephrology  1/7/2022

## 2022-01-07 NOTE — PROGRESS NOTES
Post 500cc LR% bolus, BP 96/68  Dr. Wilbur Sibley updated about BP and hemoglobin, transfer order received with [id number] Nereida Chung to transfuse blood. [id number]: Bedside shift change report given to Meli Rosales RN (oncoming nurse) by Kota Huerta RN (offgoing nurse).  Report included the following information SBAR, Procedure Summary, Intake/Output, MAR, Recent Results and Cardiac Rhythm SR.

## 2022-01-07 NOTE — PROGRESS NOTES
attended the interdisciplinary rounds for Heraclio Kim, who is a 80 y.o.,male. Patients Primary Language is: [place]. According to the patients EMR Faith Affiliation is: Jainism. 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    Benign prostatic hyperplasia with lower urinary tract symptoms     Cerebral microvascular disease 09/25/2019    Mild episode of recurrent major depressive disorder ([facility] [address] 10/26/2018    Pure hypercholesterolemia 05/07/2018    Sleeping difficulty 05/07/2018    Essential hypertension 04/23/2018    Anxiety and depression     Kidney stone     Malaria     Skin cancer     Gout     Anxiety 04/24/2017    Elevated PSA 11/18/2013    Prediabetes 11/18/2013    Insomnia 11/18/2013    Joint pain 11/18/2013    Gross hematuria 05/31/2013      Plan:  Chaplains will continue to follow and will provide pastoral care on an as needed/requested basis.  recommends bedside caregivers page  on duty if patient shows signs of acute spiritual or emotional distress.     [address] [facility] [employer]  Board Certified [address] [facility]   [phone]

## 2022-01-07 NOTE — PROGRESS NOTES
Bedside report received from pt. NGT placement confirmation pending, no wet result report during this report; XR dept contacted, will continue to monitor.

## 2022-01-07 NOTE — PROGRESS NOTES
Nutrition Note    NGT placed, however pt transferred to ICU after rapid response. Pt remains hypotensive with no plans to start tube feeding yet. NGT currently to suction. No progress towards nutrition goals at this time. Will continue to follow. Recommendation/Plan:  - Monitor for readiness to initiate tube feeding. When appropriate, start tube feeding of Jevity 1.5 at 20 mL/hr and advance as tolerated by 10 mL q 12 hours to goal rate of 50 mL/hr with 150 mL q 4 hour water flushes (goal regimen to provide 1800 kcal, 77 gm protein, 912 mL free water, 100% RDIs).       Electronically signed by Varsha Eckert RD on 1/7/2022 at 12:17 PM    Contact: [phone]

## 2022-01-07 NOTE — PROGRESS NOTES
Infectious Disease progress Note        Reason: COVID-19 pneumonia    Current abx Prior abx   Zosyn, levofloxacin since 1/7/22 Cefepime 12/31-1/3  Vancomycin 12/31  Azithromycin 12/31     Lines:       Assessment :    68-year-old male with PMH of CAD s/p CABG x 3 - 8/2021, HTN, cardiomyopathy, BPH, asthma, anxiety, alcohol abuse (has not drank in many years) and insomnia  presented to ER on 12/31/21 with c/o dyspnea worsening over the past week worse with exertion. Positive Covid test 12/31/2021    Now with worsening mentation, worsening leukocytosis, increasing procalcitonin, increasing bilirubin/transaminases    Patient presents with a highly complex clinical picture. Clinical presentation concerning for aspiration pneumonia in a patient with recent COVID-19 pneumonia     Worsening leukocytosis -likely due to steroids, partially treated bacterial infection, GI bleed     Increasing bilirubin/transaminases -could be secondary to hepatic congestion from CHF   Versus COVID-19 hepatitis. GI follow-up appreciated. Negative viral hepatitis panel 12/31/2021. Agree with imaging studies to rule out undiagnosed hepatobiliary pathology. CHF : severely reduced left ventricular systolic function with a visually estimated EF of 10 -15%. Grade III diastolic dysfunction with increased LAP. Cardiology follow-up appreciated    Altered mental status-likely metabolic/hypoxic encephalopathy    Elevated H-ywsyo-admmgo due to COVID-19 associated hypercoagulability. Acute thrombus noted in bilateral basilic vein, right cephalic vein on venous duplex 1/4/22. CTA negative for pulmonary embolism. Acute on chronic kidney injury-likely secondary to volume depletion, poor p.o. intake. Nephrology follow-up appreciated    Drop in h/h- likely secondary to ongoing GI bleed    Worsening hypotension - likely hypovolemic shock due to GI bleed    Transferred to ICU     Recommendations:    1.  cont Zosyn, levofloxacin.    2. Follow-up cardiology recommendations regarding diuresis  3. Follow-up GI, nephrology recommendations  4.  monitor oxygenation  5. Follow-up palliative care recommendations regarding goals of care    Prognosis: poor      josé miguel plan was discussed in details with ICU team.  Please call me if any further questions or concerns. Will continue to participate in the care of this patient. HPI:    Detailed review of systems not feasible    Past Medical History:   Diagnosis Date    Anxiety and depression     Asthma     Benign prostatic hyperplasia with lower urinary tract symptoms     On flomax    Cerebral microvascular disease 9/25/2019    Elevated PSA     Gout     Hypertension     Insomnia     Kidney stone     Malaria     Prediabetes     S/P CABG x 3 8/5/2021    Skin cancer        Past Surgical History:   Procedure Laterality Date    HX COLONOSCOPY  2011    f/u 2021    [address]  2000    [facility]    HX SKIN BIOPSY  2013    [facility], [facility] [address]       Current Discharge Medication List        CONTINUE these medications which have NOT CHANGED    Details   tamsulosin (FLOMAX) 0.4 mg capsule Take 2 Capsules by mouth daily (after dinner). Qty: 180 Capsule, Refills: 3      atorvastatin (LIPITOR) 40 mg tablet Take 1 Tablet by mouth daily. Qty: 90 Tablet, Refills: 3      metoprolol succinate (TOPROL-XL) 50 mg XL tablet Take 0.5 Tablets by mouth daily. Or as directed  Qty: 90 Tablet, Refills: 3      diphenhydrAMINE hcl (BENADRYL) 2 % topical gel Apply 1 mL to affected area every six (6) hours as needed for Itching.      hydrOXYzine HCL (ATARAX) 25 mg tablet Take 25 mg by mouth three (3) times daily as needed for Itching. fluticasone propionate (FLONASE) 50 mcg/actuation nasal spray 2 Sprays by Both Nostrils route daily.   Qty: 3 Bottle, Refills: 1    Associated Diagnoses: Chronic rhinitis      PARoxetine (PAXIL) 20 mg tablet TAKE 1 TABLET BY MOUTH EVERY DAY  Qty: 90 Tab, Refills: 0    Associated Diagnoses: Adjustment disorder with mixed anxiety and depressed mood      zolpidem (AMBIEN) 10 mg tablet Take 10 mg by mouth nightly as needed for Sleep. Current Facility-Administered Medications   Medication Dose Route Frequency    pantoprazole (PROTONIX) 40 mg in 0.9% sodium chloride 10 mL injection  40 mg IntraVENous Q12H    0.9% sodium chloride infusion 250 mL  250 mL IntraVENous PRN    [Held by provider] carvediloL (COREG) tablet 6.25 mg  6.25 mg Oral Q12H    piperacillin-tazobactam (ZOSYN) 3.375 g in 0.9% sodium chloride (MBP/ADV) 100 mL MBP  3.375 g IntraVENous Q6H    diphenhydrAMINE (BENADRYL) injection 25 mg  25 mg IntraVENous Q4H PRN    levoFLOXacin (LEVAQUIN) 500 mg in D5W IVPB  500 mg IntraVENous Q24H    [Held by provider] enoxaparin (LOVENOX) injection 70 mg  70 mg SubCUTAneous Q12H    melatonin tablet 5 mg  5 mg Oral QHS    [Held by provider] aspirin chewable tablet 81 mg  81 mg Oral DAILY    albuterol (PROVENTIL VENTOLIN) nebulizer solution 2.5 mg  2.5 mg Nebulization Q6H PRN    sodium chloride (NS) flush 5-40 mL  5-40 mL IntraVENous Q8H    sodium chloride (NS) flush 5-40 mL  5-40 mL IntraVENous PRN    polyethylene glycol (MIRALAX) packet 17 g  17 g Oral DAILY PRN    ondansetron (ZOFRAN ODT) tablet 4 mg  4 mg Oral Q8H PRN    Or    ondansetron (ZOFRAN) injection 4 mg  4 mg IntraVENous Q6H PRN    fluticasone propionate (FLONASE) 50 mcg/actuation nasal spray 2 Spray  2 Spray Both Nostrils DAILY    [Held by provider] PARoxetine (PAXIL) tablet 20 mg  20 mg Oral DAILY    tamsulosin (FLOMAX) capsule 0.8 mg  0.8 mg Oral PCD       Allergies: Fluconazole and Penicillin g    History reviewed. No pertinent family history.   Social History     Socioeconomic History    Marital status:      Spouse name: Not on file    Number of children: Not on file    Years of education: Not on file    Highest education level: Not on file   Occupational History    Not on file   Tobacco Use    Smoking status: Never Smoker    Smokeless tobacco: Never Used   Vaping Use    Vaping Use: Never used   Substance and Sexual Activity    Alcohol use: No    Drug use: No    Sexual activity: Not on file   Other Topics Concern    Not on file   Social History Narrative    Not on file     Social Determinants of Health     Financial Resource Strain:     Difficulty of Paying Living Expenses: Not on file   Food Insecurity:     Worried About [address] in the Last Year: Not on file    Ran Out of Food in the Last Year: Not on file   Transportation Needs:     Lack of Transportation (Medical): Not on file    Lack of Transportation (Non-Medical): Not on file   Physical Activity:     Days of Exercise per Week: Not on file    Minutes of Exercise per Session: Not on file   Stress:     Feeling of Stress : Not on file   Social Connections:     Frequency of Communication with Friends and Family: Not on file    Frequency of Social Gatherings with Friends and Family: Not on file    Attends Orthodoxy Services: Not on file    Active Member of Clubs or Organizations: Not on file    Attends Club or Organization Meetings: Not on file    Marital Status: Not on file   Intimate Partner Violence:     Fear of Current or Ex-Partner: Not on file    Emotionally Abused: Not on file    Physically Abused: Not on file    Sexually Abused: Not on file   Housing Stability:     Unable to Pay for Housing in the Last Year: Not on file    Number of [place] in the Last Year: Not on file    Unstable Housing in the Last Year: Not on file     Social History     Tobacco Use   Smoking Status Never Smoker   Smokeless Tobacco Never Used        Temp (24hrs), Av.3 °F (36.3 °C), Min:97.3 °F (36.3 °C), Max:97.4 °F (36.3 °C)    Visit Vitals  BP 96/68   Pulse 97   Temp 97.3 °F (36.3 °C)   Resp 20   Ht 5' 8\" (1.727 m)   Wt 72.7 kg (160 lb 4.8 oz)   SpO2 (!) 89%   BMI 24.37 kg/m²       ROS: Unable to obtain due to patient factors    Physical Exam:    Vitals signs and nursing note reviewed. Constitutional:       Laying on bed, confused  HENT:      Head: Normocephalic. Eyes:      Conjunctiva/sclera: Conjunctivae normal.      Neck:      Musculoskeletal: Normal range of motion and neck supple. Cardiovascular:      Rate and Rhythm: Normal rate and regular rhythm on monitor  Chest:      Bilateral chest movements equal.  Auscultation deferred due to Covid positive  Abdominal:      General: There is no distension. Palpations: Abdomen is soft. Tenderness: There is no abdominal tenderness. There is no rebound. Musculoskeletal: Normal range of motion. General: No tenderness. Skin:     General: Skin is warm and dry. Findings: No rash. Neurological:   Moves all 4 extremities. Does not follow commands. Detailed neurologic evaluation not feasible  Psychiatric:          Confused    Labs: Results:   Chemistry Recent Labs     01/07/22  0050 01/06/22  1645 01/06/22  0450 01/05/22  0542 01/05/22  0542   * 146* 170*   < > 154*   * 157* 151*   < > 150*   K 5.4 4.5 5.1   < > 5.1   * 124* 121*   < > 119*   CO2 27 26 12*   < > 23   BUN 71* 73* 77*   < > 84*   CREA 1.24 1.29 1.32*   < > 1.36*   CA 8.1* 8.9 8.7   < > 8.5   AGAP 6 7 18   < > 8   BUCR 57* 57* 58*   < > 62*   *  --  319*  --  290*   TP 5.1*  --  6.6  --  6.5   ALB 2.4*  --  2.8*  --  3.0*   GLOB 2.7  --  3.8  --  3.5   AGRAT 0.9  --  0.7*  --  0.9    < > = values in this interval not displayed. CBC w/Diff Recent Labs     01/07/22  0050 01/06/22  0450 01/05/22  0542   WBC 21.5*  18.2* 17.7* 15.8*   RBC 2.97*  3.81* 4.80 4.22*   HGB 10.3*  12.6* 15.5 13.7   HCT 32.9*  40.3 48.5* 42.5   *  105* 113* 101*   GRANS PENDING  95* 91*  --    LYMPH PENDING  2* 2*  --    EOS PENDING  0 0  --       Microbiology No results for input(s): CULT in the last 72 hours.        RADIOLOGY:    All available imaging studies/reports in [facility] for this admission were reviewed      Disclaimer: Sections of this note are dictated utilizing voice recognition software, which may have resulted in some phonetic based errors in grammar and contents. Even though attempts were made to correct all the mistakes, some may have been missed, and remained in the body of the document. If questions arise, please contact our department.     Dr. Stephanie Balbuena, Infectious Disease Specialist  [phone]  January 7, 2022  9:56 AM

## 2022-01-07 NOTE — PROGRESS NOTES
0893- Rapid Response called on pt. Upon my arrival RT Michael Gonzalez was already present at the bedside. Patient presents with hypotension and blood loss. MD at bedside and did not want an ABG at this time. No new orders given to RT at this time as well. 36- Called to patients room due to low sats. Vapotherm taken to room. Patients sats are inaccurate due to no pleth noted on monitor. Patient placed on VAPOTHERM maxed out 40L and 100% with a NRB 15lpm over top of it. Patient transported to ICU 13. NRB and HF remains on patient in ICU. MD at bedside did not want me to obtain an ABG at this time being that he is hypotensive and has had significant blood loss, ultimately creating O2 pleth challenges. Will alert ICU RT of patients arrival, no new orders given at this time.    -Patient is a DNR/DNI.

## 2022-01-07 NOTE — DISCHARGE SUMMARY
Death Summary    Patient: James Arnold               Sex: male          DOA: 12/31/2021         YOB: 1940      Age:  80 y.o.        LOS:  LOS: 7 days                Admit Date: 12/31/2021    Discharge Date: 1/7/2022    Admission Diagnoses: Transaminitis [R74.01]  CHF (congestive heart failure) ([facility]) [I50.9]  SOB (shortness of breath) [R06.02]  Pneumonia due to COVID-19 virus [U07.1, J12.82]  GI bleed [K92.2]    Discharge Diagnoses:    Problem List as of 1/7/2022 Date Reviewed: 12/7/2021            Codes Class Noted - Resolved    GI bleed ICD-10-CM: K92.2  ICD-9-CM: 578.9  1/7/2022 - Present        CHF (congestive heart failure) ([facility] ICD-10-CM: I50.9  ICD-9-CM: 428.0  12/31/2021 - Present        SOB (shortness of breath) ICD-10-CM: R06.02  ICD-9-CM: 786.05  12/31/2021 - Present        Transaminitis ICD-10-CM: R74.01  ICD-9-CM: 790.4  12/31/2021 - Present        Pneumonia due to COVID-19 virus ICD-10-CM: U07.1, J12.82  ICD-9-CM: 480.8, 079.89  12/31/2021 - Present        CAD (coronary artery disease) ICD-10-CM: I25.10  ICD-9-CM: 414.00  8/5/2021 - Present        S/P CABG x 3 ICD-10-CM: Z95.1  ICD-9-CM: V45.81  8/5/2021 - Present        Benign prostatic hyperplasia with lower urinary tract symptoms (Chronic) ICD-10-CM: N40.1  ICD-9-CM: 600.01  Unknown - Present    Overview Signed 7/27/2021 11:01 AM by Delia Arellano PA-C     On flomax             Cerebral microvascular disease ICD-10-CM: I67.89  ICD-9-CM: 437.8  9/25/2019 - Present        Mild episode of recurrent major depressive disorder ([facility] ICD-10-CM: F33.0  ICD-9-CM: 296.31  10/26/2018 - Present        Pure hypercholesterolemia (Chronic) ICD-10-CM: E78.00  ICD-9-CM: 272.0  5/7/2018 - Present        Sleeping difficulty ICD-10-CM: G47.9  ICD-9-CM: 780.50  5/7/2018 - Present        Essential hypertension (Chronic) ICD-10-CM: I10  ICD-9-CM: 401.9  4/23/2018 - Present        Anxiety and depression (Chronic) ICD-10-CM: F41.9, F32. A  ICD-9-CM: 300.00, 311  Unknown - Present        Kidney stone ICD-10-CM: N20.0  ICD-9-CM: 592.0  Unknown - Present        Malaria ICD-10-CM: B54  ICD-9-CM: 084.6  Unknown - Present        Skin cancer ICD-10-CM: C44.90  ICD-9-CM: 173.90  Unknown - Present        Gout ICD-10-CM: M10.9  ICD-9-CM: 274.9  Unknown - Present        Anxiety ICD-10-CM: F41.9  ICD-9-CM: 300.00  4/24/2017 - Present        Elevated PSA ICD-10-CM: R97.20  ICD-9-CM: 790.93  11/18/2013 - Present        Prediabetes ICD-10-CM: R73.03  ICD-9-CM: 790.29  11/18/2013 - Present        Insomnia (Chronic) ICD-10-CM: G47.00  ICD-9-CM: 780.52  11/18/2013 - Present        Joint pain ICD-10-CM: M25.50  ICD-9-CM: 719.40  11/18/2013 - Present        Gross hematuria ICD-10-CM: R31.0  ICD-9-CM: 599.71  5/31/2013 - Present        RESOLVED: Hypertension ICD-10-CM: I10  ICD-9-CM: 401.9  8/3/2021 - 8/3/2021        RESOLVED: Asthma ICD-10-CM: J45.909  ICD-9-CM: 493.90  Unknown - 5/7/2018        RESOLVED: HTN (hypertension) ICD-10-CM: I10  ICD-9-CM: 401.9  11/18/2013 - 4/23/2018                Discharge Condition:  Improved    Hospital Course: Patient is 79 yo M with hx CHF now with EF 15%, CAD s/p CABG x3 (8/2021), HTN, Asthma, anxiety, BPH presented to ED with dyspnea for 1 week. Concern for acute on chronic heart failure and COVID PNA, tested + in ED. Patient admitted and treated with diuretics. Then with ARF likely due to dehydration, poor po intake, diuretic use. Patient also found to have transaminitis, b/l pleural effusions, b/l UE DVTs, likely 2/2 COVID. Started on Lovenox. On night shift of 1/6-1/7, rapid response called called for acute GI (rectal) bleed, HD stable. Protamine ordered to reverse lovenox. Hgb/Hct slightly decreased but no need for transfusion at that time.  Patient with CT Abdomen 4 hours previous to RRT showing malpositioned enteric tube, extensive opacities in b/l lungs, soft tissue gas in right ventral abdominal wall, likely iatrogenic, unhealed median sternotomy, anasarca, mild ascites, right common iliac artery aneurysm. Later during night shift, RR team ordered 1 unit for soft Bps. On 22 AM, ICU consulted by hospitalist for continued soft Bps in setting of previously active GI bleed. On initial evaluation, patient was HD Stable (Map >65, HR normal, no active GI bleeding), pending intiation of blood transufusion. Later in the morning, 2nd rapid response was initiated as patient had another \"gush of blood\" per RNs and Bps dropped to 60s/40s. Sats additionally down to 80%. First unit transfusion initiated. Patient transferred for ICU given hemodynamically unstable and need for pressor support, started on levo. Depsite pressors and 3 units transfusion, patient with continued hypotension and 400cc bloody rectal output. Plan was to attain hemodynamic stability, place a-line for accurate hemodynamic monitoring and once stable for transport, take patient to get CTA with IR to identify source of bleed and stop if able with IR. However, when ICU team discussed goals of care with patient's daughter & wife, they agreed that patient would not have wanted this extent of treatment, he instead would want to be made comfortable. Patient's family at that point decided to transition to comfort care. Shortly after, patient . Cause of death: Hemorrhagic shock in the setting of acute GIB   Associated factors: acute lower GI bleed, hypovolemia, acute anemia, DVTs, COVID +, Acute on Chronic Heart Failure, Acute renal failure. Was the ME contacted?  No, does not meet criteria  Was the family notified: Yes      Consults:    Treatment Team: Attending Provider: Dahlia Pillai MD; Utilization Review: Sayda Castillo RN; Consulting Provider: Alcides Cooley MD; Consulting Provider: Carson Frederick MD; Consulting Provider: Jacqueline Blank MD; Nurse Practitioner: Aury Black NP; Consulting Provider: Aury Black NP; Consulting Provider: Elise Ambrose DO; Staff Nurse: Rene Chris LPN; Primary Nurse: Clent Code; Care Manager: Bella Rapp, BROOKE; Hospitalist: Nat Doherty MD    Significant Diagnostic Studies:     Recent Results (from the past 24 hour(s))   LACTIC ACID    Collection Time: 01/06/22  4:45 PM   Result Value Ref Range    Lactic acid 2.8 (HH) 0.4 - 2.0 MMOL/L   METABOLIC PANEL, BASIC    Collection Time: 01/06/22  4:45 PM   Result Value Ref Range    Sodium 157 (H) 136 - 145 mmol/L    Potassium 4.5 3.5 - 5.5 mmol/L    Chloride 124 (H) 100 - 111 mmol/L    CO2 26 21 - 32 mmol/L    Anion gap 7 3.0 - 18 mmol/L    Glucose 146 (H) 74 - 99 mg/dL    BUN 73 (H) 7.0 - 18 MG/DL    Creatinine 1.29 0.6 - 1.3 MG/DL    BUN/Creatinine ratio 57 (H) 12 - 20      GFR est AA >60 >60 ml/min/1.73m2    GFR est non-AA 54 (L) >60 ml/min/1.73m2    Calcium 8.9 8.5 - 10.1 MG/DL   BLOOD GAS, ARTERIAL POC    Collection Time: 01/06/22  5:45 PM   Result Value Ref Range    Device: NASAL CANNULA      FIO2 (POC) 5 %    pH (POC) 7.53 (H) 7.35 - 7.45      pCO2 (POC) 31.8 (L) 35.0 - 45.0 MMHG    pO2 (POC) 69 (L) 80 - 100 MMHG    HCO3 (POC) 26.5 (H) 22 - 26 MMOL/L    sO2 (POC) 95.6 92 - 97 %    Base excess (POC) 4.4 mmol/L    Allens test (POC) Positive      Site LEFT RADIAL      Specimen type (POC) ARTERIAL      Performed by Flaquito Figueroa    CREATININE, UR, RANDOM    Collection Time: 01/06/22  8:00 PM   Result Value Ref Range    Creatinine, urine 62.00 30 - 125 mg/dL   PROTEIN URINE, RANDOM    Collection Time: 01/06/22  8:00 PM   Result Value Ref Range    Protein, urine random 53 (H) <11.9 mg/dL   SODIUM, UR, RANDOM    Collection Time: 01/06/22  8:00 PM   Result Value Ref Range    Sodium,urine random 54 20 - 110 MMOL/L   URINALYSIS W/MICROSCOPIC    Collection Time: 01/06/22  8:00 PM   Result Value Ref Range    Color YELLOW      Appearance CLEAR      Specific gravity 1.020 1.003 - 1.030      pH (UA) 5.0 5.0 - 8.0      Protein 30 (A) NEG mg/dL    Glucose Negative NEG mg/dL    Ketone Negative NEG mg/dL    Bilirubin MODERATE (A) NEG      Blood MODERATE (A) NEG      Urobilinogen 2.0 (H) 0.2 - 1.0 EU/dL    Nitrites Negative NEG      Leukocyte Esterase Negative NEG      WBC Negative 0 - 4 /hpf    RBC Negative 0 - 5 /hpf    Epithelial cells Negative 0 - 5 /lpf    Bacteria Negative NEG /hpf    Hyaline cast 1 to 5 0 - 2 /lpf   LACTIC ACID    Collection Time: 01/07/22 12:50 AM   Result Value Ref Range    Lactic acid 2.9 (HH) 0.4 - 2.0 MMOL/L   CBC WITH AUTOMATED DIFF    Collection Time: 01/07/22 12:50 AM   Result Value Ref Range    WBC 18.2 (H) 4.6 - 13.2 K/uL    RBC 3.81 (L) 4.35 - 5.65 M/uL    HGB 12.6 (L) 13.0 - 16.0 g/dL    HCT 40.3 36.0 - 48.0 %    .8 (H) 78.0 - 100.0 FL    MCH 33.1 24.0 - 34.0 PG    MCHC 31.3 31.0 - 37.0 g/dL    RDW 20.5 (H) 11.6 - 14.5 %    PLATELET 816 (L) 235 - 420 K/uL    MPV 12.4 (H) 9.2 - 11.8 FL    NRBC 1.8 (H) 0  WBC    ABSOLUTE NRBC 0.32 (H) 0.00 - 0.01 K/uL    NEUTROPHILS 95 (H) 40 - 73 %    LYMPHOCYTES 2 (L) 21 - 52 %    MONOCYTES 3 3 - 10 %    EOSINOPHILS 0 0 - 5 %    BASOPHILS 0 0 - 2 %    IMMATURE GRANULOCYTES 0 %    ABS. NEUTROPHILS 17.3 (H) 1.8 - 8.0 K/UL    ABS. LYMPHOCYTES 0.4 (L) 0.9 - 3.6 K/UL    ABS. MONOCYTES 0.5 0.05 - 1.2 K/UL    ABS. EOSINOPHILS 0.0 0.0 - 0.4 K/UL    ABS. BASOPHILS 0.0 0.0 - 0.1 K/UL    ABS. IMM.  GRANS. 0.0 K/UL    DF MANUAL      PLATELET COMMENTS DECREASED PLATELETS      RBC COMMENTS TARGET CELLS  1+        RBC COMMENTS BILL CELLS  1+       METABOLIC PANEL, COMPREHENSIVE    Collection Time: 01/07/22 12:50 AM   Result Value Ref Range    Sodium 158 (H) 136 - 145 mmol/L    Potassium 5.4 3.5 - 5.5 mmol/L    Chloride 125 (H) 100 - 111 mmol/L    CO2 27 21 - 32 mmol/L    Anion gap 6 3.0 - 18 mmol/L    Glucose 213 (H) 74 - 99 mg/dL    BUN 71 (H) 7.0 - 18 MG/DL    Creatinine 1.24 0.6 - 1.3 MG/DL    BUN/Creatinine ratio 57 (H) 12 - 20      GFR est AA >60 >60 ml/min/1.73m2    GFR est non-AA 56 (L) >60 ml/min/1.73m2    Calcium 8.1 (L) 8.5 - 10.1 MG/DL    Bilirubin, total 11.6 (H) 0.2 - 1.0 MG/DL    ALT (SGPT) 404 (H) 16 - 61 U/L    AST (SGOT) 119 (H) 10 - 38 U/L    Alk. phosphatase 289 (H) 45 - 117 U/L    Protein, total 5.1 (L) 6.4 - 8.2 g/dL    Albumin 2.4 (L) 3.4 - 5.0 g/dL    Globulin 2.7 2.0 - 4.0 g/dL    A-G Ratio 0.9 0.8 - 1.7     PROTHROMBIN TIME + INR    Collection Time: 01/07/22 12:50 AM   Result Value Ref Range    Prothrombin time 21.3 (H) 11.5 - 15.2 sec    INR 1.9 (H) 0.8 - 1.2     TYPE & SCREEN    Collection Time: 01/07/22 12:55 AM   Result Value Ref Range    Crossmatch Expiration 01/10/2022,2359     ABO/Rh(D) A POSITIVE     Antibody screen NEG     [facility] AT 0555 ON [id number] BY [facility]     CALLED TO: JACQUELINE 0855 3CCU BY [facility] [id number]     Unit number [id number]     Blood component type  LR     Unit division 00     Status of unit ISSUED     Crossmatch result Compatible     Unit number [id number]     Blood component type  LR     Unit division 00     Status of unit ISSUED     Crossmatch result Compatible     Unit number [id number]     Blood component type [facility]     Unit division 00     Status of unit ISSUED     Crossmatch result Compatible    GLUCOSE, POC    Collection Time: 01/07/22  1:13 AM   Result Value Ref Range    Glucose (POC) 270 (H) 70 - 110 mg/dL   RBC, ALLOCATE    Collection Time: 01/07/22  5:45 AM   Result Value Ref Range    HISTORY CHECKED?  Historical check performed    PROTHROMBIN TIME + INR    Collection Time: 01/07/22  6:00 AM   Result Value Ref Range    Prothrombin time 26.0 (H) 11.5 - 15.2 sec    INR 2.4 (H) 0.8 - 1.2     CBC WITH AUTOMATED DIFF    Collection Time: 01/07/22  6:00 AM   Result Value Ref Range    WBC 21.5 (H) 4.6 - 13.2 K/uL    RBC 2.97 (L) 4.35 - 5.65 M/uL    HGB 10.3 (L) 13.0 - 16.0 g/dL    HCT 32.9 (L) 36.0 - 48.0 %    .8 (H) 78.0 - 100.0 FL    MCH 34.7 (H) 24.0 - 34.0 PG    MCHC 31.3 31.0 - 37.0 g/dL    RDW 20.3 (H) 11.6 - 14.5 %    PLATELET 192 (L) 468 - 420 K/uL    MPV 12.9 (H) 9.2 - 11.8 FL    NRBC 2.3 (H) 0  WBC    ABSOLUTE NRBC 0.50 (H) 0.00 - 0.01 K/uL    NEUTROPHILS 96 (H) 40 - 73 %    LYMPHOCYTES 1 (L) 21 - 52 %    MONOCYTES 3 3 - 10 %    EOSINOPHILS 0 0 - 5 %    BASOPHILS 0 0 - 2 %    IMMATURE GRANULOCYTES 0 0.0 - 0.5 %    ABS. NEUTROPHILS 20.7 (H) 1.8 - 8.0 K/UL    ABS. LYMPHOCYTES 0.2 (L) 0.9 - 3.6 K/UL    ABS. MONOCYTES 0.6 0.05 - 1.2 K/UL    ABS. EOSINOPHILS 0.0 0.0 - 0.4 K/UL    ABS. BASOPHILS 0.0 0.0 - 0.1 K/UL    ABS. IMM. GRANS. 0.0 0.00 - 0.04 K/UL    DF MANUAL      PLATELET COMMENTS DECREASED PLATELETS      RBC COMMENTS ANISOCYTOSIS  2+        RBC COMMENTS POLYCHROMASIA  1+        RBC COMMENTS TARGET CELLS  1+       GLUCOSE, POC    Collection Time: 01/07/22  7:54 AM   Result Value Ref Range    Glucose (POC) 144 (H) 70 - 110 mg/dL   EKG, 12 LEAD, SUBSEQUENT    Collection Time: 01/07/22  8:02 AM   Result Value Ref Range    Ventricular Rate 97 BPM    Atrial Rate 97 BPM    P-R Interval 150 ms    QRS Duration 120 ms    Q-T Interval 400 ms    QTC Calculation (Bezet) 508 ms    Calculated P Axis 54 degrees    Calculated R Axis -46 degrees    Calculated T Axis 88 degrees    Diagnosis       Normal sinus rhythm  Left anterior fascicular block  Nonspecific ST and T wave abnormality  Abnormal ECG  When compared with ECG of 31-DEC-2021 02:48,  fusion complexes are no longer present  premature ventricular complexes are no longer present  T wave inversion no longer evident in Lateral leads     RBC, ALLOCATE    Collection Time: 01/07/22  8:45 AM   Result Value Ref Range    HISTORY CHECKED? Historical check performed    RBC, ALLOCATE    Collection Time: 01/07/22  8:45 AM   Result Value Ref Range    HISTORY CHECKED?  Historical check performed    PLASMA, ALLOCATE    Collection Time: 01/07/22 10:45 AM   Result Value Ref Range    CALLED TO: JASON [id number] 3CCU BY R [id number]     Unit number [id number]     Blood component type FP 24h,Thaw1     Unit division 00     Status of unit ISSUED    METABOLIC PANEL, COMPREHENSIVE    Collection Time: 01/07/22 12:00 PM   Result Value Ref Range    Sodium 156 (H) 136 - 145 mmol/L    Potassium 6.2 (HH) 3.5 - 5.5 mmol/L    Chloride 126 (H) 100 - 111 mmol/L    CO2 20 (L) 21 - 32 mmol/L    Anion gap 10 3.0 - 18 mmol/L    Glucose 68 (L) 74 - 99 mg/dL    BUN 87 (H) 7.0 - 18 MG/DL    Creatinine 1.69 (H) 0.6 - 1.3 MG/DL    BUN/Creatinine ratio 51 (H) 12 - 20      GFR est AA 48 (L) >60 ml/min/1.73m2    GFR est non-AA 39 (L) >60 ml/min/1.73m2    Calcium 8.1 (L) 8.5 - 10.1 MG/DL    Bilirubin, total 10.3 (H) 0.2 - 1.0 MG/DL    ALT (SGPT) 361 (H) 16 - 61 U/L    AST (SGOT) 173 (H) 10 - 38 U/L    Alk. phosphatase 258 (H) 45 - 117 U/L    Protein, total 4.5 (L) 6.4 - 8.2 g/dL    Albumin 2.3 (L) 3.4 - 5.0 g/dL    Globulin 2.2 2.0 - 4.0 g/dL    A-G Ratio 1.0 0.8 - 1.7     PROTHROMBIN TIME + INR    Collection Time: 01/07/22 12:00 PM   Result Value Ref Range    Prothrombin time 28.1 (H) 11.5 - 15.2 sec    INR 2.7 (H) 0.8 - 1.2     PTT    Collection Time: 01/07/22 12:00 PM   Result Value Ref Range    aPTT 38.7 (H) 23.0 - 36.4 SEC   CBC WITH AUTOMATED DIFF    Collection Time: 01/07/22 12:00 PM   Result Value Ref Range    WBC 19.8 (H) 4.6 - 13.2 K/uL    RBC 4.52 4.35 - 5.65 M/uL    HGB 14.3 13.0 - 16.0 g/dL    HCT 45.3 36.0 - 48.0 %    .2 (H) 78.0 - 100.0 FL    MCH 31.6 24.0 - 34.0 PG    MCHC 31.6 31.0 - 37.0 g/dL    RDW 17.9 (H) 11.6 - 14.5 %    PLATELET 81 (L) 313 - 420 K/uL    MPV 13.3 (H) 9.2 - 11.8 FL    NRBC 3.1 (H) 0  WBC    ABSOLUTE NRBC 0.61 (H) 0.00 - 0.01 K/uL    NEUTROPHILS 92 (H) 40 - 73 %    LYMPHOCYTES 2 (L) 21 - 52 %    MONOCYTES 5 3 - 10 %    EOSINOPHILS 1 0 - 5 %    BASOPHILS 0 0 - 2 %    IMMATURE GRANULOCYTES 0 0.0 - 0.5 %    ABS. NEUTROPHILS 18.2 (H) 1.8 - 8.0 K/UL    ABS. LYMPHOCYTES 0.4 (L) 0.9 - 3.6 K/UL    ABS. MONOCYTES 1.0 0.05 - 1.2 K/UL    ABS. EOSINOPHILS 0.2 0.0 - 0.4 K/UL    ABS. BASOPHILS 0.0 0.0 - 0.1 K/UL    ABS. IMM.  GRANS. 0.0 0.00 - 0.04 K/UL    DF MANUAL      PLATELET COMMENTS DECREASED PLATELETS      RBC COMMENTS ANISOCYTOSIS  1+        RBC COMMENTS POLYCHROMASIA  1+        RBC COMMENTS TARGET CELLS  1+        RBC COMMENTS BILL CELLS  1+         CT ABD: IMPRESSION  1. Interval increased size of the conglomerate of dependent urinary bladder  calculi. 2.  Mild prostatomegaly. Recommend correlation with PSA. 3.  Malpositioned enteric tube, looped at the level of the gastric cardia, with  tip at the level the distal esophagus. Recommend repositioning to ensure  appropriate sump function. 4.  Interval resolved bilateral pleural effusions. New extensive groundglass  opacities within the bilateral lungs, may reflect poor edema or  infectious/inflammatory process. Recommend short-term imaging follow-up until  resolution. 5.  All amount of soft tissue gas in the right ventral abdominal wall, likely  iatrogenic; clinical correlation advised to exclude infection. 6.  Unhealed median sternotomy. Anasarca. Mild ascites. Right common iliac  artery aneurysm. CT HEAD: IMPRESSION  1. No noncontrast CT evidence of acute intracranial process. Note that MRI is  more sensitive for ischemia in the first 24 to 48 hours. 2.  Mild-to-moderate cerebral atrophy and mild burden of probable sequela of  chronic white matter microvascular ischemic disease. CXRAY: IMPRESSION  1. Right IJ region Cordis sheath, distal tip at the level of the thoracic  inlet. No pneumothorax. 2.  Bilateral subtle questionable hazy groundglass densities in the lung  periphery. Superimposed streaky densities in the left lower lung zone,  suggestive of focal infiltrate vs. less likely atelectatic changes vs.  aspiration. 3.  Buckled appearance of the trachea perhaps related to artifact from patient  positioning vs. right thyroid enlargement-related change. ABD KUB: IMPRESSION  1.   Malpositioned enteric tube, looped at the level of the gastric cardia, with  tip at the level the distal esophagus. Recommend repositioning to ensure  appropriate sump function. 2.  Nonspecific diffuse bilateral pulmonary groundglass/interstitial opacities. Recommend continued imaging follow-up. PVLs: Right Upper Venous    Thrombus noted within the right cephalic forearm and basilic upper arm vein(s). Cephalic forearm thrombus is occlusive. Basilic upper arm thrombus is non-occlusive. The internal jugular, subclavian, axillary, brachial prox, brachial mid, brachial dist and cephalic upper arm vein(s) were visualized in the transverse and longitudinal views. The vessels showed normal color filling and compressibility. Doppler interrogation showed phasic and spontaneous flow. Left Upper Venous    Thrombus noted within the left basilic upper arm vein(s). Basilic upper arm thrombus is non-occlusive. The internal jugular, subclavian, axillary, proximal brachial, mid brachial, distal brachial and cephalic upper arm vein(s) were visualized in the transverse and longitudinal views. The vessels showed normal color filling and compressibility. Doppler interrogation showed phasic and spontaneous flow. Unable to visualized the left radial and ulnar veins due to patient position.

## 2022-01-07 NOTE — H&P
[facility]  Pulmonary, Critical Care, and Sleep Medicine    Name: Angelo Mejía MRN: [id number]   : 1940 Hospital: [address] [place] [address] Dr   Date: 2022        Critical Care History and Physical      IMPRESSION:   · Active Lower GI Bleed - following lovenox admin for DVTS, with hemodynamic instability, requiring multiple units PRBCs  · Acute Blood Loss Anemia - 2/2 GI bleed; Hgb (12s>13s>15>12>10) with hypotension  · Acute on CHF - EF 15% - cardiology following, was being treated with lasix & beta blocker prior to acute bleed  · COVID PNA -  per patient's report in ED, vaccinated x2; tested (+) 2021  · DVT - suspected 2/2 COVID, started on Lovenox - but held due to GI Bleed  · Acute renal failure - in setting of dehydration from diuretics, poor po intake; nephro following  · Hypernatremia - nephro following  · B/l pleural effusions - resolution per most recent CT  · Hx CAD s/p CABG x3  · Transaminitis - GI following, hx heavy drinking in past, mesenteric duplex no evidence thrombus, thickened gallbladder, no acute cholecystitis, CT free fluid in abdomen. · Acute encephalopathy - multifactorial, likely toxic/metabolic in setting of COVID  UDS on admission + for opiates, benzos. CT Head  negative for acute intracranial processes. · Lactic acidosis - initial 2.8, increasing  · Concern for aspiration pneumonia -  Started on zosyn, levaquin. ID following. Patient Active Problem List   Diagnosis Code    Gross hematuria R31.0    Elevated PSA R97.20    Prediabetes R73.03    Insomnia G47.00    Joint pain M25.50    Anxiety F41.9    Anxiety and depression F41.9, F32. 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    Benign prostatic hyperplasia with lower urinary tract symptoms N40.1    CAD (coronary artery disease) I25.10    S/P CABG x 3 Z95.1    CHF (congestive heart failure) ([facility]) I50.9    SOB (shortness of breath) R06.02    Transaminitis R74.01    Pneumonia due to COVID-19 virus U07.1, J12.82        RECOMMENDATIONS:   Neuro: agitation, threatens to pull lines and drains, + restraints, precedex for agitation   Pulm:  Supplemental O2 via NC, titrate flow for goal SPO2> 90% Bronchodilators, steroids, pulmonary hygiene care, Aspiration precautions, Keep HOB >30 degrees  CVS Monitor CVP, Actively titrate vasopressors aim MAP >65mmHg, levo on board, none needed yet. GI: Protonix BID, NPO, NG with loop in gastrum - will pull back and re-advance to reposition appropriately. Trend LFT/CMPs given transaminitis. GI following. Renal:  Trend Renal indices, Strict Is/Os, Vera (+). Nephrology on board for AFR, appreciate recs. Hem/Onc: Daily CBC. Monitor for s/o active bleeding. In process of 3u PRBCs transfusion, will order 1uFP, repeat H&H. Monitor further rectal bleed or blood NG output. Hematology was consulted by hospitalist for acute thrombi, intermittent gross hematuria, elevated INR & LFTs. I/D: Urine cultures drawn at admission. Lactic acidosis - will follow with repeat Q4hrs till normalized. Antibiotics: levaquin (1/6 - ) & zosyn (1/6 - ). ID on board, appreciate recs, will touch base regarding drawing blood cultures. Trend WBCs and temperature curve - increasing leukocytosis to 21 today. Hypothermic - 95.7, warming measures, consider majo hugger if necessary. Endocrine: Q6 glucoses, SSI. TSH checked 12/25 - normal.   Metabolic:  Daily BMP, mag, phos. Trend lytes, replace as needed. For hypernatremia, if able to take NG off suction, can use free water flushes to help correct.   Musc/Skin: prn wound care  DNR  Discussed w/ attending physician      Best practice :  Glycemic control  IHI ICU bundles:   Central Line Bundle Followed  and Dobbins Bundle Followed    [facility] Vent patients- VAP bundle, aim to keep peak plateau pressure 03-37OE H2O  Stress ulcer prophylaxis. DVT prophylaxis - held due to bleed. Need for Lines, dobbins assessed. Restraints need. Palliative care evaluation. Subjective/History: This patient has been seen and evaluated at the request of Dr. Susana Finn for hypotension in setting of active GI bleed. 01/07/22    Patient is a 80 y.o. male     The patient is critically ill and can not provide additional history due to unable to speak. Patient is 81 yo M with hx CHF now with EF 15%, CAD s/p CABG x3 (8/2021), HTN, Asthma, anxiety, BPH presented to ED with dyspnea for 1 week. Concern for acute on chronic heart failure and COVID PNA, tested + in ED. Patient admitted and treated with diuretics. Then with ARF likely due to dehydration, poor po intake, diuretic use. Patient also found to have transaminitis, b/l pleural effusions, b/l UE DVTs, likely 2/2 COVID. Started on Lovenox. On night shift of 1/6-1/7, rapid response called called for acute GI (rectal) bleed, HD stable. Protamine ordered to reverse lovenox. Hgb/Hct slightly decreased but no need for transfusion at that time. Patient with CT Abdomen 4 hours previous to RRT showing malpositioned enteric tube, extensive opacities in b/l lungs, soft tissue gas in right ventral abdominal wall, likely iatrogenic, unhealed median sternotomy, anasarca, mild ascites, right common iliac artery aneurysm. Later during night shift, RR team ordered 1 unit for soft Bps. On 1/7/22 AM, ICU consulted by hospitalist for continued soft Bps in setting of previously active GI bleed. On initial evaluation, patient was HD Stable (Map >65, HR normal, no active GI bleeding), pending intiation of blood transufusion. Later in the morning, 2nd rapid response was initiated as patient had another \"gush of blood\" per RNs and Bps dropped to 60s/40s. Sats additionally down to 80%.  First unit transfusion initiated. Patient transferred for ICU given hemodynamically unstable and concern for imminent need for pressor support. Past Medical History:   Diagnosis Date    Anxiety and depression     Asthma     Benign prostatic hyperplasia with lower urinary tract symptoms     On flomax    Cerebral microvascular disease 9/25/2019    Elevated PSA     Gout     Hypertension     Insomnia     Kidney stone     Malaria     Prediabetes     S/P CABG x 3 8/5/2021    Skin cancer         Past Surgical History:   Procedure Laterality Date    HX COLONOSCOPY  2011    f/u 2021    HX HERNIA REPAIR  2000    [id number]  [employer]    HX SKIN BIOPSY  2013    [address], [place] [facility] [place]        Prior to Admission medications    Medication Sig Start Date End Date Taking? Authorizing Provider   tamsulosin (FLOMAX) 0.4 mg capsule Take 2 Capsules by mouth daily (after dinner). 12/7/21  Yes Marina Mccall PA-C   atorvastatin (LIPITOR) 40 mg tablet Take 1 Tablet by mouth daily. 9/13/21  Yes Katie Omaira P, NP   metoprolol succinate (TOPROL-XL) 50 mg XL tablet Take 0.5 Tablets by mouth daily. Or as directed 9/13/21  Yes Katie Omaira P, NP   diphenhydrAMINE hcl (BENADRYL) 2 % topical gel Apply 1 mL to affected area every six (6) hours as needed for Itching. Yes Provider, Historical   hydrOXYzine HCL (ATARAX) 25 mg tablet Take 25 mg by mouth three (3) times daily as needed for Itching. 10/21/20  Yes Provider, Historical   fluticasone propionate (FLONASE) 50 mcg/actuation nasal spray 2 Sprays by Both Nostrils route daily. 4/28/20  Yes Yvetta Collet, MD   PARoxetine (PAXIL) 20 mg tablet TAKE 1 TABLET BY MOUTH EVERY DAY 7/5/19  Yes Gilda Martinez PA   zolpidem (AMBIEN) 10 mg tablet Take 10 mg by mouth nightly as needed for Sleep.    Yes Provider, Historical       Current Facility-Administered Medications   Medication Dose Route Frequency    pantoprazole (PROTONIX) 40 mg in 0.9% sodium chloride 10 mL injection 40 mg IntraVENous Q12H    NOREPINephrine (LEVOPHED) 8 mg in 5% dextrose 250mL (32 mcg/mL) infusion  0.5-50 mcg/min IntraVENous TITRATE    NOREPINephrine (LEVOPHED) 8 mg/250 mL (32 mcg/mL) in dextrose 5% 250 mL (32 mcg/mL) infusion        [Held by provider] carvediloL (COREG) tablet 6.25 mg  6.25 mg Oral Q12H    piperacillin-tazobactam (ZOSYN) 3.375 g in 0.9% sodium chloride (MBP/ADV) 100 mL MBP  3.375 g IntraVENous Q6H    levoFLOXacin (LEVAQUIN) 500 mg in D5W IVPB  500 mg IntraVENous Q24H    [Held by provider] enoxaparin (LOVENOX) injection 70 mg  70 mg SubCUTAneous Q12H    melatonin tablet 5 mg  5 mg Oral QHS    [Held by provider] aspirin chewable tablet 81 mg  81 mg Oral DAILY    sodium chloride (NS) flush 5-40 mL  5-40 mL IntraVENous Q8H    fluticasone propionate (FLONASE) 50 mcg/actuation nasal spray 2 Spray  2 Spray Both Nostrils DAILY    [Held by provider] PARoxetine (PAXIL) tablet 20 mg  20 mg Oral DAILY    tamsulosin (FLOMAX) capsule 0.8 mg  0.8 mg Oral PCD       Allergies   Allergen Reactions    Fluconazole Hives and Itching    Penicillin G Hives        Social History     Tobacco Use    Smoking status: Never Smoker    Smokeless tobacco: Never Used   Substance Use Topics    Alcohol use: No     History reviewed. No pertinent family history. Review of Systems:  Review of systems not obtained due to patient factors. Objective:   Vital Signs:    Visit Vitals  BP 96/68   Pulse 97   Temp 97.3 °F (36.3 °C)   Resp 20   Ht 5' 8\" (1.727 m)   Wt 72.7 kg (160 lb 4.8 oz)   SpO2 (!) 89%   BMI 24.37 kg/m²       O2 Device: Non-rebreather mask   O2 Flow Rate (L/min): 5 l/min   Temp (24hrs), Av.3 °F (36.3 °C), Min:97.3 °F (36.3 °C), Max:97.4 °F (36.3 °C)       Intake/Output:   Last shift:       07 -  1900  In: 227.5 [I.V.:227.5]  Out: -   Last 3 shifts:  190 -  0700  In: 2157.5 [P.O.:100;  I.V.:7.5]  Out: 350 [Urine:350]    Intake/Output Summary (Last 24 hours) at 2022 0932  Last data filed at 1/7/2022 0706  Gross per 24 hour   Intake 1835 ml   Output 350 ml   Net 1485 ml       Ventilator Settings:  Mode Rate Tidal Volume Pressure FiO2 PEEP            36 %       Peak airway pressure:      Minute ventilation:        ARDS network Guidelines:   Lung protective strategy and Plateau  Pressure goal < 30 cm H2O goals  Oxygenation Goals PaO2 55-80 mm Hg or SaO2 88-95%  PH goal 7.30-7.45    VAP bundle:  Reviewed. [place] tube to suction at 20-30 cm Hg. Maintain [place] tube with 5-10ml air every 4 hours  Routine oral care every 4 hours  Elevation of head > 45 degree  Daily sedation holiday and SBT evaluation starting at 6.00am.    Physical Exam:     General:   Not alert, moaning in discomfort, responsive to touch but does not follow commands. Continuous writhign on bed and pulling at restraints at attempt to pull lines & drains. Head:   Normocephalic, without obvious abnormality, atraumatic. Eyes:   Conjunctivae/corneas clear. Nose:  Nares normal. Septum midline. Mucosa normal. No drainage or sinus tenderness. Throat:  Lips, mucosa, and tongue normal. Teeth and gums normal.   Neck:  Supple, symmetrical, trachea midline, no adenopathy, thyroid: no enlargment/tenderness/nodules, no carotid bruit and no JVD. Back:    Symmetric, no curvature. ROM normal.   Lungs:    Clear to auscultation bilaterally. Chest wall:   No tenderness or deformity. Heart:   Regular rate and rhythm. Abdomen:    Soft, non-tender, non distended. Active rectal bleeding. Extremities:  Extremities normal, atraumatic, no cyanosis or edema. +/- restraints    Pulses:  2+ and symmetric all extremities.    Skin:  Skin color, texture, turgor normal. No rashes or lesions   Lymph nodes:   Cervical, supraclavicular, and axillary nodes normal.   Neurologic:  responsive to stimuli     Devices:  · ETT: none  · NGT: yes  · Lines: PIV L forearm  · Drains: none  · Vera: none    Data:     Recent Results (from the past 24 hour(s))   BLOOD GAS, ARTERIAL POC    Collection Time: 01/06/22 11:47 AM   Result Value Ref Range    Device: ROOM AIR      FIO2 (POC) 21 %    pH (POC) 7.53 (H) 7.35 - 7.45      pCO2 (POC) 31.5 (L) 35.0 - 45.0 MMHG    pO2 (POC) 75 (L) 80 - 100 MMHG    HCO3 (POC) 26.6 (H) 22 - 26 MMOL/L    sO2 (POC) 96.6 92 - 97 %    Base excess (POC) 4.5 mmol/L    Allens test (POC) Positive      Site RIGHT RADIAL      Specimen type (POC) ARTERIAL      Performed by Beba Jones    C REACTIVE PROTEIN, QT    Collection Time: 01/06/22  2:15 PM   Result Value Ref Range    C-Reactive protein 4.4 (H) 0 - 0.3 mg/dL   PROCALCITONIN    Collection Time: 01/06/22  2:15 PM   Result Value Ref Range    Procalcitonin 0.30 ng/mL   LACTIC ACID    Collection Time: 01/06/22  4:45 PM   Result Value Ref Range    Lactic acid 2.8 (HH) 0.4 - 2.0 MMOL/L   METABOLIC PANEL, BASIC    Collection Time: 01/06/22  4:45 PM   Result Value Ref Range    Sodium 157 (H) 136 - 145 mmol/L    Potassium 4.5 3.5 - 5.5 mmol/L    Chloride 124 (H) 100 - 111 mmol/L    CO2 26 21 - 32 mmol/L    Anion gap 7 3.0 - 18 mmol/L    Glucose 146 (H) 74 - 99 mg/dL    BUN 73 (H) 7.0 - 18 MG/DL    Creatinine 1.29 0.6 - 1.3 MG/DL    BUN/Creatinine ratio 57 (H) 12 - 20      GFR est AA >60 >60 ml/min/1.73m2    GFR est non-AA 54 (L) >60 ml/min/1.73m2    Calcium 8.9 8.5 - 10.1 MG/DL   BLOOD GAS, ARTERIAL POC    Collection Time: 01/06/22  5:45 PM   Result Value Ref Range    Device: NASAL CANNULA      FIO2 (POC) 5 %    pH (POC) 7.53 (H) 7.35 - 7.45      pCO2 (POC) 31.8 (L) 35.0 - 45.0 MMHG    pO2 (POC) 69 (L) 80 - 100 MMHG    HCO3 (POC) 26.5 (H) 22 - 26 MMOL/L    sO2 (POC) 95.6 92 - 97 %    Base excess (POC) 4.4 mmol/L    Allens test (POC) Positive      Site LEFT RADIAL      Specimen type (POC) ARTERIAL      Performed by Beba Jones    CREATININE, UR, RANDOM    Collection Time: 01/06/22  8:00 PM   Result Value Ref Range    Creatinine, urine 62.00 30 - 125 mg/dL   PROTEIN URINE, RANDOM Collection Time: 01/06/22  8:00 PM   Result Value Ref Range    Protein, urine random 53 (H) <11.9 mg/dL   SODIUM, UR, RANDOM    Collection Time: 01/06/22  8:00 PM   Result Value Ref Range    Sodium,urine random 54 20 - 110 MMOL/L   URINALYSIS W/MICROSCOPIC    Collection Time: 01/06/22  8:00 PM   Result Value Ref Range    Color YELLOW      Appearance CLEAR      Specific gravity 1.020 1.003 - 1.030      pH (UA) 5.0 5.0 - 8.0      Protein 30 (A) NEG mg/dL    Glucose Negative NEG mg/dL    Ketone Negative NEG mg/dL    Bilirubin MODERATE (A) NEG      Blood MODERATE (A) NEG      Urobilinogen 2.0 (H) 0.2 - 1.0 EU/dL    Nitrites Negative NEG      Leukocyte Esterase Negative NEG      WBC Negative 0 - 4 /hpf    RBC Negative 0 - 5 /hpf    Epithelial cells Negative 0 - 5 /lpf    Bacteria Negative NEG /hpf    Hyaline cast 1 to 5 0 - 2 /lpf   LACTIC ACID    Collection Time: 01/07/22 12:50 AM   Result Value Ref Range    Lactic acid 2.9 (HH) 0.4 - 2.0 MMOL/L   CBC WITH AUTOMATED DIFF    Collection Time: 01/07/22 12:50 AM   Result Value Ref Range    WBC 18.2 (H) 4.6 - 13.2 K/uL    RBC 3.81 (L) 4.35 - 5.65 M/uL    HGB 12.6 (L) 13.0 - 16.0 g/dL    HCT 40.3 36.0 - 48.0 %    .8 (H) 78.0 - 100.0 FL    MCH 33.1 24.0 - 34.0 PG    MCHC 31.3 31.0 - 37.0 g/dL    RDW 20.5 (H) 11.6 - 14.5 %    PLATELET 704 (L) 365 - 420 K/uL    MPV 12.4 (H) 9.2 - 11.8 FL    NRBC 1.8 (H) 0  WBC    ABSOLUTE NRBC 0.32 (H) 0.00 - 0.01 K/uL    NEUTROPHILS 95 (H) 40 - 73 %    LYMPHOCYTES 2 (L) 21 - 52 %    MONOCYTES 3 3 - 10 %    EOSINOPHILS 0 0 - 5 %    BASOPHILS 0 0 - 2 %    IMMATURE GRANULOCYTES 0 %    ABS. NEUTROPHILS 17.3 (H) 1.8 - 8.0 K/UL    ABS. LYMPHOCYTES 0.4 (L) 0.9 - 3.6 K/UL    ABS. MONOCYTES 0.5 0.05 - 1.2 K/UL    ABS. EOSINOPHILS 0.0 0.0 - 0.4 K/UL    ABS. BASOPHILS 0.0 0.0 - 0.1 K/UL    ABS. IMM.  GRANS. 0.0 K/UL    DF MANUAL      PLATELET COMMENTS DECREASED PLATELETS      RBC COMMENTS TARGET CELLS  1+        RBC COMMENTS BILL CELLS  1+ METABOLIC PANEL, COMPREHENSIVE    Collection Time: 01/07/22 12:50 AM   Result Value Ref Range    Sodium 158 (H) 136 - 145 mmol/L    Potassium 5.4 3.5 - 5.5 mmol/L    Chloride 125 (H) 100 - 111 mmol/L    CO2 27 21 - 32 mmol/L    Anion gap 6 3.0 - 18 mmol/L    Glucose 213 (H) 74 - 99 mg/dL    BUN 71 (H) 7.0 - 18 MG/DL    Creatinine 1.24 0.6 - 1.3 MG/DL    BUN/Creatinine ratio 57 (H) 12 - 20      GFR est AA >60 >60 ml/min/1.73m2    GFR est non-AA 56 (L) >60 ml/min/1.73m2    Calcium 8.1 (L) 8.5 - 10.1 MG/DL    Bilirubin, total 11.6 (H) 0.2 - 1.0 MG/DL    ALT (SGPT) 404 (H) 16 - 61 U/L    AST (SGOT) 119 (H) 10 - 38 U/L    Alk. phosphatase 289 (H) 45 - 117 U/L    Protein, total 5.1 (L) 6.4 - 8.2 g/dL    Albumin 2.4 (L) 3.4 - 5.0 g/dL    Globulin 2.7 2.0 - 4.0 g/dL    A-G Ratio 0.9 0.8 - 1.7     PROTHROMBIN TIME + INR    Collection Time: 01/07/22 12:50 AM   Result Value Ref Range    Prothrombin time 21.3 (H) 11.5 - 15.2 sec    INR 1.9 (H) 0.8 - 1.2     TYPE & SCREEN    Collection Time: 01/07/22 12:55 AM   Result Value Ref Range    Crossmatch Expiration 01/10/2022,2359     ABO/Rh(D) A POSITIVE     Antibody screen NEG     CALLED TO: DR NOBLES [facility] AT 5249 ON [id number] BY [facility]     Unit number [id number]     Blood component type RC LR     Unit division 00     Status of unit ISSUED     Crossmatch result Compatible    GLUCOSE, POC    Collection Time: 01/07/22  1:13 AM   Result Value Ref Range    Glucose (POC) 270 (H) 70 - 110 mg/dL   RBC, ALLOCATE    Collection Time: 01/07/22  5:45 AM   Result Value Ref Range    HISTORY CHECKED?  Historical check performed    PROTHROMBIN TIME + INR    Collection Time: 01/07/22  6:00 AM   Result Value Ref Range    Prothrombin time 26.0 (H) 11.5 - 15.2 sec    INR 2.4 (H) 0.8 - 1.2     CBC WITH AUTOMATED DIFF    Collection Time: 01/07/22  6:00 AM   Result Value Ref Range    WBC 21.5 (H) 4.6 - 13.2 K/uL    RBC 2.97 (L) 4.35 - 5.65 M/uL    HGB 10.3 (L) 13.0 - 16.0 g/dL    HCT 32.9 (L) 36.0 - 48.0 % .8 (H) 78.0 - 100.0 FL    MCH 34.7 (H) 24.0 - 34.0 PG    MCHC 31.3 31.0 - 37.0 g/dL    RDW 20.3 (H) 11.6 - 14.5 %    PLATELET 124 (L) 958 - 420 K/uL    MPV 12.9 (H) 9.2 - 11.8 FL    NRBC 2.3 (H) 0  WBC    ABSOLUTE NRBC 0.50 (H) 0.00 - 0.01 K/uL    NEUTROPHILS 96 (H) 40 - 73 %    LYMPHOCYTES 1 (L) 21 - 52 %    MONOCYTES 3 3 - 10 %    EOSINOPHILS 0 0 - 5 %    BASOPHILS 0 0 - 2 %    IMMATURE GRANULOCYTES 0 0.0 - 0.5 %    ABS. NEUTROPHILS 20.7 (H) 1.8 - 8.0 K/UL    ABS. LYMPHOCYTES 0.2 (L) 0.9 - 3.6 K/UL    ABS. MONOCYTES 0.6 0.05 - 1.2 K/UL    ABS. EOSINOPHILS 0.0 0.0 - 0.4 K/UL    ABS. BASOPHILS 0.0 0.0 - 0.1 K/UL    ABS. IMM. GRANS. 0.0 0.00 - 0.04 K/UL    DF MANUAL      PLATELET COMMENTS DECREASED PLATELETS      RBC COMMENTS ANISOCYTOSIS  2+        RBC COMMENTS POLYCHROMASIA  1+        RBC COMMENTS TARGET CELLS  1+       GLUCOSE, POC    Collection Time: 01/07/22  7:54 AM   Result Value Ref Range    Glucose (POC) 144 (H) 70 - 110 mg/dL           Recent Labs     01/06/22  1745 01/06/22  1147   FIO2I 5 21   HCO3I 26.5* 26.6*   PCO2I 31.8* 31.5*   PHI 7.53* 7.53*   PO2I 69* 75*       Telemetry:normal sinus rhythm, no telemetry available during initial assessment    Imaging:  I have personally reviewed the patients radiographs and have reviewed the reports:    XR Results (most recent):  Results from Hospital Encounter encounter on 12/31/21    XR ABD (KUB)    Narrative  EXAM: XR ABD (KUB)    CLINICAL INDICATION/HISTORY: 80 years Male. NGT placement with wet read  ADDITIONAL HISTORY: None    TECHNIQUE: Frontal view of the upper abdomen to assess enteric tube position. Portions of the abdomen and pelvis are excluded from the field-of-view and not  evaluated. .    COMPARISON: CT abdomen/pelvis 1/6/2022 at 20:21 hours    FINDINGS:    Enteric tube is redundant upon itself at the level of the gastric cardia. The  tip extends cranially to the level of the distal esophagus.     No dilated bowel loops are appreciated. No acute osseous abnormality  appreciated. Multilevel degenerative changes of the spine. Diffuse bilateral  interstitial and groundglass opacities are present throughout the bilateral  lungs. CABG. Impression  1. Malpositioned enteric tube, looped at the level of the gastric cardia, with  tip at the level the distal esophagus. Recommend repositioning to ensure  appropriate sump function. 2.  Nonspecific diffuse bilateral pulmonary groundglass/interstitial opacities. Recommend continued imaging follow-up. CT Results (most recent):  Results from Hospital Encounter encounter on 12/31/21    CT ABD PELV WO CONT    Narrative  EXAM: CT ABD PELV WO CONT    CLINICAL INDICATION/HISTORY: 80 years Male. Hematuria, hx kidney stone. ADDITIONAL HISTORY: None      TECHNIQUE: CT of the abdomen and pelvis without IV contrast. Sagittal and  coronal reformats were created. All CT scans at this facility are performed using dose optimization technique as  appropriate to a performed exam, to include automated exposure control,  adjustment of the mA and/or kV according to patient size (including appropriate  matching for site specific examination) or use of iterative reconstruction  technique. IV CONTRAST: None    COMPARISON: Reference CT angiography chest 12/31/2021, CT pelvis 9/11/2019    FINDINGS:    Limitations: There is limited evaluation of solid organs and vasculature due to  lack of intravenous contrast. Patient is imaged with arms by his side, which  results in decreased signal-to-noise ratio. Lower Chest: Mild to moderate cardiomegaly. Small to moderate pericardial  effusion, unchanged from prior. Interval resolution of previously seen bilateral  pleural effusions. New diffuse groundglass opacities and interstitial thickening  within the bilateral lungs, most pronounced in the bilateral lower lobes but  also involving the visualized portions of the lingula and right middle lobe.   Coronary artery calcifications. Pulmonary arterial enlargement to 3.2 cm  diameter, suggestive of pulmonary arterial hypertension. CABG. .    Mesentery/Peritoneum: No free intraperitoneal air. Trace dependent presacral  fluid, nonspecific. Trace perihepatic and perisplenic ascites. Fluid also tracks  along the bilateral paracolic gutters. Liver: Unremarkable    Gallbladder/biliary: Unremarkable    Pancreas: Mild pancreatic atrophy. No gross abnormality detected. Spleen: Unremarkable    Adrenal Glands: Unremarkable    Kidneys: Bilateral renal atrophy. 1.3 cm water attenuating cyst at the  interpolar region of the right kidney. Urinary Bladder: Mild dependent stones within the urinary bladder measuring 2.2  cm in diameter, increased in size from prior. No urinary bladder mural  thickening appreciated. Other Pelvic Organs: Coarse calcific lesions within the prostate. Mild prostate  enlargement. Bowel: Mild diverticulosis without evidence of acute diverticulitis. Portions of  the colon are underdistended, limiting assessment of mural thickness. No  pericolic fat stranding appreciated. Normal caliber appendix (axial 48). The tip  of the appendix is difficult to separate from the trace fluid in the paracolic  gutter although no definite abnormality is appreciated. Malpositioned enteric tube which is looped at the level of the gastric cardia,  with tip extending cranially to the level of the distal third of the esophagus. No evidence of small bowel obstruction. Lymph Nodes: No enlarged lymph nodes identified by size criteria. Vessels: Moderate arterial mural calcifications. 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 Body wall: Small fat-containing umbilical hernia. Small amount of subcutaneous  is gas within the right ventral abdominal wall. Musculoskeletal: No acute fracture. No aggressive osseous lesion appreciated. Nonhealed median sternotomy. Multilevel degenerative changes of the spine. Trace  retrolisthesis of L2 on L3 and L3 on L4. Calcifications again noted at the  bilateral hamstring origins. Impression  1. Interval increased size of the conglomerate of dependent urinary bladder  calculi. 2.  Mild prostatomegaly. Recommend correlation with PSA. 3.  Malpositioned enteric tube, looped at the level of the gastric cardia, with  tip at the level the distal esophagus. Recommend repositioning to ensure  appropriate sump function. 4.  Interval resolved bilateral pleural effusions. New extensive groundglass  opacities within the bilateral lungs, may reflect poor edema or  infectious/inflammatory process. Recommend short-term imaging follow-up until  resolution. 5.  All amount of soft tissue gas in the right ventral abdominal wall, likely  iatrogenic; clinical correlation advised to exclude infection. 6.  Unhealed median sternotomy. Anasarca. Mild ascites. Right common iliac  artery aneurysm. Additional incidental findings as above. Total of     min critical care time spent at bedside during the course of care providing evaluation,management and care decisions and ordering appropriate treatment related to critical care problems exclusive of procedures. The reason for providing this level of medical care for this critically ill patient was due a critical illness that impaired one or more vital organ systems such that there was a high probability of imminent or life threatening deterioration in the patients condition. This care involved high complexity decision making to assess, manipulate, and support vital system functions, to treat this degree vital organ system failure and to prevent further life threatening deterioration of the patients condition. Irlanda Heredia MD  01/07/22  Pulmonary, Critical Care Medicine  [facility]         Restraint type:  Soft restraint:  right wrist and left wrist  Reason for restraints: Interference with medical equipment or treatment  Duration: 24 hours    Restraints must be removed when an alternative is available and effective and/or patient no longer meets criteria. Orders must be renewed every calendar day or when discontinued.     The MD must conduct a face to face assessment within 1 calendar day of initiation when initial restraint order is verbal.

## 2022-01-07 NOTE — ROUTINE PROCESS
0900: Patient arrived to icu, bp stable sbp 120s, new bright red blood per rectum, bilateral restraints in place, hi jeff NRB. PRBC infusing, 2 more RPBC ordered. 1100: Bilateral wrist restraints discontinued. SBP low in 70s, Verbal order from Dr Zia Moses to infusing PRBC rapidly and FFP.   1200: Daughter on unit with Patient's (covid positive) wife on the phone to discuss goals of care with our medical team.  1300: Family decided to make patient comfort care, medications given and family on bedside phone to say goodbye while standing outside of door. 1420: Time of death, pronounced by md at bedside. Family notified.

## 2022-01-07 NOTE — PROGRESS NOTES
[facility], [address]  [phone]  [email or web address]    Gastroenterology follow up-Progress note    Impression:  1. Blood loss anemia    - Hgb 10.3/Hct 32.9   - 2 units PRBCs tranfused and 1 unit PRBC transfusing  2. Elevated liver biochemistries - improving    - negative viral hep panel 12/31/21; unenhanced CTA chest without hepatobiliary pathology              - Abd US 12/31/21 without liver lesions but noted that portal vein not well assessed otherwise no findings suggestive of cholecystitis or acute liver disease.              - hepatic doppler 1/4/22 without evidence of thrombus   3. Hyperbilirubinemia - likely a sequela of patient's disease state               - Tbili 11.4 (increased)  Pneumonia due to COVID-19 virus               - confirmed 12/31/21  Acute hypoxic respiratory failure requiring supplemental O2  Acute on chronic systolic heart failure  Mild bilateral pleural effusions  CAD s/p CABG x 3 on 8/5/2021  Elevated d-Dimer @ 7.69  Encephalopathy  Coagulopathy - INR 2.4  Acute non-occlusive thrombus (R forearm, R and L upper arm)  Thromobocytopenia - plt 103k  DNR code status        Plan:  1. No planned invasive endoscopic evaluation for which moderate sedation is employed. Consideration for endoscopic evaluation when patient's respiratory status improves and without COVID-19 infection. 2. Resuscitative measures as per ICU team.  3. PPI twice daily  4. Resumption of anticoagulant therapy deferred to primary team as appropriate for management of upper extremity thrombi. 5. Consideration for STAT NM bleeding scan for brisk/overt GI bleeding v CTA for possible IR embolization if large volume GI bleeding and precipitous drop in H/H.  6. Medical management per ICU team.    Please all with questions/concerns. Thank you. Subjective:  Reports of possible GI bleed overnight.   From chart review, overnight nurse BROOKE Genao documented at 0025 hours of patient having \"gross hematuria, VSS, pt unresponsive and RRT paged\". Team responded. Per RRT note, documentation that \"Patient noted to be lying in bed in a large pool of dark red/brown pool of fluid under his buttocks. \" Patient was resuscitated and transferred to ICU for critical management. Patient is in isolated due to COVID-19 infection. Patient was previously on Lovenox 70 mg BID for management of thrombus - last dose administered shortly midnight. ROS: deferred        Patient Active Problem List   Diagnosis Code    Gross hematuria R31.0    Elevated PSA R97.20    Prediabetes R73.03    Insomnia G47.00    Joint pain M25.50    Anxiety F41.9    Anxiety and depression F41.9, F32. 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 ICU team attending to patient to obtain IV access for large bore infusion.    General: restless, non-purposeful movements and moans   Cardiovascular: normal rate and regular rhythm   Pulmonary/Chest Wall: unlabored respiratory effort   Abdominal: appearance normal, bowel sounds normal and soft, non-acute, non-tender       Intake/Output Summary (Last 24 hours) at 1/7/2022 0923  Last data filed at 1/7/2022 0706  Gross per 24 hour   Intake 1835 ml   Output 350 ml   Net 1485 ml       CBC w/Diff    Lab Results   Component Value Date/Time    WBC 21.5 (H) 01/07/2022 06:00 AM    RBC 2.97 (L) 01/07/2022 06:00 AM    HGB 10.3 (L) 01/07/2022 06:00 AM    HCT 32.9 (L) 01/07/2022 06:00 AM    .8 (H) 01/07/2022 06:00 AM    MCH 34.7 (H) 01/07/2022 06:00 AM    MCHC 31.3 01/07/2022 06:00 AM    RDW 20.3 (H) 01/07/2022 06:00 AM     (L) 01/07/2022 06:00 AM    Lab Results   Component Value Date/Time    GRANS 96 (H) 01/07/2022 06:00 AM    LYMPH 1 (L) 01/07/2022 06:00 AM    EOS 0 01/07/2022 06:00 AM    BANDS 1 01/06/2022 04:50 AM    BASOS 0 01/07/2022 06:00 AM      Basic Metabolic Profile   Recent Labs     01/07/22  0050 01/06/22  1645 01/06/22  0450   *   < > 151*   K 5.4   < > 5.1   *   < > 121*   CO2 27   < > 12*   BUN 71*   < > 77*   CA 8.1*   < > 8.7   MG  --   --  1.3*   PHOS  --   --  3.3    < > = values in this interval not displayed. Hepatic Function    Lab Results   Component Value Date/Time    ALB 2.4 (L) 01/07/2022 12:50 AM    TP 5.1 (L) 01/07/2022 12:50 AM     (H) 01/07/2022 12:50 AM    No results found for: TBIL       Coags   Recent Labs     01/07/22  0600 01/07/22  0050   PTP 26.0* 21.3*   INR 2.4* 1.9*         Radiology  XR ABD (KUB)    Result Date: 1/7/2022  1. Malpositioned enteric tube, looped at the level of the gastric cardia, with tip at the level the distal esophagus. Recommend repositioning to ensure appropriate sump function. 2.  Nonspecific diffuse bilateral pulmonary groundglass/interstitial opacities. Recommend continued imaging follow-up. CT HEAD WO CONT    Result Date: 1/6/2022  1. No noncontrast CT evidence of acute intracranial process. Note that MRI is more sensitive for ischemia in the first 24 to 48 hours. 2.  Mild-to-moderate cerebral atrophy and mild burden of probable sequela of chronic white matter microvascular ischemic disease. CT ABD PELV WO CONT    Result Date: 1/7/2022  1.   Interval increased size of the conglomerate of dependent urinary bladder calculi. 2.  Mild prostatomegaly. Recommend correlation with PSA. 3.  Malpositioned enteric tube, looped at the level of the gastric cardia, with tip at the level the distal esophagus. Recommend repositioning to ensure appropriate sump function. 4.  Interval resolved bilateral pleural effusions. New extensive groundglass opacities within the bilateral lungs, may reflect poor edema or infectious/inflammatory process. Recommend short-term imaging follow-up until resolution. 5.  All amount of soft tissue gas in the right ventral abdominal wall, likely iatrogenic; clinical correlation advised to exclude infection. 6.  Unhealed median sternotomy. Anasarca. Mild ascites. Right common iliac artery aneurysm. Additional incidental findings as above. ALLISON Carrillo    [facility].   [email or web address]  Phone: [phone]  Pager: [phone]

## 2022-01-07 NOTE — PROGRESS NOTES
Rapid Response Note  [facility] Medicine    Patient: Cynthia Espinosa 80 y.o. male  [id number]  1940      Admit Date: 12/31/2021   Admission Diagnosis: Transaminitis [R74.01]  CHF (congestive heart failure) (HCC) [I50.9]  SOB (shortness of breath) [R06.02]  Pneumonia due to COVID-19 virus [U07.1, J12.82]    RAPID RESPONSE     Rapid response called for acute GI bleed    Pt with hx of Acute on chronic systolic CHF EF 57%, elevated D-dimer in the setting of COVID-19, EDWIGE on CKD, Transaminitis, Tube-feed dependent, now presenting with acute GI bleed. Patient noted to be lying in bed in a large pool of dark red/brown pool of fluid under his buttocks. VSS on 2L O2 (BP in the 110s/70s, SpO2 >96% on 2L, HR 80s), patient is not in any distress but not alert and does not respond to questions. On chart review, last Hgb of 15.5 on 1/6/2022. Has Thrombocytopenia to 110, Hypernatremia to 157. ABG obtained on 1/6 showing declining pO2 to 69. Patient currently on Lovenox, last administered 70mg @0000, 1/7/2022. CT Abdo/Pelvis was done 4 hours before RRT was called, showing Malpositioned enteric tube, looped at the level of the gastric cardia, extensive opacities in the bilateral lungs, Soft tissue gas in the R ventral abdo wall, unhealed media sternotomy, anasarca, R common iliac artery aneurysm. This was confirmed on the KUB done 1h after the CT Abdo/Pelv    Hemoglobin went form 15 to 12.6    Medications Reviewed - Lovenox held, ( last time administered was at midnight tonight) - protamine sulfate ordered by Dr. Mimi Cantu.      ROS unable to obtain due to patient's condition    OBJECTIVE     Visit Vitals  /77   Pulse 66   Temp 97.4 °F (36.3 °C) Comment: CNA obtained   Resp 20   Ht 5' 8\" (1.727 m)   Wt 72.7 kg (160 lb 4.8 oz)   SpO2 (!) 88%   BMI 24.37 kg/m²       Physical Exam    CV - normal S1 and S2  Respiratory - Coarse brbeath sounds bilaterally  GI, positive bowel sounds  Rectal - copius amount of dark red blood mixed with stool seen   Medications administered: D5-1/2NaCl @150mL/hr    EKG: Not done    Labs: CBC. CMP, PT/INR, Type&Screen    ASSESSMENT, PLAN & DISPOSITION   Glenn Rose is a 80y.o. year old male admitted for Transaminitis [R74.01]  CHF (congestive heart failure) (HCC) [I50.9]  SOB (shortness of breath) [R06.02]  Pneumonia due to COVID-19 virus [U07.1, J12.82]. Rapid response called for acute GI bleed. Patient condition currently: unstable. Patient lost a considerable amount of fluids (grossly >500mL), likely blood, but is maintaining his BP and is not tachycardic. Patient with CHF with EF of 10-15%, also with acute renal failure, has been on D5 @100ml/hr prior to the RRT. Started D5-1/2NaCl @150mL/hr instead of giving a bolus. STAT CBC obtained along INR, Lovenox was held. Type&Screen also ordered in case patient needs a transfusion. VS stable for now but if patient continues to bleed significantly, will need ICU transfer. Etiology of the patient's bleed is unclear at this time, patient will need a GI consult in AM.    Patient was made DNR yesterday. Given patient's co-morbidities, would benefit from further discussion with family regarding his goals of care. Overall poor prognosis. Protamine sulfate was ordered by Dr. Emiliano Tilley, family was called, Dr. Emiliano Tilley spoke to daughter detailing what had happened and what the next steps in care/treatment are. Patients daughter was asked permission if a blood transfusion were to happen, to give consent that it is alright to administer - she agreed. Patients tube feeds were held due to misalignment of the NG tube. H and H were ordered every 3 hours beginning at 3 AM this morning    If hemoglobin dropped below 1 on repeat H and H at 3 AM, will require 1 unit of blood. Disposition: patient will stay in current room and unit. Attending Dr. Emiliano Tilley, notified of rapid response. In agreement with plan. Primary team resuming care. Senior Resident Dr. Toney Saldana present  During rapid response.      Vianney Adames MD, PGY-1  [facility] Medicine    01/07/22 12:38 AM

## 2022-01-07 NOTE — PROGRESS NOTES
Gross hematuria noted, VSS, pt unresponsive, RRT paged. 12Trenia Jeans family physician at bedside. Orders received and implemented, see MAR/ order management. Pt responding to painful stimuli. 3274: Dr. Sergio Treviño at bedside. Wife verbalized ok to transfuse PRBCs. H & H every 3 hour. Notify MD if H & H is <10    0135: Significant other updated by Dr. Sergio Treviño. Discussed KUB result/intervention with Dr. Jay Mclean and Dr. Sergio Treviño, pt to remain NPO and RN to hold off on tube feeding pending GI consult.

## 2022-01-07 NOTE — PROGRESS NOTES
I talked with patient's daughter (at bedside) and wife (via telephone) and updated them on patient's clinical status. At this time, he has required 3 unites of pRBCs, 1 unit of FFP and IVF boluses. Currently on Levophed at 30 mcg/min. He is s/p protamine. He has also noted to have ~ 400 cc of bright red blood per rectum. He is also on HFNC (100%FiO2; 40Lpm). I explained to to them that the plan was to continue to provide transfusion of blood products. I also informed them that given his underlying HFrEF and COVID-19 viral pneumonia, fluid resuscitation may result in further decline in respiratory status. I also informed them that we are unsure of the location of his bleeding and CTA w/ contrast of the abdomen is recommended. However, he is not stable for travel out of the ICU at this time. Patient is currently DNR/DNI. Once updated, both patient's daughter and wife stated that he would not have wanted any of this and would have wanted to be made comfortable. They stated he did not want to suffer. They stated that they wanted him transitioned to comfort care only and he be provided with medication for pain as well as anxiety and to let nature take its course as this is what he would have wanted. Daughter wanted to talk to him via telephone in his room and this was provided. Orders placed. Pastoral care at bedside with patient's daughter.       Musa Burrosw, DO   01/07/22  Pulmonary, Critical Care Medicine  [facility]

## 2022-01-07 NOTE — PROGRESS NOTES
PCCM BRIEF NOTE    Patient now with 400cc dark bloody rectal output, in process of transfusing 3 units PRBCs, 1u FFP. Given significant bloody output in short period of time this AM, discussed next step with GI. Per their recs, CTA with IR would be best. Consulted IR, they will schedule patient for today.     MD TALHA [facility] PGY-1   12:00  1/7/2022    Pulmonary, Critical Care Medicine  [facility]

## 2022-01-07 NOTE — ROUTINE PROCESS
0900: Patient arrived to icu, bp stable sbp 120s, new bright red blood per rectum, bilateral restraints in place, hi jeff NRB. PRBC infusing, 2 more RPBC ordered. 1100: Bilateral wrist restraints discontinued. SBP low in 70s, Verbal order from Dr Nehemias Woodward to infusing PRBC rapidly and FFP.   1200: Daughter on unit with Patient's (covid positive) wife on the phone to discuss goals of care with our medical team.  1300: Family decided to make patient comfort care, medications given and family on bedside phone to say goodbye while standing outside of door.    [id number] DIANE Telles

## 2022-01-07 NOTE — PROGRESS NOTES
Lab paged x2 for H  H    Patient Vitals for the past 4 hrs:   Temp Pulse Resp BP SpO2   01/07/22 0527    (!) 64/47    01/07/22 0524 97.3 °F (36.3 °C) 94 20 (!) 72/53 (!) 89 %       Dr. Esme Matthews paged.     Dr. Esme Matthews and Dr. Nico Santana at bedside, BP readings noted by MD    Per Dr. Esme Matthews, RN to monitor H & H result, if >10, do not transfuse

## 2022-01-07 NOTE — PROGRESS NOTES
Rapid Response Note  [facility]    Patient: Gómez Isabel 80 y.o. male  [id number]  1940      Admit Date: 12/31/2021   Admission Diagnosis: Transaminitis [R74.01]  CHF (congestive heart failure) (HCC) [I50.9]  SOB (shortness of breath) [R06.02]  Pneumonia due to COVID-19 virus [U07.1, J12.82]    RAPID RESPONSE     Rapid response called for hypotension and continued active GIB. SBP 70s. Gross blood in diaper. Pt moaning in pain. EKG not useful; too much artifact, pt moved too much. No new H/H since 0050 this morning; Hgb 10.3. RN staff reported continued GIB; 1u PRBC started. No fluid boluses given as he was given x2 in prior [place] overnight and has history of CHF with EF 10-15%. Patient initially placed in trendelenburg with BP improved to 91/65, HR 96. O2 sats 80% on nonrebreather; RT started pt on HFNC 40 L, 100% FIO2. BP worsened to 70s/30s, HR 160s. ICU arrived to assess pt for transfer. Medications Reviewed      OBJECTIVE     Patient Vitals for the past 12 hrs:   Temp Pulse Resp BP SpO2   01/07/22 1000 (!) 95.7 °F (35.4 °C)  28 (!) 137/108 (!) 89 %   01/07/22 0943 (!) 96.1 °F (35.6 °C) (!) 111 29 (!) 137/111 (!) 88 %   01/07/22 0706  97  96/68    01/07/22 0552    (!) 70/43    01/07/22 0545    97/72    01/07/22 0540    (!) 60/44    01/07/22 0527    (!) 64/47    01/07/22 0524 97.3 °F (36.3 °C) 94 20 (!) 72/53 (!) 89 %   01/07/22 0128    106/75    01/07/22 0124  82 20 (!) 117/59 95 %   01/07/22 0121 97.3 °F (36.3 °C) 82 20 (!) 89/67 95 %   01/07/22 0100  87 20 100/73 97 %   01/07/22 0048  87  107/75 95 %   01/07/22 0037  66  113/77 (!) 88 %   01/07/22 0027  87 20 117/82 95 %       PHYSICAL:  General:  Alert, moaning, in moderate distress. Restraints in -place. CV:  RRR. Peripheral pulses difficult to palpate. RESP:  NRB in place, poor pleth with sats peak 80%    ABD:  Soft, nontender. Neuro:  Moving all extremities.       ASSESSMENT, [place] Hilton is a 80y.o. year old male admitted for Transaminitis [R74.01]  CHF (congestive heart failure) (HCC) [I50.9]  SOB (shortness of breath) [R06.02]  Pneumonia due to COVID-19 virus [U07.1, J12.82]. Rapid response called for hypotension. Patient condition currently: guarded. Arrived to patient at bedside in moderate distress. Called for hypotension with systolic blood pressure in the 70s. Our night team responded to 2 rapid responses for this patient - first one for his acute GI bleed, second for hypotension in which STAT H&H ordered and 1u pRBC ordered (administered upon our arrival)  Patient with cold extremities, difficult to get appropriate pleth on SpO2, peaked at 80% so respiratory was called to administer HFNC. Repeat blood pressure 85/52 then 62K systolic. ICU at bedside to evaluate patient and he was transferred to ICU bed 313. Medications Administered: 1 unit PRBC ordered from prior RRT being administered. Disposition:     Attending Dr. Arlene Escobar notified of rapid response. In agreement with plan. Transfer to ICU.  Sign-out given to Dr. Massimo Benson by Dr. Katherine Fortune    Senior resident Dr. Katherine Fortune present during RRT and evaluation    Alrfedo Roth DO  PGY-1   [facility] Medicine   January 7, 2022, 8:09 AM

## 2022-01-07 NOTE — PROGRESS NOTES
[facility]  Brief Progress Note  SUBJECTIVE  Paged by nurse concerning hypotension down to 60s/40s. Dr. Luis Miguel Vieyra at bedside on arrival.    Patient seen at bedside, he is intermittently moaning in pain but does not respond to questions. Unable to obtain any meaningful Hx or ROS from the patient at this time. Looks pale. Diaper was changed 30 minutes ago, continues to have GI bleed. Repeated BP multiple times on both arms, consistently at 60s-70s/40s-50s. H&H put in for 3:00 a.m. STAT has not yet been done.       OBJECTIVE  Visit Vitals  BP (!) 64/47 (BP 1 Location: Left upper arm, BP Patient Position: Semi fowlers)   Pulse 94   Temp 97.3 °F (36.3 °C)   Resp 20   Ht 5' 8\" (1.727 m)   Wt 72.7 kg (160 lb 4.8 oz)   SpO2 (!) 89%   BMI 24.37 kg/m²       Recent Results (from the past 12 hour(s))   CREATININE, UR, RANDOM    Collection Time: 01/06/22  8:00 PM   Result Value Ref Range    Creatinine, urine 62.00 30 - 125 mg/dL   PROTEIN URINE, RANDOM    Collection Time: 01/06/22  8:00 PM   Result Value Ref Range    Protein, urine random 53 (H) <11.9 mg/dL   SODIUM, UR, RANDOM    Collection Time: 01/06/22  8:00 PM   Result Value Ref Range    Sodium,urine random 54 20 - 110 MMOL/L   URINALYSIS W/MICROSCOPIC    Collection Time: 01/06/22  8:00 PM   Result Value Ref Range    Color YELLOW      Appearance CLEAR      Specific gravity 1.020 1.003 - 1.030      pH (UA) 5.0 5.0 - 8.0      Protein 30 (A) NEG mg/dL    Glucose Negative NEG mg/dL    Ketone Negative NEG mg/dL    Bilirubin MODERATE (A) NEG      Blood MODERATE (A) NEG      Urobilinogen 2.0 (H) 0.2 - 1.0 EU/dL    Nitrites Negative NEG      Leukocyte Esterase Negative NEG      WBC Negative 0 - 4 /hpf    RBC Negative 0 - 5 /hpf    Epithelial cells Negative 0 - 5 /lpf    Bacteria Negative NEG /hpf    Hyaline cast 1 to 5 0 - 2 /lpf   LACTIC ACID    Collection Time: 01/07/22 12:50 AM   Result Value Ref Range    Lactic acid 2.9 (HH) 0.4 - 2.0 MMOL/L   CBC WITH AUTOMATED DIFF    Collection Time: 01/07/22 12:50 AM   Result Value Ref Range    WBC 18.2 (H) 4.6 - 13.2 K/uL    RBC 3.81 (L) 4.35 - 5.65 M/uL    HGB 12.6 (L) 13.0 - 16.0 g/dL    HCT 40.3 36.0 - 48.0 %    .8 (H) 78.0 - 100.0 FL    MCH 33.1 24.0 - 34.0 PG    MCHC 31.3 31.0 - 37.0 g/dL    RDW 20.5 (H) 11.6 - 14.5 %    PLATELET 110 (L) 484 - 420 K/uL    MPV 12.4 (H) 9.2 - 11.8 FL    NRBC 1.8 (H) 0  WBC    ABSOLUTE NRBC 0.32 (H) 0.00 - 0.01 K/uL    NEUTROPHILS 95 (H) 40 - 73 %    LYMPHOCYTES 2 (L) 21 - 52 %    MONOCYTES 3 3 - 10 %    EOSINOPHILS 0 0 - 5 %    BASOPHILS 0 0 - 2 %    IMMATURE GRANULOCYTES 0 %    ABS. NEUTROPHILS 17.3 (H) 1.8 - 8.0 K/UL    ABS. LYMPHOCYTES 0.4 (L) 0.9 - 3.6 K/UL    ABS. MONOCYTES 0.5 0.05 - 1.2 K/UL    ABS. EOSINOPHILS 0.0 0.0 - 0.4 K/UL    ABS. BASOPHILS 0.0 0.0 - 0.1 K/UL    ABS. IMM. GRANS. 0.0 K/UL    DF MANUAL      PLATELET COMMENTS DECREASED PLATELETS      RBC COMMENTS TARGET CELLS  1+        RBC COMMENTS BILL CELLS  1+       METABOLIC PANEL, COMPREHENSIVE    Collection Time: 01/07/22 12:50 AM   Result Value Ref Range    Sodium 158 (H) 136 - 145 mmol/L    Potassium 5.4 3.5 - 5.5 mmol/L    Chloride 125 (H) 100 - 111 mmol/L    CO2 27 21 - 32 mmol/L    Anion gap 6 3.0 - 18 mmol/L    Glucose 213 (H) 74 - 99 mg/dL    BUN 71 (H) 7.0 - 18 MG/DL    Creatinine 1.24 0.6 - 1.3 MG/DL    BUN/Creatinine ratio 57 (H) 12 - 20      GFR est AA >60 >60 ml/min/1.73m2    GFR est non-AA 56 (L) >60 ml/min/1.73m2    Calcium 8.1 (L) 8.5 - 10.1 MG/DL    Bilirubin, total 11.6 (H) 0.2 - 1.0 MG/DL    ALT (SGPT) 404 (H) 16 - 61 U/L    AST (SGOT) 119 (H) 10 - 38 U/L    Alk.  phosphatase 289 (H) 45 - 117 U/L    Protein, total 5.1 (L) 6.4 - 8.2 g/dL    Albumin 2.4 (L) 3.4 - 5.0 g/dL    Globulin 2.7 2.0 - 4.0 g/dL    A-G Ratio 0.9 0.8 - 1.7     PROTHROMBIN TIME + INR    Collection Time: 01/07/22 12:50 AM   Result Value Ref Range    Prothrombin time 21.3 (H) 11.5 - 15.2 sec    INR 1.9 (H) 0.8 - 1.2 TYPE & SCREEN    Collection Time: 01/07/22 12:55 AM   Result Value Ref Range    Crossmatch Expiration 01/10/2022,2359     ABO/Rh(D) A POSITIVE     Antibody screen NEG    GLUCOSE, POC    Collection Time: 01/07/22  1:13 AM   Result Value Ref Range    Glucose (POC) 270 (H) 70 - 110 mg/dL   RBC, ALLOCATE    Collection Time: 01/07/22  5:45 AM   Result Value Ref Range    HISTORY CHECKED? Historical check performed        Physical examination  Consitutional: Pale, moaning frequently in bed, looks to be distressed, soft wrist retraints on  Cardiovascular: RRR, no m/g/r  Respiratory: on 5L Non rebreather, SpO2 @89%  Abdominal: Abdomen soft, NT/ND. Streaky dark red strains on the diaper changed 30 min ago    ASSESSMENT/PLAN  Hypotension in the setting of Acute GI bleed  Patient now hemodynamically unstable. Rapid was called a few hours earlier due to acute GI bleed after Lovenox was administered @0000 and CT Abdo showed misaligned NGT. Continues to have the GI bleed albeit less now after protamine. Please see the RRT note earlier for more information  - called lab for STAT H&H, pending results   - Dr. Anabela Kumar ordered 1U pRBC to be transfused if Hgb <10, Blood bank confirmed 1U is currently available   - 500cc LR bolus in the meantime  - patient is hemodynamically unstable to remain on the floor, continues to have active GI bleeding, recommend ICU consult at this point, will defer final decision to Dr. Anabela Kumar  - continue soft wrist restraints for now  - Increase O2 to 7L on Non-rebreather  - Attempted to call Daughter, Ms. Thomas Baires, x2 but unable to be reached, VM box is full. Attempted to call Spouse, Mrs. Patience Woodward but both numbers in chart do not work.  Patient in discomfort but given his unstable vitals and ongoing GI bleed in setting of severe CHF and ARF, pain med options are limited      Tato Freed MD, PGY-2   [address]   Senior Pager: [phone]   January 7, 2022, 5:51 AM

## 2022-01-07 NOTE — PROGRESS NOTES
Hospitalist Progress Note    Patient: Brooke Padilla Age: 80 y.o. : 1940 MR#: [id number] SSN: [id number]  Date/Time: 2022 9:09 AM    DOA: 2021  PCP: Ameena Pham MD    Subjective:     Patient has xsitioned to comfort measures. Patient seen and examined at bedside, he has just received morphine, unresponsive to voice or light touch. Multiple family members outside of the room. Assessment/Plan:     1. Acute respiratory failure with hypoxia due to multifactorial   2. Recent COVID-19 pneumonia in vaccinated patient. Tested + 2021  3. Acute on chronic systolic CHF, Echo with EF 15%. Continue Lasix, beta-blocker. Cardiology follows. 4.   Bilateral pleural effusion with CHF  5. Difficult IV access. Cardiology to place line treatment. Requested for tomorrow  6. hx CAD with CABGx3  7. Thrombocytopenia, improved   8. Leukocytosis, trending up. Urinalysis negative. Follow culture. CT abdomen pelvis. ID follows  9. [facility] nephrology follows  10. Transaminitis in the setting of COVID-19. Mesenteric duplex no evidence of thrombus. GI follows.       -thickened gallbladder, no evidence of acute cholecystitis          Hyperbilirubinemia, increasing. CT with free fluid in abdomen. 11.  Elevated INR, due to liver pathology   12. Gross hematuria. Saw Dr. Hai Jenknis urology of [address] [place] [address], urine culture, urine pathology negative at that time. History of kidney stone. CT abdomen and pelvis noncontrast, renal stone protocol. 13.   Acute encephalopathy, multifactorial, metabolic/toxic, EUTFD-61, steroid use, electrolyte abnormalities. Urine drug screen at admission positive for opiates, benzos. Correct sodium. Recent TSH WNL. B12 folate WNL. CT head stat. Avoid benzos. Monitor for constipation. Consider neurology consult. 14.  Steroid-induced hyperglycemia. Sliding scale insulin. 15. hypertension. 16. Hypomagnesemia. Replete as needed.    17. Difficult IV access. Midline requested. 18.  Hyperchloremic acidosis, lactic acidosis. Continue IV fluids. 19.  Likely aspiration pneumonia. Cynthia Donovan. ID follows. 20.  DNR/DNI. Pulmonary critical care input appreciated. Continue comfort measures at this time. Case discussed with:  [x]Patient  []Family  [x]Nursing  []Case Management  DVT Prophylaxis:  [x]Lovenox  []Hep SQ  []SCDs  []Coumadin   []On Heparin gtt    Signed By: Avery Doll MD     2022 9:09 AM              Objective:   VS:   Visit Vitals  BP 95/84   Pulse 98   Temp (!) 95.4 °F (35.2 °C)   Resp (!) 31   Ht 5' 8\" (1.727 m)   Wt 72.7 kg (160 lb 4.8 oz)   SpO2 (!) 88%   BMI 24.37 kg/m²      Tmax/24hrs: Temp (24hrs), Av.3 °F (35.7 °C), Min:95.4 °F (35.2 °C), Max:97.4 °F (36.3 °C)      Intake/Output Summary (Last 24 hours) at 2022 1355  Last data filed at 2022 1100  Gross per 24 hour   Intake 2847 ml   Output 350 ml   Net 2497 ml       Tele: sinus  General: Does not awaken to voice or light touch  HEENT: PERRL, EOMI, supple neck, no JVD, dry oral mucosa  Cardiovascular: S1S2 regular, no rub/gallop   Pulmonary: air entry bilaterally, no wheezing  GI:  Soft, non tender, non distended, nabs, no guarding   Extremities:  trace pedal edema, +distal pulses appreciated   Neuro: not answering questions, not following commands. Additional: no rash to visible skin.        Current Facility-Administered Medications   Medication Dose Route Frequency    ondansetron (ZOFRAN) injection 4 mg  4 mg IntraVENous Q4H PRN    LORazepam (ATIVAN) injection 1 mg  1 mg IntraVENous Q15MIN PRN    scopolamine (TRANSDERM-SCOP) 1 mg over 3 days 1 Patch  1 Patch TransDERmal Q72H PRN    morphine injection 2 mg  2 mg IntraVENous Q15MIN PRN    haloperidol (HALDOL) 2 mg/mL oral solution 2 mg  2 mg SubLINGual Q6H PRN    Or    haloperidol lactate (HALDOL) injection 2 mg  2 mg IntraVENous Q6H PRN            Lab/Data Review:  Labs: Results: Chemistry Recent Labs     01/07/22  1200 01/07/22  0050 01/06/22  1645 01/06/22  0450 01/06/22  0450   GLU 68* 213* 146*   < > 170*   * 158* 157*   < > 151*   K 6.2* 5.4 4.5   < > 5.1   * 125* 124*   < > 121*   CO2 20* 27 26   < > 12*   BUN 87* 71* 73*   < > 77*   CREA 1.69* 1.24 1.29   < > 1.32*   BUCR 51* 57* 57*   < > 58*   AGAP 10 6 7   < > 18   CA 8.1* 8.1* 8.9   < > 8.7   PHOS  --   --   --   --  3.3    < > = values in this interval not displayed. Recent Labs     01/07/22 1200 01/07/22  0050 01/06/22  0450   * 404* 580*   TP 4.5* 5.1* 6.6   ALB 2.3* 2.4* 2.8*   GLOB 2.2 2.7 3.8   AGRAT 1.0 0.9 0.7*      CBC w/Diff Recent Labs     01/07/22  1200 01/07/22  0600 01/07/22  0600 01/07/22  0050 01/07/22  0050 01/06/22  0450 01/06/22  0450   WBC 19.8*  --  21.5*  --  18.2*   < > 17.7*   RBC 4.52  --  2.97*  --  3.81*   < > 4.80   HGB 14.3  --  10.3*  --  12.6*   < > 15.5   HCT 45.3  --  32.9*  --  40.3   < > 48.5*   .2*   < > 110.8*   < > 105.8*   < > 101.0*   MCH 31.6   < > 34.7*   < > 33.1   < > 32.3   MCHC 31.6   < > 31.3   < > 31.3   < > 32.0   RDW 17.9*   < > 20.3*   < > 20.5*   < > 20.8*   PLT 81*  --  103*  --  105*   < > 113*   BANDS  --   --   --   --   --   --  1   GRANS 92*  --  96*  --  95*   < > 91*   LYMPH 2*  --  1*  --  2*   < > 2*   EOS 1  --  0  --  0   < > 0    < > = values in this interval not displayed.       Coagulation Recent Labs     01/07/22  1200 01/07/22  0600   PTP 28.1* 26.0*   INR 2.7* 2.4*   APTT 38.7*  --        Iron/Ferritin No results found for: IRON, FE, TIBC, IBCT, PSAT, FERR    BNP    Cardiac Enzymes Lab Results   Component Value Date/Time     01/27/2019 08:17 PM    CK - MB 1.8 01/27/2019 08:17 PM    CK-MB Index 1.4 01/27/2019 08:17 PM    Troponin-I, QT <0.02 01/27/2019 08:17 PM        Lactic Acid    Thyroid Studies          All Micro Results     Procedure Component Value Units Date/Time    CULTURE, BLOOD [[id number]] Collected: 01/07/22 1200    Order Status: Completed Specimen: Blood Updated: 01/07/22 1329    CULTURE, BLOOD [[id number]] Collected: 01/07/22 1100    Order Status: Canceled Specimen: Blood     CULTURE, BLOOD [[id number]] Collected: 01/07/22 1100    Order Status: Canceled Specimen: Blood     CULTURE, BLOOD [[id number]]     Order Status: Canceled Specimen: Blood     CULTURE, URINE [[id number]] Collected: 01/06/22 0330    Order Status: Completed Specimen: Cath Urine Updated: 01/06/22 1532    COVID-19 WITH INFLUENZA A/B [[id number]]  (Abnormal) Collected: 12/31/21 0320    Order Status: Completed Specimen: Nasopharyngeal Updated: 12/31/21 0754     SARS-CoV-2 Detected        Comment: CALLED TO AND CORRECTLY REPEATED BY:  YAMINI GARCÍA RN, [facility] ED, 12/31/21 AT 0754 TO DRM  Positive results are indicative of the presence of SARS-CoV-2. Clinical correlation with patient history and other diagnostic information is necessary to determine patient infection status. Positive results do not rule out bacterial infection or co-infection with other pathogens. Influenza A by PCR Not detected        Influenza B by PCR Not detected        Comment: NOTE: Influenza A and Influenza B negative results should be considered presumptive in samples that have a positive SARS-CoV-2 result. Consider re-testing with an alternate FDA-approved test if clinically indicated. Testing was performed using marifer Maile SARS-CoV-2 and Influenza A/B nucleic acid assay. This test is a multiplex Real-Time Reverse Transcriptase Polymerase Chain Reaction (RT-PCR) based in virto diagnostic test intended for the qualitative detection of nucleic acids from SARS-CoV-2, Influenza A, and Influenza B in nasopharyngeal for use under the FDA's Emergency Use Authorization (EAU) only.        Fact sheet for Patients: ConventionUpdate.co.nz  Fact sheet for Healthcare Providers: ConventionUpdate.co.nz         COVID-19 RAPID TEST [[id number]] Collected: 12/31/21 0300    Order Status: Canceled     INFLUENZA A & B AG (RAPID TEST) [[id number]] Collected: 12/31/21 0300    Order Status: Canceled Specimen: Nasopharyngeal             Images:    CT (Most Recent). CT Results (most recent):  Results from Hospital Encounter encounter on 12/31/21    CT ABD PELV WO CONT    Narrative  EXAM: CT ABD PELV WO CONT    CLINICAL INDICATION/HISTORY: 80 years Male. Hematuria, hx kidney stone. ADDITIONAL HISTORY: None      TECHNIQUE: CT of the abdomen and pelvis without IV contrast. Sagittal and  coronal reformats were created. All CT scans at this facility are performed using dose optimization technique as  appropriate to a performed exam, to include automated exposure control,  adjustment of the mA and/or kV according to patient size (including appropriate  matching for site specific examination) or use of iterative reconstruction  technique. IV CONTRAST: None    COMPARISON: Reference CT angiography chest 12/31/2021, CT pelvis 9/11/2019    FINDINGS:    Limitations: There is limited evaluation of solid organs and vasculature due to  lack of intravenous contrast. Patient is imaged with arms by his side, which  results in decreased signal-to-noise ratio. Lower Chest: Mild to moderate cardiomegaly. Small to moderate pericardial  effusion, unchanged from prior. Interval resolution of previously seen bilateral  pleural effusions. New diffuse groundglass opacities and interstitial thickening  within the bilateral lungs, most pronounced in the bilateral lower lobes but  also involving the visualized portions of the lingula and right middle lobe. Coronary artery calcifications. Pulmonary arterial enlargement to 3.2 cm  diameter, suggestive of pulmonary arterial hypertension. CABG. .    Mesentery/Peritoneum: No free intraperitoneal air. Trace dependent presacral  fluid, nonspecific. Trace perihepatic and perisplenic ascites.  Fluid also tracks  along the bilateral paracolic gutters. Liver: Unremarkable    Gallbladder/biliary: Unremarkable    Pancreas: Mild pancreatic atrophy. No gross abnormality detected. Spleen: Unremarkable    Adrenal Glands: Unremarkable    Kidneys: Bilateral renal atrophy. 1.3 cm water attenuating cyst at the  interpolar region of the right kidney. Urinary Bladder: Mild dependent stones within the urinary bladder measuring 2.2  cm in diameter, increased in size from prior. No urinary bladder mural  thickening appreciated. Other Pelvic Organs: Coarse calcific lesions within the prostate. Mild prostate  enlargement. Bowel: Mild diverticulosis without evidence of acute diverticulitis. Portions of  the colon are underdistended, limiting assessment of mural thickness. No  pericolic fat stranding appreciated. Normal caliber appendix (axial 48). The tip  of the appendix is difficult to separate from the trace fluid in the paracolic  gutter although no definite abnormality is appreciated. Malpositioned enteric tube which is looped at the level of the gastric cardia,  with tip extending cranially to the level of the distal third of the esophagus. No evidence of small bowel obstruction. Lymph Nodes: No enlarged lymph nodes identified by size criteria. Vessels: Moderate arterial mural calcifications. 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 Body wall: Small fat-containing umbilical hernia. Small amount of subcutaneous  is gas within the right ventral abdominal wall. Musculoskeletal: No acute fracture. No aggressive osseous lesion appreciated. Nonhealed median sternotomy. Multilevel degenerative changes of the spine. Trace  retrolisthesis of L2 on L3 and L3 on L4. Calcifications again noted at the  bilateral hamstring origins. Impression  1. Interval increased size of the conglomerate of dependent urinary bladder  calculi. 2.  Mild prostatomegaly. Recommend correlation with PSA.   3.  Malpositioned enteric tube, looped at the level of the gastric cardia, with  tip at the level the distal esophagus. Recommend repositioning to ensure  appropriate sump function. 4.  Interval resolved bilateral pleural effusions. New extensive groundglass  opacities within the bilateral lungs, may reflect poor edema or  infectious/inflammatory process. Recommend short-term imaging follow-up until  resolution. 5.  All amount of soft tissue gas in the right ventral abdominal wall, likely  iatrogenic; clinical correlation advised to exclude infection. 6.  Unhealed median sternotomy. Anasarca. Mild ascites. Right common iliac  artery aneurysm. Additional incidental findings as above. XRAY (Most Recent)      EKG Results for orders placed or performed in visit on 09/13/21   AMB POC EKG ROUTINE W/ 12 LEADS, INTER & REP     Status: None    Narrative    Read by Alfredito Wu MD. Sinus rhythm. IVCO. Old inferior MI.        2D ECHO 12/31/21    ECHO ADULT FOLLOW-UP OR LIMITED 01/02/2022 1/2/2022    Interpretation Summary    Left Ventricle: Left ventricle size is normal. Mildly increased wall thickness. Findings consistent with mild concentric hypertrophy. Severe global hypokinesis present. Severely reduced left ventricular systolic function with a visually estimated EF of 10 -15%. Grade III diastolic dysfunction with increased LAP.   Pericardium: Trivial pericardial effusion present. No indication of cardiac tamponade.   PAP of 42 mmHg.     Signed by: Judge Nona DO on 1/2/2022 10:29 AM

## 2022-01-07 NOTE — PROGRESS NOTES
Reviewed chart. RRT this am. Patient in restraints. CM will continue to monitor and assist with transition of care needs.      JEANNETTE KerrN, RN  Pager # [phone]  Care Manager

## 2022-01-08 LAB
ABO + RH BLD: NORMAL
BLD PROD TYP BPU: NORMAL
BLOOD GROUP ANTIBODIES SERPL: NORMAL
BPU ID: NORMAL
CALLED TO:,BCALL1: NORMAL
CALLED TO:,BCALL1: NORMAL
CALLED TO:,BCALL2: NORMAL
CROSSMATCH RESULT,%XM: NORMAL
OSMOLALITY UR: 844 MOSMOL/KG
SPECIMEN EXP DATE BLD: NORMAL
STATUS OF UNIT,%ST: NORMAL
UNIT DIVISION, %UDIV: 0

## 2022-01-09 NOTE — PROGRESS NOTES
Physician Progress Note      PATIENTCocarly Kumar  CSN #:                  [id number]  :                       1940  ADMIT DATE:       2021 2:55 AM  DISCH DATE:        2022 5:50 PM  RESPONDING  PROVIDER #:        Zelalem Nicolas MD          QUERY TEXT:    Pt admitted Acute respiratory failure with hypoxia due to multifactorial , Recent COVID-19 pneumonia and Acute on chronic systolic CHF . Noted documentation of Severe malnutrition  on 22 by ordered RD consultant. If possible, please document in progress notes and discharge summary:    The medical record reflects the following:  Risk Factors: BMI 24  Acute respiratory failure with hypoxia due to multifactorial , Recent COVID-19 pneumonia and Acute on chronic systolic CHF now Active Lower GI Bleed    Clinical Indicators: RD PN Malnutrition Status:  Severe malnutrition  Context:  Acute illness  Findings of the 6 clinical characteristics of malnutrition: Energy Intake:  7 - 50% or less of est energy requirements for 5 or more daysWeight Loss:  7 - Greater than 2% over 1 week   Body Fat Loss:  Unable to assess,   Muscle Mass Loss:  Unable to assess,  Fluid Accumulation:  No significant fluid accumulation Last wt PTA was 173 lb 21.  Nutrition Diagnosis: Inadequate oral intake related to cognitive or neurological impairment,acute injury/trauma as evidenced by intake 0-25%    Treatment: RD consult Once NGT is placed and placement verified, iInitiate tube feeding of Jevity 1.5 at 20 mL/hr  Thank you  Paige Masters RN  email  [email or web address]  Options provided:  -- Severe malnutrition  confirmed present on admission  -- Severe malnutrition   ruled out  -- Other - I will add my own diagnosis  -- Disagree - Not applicable / Not valid  -- Disagree - Clinically unable to determine / Unknown  -- Refer to Clinical Documentation Reviewer    PROVIDER RESPONSE TEXT:    The diagnosis of Severe malnutrition was confirmed as present on admission. Query created by:  Ivan Steen on 1/7/2022 2:35 PM      Electronically signed by:  Joey Prieto MD 1/8/2022 8:19 PM

## 2022-01-12 NOTE — PROGRESS NOTES
Physician Progress Note      Griffin Acevdeo  [facility] #:                  [id number]  :                       1940  ADMIT DATE:       2021 2:55 AM  DISCH DATE:        2022 5:50 PM  RESPONDING  PROVIDER #:        Debbie Dixon MD        QUERY TEXT:    Stage of Chronic Kidney Disease: Please provide further specificity, if known. Clinical indicators include: acute on chronic kidney injury, bun, bnp  Options provided:  -- Chronic kidney disease stage 1  -- Chronic kidney disease stage 2  -- Chronic kidney disease stage 3  -- Chronic kidney disease stage 3a  -- Chronic kidney disease stage 3b  -- Chronic kidney disease stage 4  -- Chronic kidney disease stage 5  -- Chronic kidney disease stage 5, requiring dialysis  -- End stage renal disease  -- Other - I will add my own diagnosis  -- Disagree - Not applicable / Not valid  -- Disagree - Clinically Unable to determine / Unknown        PROVIDER RESPONSE TEXT:    The patient has chronic kidney disease stage 3.       Electronically signed by:  Debbie Dixon MD 2022 7:13 PM

## 2022-01-13 LAB
BACTERIA SPEC CULT: NORMAL
SERVICE CMNT-IMP: NORMAL

## 2023-02-26 NOTE — H&P
History & Physical    Patient: Aster Retana MRN: [id number]  CSN: [id number]    YOB: 1940  Age: 80 y.o. Sex: male      DOA: 12/31/2021    Chief Complaint:   Chief Complaint   Patient presents with    Shortness of Breath    Chest Pain          HPI:     Aster Retana is a 80 y.o. male with hx of BPH, anxiety, asthma, hypertension, coronary artery disease status post CABGx3 (Aug '21), cardiomyopathy with a EF 45%. Patient presented with 1 week of dyspnea. Worse with exertion. Accompanied occasionally with chest pain. No fever or chills. Received Covid vaccination x2. [facility] -patient intermittently hypoxic 88-9 9%. BNP elevated at 8747. AST 1028, ALT 1873. Patient used to use heavy alcohol. States he has not drank in many years. CTA chest with right greater than left pleural effusion. Mild free fluid in upper quadrants of the abdomen. 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 No pertinent family history. Social History     Socioeconomic History    Marital status:    Tobacco Use    Smoking status: Never Smoker    Smokeless tobacco: Never Used   Vaping Use    Vaping Use: Never used   Substance and Sexual Activity    Alcohol use: No    Drug use: No       Prior to Admission medications    Medication Sig Start Date End Date Taking? Authorizing Provider   tamsulosin (FLOMAX) 0.4 mg capsule Take 2 Capsules by mouth daily (after dinner).  12/7/21  Yes Eugenio Keys JUDITH   atorvastatin (LIPITOR) 40 mg tablet Take 1 Tablet by mouth daily. 9/13/21  Yes Ryan OLMOS NP   metoprolol succinate (TOPROL-XL) 50 mg XL tablet Take 0.5 Tablets by mouth daily. Or as directed 9/13/21  Yes Ryan OLMOS NP   diphenhydrAMINE hcl (BENADRYL) 2 % topical gel Apply 1 mL to affected area every six (6) hours as needed for Itching. Yes Provider, Historical   hydrOXYzine HCL (ATARAX) 25 mg tablet Take 25 mg by mouth three (3) times daily as needed for Itching. 10/21/20  Yes Provider, Historical   fluticasone propionate (FLONASE) 50 mcg/actuation nasal spray 2 Sprays by Both Nostrils route daily. 4/28/20  Yes Osiris Blackburn MD   PARoxetine (PAXIL) 20 mg tablet TAKE 1 TABLET BY MOUTH EVERY DAY 7/5/19  Yes Gilda Martinez PA   zolpidem (AMBIEN) 10 mg tablet Take 10 mg by mouth nightly as needed for Sleep. Yes Provider, Historical   allopurinoL (ZYLOPRIM) 300 mg tablet Take 300 mg by mouth daily. Patient not taking: Reported on 12/31/2021    Provider, Historical   HYDROcodone-acetaminophen (XODOL) 7.5-300 mg tablet Take 1 Tablet by mouth three (3) times daily as needed for Pain. Patient not taking: Reported on 12/31/2021    Provider, Historical       Allergies   Allergen Reactions    Fluconazole Hives and Itching    Penicillin G Hives         Review of Systems  GENERAL: No fever, chills, malaise, weight changes  HEENT: No sore throat or sinus congestion. NECK: No pain or stiffness. PULMONARY: +shortness of breath, no cough or wheeze. Cardiovascular: + Intermittent CP  GASTROINTESTINAL: No abdominal pain, nausea, vomiting or diarrhea  GENITOURINARY: No hesitancy or dysuria. MUSCULOSKELETAL: No joint or muscle pain   DERMATOLOGIC: No rash, no itching, no lesions.    ENDOCRINE: No polyuria, polydipsia  HEMATOLOGICAL: No anemia   NEUROLOGIC: No headache      Physical Exam:     Physical Exam:  Visit Vitals  BP (!) 128/92 (BP 1 Location: Left upper arm, BP Patient Position: Lying)   Pulse 94   Temp 96.9 °F (36.1 °C)   Resp 16   Ht 5' 8\" (1.727 m)   Wt 78.8 kg (173 lb 12.8 oz)   SpO2 98%   BMI 26.43 kg/m²    O2 Flow Rate (L/min): 3 l/min O2 Device: CO2 nasal cannula    Temp (24hrs), Av.9 °F (36.1 °C), Min:96.9 °F (36.1 °C), Max:96.9 °F (36.1 °C)    No intake/output data recorded. No intake/output data recorded. General:  Alert, cooperative, no distress, appears stated age. Head: Normocephalic, without obvious abnormality, atraumatic. Eyes:  Conjunctivae/corneas clear. PERRL, EOMs intact. Nose: Nares normal.   Neck: Supple, symmetrical, trachea midline   Lungs:    Bilateral rales. On 3 L nasal cannula. Heart:  Regular rate and rhythm, S1, S2 normal.     Abdomen: Soft, non-tender. Nondistended. Extremities: Extremities normal, atraumatic, no cyanosis or edema. Pulses: 2+ and symmetric all extremities. Skin:  No rashes or lesions   Neurologic: AAOx3, No focal motor or sensory deficit. Labs Reviewed: All lab results for the last 24 hours reviewed.   Recent Results (from the past 24 hour(s))   EKG, 12 LEAD, INITIAL    Collection Time: 21  2:48 AM   Result Value Ref Range    Ventricular Rate 97 BPM    Atrial Rate 97 BPM    P-R Interval 188 ms    QRS Duration 134 ms    Q-T Interval 408 ms    QTC Calculation (Bezet) 518 ms    Calculated P Axis 66 degrees    Calculated R Axis -39 degrees    Calculated T Axis 99 degrees    Diagnosis       Sinus rhythm with occasional premature ventricular complexes and fusion   complexes  Left axis deviation  Nonspecific intraventricular block  Cannot rule out Anterior infarct , age undetermined  T wave abnormality, consider lateral ischemia  Abnormal ECG  When compared with ECG of 25-DEC-2021 23:57,  premature ventricular complexes are now present     GGT    Collection Time: 21  3:00 AM   Result Value Ref Range     (H) 15 - 85 U/L   CBC WITH AUTOMATED DIFF    Collection Time: 12/31/21  3:01 AM   Result Value Ref Range    WBC 11.9 4.6 - 13.2 K/uL    RBC 3.79 (L) 4.35 - 5.65 M/uL    HGB 12.6 (L) 13.0 - 16.0 g/dL    HCT 36.4 36.0 - 48.0 %    MCV 96.0 78.0 - 100.0 FL    MCH 33.2 24.0 - 34.0 PG    MCHC 34.6 31.0 - 37.0 g/dL    RDW 16.2 (H) 11.6 - 14.5 %    PLATELET 058 (L) 047 - 420 K/uL    MPV 12.3 (H) 9.2 - 11.8 FL    NRBC 1.6 (H) 0  WBC    ABSOLUTE NRBC 0.19 (H) 0.00 - 0.01 K/uL    NEUTROPHILS 79 (H) 40 - 73 %    LYMPHOCYTES 8 (L) 21 - 52 %    MONOCYTES 12 (H) 3 - 10 %    EOSINOPHILS 0 0 - 5 %    BASOPHILS 0 0 - 2 %    IMMATURE GRANULOCYTES 1 (H) 0.0 - 0.5 %    ABS. NEUTROPHILS 9.4 (H) 1.8 - 8.0 K/UL    ABS. LYMPHOCYTES 0.9 0.9 - 3.6 K/UL    ABS. MONOCYTES 1.4 (H) 0.05 - 1.2 K/UL    ABS. EOSINOPHILS 0.0 0.0 - 0.4 K/UL    ABS. BASOPHILS 0.0 0.0 - 0.1 K/UL    ABS. IMM. GRANS. 0.1 (H) 0.00 - 0.04 K/UL    DF AUTOMATED     METABOLIC PANEL, COMPREHENSIVE    Collection Time: 12/31/21  3:01 AM   Result Value Ref Range    Sodium 141 136 - 145 mmol/L    Potassium 3.7 3.5 - 5.5 mmol/L    Chloride 106 100 - 111 mmol/L    CO2 22 21 - 32 mmol/L    Anion gap 13 3.0 - 18 mmol/L    Glucose 153 (H) 74 - 99 mg/dL    BUN 65 (H) 7.0 - 18 MG/DL    Creatinine 1.53 (H) 0.6 - 1.3 MG/DL    BUN/Creatinine ratio 42 (H) 12 - 20      GFR est AA 53 (L) >60 ml/min/1.73m2    GFR est non-AA 44 (L) >60 ml/min/1.73m2    Calcium 8.8 8.5 - 10.1 MG/DL    Bilirubin, total 6.5 (H) 0.2 - 1.0 MG/DL    ALT (SGPT) 1,873 (H) 16 - 61 U/L    AST (SGOT) 1,028 (H) 10 - 38 U/L    Alk.  phosphatase 407 (H) 45 - 117 U/L    Protein, total 6.9 6.4 - 8.2 g/dL    Albumin 3.6 3.4 - 5.0 g/dL    Globulin 3.3 2.0 - 4.0 g/dL    A-G Ratio 1.1 0.8 - 1.7     MAGNESIUM    Collection Time: 12/31/21  3:01 AM   Result Value Ref Range    Magnesium 2.6 1.6 - 2.6 mg/dL   NT-PRO BNP    Collection Time: 12/31/21  3:01 AM   Result Value Ref Range    NT pro-BNP 8,747 (H) 0 - 1,800 PG/ML   TROPONIN-HIGH SENSITIVITY    Collection Time: 12/31/21  3:01 AM Result Value Ref Range    Troponin-High Sensitivity 44 0 - 78 ng/L   ACETAMINOPHEN    Collection Time: 12/31/21  3:01 AM   Result Value Ref Range    Acetaminophen level <2 (L) 10.0 - 30.0 ug/mL   ETHYL ALCOHOL    Collection Time: 12/31/21  3:01 AM   Result Value Ref Range    ALCOHOL(ETHYL),SERUM 3 0 - 3 MG/DL   COVID-19 WITH INFLUENZA A/B    Collection Time: 12/31/21  3:20 AM   Result Value Ref Range    SARS-CoV-2 Detected (AA) NOTD      Influenza A by PCR Not detected NOTD      Influenza B by PCR Not detected NOTD     TROPONIN-HIGH SENSITIVITY    Collection Time: 12/31/21  5:20 AM   Result Value Ref Range    Troponin-High Sensitivity 37 0 - 78 ng/L   AMMONIA    Collection Time: 12/31/21  5:20 AM   Result Value Ref Range    Ammonia 28 11 - 32 UMOL/L   POC LACTIC ACID    Collection Time: 12/31/21  5:22 AM   Result Value Ref Range    Lactic Acid (POC) 3.14 (HH) 0.40 - 2.00 mmol/L   URINALYSIS W/ RFLX MICROSCOPIC    Collection Time: 12/31/21  6:45 AM   Result Value Ref Range    Color DARK YELLOW      Appearance CLEAR      Specific gravity 1.025 1.005 - 1.030      pH (UA) 5.5 5.0 - 8.0      Protein TRACE (A) NEG mg/dL    Glucose Negative NEG mg/dL    Ketone Negative NEG mg/dL    Bilirubin SMALL (A) NEG      Blood Negative NEG      Urobilinogen 1.0 0.2 - 1.0 EU/dL    Nitrites Negative NEG      Leukocyte Esterase TRACE (A) NEG     URINE MICROSCOPIC ONLY    Collection Time: 12/31/21  6:45 AM   Result Value Ref Range    WBC 0 to 3 0 - 4 /hpf    RBC Negative 0 - 5 /hpf    Epithelial cells FEW 0 - 5 /lpf    Bacteria Negative NEG /hpf   HEPATITIS PANEL, ACUTE    Collection Time: 12/31/21  7:00 AM   Result Value Ref Range    Hepatitis A, IgM Negative NEG      __          Hepatitis B surface Ag <0.10 <1.00 Index    Hep B surface Ag Interp. Negative NEG      __          Hepatitis B core, IgM Negative NEG      __          Hepatitis C virus Ab 0.2 <0.80 Index    Hep C virus Ab Interp.  Negative NEG      Hep C  virus Ab comment BLOOD GAS,LACTIC ACID, POC    Collection Time: 12/31/21  9:27 AM   Result Value Ref Range    pH (POC) 7.37 7.35 - 7.45      pCO2 (POC) 29.5 (L) 35.0 - 45.0 MMHG    pO2 (POC) 113 (H) 80 - 100 MMHG    HCO3 (POC) 17.1 (L) 22 - 26 MMOL/L    sO2 (POC) 98.4 (H) 92 - 97 %    Base deficit (POC) 6.9 mmol/L    Specimen type (POC) ARTERIAL      Performed by Gricel Krishna     Lactic Acid (POC) 5.15 (HH) 0.40 - 7.43 mmol/L   METABOLIC PANEL, COMPREHENSIVE    Collection Time: 12/31/21  6:30 PM   Result Value Ref Range    Sodium 142 136 - 145 mmol/L    Potassium 4.5 3.5 - 5.5 mmol/L    Chloride 109 100 - 111 mmol/L    CO2 25 21 - 32 mmol/L    Anion gap 8 3.0 - 18 mmol/L    Glucose 152 (H) 74 - 99 mg/dL    BUN 56 (H) 7.0 - 18 MG/DL    Creatinine 1.50 (H) 0.6 - 1.3 MG/DL    BUN/Creatinine ratio 37 (H) 12 - 20      GFR est AA 55 (L) >60 ml/min/1.73m2    GFR est non-AA 45 (L) >60 ml/min/1.73m2    Calcium 8.4 (L) 8.5 - 10.1 MG/DL    Bilirubin, total 5.9 (H) 0.2 - 1.0 MG/DL    ALT (SGPT) 1,576 (H) 16 - 61 U/L    AST (SGOT) 664 (H) 10 - 38 U/L    Alk. phosphatase 372 (H) 45 - 117 U/L    Protein, total 6.6 6.4 - 8.2 g/dL    Albumin 3.3 (L) 3.4 - 5.0 g/dL    Globulin 3.3 2.0 - 4.0 g/dL    A-G Ratio 1.0 0.8 - 1.7     CBC WITH AUTOMATED DIFF    Collection Time: 12/31/21  6:30 PM   Result Value Ref Range    WBC 8.7 4.6 - 13.2 K/uL    RBC 3.73 (L) 4.35 - 5.65 M/uL    HGB 12.4 (L) 13.0 - 16.0 g/dL    HCT 37.3 36.0 - 48.0 %    .0 78.0 - 100.0 FL    MCH 33.2 24.0 - 34.0 PG    MCHC 33.2 31.0 - 37.0 g/dL    RDW 17.2 (H) 11.6 - 14.5 %    PLATELET 90 (L) 175 - 420 K/uL    MPV 12.3 (H) 9.2 - 11.8 FL    NRBC 1.7 (H) 0  WBC    ABSOLUTE NRBC 0.15 (H) 0.00 - 0.01 K/uL    NEUTROPHILS 88 (H) 40 - 73 %    LYMPHOCYTES 5 (L) 21 - 52 %    MONOCYTES 5 3 - 10 %    EOSINOPHILS 0 0 - 5 %    BASOPHILS 0 0 - 2 %    IMMATURE GRANULOCYTES 1 (H) 0.0 - 0.5 %    ABS. NEUTROPHILS 7.7 1.8 - 8.0 K/UL    ABS. LYMPHOCYTES 0.5 (L) 0.9 - 3.6 K/UL    ABS. MONOCYTES 0.5 0.05 - 1.2 K/UL    ABS. EOSINOPHILS 0.0 0.0 - 0.4 K/UL    ABS. BASOPHILS 0.0 0.0 - 0.1 K/UL    ABS. IMM. GRANS. 0.1 (H) 0.00 - 0.04 K/UL    DF AUTOMATED          XR Results (most recent):  Results from Hospital Encounter encounter on 12/30/21    XR CHEST PA LAT    Narrative  Chest  PA and lateral views    INDICATION:  Chest pain. COMPARISON:  Shortness of breath    FINDINGS:  Median sternotomy wires and mediastinal clips are noted. The cardiac  silhouette is mildly enlarged. The pulmonary vasculature is unremarkable. Small right pleural effusion present, with overlying opacity at the right lung  base. Mild streaky opacities at the left lung base. No pneumothorax. No acute  osseous abnormalities are identified. Thoracic spondylosis noted. Impression  1. Small right pleural effusion with overlying atelectasis. Superimposed  infiltrate not excluded. 2. Mild streaky opacities at the left lung base favor atelectasis. 3. Mildly enlarged cardiac silhouette        CT Results  (Last 48 hours)               12/31/21 0501  CTA CHEST W OR W WO CONT Final result    Impression:  1. No evidence of pulmonary embolism. 2. Cardiomegaly. 3. Right greater than left mild pleural effusions. Questionable mild   interstitial edema at the lung base versus hypoinflation artifact. 4. Mild free fluid in the upper quadrants of the abdomen. 5. Diverticulosis. Narrative:  CTA CHEST PULMONARY EMBOLISM PROTOCOL         INDICATION: Shortness of breath increasing for 2 weeks. Cough. Shortness of   breath and dizziness. Question pulmonary embolism.        TECHNIQUE: Thin collimation axial images obtained through the level of the   pulmonary arteries with additional imaging through the chest following the   uneventful administration of intravenous contrast.  Images reconstructed into   MIP coronal and sagittal projections for complete evaluation of the tortuous and   overlapping pulmonary vascular structures and to reduce patient radiation dose. All CT scans are performed using dose optimization techniques as appropriate to   the performed exam including the following: Automated exposure control,   adjustment of mA and/or kV according to patient size, and use of iterative   reconstructive technique. COMPARISON: None. FINDINGS:       No filling defects are appreciated within the main, left, right, lobar or   visualized segmental pulmonary arteries to suggest embolism. Subsegmental   branches are limited by motion artifact in many regions. The thoracic aorta is   not aneurysmal. There is no contrast in the aorta and dissection cannot be   assessed. Injected contrast refluxes into IVC/hepatic veins. No significant pericardial effusion. Cardiomegaly. Coronary artery   calcifications. CABG. Sternotomy without union but no separation. Mild right   and tiny left pleural effusions. No enlarged axillary, hilar, or mediastinal   lymphadenopathy. Mild vertebral osteophytes. Mild hypoinflation. Minimal interstitial thickening at lung base. Mild dependent   atelectasis adjacent to effusion. Small volume free fluid in the bilateral upper quadrants. The unenhanced liver,   pancreas, spleen, adrenal glands, and partially imaged kidneys are within normal   limits. The gallbladder appears contracted. No CBD dilation. Diverticulosis. No   dilated bowel. Procedures/imaging: see electronic medical records for all procedures/Xrays and details which were not copied into this note but were reviewed prior to creation of Plan      Assessment/Plan     Active Problems:    CHF (congestive heart failure) ([address] (12/31/2021)      SOB (shortness of breath) (12/31/2021)      Transaminitis (12/31/2021)      Pneumonia due to COVID-19 virus (12/31/2021)       Assessment  1. Acute on chronic systolic heart failure  2. Pneumonia due to COVID-19 virus  3.  Acute hypoxic respiratory failure secondary to pneumonia and fluid overload  4. Mild bilateral pleural effusions  5. Transaminitis  6. CT with evidence of mild free fluid in upper quadrants of the abdomen   #1-6. Admit. Decadron. GI consultation given elevated LFTs . Acute hepatitis panel negative. Abdominal ultrasound completed, read pending. Lasix 20 mg twice daily. LFTs trending down. Possibly associated with hepatic congestion from CHF, although seems to be a bit high. Repeat CMP to trend LFTs. 7. History of insomnia  #7. Recent ED visit for confusion. Patient has been on Ambien 10 mg. Will reduce down to 5 mg. Diet: cardiac   DVT/GI Prophylaxis: Lovenox  Code Status: full     Contact:    Discussed with patient at bedside about hospital admission and plan care. He understands and agrees. All questions answered. Disclaimer: Sections of this note are dictated using utilizing voice recognition software. Minor typographical errors may be present. If questions arise, please do not hesitate to contact me or call our department.         Paulino Wagner [facility] today

## 2024-06-10 NOTE — DIABETES MGMT
Diabetes Plan of Care    History of pre diabetes. A1c of 5.7 % (7/29/201)  Home diabetes medications: None. 8/06: Patient is status post CABG x3  Glycemic assessment:    POC BG range on 8/05: 102-122. Regular insulin drip via GlucoStabilizer. POC BG range on 8/06 at time of review: 100-162. Noted first dose of lantus insulin given at 11:35 AM    Recommendation: proceed with plan to transition to [facility] insulin today. Most recent blood glucose values: Within target range : Yes. Current A1c 5.7 % (7/29/201) which is equivalent to estimated average blood glucose of 117 mg/dL during the past 2-3 months    Adequate glycemic control PTA: Yes. Current hospital diabetes medications:  Regular insulin drip via GlucoStabilizer. BG target: . Drip rate at time of review: 0.8 units/hr  Basal lantus insulin 20 units daily, first dose given 8/06/21    TDD previous day 8/05/2021  Regular insulin drip via GlucoStabilizer: 9.7 units    Diet: Currently on clear liquid; no concentrated sweets and noted plan for regular diet starting at dinner today.     Goals: Blood glucose will be within target of 70 - 180 mg/dl by: 8/09/2021    Education:  _____ Refer to Diabetes Education Record                       __X___ Education not indicated at this time       Elva Sears RN [facility]  Pager: [phone] 

## (undated) DEVICE — GLOVE SURG SZ 8 L11.77IN FNGR THK9.8MIL STRW LTX POLYMER

## (undated) DEVICE — SUTURE PROL SZ 5-0 L36IN NONABSORBABLE BLU L13MM C-1 3/8 8720H

## (undated) DEVICE — ROTATING SURGICAL PUNCHES, 1 PER POUCH: Brand: A&E MEDICAL / ROTATING SURGICAL PUNCHES

## (undated) DEVICE — CATHETER ART 20 GAX4 CM 22 GA RADIAL FEP

## (undated) DEVICE — SUTURE MCRYL SZ 4-0 L18IN ABSRB UD L19MM PS-2 3/8 CIR PRIM Y496G

## (undated) DEVICE — KIT CATH 7FR L16CM CTRL VEN POLYUR 3 LUMN PRSS INJ BLU

## (undated) DEVICE — DR LANCEY ON PUMP PACK: Brand: MEDLINE INDUSTRIES, INC.

## (undated) DEVICE — SUT SLK 2 60IN TIE MP BLK --

## (undated) DEVICE — SUT PROL 4-0 30IN SH1 DA BLU --

## (undated) DEVICE — Device: Brand: RETRACT-O-TAPE 18G X 30.5CM BLUNT NEEDLE

## (undated) DEVICE — SUT PROL 6-0 30IN C1 DA BLU --

## (undated) DEVICE — RADIFOCUS OPTITORQUE ANGIOGRAPHIC CATHETER: Brand: OPTITORQUE

## (undated) DEVICE — ADAPTER CARDPLG SET MGMT FEM LUER W/ CLR CODE CLMP DLP

## (undated) DEVICE — Y BARBED CONNECTOR POLYPROPYLENE, LARGE: Brand: ARGYLE

## (undated) DEVICE — SUTURE MCRYL SZ 4 0 L18IN ABSRB VLT PS 1 L24MM 3 8 CIR REV Y682H

## (undated) DEVICE — LEAD PACE L475MM CHNL A OR V MYOCARDIAL STEROID ELUT SIL

## (undated) DEVICE — SUTURE PROL SZ 7-0 L24IN NONABSORBABLE BLU L8MM BV175-6 3/8 8735H

## (undated) DEVICE — PACK PROCEDURE SURG VASC CATH 161 MMC LF

## (undated) DEVICE — SUT SLK 0 30IN FSL BLK --

## (undated) DEVICE — GLIDESHEATH SLENDER STAINLESS STEEL KIT: Brand: GLIDESHEATH SLENDER

## (undated) DEVICE — AVID DUAL STAGE VENOUS DRAINAGE CANNULA: Brand: AVID DUAL STAGE VENOUS DRAINAGE CANNULA

## (undated) DEVICE — ARGYLE FRAZIER SURGICAL SUCTION INSTRUMENT 8 FR/CH (2.7 MM): Brand: ARGYLE

## (undated) DEVICE — X-RAY SPONGES,12 PLY: Brand: DERMACEA

## (undated) DEVICE — SUT SLK 1 30IN TIE MP BLK --

## (undated) DEVICE — BARD® PTFE FELT PLEDGETS, (RECTANGLE), 4.8 MM X 6 MM: Brand: BARD® PTFE FELT PLEDGETS

## (undated) DEVICE — NDL PRT INJ NSAF BLNT 18GX1.5 --

## (undated) DEVICE — SYR LR LCK 1ML GRAD NSAF 30ML --

## (undated) DEVICE — STERILE POLYISOPRENE POWDER-FREE SURGICAL GLOVES: Brand: PROTEXIS

## (undated) DEVICE — SUTURE SZ 7 L18IN NONABSORBABLE SIL CCS L48MM 1/2 CIR STRNM M655G

## (undated) DEVICE — PENCIL ES L3M BTTN SWCH S STL HEX LOK BLDE ELECTRD HOLSTER

## (undated) DEVICE — SUT SLK 0 30IN SH BLK --

## (undated) DEVICE — STANDARD SURGICAL GOWN, L: Brand: CONVERTORS

## (undated) DEVICE — HI-TORQUE VERSACORE FLOPPY GUIDE WIRE SYSTEM 145 CM: Brand: HI-TORQUE VERSACORE

## (undated) DEVICE — PAD,ABDOMINAL,8"X10",ST,LF: Brand: MEDLINE

## (undated) DEVICE — SUTURE ETHBND EXCEL SZ 2-0 L36IN NONABSORBABLE GRN SH-1 X763H

## (undated) DEVICE — SET FLD ADMIN 3 W STPCOCK FIX FEM L BOR 1IN

## (undated) DEVICE — GOWN,NON-REINFORCED,3XL: Brand: MEDLINE

## (undated) DEVICE — PRESSURE MONITORING SET: Brand: TRUWAVE

## (undated) DEVICE — SUTURE VCRL SZ 1 L36IN ABSRB UD CTX L48MM 1/2 CIR J977H

## (undated) DEVICE — REM POLYHESIVE ADULT PATIENT RETURN ELECTRODE: Brand: VALLEYLAB

## (undated) DEVICE — PREP SKN CHLRAPRP APL 26ML STR --

## (undated) DEVICE — TOWEL SURG W16XL26IN WHT NONFENESTRATED ST 4 PER PK

## (undated) DEVICE — SUTURE VCRL SZ 2-0 L27IN ABSRB UD L26MM CT-2 1/2 CIR J269H

## (undated) DEVICE — FLEX ADVANTAGE 1500CC: Brand: FLEX ADVANTAGE

## (undated) DEVICE — GUIDEWIRE VASC L260CM DIA0035IN TIP L3MM PTFE J STD TAPR FIX

## (undated) DEVICE — NEEDLE HYPO 25GA L0.625IN BLU POLYPR HUB S STL REG BVL STR

## (undated) DEVICE — PROCEDURE KIT FLUID MGMT 10 FR CUST MAINFOLD

## (undated) DEVICE — GDWIRE ANGIO DUO FLX 0.018X25 --

## (undated) DEVICE — INSERT SUT HLD F/OCTBS RETRCTR --

## (undated) DEVICE — TRAY CATHETER 16FR W URIN M STATLOK STBL DEV FOR LUBRI-SIL 2 TEMP

## (undated) DEVICE — SPONGE HEMOSTAT CELLULS 4X8IN -- SURGICEL

## (undated) DEVICE — SUTURE PERMAHAND SZ 2-0 L18IN NONABSORBABLE BLK L26MM SH C012D

## (undated) DEVICE — SUTURE PROL SZ 7-0 L30IN NONABSORBABLE BLU L9.3MM BV-1 1/2 8703H

## (undated) DEVICE — CATHETER THOR 28FR L23CM DIA9.3MM POLYVI CHL TAPR CONN TIP

## (undated) DEVICE — ZINACTIVE USE 2641837 CLIP LIG M BLU TI HRT SHP WIRE HORZ 600 PER BX

## (undated) DEVICE — PROBE VASC 8MHZ WTRPRF

## (undated) DEVICE — HEART II: Brand: MEDLINE INDUSTRIES, INC.

## (undated) DEVICE — MEDI-VAC TUBING CONNECTOR 5-IN-1 STRAIGHT: Brand: CARDINAL HEALTH

## (undated) DEVICE — SUT PROL 4-0 36IN RB1 DA BLU --

## (undated) DEVICE — ANGIOGRAPHY KIT CUST VASC

## (undated) DEVICE — SUTURE ETHLN 1 L30IN NONABSORBABLE BLK CTX L48MM 1/2 CIR 830H

## (undated) DEVICE — SPONGE DRAIN NONWOVEN 4X4IN -- 2/PK

## (undated) DEVICE — DECANTER BAG 9": Brand: MEDLINE INDUSTRIES, INC.

## (undated) DEVICE — EZ GLIDE AORTIC CANNULA: Brand: EDWARDS LIFESCIENCES EZ GLIDE AORTIC CANNULA

## (undated) DEVICE — SWAN-GANZ CCOMBO V THERMODILUTION CATHETER: Brand: SWAN-GANZ CCOMBO V

## (undated) DEVICE — GOWN,SIRUS,NONRNF,SETINSLV,XL,20/CS: Brand: MEDLINE

## (undated) DEVICE — BAND HEMOSTAT DRY D-STAT RAD --

## (undated) DEVICE — COVER US PRB W15XL120CM W/ GEL RUBBERBAND TAPE STRP FLD GEN

## (undated) DEVICE — SUTURE VCRL SZ 0 L36IN ABSRB UD L36MM CT-1 1/2 CIR J946H

## (undated) DEVICE — GAUZE,SPONGE,4"X4",16PLY,STRL,LF,10/TRAY: Brand: MEDLINE

## (undated) DEVICE — BRUSH SCRB 4% CHG RED DISP --

## (undated) DEVICE — SOLUTION IV 1000ML 0.9% SOD CHL

## (undated) DEVICE — 450 ML BOTTLE OF 0.05% CHLORHEXIDINE GLUCONATE IN 99.95% STERILE WATER FOR IRRIGATION, USP AND APPLICATOR.: Brand: IRRISEPT ANTIMICROBIAL WOUND LAVAGE

## (undated) DEVICE — SYS VSL HARV HEMOPRO2 VASOVIEW -- HARV SYS MINIMUM ORDER 5/EA

## (undated) DEVICE — Device

## (undated) DEVICE — FOGARTY SPRING CLIPS 6MM: Brand: FOGARTY SOFTJAW